# Patient Record
Sex: MALE | Race: WHITE | NOT HISPANIC OR LATINO | Employment: FULL TIME | URBAN - METROPOLITAN AREA
[De-identification: names, ages, dates, MRNs, and addresses within clinical notes are randomized per-mention and may not be internally consistent; named-entity substitution may affect disease eponyms.]

---

## 2017-01-16 ENCOUNTER — OFFICE VISIT (OUTPATIENT)
Dept: PSYCHOLOGY | Facility: CLINIC | Age: 37
End: 2017-01-16
Payer: COMMERCIAL

## 2017-01-16 DIAGNOSIS — F33.41 MAJOR DEPRESSIVE DISORDER, RECURRENT EPISODE, IN PARTIAL REMISSION (H): ICD-10-CM

## 2017-01-16 DIAGNOSIS — F90.0 ATTENTION-DEFICIT HYPERACTIVITY DISORDER, PREDOMINANTLY INATTENTIVE TYPE: Primary | ICD-10-CM

## 2017-01-16 PROCEDURE — 90834 PSYTX W PT 45 MINUTES: CPT | Performed by: PSYCHOLOGIST

## 2017-01-16 NOTE — PROGRESS NOTES
Progress Note    Client Name: William Bolton  Date: 1/16/17         Service Type: Individual      Session Start Time: 7:00  Session End Time: 7:45      Session Length: 45     Session #: 9     Attendees: Client attended alone       DATA      Progress Since Last Session (Related to Symptoms / Goals / Homework):   Symptoms: Stable    Homework: completed      Episode of Care Goals: Satisfactory progress - ACTION (Actively working towards change); Intervened by reinforcing change plan / affirming steps taken. Reports that he has been more assertive about his needs with his wife. Reports that he is making overall lifestyle changes to improve mood and manage problems with inattention.     Current / Ongoing Stressors and Concerns:   Reported that inattention and mood seem to be well managed right now. Reported that they should find out next week if his foster child's mother will have her parental rights terminated. Reported that they plan to adopt him.       Treatment Objective(s) Addressed in This Session:   reviewed successes and struggles; review lifestyle habits and ways to improve focus and mood  psychoeducation     Intervention:   Reviewed progress, successes and struggles since last session. Coached him on assertiveness skills and mood management skills to maintain gains. Reinforced healthy behavioral choices. Normalized the ups and downs of being a .  Reflected on the pressure of the uncertainties of whether they will be able to adopt their foster son.         ASSESSMENT: Current Emotional / Mental Status (status of significant symptoms):   Risk status (Self / Other harm or suicidal ideation)   Client denies current fears or concerns for personal safety.   Client denies current or recent suicidal ideation or behaviors.   Client denies current or recent homicidal ideation or behaviors.   Client denies current or recent self injurious behavior or  ideation.   Client denies other safety concerns.   A safety and risk management plan has not been developed at this time, however client was given the after-hours number should there be a change in any of these risk factors.     Appearance:   Appropriate    Eye Contact:   Good    Psychomotor Behavior: Normal    Attitude:   Cooperative    Orientation:   All   Speech    Rate / Production: Normal     Volume:  Normal    Mood:    Normal with periods of situational sadness and anxiety   Affect:    Appropriate    Thought Content:  Clear    Thought Form:  Coherent  Logical    Insight:    Good      Medication Review:   No changes to current psychiatric medication(s)     Medication Compliance:   Yes     Changes in Health Issues:   None reported     Chemical Use Review:   Substance Use: Chemical use reviewed, no active concerns identified      Tobacco Use: No current tobacco use.       Collateral Reports Completed:   Not Applicable    PLAN: (Client Tasks / Therapist Tasks / Other)  Future appointment is scheduled. Continue to use skills discussed in session to increase life balance, improve mood, and improve focus and follow through.  Continue habits of regular physical activity. Continue to reach out to spouse and other supportive people when mood is low. Call with questions or concerns.         Marlin Spivey PSYD LP                                                                                                          Treatment Plan    Client's Name: William Bolton  YOB: 1980    Date: 1/16/2017    DSM-V Diagnoses: 296.35 Major Depressive Disorder, Recurrent Episode, In partial remission _  Attention Deficit/Hyperactivity Disorder: 314.00 (F90.0): Predominantly innattentive presentation  Psychosocial / Contextual Factors: balancing work and home life  WHODAS:     Referral / Collaboration:  Referral to another professional/service is not indicated at this time..    Anticipated number of session or this episode  of care: reassess after 12 sessions      MeasurableTreatment Goal(s) related to diagnosis / functional impairment(s)  Goal 1: Client will learn and use coping skills to effectively manage inattention and depressed mood.    I will know I've met my goal when I'm feeling better.      Objective #A (Client Action)    Client will increase physical activity to at least 2x weekly to improve mood and overall sense of well-being.  Status: Continued - Date(s): 1/16/2017    Intervention(s)  Therapist will assign homework, problem-solve barriers to follow through.    Objective #B  Client will learn assertive communication skills to improve relationships.  Status: Continued - Date(s): 1/16/2017    Intervention(s)  Therapist will teach assertive communication skills.    Objective #C  Client will learn and use strategies to plan his time and organize his activities more effectively.  Status: Continued - Date(s): 1/16/2017    Intervention(s)  Therapist will teach time management and organizational techniques.      Client has reviewed and agreed to the above plan.      Marlin Spivey PSYD LP  January 16, 2017

## 2017-01-25 ENCOUNTER — MYC MEDICAL ADVICE (OUTPATIENT)
Dept: FAMILY MEDICINE | Facility: CLINIC | Age: 37
End: 2017-01-25

## 2017-01-25 DIAGNOSIS — F98.8 ADD (ATTENTION DEFICIT DISORDER) WITHOUT HYPERACTIVITY: Primary | ICD-10-CM

## 2017-01-25 RX ORDER — DEXTROAMPHETAMINE SACCHARATE, AMPHETAMINE ASPARTATE MONOHYDRATE, DEXTROAMPHETAMINE SULFATE AND AMPHETAMINE SULFATE 5; 5; 5; 5 MG/1; MG/1; MG/1; MG/1
20 CAPSULE, EXTENDED RELEASE ORAL DAILY
Qty: 30 CAPSULE | Refills: 0 | Status: CANCELLED | OUTPATIENT
Start: 2017-01-25

## 2017-01-25 NOTE — TELEPHONE ENCOUNTER
Dr. Villa please see mychart note below--3 issues. Please advise.   Adderall pended.     A prescription for  Adderall      Last Written Prescription Date: 9/23/16  Last Fill Quantity: 30,  # refills: 0 ?  Last Office Visit with [facility], [facility] or [facility] prescribing provider: 4/20/16                                         Next 5 appointments (look out 90 days)     Apr 17, 2017  7:00 AM   Return Visit with Marlin Spivey PSYD   [facility]    [address] [place] [id number]   [phone]                  Routing refill request to provider for review/approval because:  Drug not on the G refill protocol   Chary Peralta R.N.

## 2017-02-01 RX ORDER — DEXTROAMPHETAMINE SACCHARATE, AMPHETAMINE ASPARTATE MONOHYDRATE, DEXTROAMPHETAMINE SULFATE AND AMPHETAMINE SULFATE 5; 5; 5; 5 MG/1; MG/1; MG/1; MG/1
20 CAPSULE, EXTENDED RELEASE ORAL DAILY
Qty: 30 CAPSULE | Refills: 0 | Status: SHIPPED | OUTPATIENT
Start: 2017-03-04 | End: 2017-04-03

## 2017-02-01 RX ORDER — DEXTROAMPHETAMINE SACCHARATE, AMPHETAMINE ASPARTATE MONOHYDRATE, DEXTROAMPHETAMINE SULFATE AND AMPHETAMINE SULFATE 5; 5; 5; 5 MG/1; MG/1; MG/1; MG/1
20 CAPSULE, EXTENDED RELEASE ORAL DAILY
Qty: 30 CAPSULE | Refills: 0 | Status: SHIPPED | OUTPATIENT
Start: 2017-02-01 | End: 2017-03-03

## 2017-02-01 RX ORDER — DEXTROAMPHETAMINE SACCHARATE, AMPHETAMINE ASPARTATE MONOHYDRATE, DEXTROAMPHETAMINE SULFATE AND AMPHETAMINE SULFATE 5; 5; 5; 5 MG/1; MG/1; MG/1; MG/1
20 CAPSULE, EXTENDED RELEASE ORAL DAILY
Qty: 30 CAPSULE | Refills: 0 | Status: SHIPPED | OUTPATIENT
Start: 2017-04-04 | End: 2017-05-04

## 2017-02-15 ENCOUNTER — OFFICE VISIT (OUTPATIENT)
Dept: FAMILY MEDICINE | Facility: CLINIC | Age: 37
End: 2017-02-15
Payer: COMMERCIAL

## 2017-02-15 VITALS
DIASTOLIC BLOOD PRESSURE: 78 MMHG | BODY MASS INDEX: 36.16 KG/M2 | TEMPERATURE: 97.8 F | HEIGHT: 72 IN | HEART RATE: 100 BPM | SYSTOLIC BLOOD PRESSURE: 130 MMHG | WEIGHT: 267 LBS

## 2017-02-15 DIAGNOSIS — F33.0 MAJOR DEPRESSIVE DISORDER, RECURRENT EPISODE, MILD (H): ICD-10-CM

## 2017-02-15 DIAGNOSIS — Z30.09 ENCOUNTER FOR VASECTOMY COUNSELING: Primary | ICD-10-CM

## 2017-02-15 PROCEDURE — 99214 OFFICE O/P EST MOD 30 MIN: CPT | Performed by: FAMILY MEDICINE

## 2017-02-15 RX ORDER — CITALOPRAM HYDROBROMIDE 10 MG/1
10 TABLET ORAL DAILY
Qty: 10 TABLET | Refills: 0 | Status: SHIPPED | OUTPATIENT
Start: 2017-02-15 | End: 2017-06-05 | Stop reason: ALTCHOICE

## 2017-02-15 RX ORDER — BUPROPION HYDROCHLORIDE 150 MG/1
150 TABLET ORAL EVERY MORNING
Qty: 30 TABLET | Refills: 1 | Status: SHIPPED | OUTPATIENT
Start: 2017-02-15 | End: 2017-04-11

## 2017-02-15 RX ORDER — DIAZEPAM 10 MG
10 TABLET ORAL EVERY 6 HOURS PRN
Qty: 1 TABLET | Refills: 0 | Status: SHIPPED | OUTPATIENT
Start: 2017-02-15 | End: 2017-06-05

## 2017-02-15 NOTE — PATIENT INSTRUCTIONS
Understanding Vasectomy  Vasectomy is a simple, safe procedure that makes a man sterile (unable to father a child). It is the most effective birth control method for men.  Your Reproductive System  For pregnancy to occur, a man s sperm (male reproductive cells) must join with a woman s egg. To understand how a vasectomy works, you need to know how sperm are produced, stored, and released by the body:    The urethra is the tube in the center of the penis. It transports both urine and semen. When you have an orgasm, semen is ejaculated out of the urethra.    The seminal vesicles and the prostate gland secrete fluids called semen. This sticky, white fluid helps nourish sperm and carry them along.    The epididymis is a coiled tube that holds the sperm while they mature.    The scrotum is a pouch of skin that contains the testes.    The testes are glands that produce sperm and male hormones.    The vas deferens are tubes that carry the sperm from the epididymis to the penis.    Sperm (shown magnified) carry genetic material.    How a Vasectomy Works  During the procedure, the 2 vas deferens are cut and sealed off. This prevents sperm from traveling from the testes to the penis. It is the only change in your reproductive system. The testes still produce sperm. But since the sperm have nowhere to go, they die and are absorbed by your body. Only a very small amount of semen is made up of sperm. So after a vasectomy, your semen won t look or feel any different.  Keep In Mind  After a vasectomy, some active sperm still remain in the reproductive system. It will take about 3 months and numerous ejaculations before the semen is completely free of sperm. Until then, you ll need to use another form of birth control.     2675-1828 [employer]. [address] [place] [address], [place] [address]. All rights reserved. This information is not intended as a substitute for professional medical care. Always follow your healthcare  professional's instructions.        Having a Vasectomy: Before, During, and After the Procedure  Vasectomy is an outpatient (same day) procedure. It can be done in a doctor s office, clinic, or hospital. Your doctor will talk with you about preparing for surgery. He or she will also discuss the possible risks and complications with you. After the procedure, follow your doctor s advice for recovery.  Preparing for surgery     The cut ends of the vas may be tied, closed with a clip, or sealed by heat (cauterized).   Your doctor will talk with you about getting ready for surgery. You may be asked to do the following:    Sign a consent form. This must be done at least a few days before surgery. It gives your doctor permission to do the procedure. It also states that a vasectomy is not guaranteed to make you sterile.    Don t take aspirin, ibuprofen, or naproxen for 2 weeks before surgery. These medications can cause bleeding after the procedure. Also, tell your doctor if you take any medications, supplements, or herbal remedies.    Tell your doctor if you ve had any prior scrotal surgery.    Arrange for an adult family member or friend to give you a ride home after surgery.    Shower and clean your scrotum the day of surgery. Your doctor may also ask you to shave your scrotum.    Bring an athletic supporter (jockstrap) or pair of snug cotton briefs to the doctor s office or hospital.    Eat no more than a light snack before surgery.  During surgery  The entire procedure usually lasts less than 30 minutes.    You ll be asked to undress and lie on a table.    You may be given medication to help you relax. To prevent pain during surgery, you ll be given an injection of local anesthetic in your scrotum or lower groin.    Once the area is numb, one or two small incisions are made in the scrotum. This may be done with a scalpel or with a pointed clamp (no-scalpel method).    The vas deferens are lifted through the incision  and cut. The ends of the vas are then sealed off using one of several methods.    If needed, the incision is closed with stitches.    You can rest for a while until you re ready to go home.  Recovering at home  For about a week, your scrotum may look bruised and slightly swollen. You may also have a small amount of bloody discharge from the incision. This is normal.  To help make your recovery more comfortable, follow the tips below.    Stay off your feet as much as possible for the first 2 days. Try to lie flat on a bed or sofa.    Wear an athletic supporter or snug cotton briefs for support.    Reduce swelling by placing an ice pack or bag of frozen peas in a thin towel. Then place the towel on your scrotum.    Take medications with acetaminophen (such as Tylenol) to relieve any discomfort. Don t use aspirin, ibuprofen, or naproxen.    Wait 48 hours before bathing.    Avoid heavy lifting or exercise for 7 days.    Ask your doctor how long to wait before having sex again. Remember: You must use another form of birth control until you re completely sterile.  When to seek medical care  Call your doctor if you notice any of the following after surgery:    Increasing pain or swelling in your scrotum    A large black-and-blue area, or a growing lump    Fever or chills    Increasing redness or drainage of the incision    Trouble urinating   Sex after vasectomy  Vasectomy doesn t change your sexual function. So when you start having sex again, it should feel the same as before. A vasectomy also shouldn t affect your relationship with your partner. It s important to remember, though, that you won t become sterile right away. It will take time before you can have sex without the need for birth control.    Until you re sterile: After a vasectomy, some active sperm still remain in your semen. It will take time and many ejaculations before the sperm are completely gone. During this period, you must use another birth control  method to prevent pregnancy. To make sure no sperm are left in your semen, you ll need to have one or more semen exams. You usually collect a semen sample at home and bring it to a lab. The sample is then checked under a microscope. You re sterile only when these samples show no evidence of sperm. Ask your doctor whether additional follow-up is needed.    After you re sterile: After your doctor tells you you re sterile, you no longer need to use any form of birth control. You re free to have sex without the fear of unwanted pregnancy. However, a vasectomy does not protect you from sexually transmitted diseases (STDs). If you have more than one sex partner, be sure to practice safer sex by using condoms.    8436-5634 [employer]. [address] [place] [address], [place] [address]. All rights reserved. This information is not intended as a substitute for professional medical care. Always follow your healthcare professional's instructions.        Vasectomy: Risks and Complications  A vasectomy is an outpatient procedure. This means you ll go home the same day. It s done in a doctor s office, clinic, or hospital. Before your procedure, you ll be asked to read and sign a consent form. This form states you re aware of the possible risks and complications. It also says that you understand that the procedure, though most often successful, can t promise to make you sterile. Be sure you have all your questions answered before you sign this form. Below is a list of risks and possible complications of the procedure.  Risks and Possible Complications of Vasectomy  Vasectomy is safe. But it does have risks. They include the following:    Bleeding or infection    Sperm granuloma. This is a small, harmless lump. It may form where the vas deferens is sealed off.    Sperm buildup (congestion). This may cause soreness in the testes. Anti-inflammatory medications can provide relief.    Epididymitis. This is inflammation that may  cause scrotal aching. It often goes away without treatment. Anti-inflammatory medications can provide relief.    Reconnection of the vas deferens. This can occur in rare cases. It makes you fertile again. This may result in an unplanned pregnancy.    Sperm antibodies. Develpoing antibodies is a common response of your body to the absorbed sperm. The antibodies can make you sterile. This is true even if you later try to reverse your vasectomy.    Long-term testicular discomfort. This may occur after surgery. But it s very rare.     3265-4000 [employer]. [address] [place] [address]. All rights reserved. This information is not intended as a substitute for professional medical care. Always follow your healthcare professional's instructions.

## 2017-02-15 NOTE — MR AVS SNAPSHOT
After Visit Summary   2/15/2017    William Bolton    MRN: [id number]           Patient Information     Date Of Birth          1980        Visit Information        Provider Department      2/15/2017 4:20 PM Juan Antonio Villa Jr., MD [facility]        Today's Diagnoses     Encounter for vasectomy counseling    -  1      Care Instructions      Understanding Vasectomy  Vasectomy is a simple, safe procedure that makes a man sterile (unable to father a child). It is the most effective birth control method for men.  Your Reproductive System  For pregnancy to occur, a man s sperm (male reproductive cells) must join with a woman s egg. To understand how a vasectomy works, you need to know how sperm are produced, stored, and released by the body:    The urethra is the tube in the center of the penis. It transports both urine and semen. When you have an orgasm, semen is ejaculated out of the urethra.    The seminal vesicles and the prostate gland secrete fluids called semen. This sticky, white fluid helps nourish sperm and carry them along.    The epididymis is a coiled tube that holds the sperm while they mature.    The scrotum is a pouch of skin that contains the testes.    The testes are glands that produce sperm and male hormones.    The vas deferens are tubes that carry the sperm from the epididymis to the penis.    Sperm (shown magnified) carry genetic material.    How a Vasectomy Works  During the procedure, the 2 vas deferens are cut and sealed off. This prevents sperm from traveling from the testes to the penis. It is the only change in your reproductive system. The testes still produce sperm. But since the sperm have nowhere to go, they die and are absorbed by your body. Only a very small amount of semen is made up of sperm. So after a vasectomy, your semen won t look or feel any different.  Keep In Mind  After a vasectomy, some active sperm still remain in the reproductive system. It  will take about 3 months and numerous ejaculations before the semen is completely free of sperm. Until then, you ll need to use another form of birth control.     6191-5620 [employer]. [address] [place] [address], [place] [address]. All rights reserved. This information is not intended as a substitute for professional medical care. Always follow your healthcare professional's instructions.        Having a Vasectomy: Before, During, and After the Procedure  Vasectomy is an outpatient (same day) procedure. It can be done in a doctor s office, clinic, or hospital. Your doctor will talk with you about preparing for surgery. He or she will also discuss the possible risks and complications with you. After the procedure, follow your doctor s advice for recovery.  Preparing for surgery     The cut ends of the vas may be tied, closed with a clip, or sealed by heat (cauterized).   Your doctor will talk with you about getting ready for surgery. You may be asked to do the following:    Sign a consent form. This must be done at least a few days before surgery. It gives your doctor permission to do the procedure. It also states that a vasectomy is not guaranteed to make you sterile.    Don t take aspirin, ibuprofen, or naproxen for 2 weeks before surgery. These medications can cause bleeding after the procedure. Also, tell your doctor if you take any medications, supplements, or herbal remedies.    Tell your doctor if you ve had any prior scrotal surgery.    Arrange for an adult family member or friend to give you a ride home after surgery.    Shower and clean your scrotum the day of surgery. Your doctor may also ask you to shave your scrotum.    Bring an athletic supporter (jockstalecia) or pair of snug cotton briefs to the doctor s office or hospital.    Eat no more than a light snack before surgery.  During surgery  The entire procedure usually lasts less than 30 minutes.    You ll be asked to undress and lie on a  table.    You may be given medication to help you relax. To prevent pain during surgery, you ll be given an injection of local anesthetic in your scrotum or lower groin.    Once the area is numb, one or two small incisions are made in the scrotum. This may be done with a scalpel or with a pointed clamp (no-scalpel method).    The vas deferens are lifted through the incision and cut. The ends of the vas are then sealed off using one of several methods.    If needed, the incision is closed with stitches.    You can rest for a while until you re ready to go home.  Recovering at home  For about a week, your scrotum may look bruised and slightly swollen. You may also have a small amount of bloody discharge from the incision. This is normal.  To help make your recovery more comfortable, follow the tips below.    Stay off your feet as much as possible for the first 2 days. Try to lie flat on a bed or sofa.    Wear an athletic supporter or snug cotton briefs for support.    Reduce swelling by placing an ice pack or bag of frozen peas in a thin towel. Then place the towel on your scrotum.    Take medications with acetaminophen (such as Tylenol) to relieve any discomfort. Don t use aspirin, ibuprofen, or naproxen.    Wait 48 hours before bathing.    Avoid heavy lifting or exercise for 7 days.    Ask your doctor how long to wait before having sex again. Remember: You must use another form of birth control until you re completely sterile.  When to seek medical care  Call your doctor if you notice any of the following after surgery:    Increasing pain or swelling in your scrotum    A large black-and-blue area, or a growing lump    Fever or chills    Increasing redness or drainage of the incision    Trouble urinating   Sex after vasectomy  Vasectomy doesn t change your sexual function. So when you start having sex again, it should feel the same as before. A vasectomy also shouldn t affect your relationship with your partner. It s  important to remember, though, that you won t become sterile right away. It will take time before you can have sex without the need for birth control.    Until you re sterile: After a vasectomy, some active sperm still remain in your semen. It will take time and many ejaculations before the sperm are completely gone. During this period, you must use another birth control method to prevent pregnancy. To make sure no sperm are left in your semen, you ll need to have one or more semen exams. You usually collect a semen sample at home and bring it to a lab. The sample is then checked under a microscope. You re sterile only when these samples show no evidence of sperm. Ask your doctor whether additional follow-up is needed.    After you re sterile: After your doctor tells you you re sterile, you no longer need to use any form of birth control. You re free to have sex without the fear of unwanted pregnancy. However, a vasectomy does not protect you from sexually transmitted diseases (STDs). If you have more than one sex partner, be sure to practice safer sex by using condoms.    6246-2315 [employer]. [address] [place] [address]. All rights reserved. This information is not intended as a substitute for professional medical care. Always follow your healthcare professional's instructions.        Vasectomy: Risks and Complications  A vasectomy is an outpatient procedure. This means you ll go home the same day. It s done in a doctor s office, clinic, or hospital. Before your procedure, you ll be asked to read and sign a consent form. This form states you re aware of the possible risks and complications. It also says that you understand that the procedure, though most often successful, can t promise to make you sterile. Be sure you have all your questions answered before you sign this form. Below is a list of risks and possible complications of the procedure.  Risks and Possible Complications of  Vasectomy  Vasectomy is safe. But it does have risks. They include the following:    Bleeding or infection    Sperm granuloma. This is a small, harmless lump. It may form where the vas deferens is sealed off.    Sperm buildup (congestion). This may cause soreness in the testes. Anti-inflammatory medications can provide relief.    Epididymitis. This is inflammation that may cause scrotal aching. It often goes away without treatment. Anti-inflammatory medications can provide relief.    Reconnection of the vas deferens. This can occur in rare cases. It makes you fertile again. This may result in an unplanned pregnancy.    Sperm antibodies. Develpoing antibodies is a common response of your body to the absorbed sperm. The antibodies can make you sterile. This is true even if you later try to reverse your vasectomy.    Long-term testicular discomfort. This may occur after surgery. But it s very rare.     3220-6630 [employer]. [address] [place] [address]. All rights reserved. This information is not intended as a substitute for professional medical care. Always follow your healthcare professional's instructions.              Follow-ups after your visit        Your next 10 appointments already scheduled     Apr 17, 2017  7:00 AM CDT   Return Visit with Marlin Spivey PsyD   [facility] [place] ([facility] [place])    [address] [place] [id number] [phone]              Who to contact     If you have questions or need follow up information about today's clinic visit or your schedule please contact [facility] [place] directly at [phone].  Normal or non-critical lab and imaging results will be communicated to you by MyChart, letter or phone within 4 business days after the clinic has received the results. If you do not hear from us within 7 days, please contact the clinic through MyChart or phone. If you have a critical or abnormal lab  result, we will notify you by phone as soon as possible.  Submit refill requests through [facility] or call your pharmacy and they will forward the refill request to us. Please allow 3 business days for your refill to be completed.          Additional Information About Your Visit        [employer] Information     [facility] [employer] gives you secure access to your electronic health record. If you see a primary care provider, you can also send messages to your care team and make appointments. If you have questions, please call your primary care clinic.  If you do not have a primary care provider, please call [phone] and they will assist you.        Care EveryWhere ID     This is your Care EveryWhere ID. This could be used by other organizations to access your [facility] medical records  [id number]        Your Vitals Were     Pulse Temperature Height BMI (Body Mass Index)          100 97.8  F (36.6  C) (Oral) 6' (1.829 m) 36.21 kg/m2         Blood Pressure from Last 3 Encounters:   02/15/17 130/78   03/08/16 122/82   12/07/15 118/76    Weight from Last 3 Encounters:   02/15/17 267 lb (121.1 kg)   03/08/16 235 lb (106.6 kg)   12/07/15 228 lb (103.4 kg)              Today, you had the following     No orders found for display         Today's Medication Changes          These changes are accurate as of: 2/15/17  4:57 PM.  If you have any questions, ask your nurse or doctor.               Start taking these medicines.        Dose/Directions    diazepam 10 MG tablet   Commonly known as:  VALIUM   Used for:  Encounter for vasectomy counseling   Started by:  Juan Antonio Villa Jr., MD        Dose:  10 mg   Take 1 tablet (10 mg) by mouth every 6 hours as needed for anxiety or sleep Take 30-60 minutes before procedure.  Do not operate a vehicle after taking this medication.   Quantity:  1 tablet   Refills:  0            Where to get your medicines      Some of these will need a paper prescription and others can be bought over the counter.   Ask your nurse if you have questions.     Bring a paper prescription for each of these medications     diazepam 10 MG tablet                Primary Care Provider Office Phone # Fax #    Juan Antonio Villa Jr., [phone]       [facility] [address]  [place] [address]        Thank you!     Thank you for choosing [facility]  for your care. Our goal is always to provide you with excellent care. Hearing back from our patients is one way we can continue to improve our services. Please take a few minutes to complete the written survey that you may receive in the mail after your visit with us. Thank you!             Your Updated Medication List - Protect others around you: Learn how to safely use, store and throw away your medicines at www.disposemymeds.org.          This list is accurate as of: 2/15/17  4:57 PM.  Always use your most recent med list.                   Brand Name Dispense Instructions for use    * amphetamine-dextroamphetamine 20 MG per 24 hr capsule    ADDERALL XR    30 capsule    Take 1 capsule (20 mg) by mouth daily       * amphetamine-dextroamphetamine 20 MG per 24 hr capsule   Start taking on:  3/4/2017    ADDERALL XR    30 capsule    Take 1 capsule (20 mg) by mouth daily       * amphetamine-dextroamphetamine 20 MG per 24 hr capsule   Start taking on:  4/4/2017    ADDERALL XR    30 capsule    Take 1 capsule (20 mg) by mouth daily       citalopram 40 MG tablet    celeXA    90 tablet    Take 1 tablet (40 mg) by mouth daily       diazepam 10 MG tablet    VALIUM    1 tablet    Take 1 tablet (10 mg) by mouth every 6 hours as needed for anxiety or sleep Take 30-60 minutes before procedure.  Do not operate a vehicle after taking this medication.       * Notice:  This list has 3 medication(s) that are the same as other medications prescribed for you. Read the directions carefully, and ask your doctor or other care provider to review them with you.

## 2017-02-15 NOTE — NURSING NOTE
Chief Complaint   Patient presents with     Consult       Initial /78 (BP Location: Right arm, Patient Position: Chair, Cuff Size: Adult Large)  Pulse 100  Temp 97.8  F (36.6  C) (Oral)  Ht 6' (1.829 m)  Wt 267 lb (121.1 kg)  BMI 36.21 kg/m2 Estimated body mass index is 36.21 kg/(m^2) as calculated from the following:    Height as of this encounter: 6' (1.829 m).    Weight as of this encounter: 267 lb (121.1 kg).  Medication Reconciliation: complete

## 2017-02-15 NOTE — PROGRESS NOTES
SUBJECTIVE:                                                    William Bolton is a 37 year old male who presents to clinic today for the following health issues:      Vasectomy Consult     Depression Followup    Status since last visit: Stable, but had episode of erectile dysfunction on citalopram and wonders if he should consider changing medications.  Endorses that libido is down a little.    See PHQ-9 for current symptoms.  Other associated symptoms: None    Complicating factors:   Significant life event:  No   Current substance abuse:  None  Anxiety or Panic symptoms:  No    PHQ-9  English PHQ-9   Any Language            Problem list and histories reviewed & adjusted, as indicated.  Additional history: none    Indication: Vasectomy Consultation    S:  This patient has presented for discussion of vasectomy.  He and his wife have agreed they are done having children and desire permanent sterilization for him. The procedure was explained in detail using diagrams published by [employer].  The permanency and effectiveness of this operation were explained in detail.  Complications were also explained in detail, including vasectomy failure rate, bleeding, infection, scarring, chronic pain, and epididymitis.      The patient was told to shave his scrotal area the day before the procedure to prep for surgery.  He was also told he'd need to remain inactive for 48-72 hours afterwards, no lifting more than 20 lbs or sexual intercourse for two weeks.  The patient was also told that he should have a post-operative sperm sample checked and should refrain from unprotected sexual intercourse until the sperm sample shows no sperm.      Approximately 20 minutes were used in the discussion of the vasectomy and questions were answered.              Problem list, Medication list, Allergies, and Medical/Social/Surgical histories reviewed in EPIC and updated as appropriate.    ROS:  Constitutional, HEENT, cardiovascular,  pulmonary, gi and gu systems are negative, except as otherwise noted.    OBJECTIVE:                                                    /78 (BP Location: Right arm, Patient Position: Chair, Cuff Size: Adult Large)  Pulse 100  Temp 97.8  F (36.6  C) (Oral)  Ht 6' (1.829 m)  Wt 267 lb (121.1 kg)  BMI 36.21 kg/m2  Body mass index is 36.21 kg/(m^2).  GENERAL: healthy, alert and no distress  PSYCH: mentation appears normal, affect normal/bright  The vas deferens were found bilaterally.  Left side was somewhat difficult to isolate.    Diagnostic Test Results:  none      ASSESSMENT/PLAN:                                                            1. Encounter for vasectomy counseling  Schedule vasectomy.  Patient was offered pre-operative Valium which he accepted.  - diazepam (VALIUM) 10 MG tablet; Take 1 tablet (10 mg) by mouth every 6 hours as needed for anxiety or sleep Take 30-60 minutes before procedure.  Do not operate a vehicle after taking this medication.  Dispense: 1 tablet; Refill: 0    2. Major depressive disorder, recurrent episode, mild (H)  Will decrease citalopram dose to 20 mg daily for 10 days then 10 mg daily for 10 days then stop.  Start Wellbutrin during the citalopram taper.  Recheck in four weeks when he presents for his vasectomy.  - citalopram (CELEXA) 10 MG tablet; Take 1 tablet (10 mg) by mouth daily  Dispense: 10 tablet; Refill: 0  - buPROPion (WELLBUTRIN XL) 150 MG 24 hr tablet; Take 1 tablet (150 mg) by mouth every morning  Dispense: 30 tablet; Refill: 1    See Patient Instructions    Jr Juan Antonio Villa MD  [facility]

## 2017-03-23 ENCOUNTER — TELEPHONE (OUTPATIENT)
Dept: FAMILY MEDICINE | Facility: CLINIC | Age: 37
End: 2017-03-23

## 2017-03-23 NOTE — TELEPHONE ENCOUNTER
Patient calling to report that he was recently exposed to pneumonia. His youngest child was just diagnosed with this and he is wondering if this could impact his upcoming vasectomy procedure on 3/29/17. He also has another child with a cold. Patient reports that as of right now, he has a mild cough and some sinus congestion. Nothing suggestive of pneumonia at this time. Denies fevers, fatigue, achiness, wheezing, SOB, or any other acute symptoms at this time. Advised patient to continue to monitor symptoms. If new or worsening symptoms occur prior to appointment call clinic back to speak with nurse again. Otherwise, should be fine to proceed with vasectomy on 3/29/17.    Patient verbalized understanding and agrees with plan.    Will call back if further questions or concerns.    Colette Butler, RN, BSN

## 2017-03-25 ENCOUNTER — OFFICE VISIT (OUTPATIENT)
Dept: FAMILY MEDICINE | Facility: CLINIC | Age: 37
End: 2017-03-25
Payer: COMMERCIAL

## 2017-03-25 VITALS
HEART RATE: 105 BPM | SYSTOLIC BLOOD PRESSURE: 120 MMHG | OXYGEN SATURATION: 98 % | TEMPERATURE: 99 F | WEIGHT: 264 LBS | HEIGHT: 72 IN | BODY MASS INDEX: 35.76 KG/M2 | DIASTOLIC BLOOD PRESSURE: 82 MMHG

## 2017-03-25 DIAGNOSIS — J06.9 VIRAL URI WITH COUGH: Primary | ICD-10-CM

## 2017-03-25 PROCEDURE — 99213 OFFICE O/P EST LOW 20 MIN: CPT | Performed by: FAMILY MEDICINE

## 2017-03-25 NOTE — MR AVS SNAPSHOT
"              After Visit Summary   3/25/2017    William Bolton    MRN: [id number]           Patient Information     Date Of Birth          1980        Visit Information        Provider Department      3/25/2017 9:00 AM Juan Antonio Villa Jr., MD [facility]        Today's Diagnoses     Viral URI with cough    -  1      Care Instructions      Viral Syndrome (Adult)  A viral illness may cause a number of symptoms. The symptoms depend on the part of the body that the virus affects. If it settles in the nose, throat, and lungs, it may cause cough, sore throat, congestion, and sometimes headache. If it settles in the stomach and intestinal tract, it may cause vomiting and diarrhea. Sometimes it causes vague symptoms like \"aching all over,\" feeling tired, loss of appetite, or fever.  A viral illness usually lasts 1 to 2 weeks, but sometimes it lasts longer. In some cases, a more serious infection can look like a viral syndrome in the first few days of the illness. You may need another exam and additional tests to know the difference. Watch for the warning signs listed below.  Home care  Follow these guidelines for taking care of yourself at home:    If symptoms are severe, rest at home for the first 2 to 3 days.    Stay away from cigarette smoke - both your smoke and the smoke from others.    You may use over-the-counter medication acetaminophen or ibuprofen for fever, muscle aching, and headache, unless another medicine was prescribed for this. If you have chronic liver or kidney disease or ever had a stomach ulcer or GI bleeding, talk with your doctor before using these medicines . No one who is younger than 18 and ill with a fever should take aspirin. It may cause severe disease or death liver damage .    Your appetite may be poor, so a light diet is fine. Avoid dehydration by drinking 8 to 12 8-ounce glasses of fluids each day. This may include water; orange juice; lemonade; apple, grape, and " cranberry juice; clear fruit drinks; electrolyte replacement and sports drinks; and decaffeinated teas and coffee. If you have been diagnosed with a kidney disease, ask your doctor how much and what types of fluids you should drink to prevent dehydration. If you have kidney disease, drinking too much fluid can cause it build up in the your body and be dangerous to your health.    Over-the-counter remedies won't shorten the length of the illness but may be helpful for cough, sore throat; and nasal and sinus congestion. Don't use decongestants if you have high blood pressure.  Follow-up care  Follow up with your health care provider if you do not improve over the next week.  When to seek medical advice  Call your healthcare provider right away if any of these occur:    Cough with lots of colored sputum (mucus) or blood in your sputum    Chest pain, shortness of breath, wheezing, or difficulty breathing    Severe headache; face, neck, or ear pain    Severe, constant pain in the lower right side of your belly (abdominal)    Continued vomiting (can t keep liquids down)    Frequent diarrhea (more than 5 times a day); blood (red or black color) or mucus in diarrhea    Feeling weak, dizzy, or like you are going to faint    Extreme thirst    Fever of 100.4 F (38 C) or higher, or as directed by your healthcare provider  Call 911  Get emergency medical care if any of the following occur:    Convulsion    Feeling weak, dizzy, or like you are going to faint    Chest pain, shortness of breath, wheezing, or difficulty breathing    3848-7544 [employer]. [address] [place] [address], [place] [address]. All rights reserved. This information is not intended as a substitute for professional medical care. Always follow your healthcare professional's instructions.              Follow-ups after your visit        Your next 10 appointments already scheduled     Mar 29, 2017  1:40 PM CDT   Office Visit with Juan Antonio Villa Jr.,  MD, SV PROC RM 1   [facility] ([facility])    [address] [place] [address] [phone]           Bring a current list of meds and any records pertaining to this visit.  For Physicals, please bring immunization records and any forms needing to be filled out.  Please arrive 10 minutes early to complete paperwork.            Apr 17, 2017  7:00 AM CDT   Return Visit with Marlin Spivey PsyD   [facility] [place] ([facility] [place])    [address]  [place] [address] [phone]              Who to contact     If you have questions or need follow up information about today's clinic visit or your schedule please contact [facility] directly at [phone].  Normal or non-critical lab and imaging results will be communicated to you by MyChart, letter or phone within 4 business days after the clinic has received the results. If you do not hear from us within 7 days, please contact the clinic through [employer] or phone. If you have a critical or abnormal lab result, we will notify you by phone as soon as possible.  Submit refill requests through [employer] or call your pharmacy and they will forward the refill request to us. Please allow 3 business days for your refill to be completed.          Additional Information About Your Visit        MyChart Information     [employer] gives you secure access to your electronic health record. If you see a primary care provider, you can also send messages to your care team and make appointments. If you have questions, please call your primary care clinic.  If you do not have a primary care provider, please call [phone] and they will assist you.        Care EveryWhere ID     This is your Care EveryWhere ID. This could be used by other organizations to access your [place] medical records  [id number]        Your Vitals Were     Pulse Temperature Height Pulse Oximetry BMI (Body Mass Index)        105 99  F (37.2  C) (Oral) 6' (1.829 m) 98% 35.8 kg/m2        Blood Pressure from Last 3 Encounters:   03/25/17 120/82   02/15/17 130/78   03/08/16 122/82    Weight from Last 3 Encounters:   03/25/17 264 lb (119.7 kg)   02/15/17 267 lb (121.1 kg)   03/08/16 235 lb (106.6 kg)              Today, you had the following     No orders found for display       Primary Care Provider Office Phone # Fax #    Juan Antonio Villa Jr., [phone]       [facility] [address] [address]        Thank you!     Thank you for choosing [facility]  for your care. Our goal is always to provide you with excellent care. Hearing back from our patients is one way we can continue to improve our services. Please take a few minutes to complete the written survey that you may receive in the mail after your visit with us. Thank you!             Your Updated Medication List - Protect others around you: Learn how to safely use, store and throw away your medicines at www.disposemymeds.org.          This list is accurate as of: 3/25/17  9:24 AM.  Always use your most recent med list.                   Brand Name Dispense Instructions for use    * amphetamine-dextroamphetamine 20 MG per 24 hr capsule    ADDERALL XR    30 capsule    Take 1 capsule (20 mg) by mouth daily       * amphetamine-dextroamphetamine 20 MG per 24 hr capsule   Start taking on:  4/4/2017    ADDERALL XR    30 capsule    Take 1 capsule (20 mg) by mouth daily       buPROPion 150 MG 24 hr tablet    WELLBUTRIN XL    30 tablet    Take 1 tablet (150 mg) by mouth every morning       citalopram 10 MG tablet    celeXA    10 tablet    Take 1 tablet (10 mg) by mouth daily       diazepam 10 MG tablet    VALIUM    1 tablet    Take 1 tablet (10 mg) by mouth every 6 hours as needed for anxiety or sleep Take 30-60 minutes before procedure.  Do not operate a vehicle after taking this medication.       * Notice:  This list has 2  medication(s) that are the same as other medications prescribed for you. Read the directions carefully, and ask your doctor or other care provider to review them with you.

## 2017-03-25 NOTE — NURSING NOTE
Chief Complaint   Patient presents with     Cough       Initial /82 (BP Location: Right arm, Cuff Size: Adult Large)  Pulse 105  Temp 99  F (37.2  C) (Oral)  Ht 6' (1.829 m)  Wt 264 lb (119.7 kg)  SpO2 98%  BMI 35.8 kg/m2 Estimated body mass index is 35.8 kg/(m^2) as calculated from the following:    Height as of this encounter: 6' (1.829 m).    Weight as of this encounter: 264 lb (119.7 kg).  Medication Reconciliation: complete

## 2017-03-25 NOTE — PATIENT INSTRUCTIONS
"  Viral Syndrome (Adult)  A viral illness may cause a number of symptoms. The symptoms depend on the part of the body that the virus affects. If it settles in the nose, throat, and lungs, it may cause cough, sore throat, congestion, and sometimes headache. If it settles in the stomach and intestinal tract, it may cause vomiting and diarrhea. Sometimes it causes vague symptoms like \"aching all over,\" feeling tired, loss of appetite, or fever.  A viral illness usually lasts 1 to 2 weeks, but sometimes it lasts longer. In some cases, a more serious infection can look like a viral syndrome in the first few days of the illness. You may need another exam and additional tests to know the difference. Watch for the warning signs listed below.  Home care  Follow these guidelines for taking care of yourself at home:    If symptoms are severe, rest at home for the first 2 to 3 days.    Stay away from cigarette smoke - both your smoke and the smoke from others.    You may use over-the-counter medication acetaminophen or ibuprofen for fever, muscle aching, and headache, unless another medicine was prescribed for this. If you have chronic liver or kidney disease or ever had a stomach ulcer or GI bleeding, talk with your doctor before using these medicines . No one who is younger than 18 and ill with a fever should take aspirin. It may cause severe disease or death liver damage .    Your appetite may be poor, so a light diet is fine. Avoid dehydration by drinking 8 to 12 8-ounce glasses of fluids each day. This may include water; orange juice; lemonade; apple, grape, and cranberry juice; clear fruit drinks; electrolyte replacement and sports drinks; and decaffeinated teas and coffee. If you have been diagnosed with a kidney disease, ask your doctor how much and what types of fluids you should drink to prevent dehydration. If you have kidney disease, drinking too much fluid can cause it build up in the your body and be dangerous to " your health.    Over-the-counter remedies won't shorten the length of the illness but may be helpful for cough, sore throat; and nasal and sinus congestion. Don't use decongestants if you have high blood pressure.  Follow-up care  Follow up with your health care provider if you do not improve over the next week.  When to seek medical advice  Call your healthcare provider right away if any of these occur:    Cough with lots of colored sputum (mucus) or blood in your sputum    Chest pain, shortness of breath, wheezing, or difficulty breathing    Severe headache; face, neck, or ear pain    Severe, constant pain in the lower right side of your belly (abdominal)    Continued vomiting (can t keep liquids down)    Frequent diarrhea (more than 5 times a day); blood (red or black color) or mucus in diarrhea    Feeling weak, dizzy, or like you are going to faint    Extreme thirst    Fever of 100.4 F (38 C) or higher, or as directed by your healthcare provider  Call 911  Get emergency medical care if any of the following occur:    Convulsion    Feeling weak, dizzy, or like you are going to faint    Chest pain, shortness of breath, wheezing, or difficulty breathing    6499-9655 [employer]. [address] [place] [address], [place] [address]. All rights reserved. This information is not intended as a substitute for professional medical care. Always follow your healthcare professional's instructions.

## 2017-03-29 ENCOUNTER — OFFICE VISIT (OUTPATIENT)
Dept: FAMILY MEDICINE | Facility: CLINIC | Age: 37
End: 2017-03-29
Payer: COMMERCIAL

## 2017-03-29 VITALS
TEMPERATURE: 96.9 F | OXYGEN SATURATION: 99 % | BODY MASS INDEX: 35.76 KG/M2 | WEIGHT: 264 LBS | HEIGHT: 72 IN | SYSTOLIC BLOOD PRESSURE: 128 MMHG | HEART RATE: 104 BPM | DIASTOLIC BLOOD PRESSURE: 86 MMHG

## 2017-03-29 DIAGNOSIS — Z30.2 ENCOUNTER FOR VASECTOMY: Primary | ICD-10-CM

## 2017-03-29 PROCEDURE — 55250 REMOVAL OF SPERM DUCT(S): CPT | Performed by: FAMILY MEDICINE

## 2017-03-29 PROCEDURE — 88302 TISSUE EXAM BY PATHOLOGIST: CPT | Performed by: FAMILY MEDICINE

## 2017-03-29 NOTE — NURSING NOTE
Chief Complaint   Patient presents with     Vasectomy       Initial /86  Pulse 104  Temp 96.9  F (36.1  C) (Tympanic)  Ht 6' (1.829 m)  Wt 264 lb (119.7 kg)  SpO2 99%  BMI 35.8 kg/m2 Estimated body mass index is 35.8 kg/(m^2) as calculated from the following:    Height as of this encounter: 6' (1.829 m).    Weight as of this encounter: 264 lb (119.7 kg).  Medication Reconciliation: complete

## 2017-03-29 NOTE — MR AVS SNAPSHOT
After Visit Summary   3/29/2017    William Bolton    MRN: [id number]           Patient Information     Date Of Birth          1980        Visit Information        Provider Department      3/29/2017 1:40 PM Juan Antonio Villa Jr., MD; SV PROC RM 1 [facility]        Today's Diagnoses     Encounter for vasectomy    -  1       Follow-ups after your visit        Follow-up notes from your care team     Return in about 90 days (around 6/27/2017) for semen analysis, Lab Work.      Your next 10 appointments already scheduled     Apr 17, 2017  7:00 AM CDT   Return Visit with Marlin Spivey PsyD   [facility] [place] ([facility] [place])    [address]  [place] [address] [phone]              Who to contact     If you have questions or need follow up information about today's clinic visit or your schedule please contact [facility] [place] directly at [phone].  Normal or non-critical lab and imaging results will be communicated to you by [employer], letter or phone within 4 business days after the clinic has received the results. If you do not hear from us within 7 days, please contact the clinic through [employer] or phone. If you have a critical or abnormal lab result, we will notify you by phone as soon as possible.  Submit refill requests through [employer] or call your pharmacy and they will forward the refill request to us. Please allow 3 business days for your refill to be completed.          Additional Information About Your Visit        [employer] Information     [employer] gives you secure access to your electronic health record. If you see a primary care provider, you can also send messages to your care team and make appointments. If you have questions, please call your primary care clinic.  If you do not have a primary care provider, please call [phone] and they will assist you.        Care EveryWhere ID     This is your Care EveryWhere  ID. This could be used by other organizations to access your [place] medical records  [id number]        Your Vitals Were     Pulse Temperature Height Pulse Oximetry BMI (Body Mass Index)       104 96.9  F (36.1  C) (Tympanic) 6' (1.829 m) 99% 35.8 kg/m2        Blood Pressure from Last 3 Encounters:   03/25/17 120/82   02/15/17 130/78   03/08/16 122/82    Weight from Last 3 Encounters:   03/29/17 264 lb (119.7 kg)   03/25/17 264 lb (119.7 kg)   02/15/17 267 lb (121.1 kg)              Today, you had the following     No orders found for display       Primary Care Provider Office Phone # Fax #    Juan Antonio Villa Jr., [phone]       [facility] [address]  [place] [address]        Thank you!     Thank you for choosing [facility]  for your care. Our goal is always to provide you with excellent care. Hearing back from our patients is one way we can continue to improve our services. Please take a few minutes to complete the written survey that you may receive in the mail after your visit with us. Thank you!             Your Updated Medication List - Protect others around you: Learn how to safely use, store and throw away your medicines at www.disposemymeds.org.          This list is accurate as of: 3/29/17  2:55 PM.  Always use your most recent med list.                   Brand Name Dispense Instructions for use    * amphetamine-dextroamphetamine 20 MG per 24 hr capsule    ADDERALL XR    30 capsule    Take 1 capsule (20 mg) by mouth daily       * amphetamine-dextroamphetamine 20 MG per 24 hr capsule   Start taking on:  4/4/2017    ADDERALL XR    30 capsule    Take 1 capsule (20 mg) by mouth daily       buPROPion 150 MG 24 hr tablet    WELLBUTRIN XL    30 tablet    Take 1 tablet (150 mg) by mouth every morning       citalopram 10 MG tablet    celeXA    10 tablet    Take 1 tablet (10 mg) by mouth daily       diazepam 10 MG tablet    VALIUM    1 tablet    Take 1 tablet  (10 mg) by mouth every 6 hours as needed for anxiety or sleep Take 30-60 minutes before procedure.  Do not operate a vehicle after taking this medication.       * Notice:  This list has 2 medication(s) that are the same as other medications prescribed for you. Read the directions carefully, and ask your doctor or other care provider to review them with you.

## 2017-03-29 NOTE — PROGRESS NOTES
The patient comes in today for a vasectomy.  The patient and his significant other have previously been in and we discussed the other options for birth control, the risks and benefits of the procedure.  Details of those risks and benefits have been noted in the consent form which has been signed.  This includes the potential for bleeding, potential for infection.  Data presented suggesting an increased risk of prostate carcinoma and potential for sperm granuloma formation.    The patient was placed in a supine position on the procedure table.  Scrotal shaving was done by the patient at home.  Sterilely prepped and draped in the usual fashion.  The right vas was first identified and brought up to the midline raphae where a small wheal of Lidocaine without epinephrine was placed in the skin overlying the vas and further anesthesia was injected in to the vas sheath.  The vas was then secured through the scrotal skin with a towel clamp.  A #15 scalpel was then used to incise the skin and then I dissected out the vas free of adventitial tissue.   When a segment of vas was clearly freed up, two titanium surgical clips were placed on the proximal and one on the distal end of a .5-1 cm. segment of the vas.  The vas material between the clips was then cut and removed.  Each ligated end was then cauterized.  When the sterile field was completely dry, the vas was returned to the scrotal sac.      Attention was then turned to the opposite side and proceeded in similar fashion to obtain specimen.  The vas was brought up to the same opening, injected with Lidocaine along the vas sheath and grasped with the clamp, and proceeded in the above fashion.  A 4-0 Dexon suture was used for the scrotal skin incision and a single interrupted suture was used to re-approximate the skin edges.    ASSESSMENT:  1)  Male sterilization via vasectomy.     PLAN:  Patient tolerated the procedure quite well.  He will use  Tylenol 1000 mg TID with  food.  Ice to the scrotum 20 minutes at a time every two to three hours while awake for the next two to three days.  No heavy lifting or intercourse for 14 days.  The patient will bring in a sample of semen for analysis after 3 months.    Juan Antonio Villa MD

## 2017-03-31 LAB — COPATH REPORT: NORMAL

## 2017-04-09 ENCOUNTER — MYC MEDICAL ADVICE (OUTPATIENT)
Dept: FAMILY MEDICINE | Facility: CLINIC | Age: 37
End: 2017-04-09

## 2017-04-09 DIAGNOSIS — F33.0 MAJOR DEPRESSIVE DISORDER, RECURRENT EPISODE, MILD (H): ICD-10-CM

## 2017-04-11 RX ORDER — BUPROPION HYDROCHLORIDE 300 MG/1
300 TABLET ORAL EVERY MORNING
Qty: 30 TABLET | Refills: 1 | Status: SHIPPED | OUTPATIENT
Start: 2017-04-11 | End: 2017-06-05

## 2017-05-12 ENCOUNTER — OFFICE VISIT (OUTPATIENT)
Dept: PSYCHOLOGY | Facility: CLINIC | Age: 37
End: 2017-05-12
Payer: COMMERCIAL

## 2017-05-12 DIAGNOSIS — F90.0 ATTENTION-DEFICIT HYPERACTIVITY DISORDER, PREDOMINANTLY INATTENTIVE TYPE: ICD-10-CM

## 2017-05-12 DIAGNOSIS — F33.41 RECURRENT MAJOR DEPRESSION IN PARTIAL REMISSION (H): Primary | ICD-10-CM

## 2017-05-12 PROCEDURE — 90834 PSYTX W PT 45 MINUTES: CPT | Performed by: PSYCHOLOGIST

## 2017-05-12 NOTE — PROGRESS NOTES
Progress Note    Client Name: William Bolton  Date: 5/12/2017         Service Type: Individual      Session Start Time: 11:00  Session End Time: 11:45      Session Length: 45     Session #: 10     Attendees: Client attended alone       DATA      Progress Since Last Session (Related to Symptoms / Goals / Homework):   Symptoms: Stable    Homework: completed      Episode of Care Goals: Satisfactory progress - ACTION (Actively working towards change); Intervened by reinforcing change plan / affirming steps taken. Reports that he has been more assertive about his needs with his wife. Reports that he is making overall lifestyle changes to improve mood and manage problems with inattention.     Current / Ongoing Stressors and Concerns:   Reported that inattention and mood seem to be well managed right now. He reported that he recently switched anti-depressants and has noticed an increase in irritability, but is waiting another month to see if this resolves before scheduling a follow up with Dr. Villa. He discussed a few situations that created strain with his wife, but reported that they have been managing conflicts appropriately between the two of them, but he hopes that his wife can talk to her parents about the imbalance that client sees in the way his in-laws treat his kids compared to their other grandchildren.       Treatment Objective(s) Addressed in This Session:   reviewed successes and struggles; review lifestyle habits and ways to improve focus and mood  psychoeducation     Intervention:   Reviewed progress, successes and struggles since last session. Coached him on assertiveness skills to use with family (rather than being overly direct) and mood management skills to maintain gains. Reinforced healthy behavioral choices.          ASSESSMENT: Current Emotional / Mental Status (status of significant symptoms):   Risk status (Self / Other harm or suicidal  "ideation)   Client denies current fears or concerns for personal safety.   Client denies current or recent suicidal ideation or behaviors.   Client denies current or recent homicidal ideation or behaviors.   Client denies current or recent self injurious behavior or ideation.   Client denies other safety concerns.   A safety and risk management plan has not been developed at this time, however client was given the after-hours number should there be a change in any of these risk factors.     Appearance:   Appropriate    Eye Contact:   Good    Psychomotor Behavior: Normal    Attitude:   Cooperative    Orientation:   All   Speech    Rate / Production: Normal     Volume:  Normal    Mood:    Normal with periods of situational sadness and irritability   Affect:    Appropriate    Thought Content:  Clear    Thought Form:  Coherent  Logical    Insight:    Good      Medication Review:   Changes to psychiatric medications, see updated Medication List in EPIC.      Medication Compliance:   Yes     Changes in Health Issues:   None reported     Chemical Use Review:   Substance Use: Chemical use reviewed, no active concerns identified      Tobacco Use: No current tobacco use.       Collateral Reports Completed:   Not Applicable    PLAN: (Client Tasks / Therapist Tasks / Other)  Future appointment is scheduled. Continue to use skills discussed in session to increase life balance, improve mood, and improve focus and follow through.  Get back into a habit of regular physical activity. Continue to reach out to spouse and other supportive people when mood is low. Use assertiveness skills (e.g. Paying attention to timing, tone, \"heat of the moment\") with family to manage disagreement and conflict. Call with questions or concerns.         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    Anticipated number of session or this episode of care: reassess after 12 sessions      MeasurableTreatment Goal(s) related to diagnosis / functional impairment(s)  Goal 1: Client will learn and use coping skills to effectively manage inattention and depressed mood.    I will know I've met my goal when I'm feeling better.      Objective #A (Client Action)    Client will increase physical activity to at least 2x weekly to improve mood and overall sense of well-being.  Status: Continued - Date(s): 5/12/2017    Intervention(s)  Therapist will assign homework, problem-solve barriers to follow through.    Objective #B  Client will learn assertive communication skills to improve relationships.  Status: Continued - Date(s): 5/12/2017    Intervention(s)  Therapist will teach assertive communication skills.    Objective #C  Client will learn and use strategies to plan his time and organize his activities more effectively.  Status: Continued - Date(s): 5/12/2017    Intervention(s)  Therapist will teach time management and organizational techniques.      Client has reviewed and agreed to the above plan.      Marlin Spivey PsyD LP  5/12/2017

## 2017-05-12 NOTE — MR AVS SNAPSHOT
MRN:[id number]                      After Visit Summary   5/12/2017    William Bolton    MRN: [id number]           Visit Information        Provider Department      5/12/2017 11:00 AM Marlin Spivey PsyD [facility]      Your next 10 appointments already scheduled     Aug 14, 2017  8:00 AM CDT   Return Visit with Marlin Spivey PsyD   [facility] [place] ([facility])    [address] [place] [address] [phone]              MyChart Information     [employer] gives you secure access to your electronic health record. If you see a primary care provider, you can also send messages to your care team and make appointments. If you have questions, please call your primary care clinic.  If you do not have a primary care provider, please call [phone] and they will assist you.        Care EveryWhere ID     This is your Care EveryWhere ID. This could be used by other organizations to access your [place] medical records  [id number]

## 2017-05-16 DIAGNOSIS — F98.8 ADD (ATTENTION DEFICIT DISORDER) WITHOUT HYPERACTIVITY: ICD-10-CM

## 2017-05-16 RX ORDER — DEXTROAMPHETAMINE SACCHARATE, AMPHETAMINE ASPARTATE MONOHYDRATE, DEXTROAMPHETAMINE SULFATE AND AMPHETAMINE SULFATE 5; 5; 5; 5 MG/1; MG/1; MG/1; MG/1
20 CAPSULE, EXTENDED RELEASE ORAL DAILY
Qty: 30 CAPSULE | Refills: 0 | Status: CANCELLED | OUTPATIENT
Start: 2017-05-16

## 2017-05-16 NOTE — TELEPHONE ENCOUNTER
amphetamine-dextroamphetamine (ADDERALL XR) 20 MG per 24 hr capsule      Last Written Prescription Date:  4/4/2017  Last Fill Quantity: 30 capsule,   # refills: 0  Last Office Visit with [facility] prescribing provider: 3/29/2017  Future Office visit:    Next 5 appointments (look out 90 days)     Aug 14, 2017  8:00 AM CDT   Return Visit with Marlin Spivey PsyD   [facility] [place] ([facility] [place])    [address] [place] [address]   [phone]                   Routing refill request to provider for review/approval because:  Drug not on the [facility] refill protocol or controlled substance

## 2017-05-16 NOTE — TELEPHONE ENCOUNTER
Controlled Substance Refill Request for Adderall XR 20mg  Problem List Complete:  No     PROVIDER TO CONSIDER COMPLETION OF PROBLEM LIST AND OVERVIEW/CONTROLLED SUBSTANCE AGREEMENT    Last Written Prescription Date:  4-4-17  Last Fill Quantity: 30,   # refills: 0    Last Office Visit with [facility] primary care provider: 3-29-17    Future Office visit:   Next 5 appointments (look out 90 days)     Aug 14, 2017  8:00 AM CDT   Return Visit with Marlin Spivey PsyD   [facility]    [address] [place] [address]   [phone]                  Controlled substance agreement on file: No.     Processing:  Patient will  in clinic   checked in past 6 months?  No, route to RN     Thank you,  Bernie Salvador, [place]  [facility]

## 2017-05-17 RX ORDER — DEXTROAMPHETAMINE SACCHARATE, AMPHETAMINE ASPARTATE MONOHYDRATE, DEXTROAMPHETAMINE SULFATE AND AMPHETAMINE SULFATE 5; 5; 5; 5 MG/1; MG/1; MG/1; MG/1
20 CAPSULE, EXTENDED RELEASE ORAL DAILY
Qty: 30 CAPSULE | Refills: 0 | Status: SHIPPED | OUTPATIENT
Start: 2017-05-17 | End: 2017-06-16

## 2017-05-17 RX ORDER — DEXTROAMPHETAMINE SACCHARATE, AMPHETAMINE ASPARTATE MONOHYDRATE, DEXTROAMPHETAMINE SULFATE AND AMPHETAMINE SULFATE 5; 5; 5; 5 MG/1; MG/1; MG/1; MG/1
20 CAPSULE, EXTENDED RELEASE ORAL DAILY
Qty: 30 CAPSULE | Refills: 0 | Status: SHIPPED | OUTPATIENT
Start: 2017-06-17 | End: 2017-07-17

## 2017-05-17 RX ORDER — DEXTROAMPHETAMINE SACCHARATE, AMPHETAMINE ASPARTATE MONOHYDRATE, DEXTROAMPHETAMINE SULFATE AND AMPHETAMINE SULFATE 5; 5; 5; 5 MG/1; MG/1; MG/1; MG/1
20 CAPSULE, EXTENDED RELEASE ORAL DAILY
Qty: 30 CAPSULE | Refills: 0 | Status: SHIPPED | OUTPATIENT
Start: 2017-07-18 | End: 2017-08-17

## 2017-05-17 NOTE — TELEPHONE ENCOUNTER
Perfect timing, Luke.  I'm over in [place] this morning and have dropped three one month Rx's for your Adderall XR 20 mg at the pharmacy here.  So you're good to go for the next three months. I should probably see you in clinic in 3 months as that will be 5-6 months after I last saw you.  I need to ensure your blood pressure, pulse, weight, etc all remain OK on your stimulant.  Please contact the clinic if you have further questions or concerns.     Chart reviewed.  Rx sent to pt's preferred pharmacy.    [facility] - [place], [place] - [address] [place] [address]    Juan Antonio Villa MD

## 2017-06-05 ENCOUNTER — OFFICE VISIT (OUTPATIENT)
Dept: FAMILY MEDICINE | Facility: CLINIC | Age: 37
End: 2017-06-05
Payer: COMMERCIAL

## 2017-06-05 VITALS
TEMPERATURE: 98.5 F | DIASTOLIC BLOOD PRESSURE: 82 MMHG | OXYGEN SATURATION: 98 % | HEART RATE: 99 BPM | HEIGHT: 72 IN | WEIGHT: 264 LBS | BODY MASS INDEX: 35.76 KG/M2 | SYSTOLIC BLOOD PRESSURE: 132 MMHG

## 2017-06-05 DIAGNOSIS — F33.0 MAJOR DEPRESSIVE DISORDER, RECURRENT EPISODE, MILD (H): ICD-10-CM

## 2017-06-05 DIAGNOSIS — E66.09 NON MORBID OBESITY DUE TO EXCESS CALORIES: Primary | ICD-10-CM

## 2017-06-05 PROCEDURE — 99213 OFFICE O/P EST LOW 20 MIN: CPT | Performed by: FAMILY MEDICINE

## 2017-06-05 RX ORDER — BUPROPION HYDROCHLORIDE 150 MG/1
150 TABLET ORAL EVERY MORNING
Qty: 90 TABLET | Refills: 1 | Status: SHIPPED | OUTPATIENT
Start: 2017-06-05 | End: 2017-07-10

## 2017-06-05 RX ORDER — CITALOPRAM HYDROBROMIDE 20 MG/1
TABLET ORAL
Qty: 30 TABLET | Refills: 0 | Status: SHIPPED | OUTPATIENT
Start: 2017-06-05 | End: 2017-07-10

## 2017-06-05 ASSESSMENT — ANXIETY QUESTIONNAIRES
1. FEELING NERVOUS, ANXIOUS, OR ON EDGE: NOT AT ALL
3. WORRYING TOO MUCH ABOUT DIFFERENT THINGS: NOT AT ALL
IF YOU CHECKED OFF ANY PROBLEMS ON THIS QUESTIONNAIRE, HOW DIFFICULT HAVE THESE PROBLEMS MADE IT FOR YOU TO DO YOUR WORK, TAKE CARE OF THINGS AT HOME, OR GET ALONG WITH OTHER PEOPLE: SOMEWHAT DIFFICULT
7. FEELING AFRAID AS IF SOMETHING AWFUL MIGHT HAPPEN: NOT AT ALL
6. BECOMING EASILY ANNOYED OR IRRITABLE: NEARLY EVERY DAY
5. BEING SO RESTLESS THAT IT IS HARD TO SIT STILL: NOT AT ALL
2. NOT BEING ABLE TO STOP OR CONTROL WORRYING: NOT AT ALL
GAD7 TOTAL SCORE: 3

## 2017-06-05 ASSESSMENT — PATIENT HEALTH QUESTIONNAIRE - PHQ9: 5. POOR APPETITE OR OVEREATING: NOT AT ALL

## 2017-06-05 NOTE — MR AVS SNAPSHOT
After Visit Summary   6/5/2017    William Bolton    MRN: [id number]           Patient Information     Date Of Birth          1980        Visit Information        Provider Department      6/5/2017 2:00 PM Juan Antonio Villa Jr., MD [facility]        Today's Diagnoses     Major depressive disorder, recurrent episode, mild (H)           Follow-ups after your visit        Follow-up notes from your care team     Return in about 4 weeks (around 7/3/2017) for E-visit to recheck depression meds.      Your next 10 appointments already scheduled     Aug 14, 2017  8:00 AM CDT   Return Visit with Marlin Spivey PsyD   [facility] [place] ([facility] [place])    [address]  [place] [address] [phone]              Who to contact     If you have questions or need follow up information about today's clinic visit or your schedule please contact [facility] directly at [phone].  Normal or non-critical lab and imaging results will be communicated to you by [employer], letter or phone within 4 business days after the clinic has received the results. If you do not hear from us within 7 days, please contact the clinic through [employer] or phone. If you have a critical or abnormal lab result, we will notify you by phone as soon as possible.  Submit refill requests through [employer] or call your pharmacy and they will forward the refill request to us. Please allow 3 business days for your refill to be completed.          Additional Information About Your Visit        MyChart Information     [employer] gives you secure access to your electronic health record. If you see a primary care provider, you can also send messages to your care team and make appointments. If you have questions, please call your primary care clinic.  If you do not have a primary care provider, please call [phone] and they will assist you.        Care EveryWhere ID     This is  your Care EveryWhere ID. This could be used by other organizations to access your [place] medical records  [id number]        Your Vitals Were     Pulse Temperature Height Pulse Oximetry BMI (Body Mass Index)       99 98.5  F (36.9  C) (Oral) 6' (1.829 m) 98% 35.8 kg/m2        Blood Pressure from Last 3 Encounters:   06/05/17 132/82   03/29/17 128/86   03/25/17 120/82    Weight from Last 3 Encounters:   06/05/17 264 lb (119.7 kg)   03/29/17 264 lb (119.7 kg)   03/25/17 264 lb (119.7 kg)              Today, you had the following     No orders found for display         Today's Medication Changes          These changes are accurate as of: 6/5/17  2:41 PM.  If you have any questions, ask your nurse or doctor.               These medicines have changed or have updated prescriptions.        Dose/Directions    buPROPion 150 MG 24 hr tablet   Commonly known as:  WELLBUTRIN XL   This may have changed:    - medication strength  - how much to take   Used for:  Major depressive disorder, recurrent episode, mild (H)   Changed by:  Juan Antonio Villa Jr., MD        Dose:  150 mg   Take 1 tablet (150 mg) by mouth every morning   Quantity:  90 tablet   Refills:  1       citalopram 20 MG tablet   Commonly known as:  celeXA   This may have changed:    - medication strength  - how much to take  - how to take this  - when to take this  - additional instructions   Used for:  Major depressive disorder, recurrent episode, mild (H)   Changed by:  Juan Antonio Villa Jr., MD        Take 1/2 tablet (10 mg) for 1-2 weeks, then increase to 1 tablet orally daily   Quantity:  30 tablet   Refills:  0            Where to get your medicines      These medications were sent to [facility] [id number]  [place] [id number] [facility] [address], [facility] [address]    Hours:  24-hours Phone:  [phone]     buPROPion 150 MG 24 hr tablet    citalopram 20 MG tablet                Primary Care  Provider Office Phone # Fax #    Juan Antonio Villa Jr., [phone]       [facility] [address] [facility] [address]        Thank you!     Thank you for choosing [facility]  for your care. Our goal is always to provide you with excellent care. Hearing back from our patients is one way we can continue to improve our services. Please take a few minutes to complete the written survey that you may receive in the mail after your visit with us. Thank you!             Your Updated Medication List - Protect others around you: Learn how to safely use, store and throw away your medicines at www.disposemymeds.org.          This list is accurate as of: 6/5/17  2:41 PM.  Always use your most recent med list.                   Brand Name Dispense Instructions for use    * amphetamine-dextroamphetamine 20 MG per 24 hr capsule    ADDERALL XR    30 capsule    Take 1 capsule (20 mg) by mouth daily       * amphetamine-dextroamphetamine 20 MG per 24 hr capsule   Start taking on:  6/17/2017    ADDERALL XR    30 capsule    Take 1 capsule (20 mg) by mouth daily       * amphetamine-dextroamphetamine 20 MG per 24 hr capsule   Start taking on:  7/18/2017    ADDERALL XR    30 capsule    Take 1 capsule (20 mg) by mouth daily       buPROPion 150 MG 24 hr tablet    WELLBUTRIN XL    90 tablet    Take 1 tablet (150 mg) by mouth every morning       citalopram 20 MG tablet    celeXA    30 tablet    Take 1/2 tablet (10 mg) for 1-2 weeks, then increase to 1 tablet orally daily       * Notice:  This list has 3 medication(s) that are the same as other medications prescribed for you. Read the directions carefully, and ask your doctor or other care provider to review them with you.

## 2017-06-05 NOTE — NURSING NOTE
Chief Complaint   Patient presents with     Depression       Initial /82  Pulse 99  Temp 98.5  F (36.9  C) (Oral)  Ht 6' (1.829 m)  Wt 264 lb (119.7 kg)  SpO2 98%  BMI 35.8 kg/m2 Estimated body mass index is 35.8 kg/(m^2) as calculated from the following:    Height as of this encounter: 6' (1.829 m).    Weight as of this encounter: 264 lb (119.7 kg).  Medication Reconciliation: complete

## 2017-06-05 NOTE — PROGRESS NOTES
SUBJECTIVE:                                                    William Bolton is a 37 year old male who presents to clinic today for the following health issues:      Depression Followup    Status since last visit: felt better on Celexa, feels more on edge has more mood swings     See PHQ-9 for current symptoms.  Other associated symptoms: None    Complicating factors:   Significant life event:  No   Current substance abuse:  None  Anxiety or Panic symptoms:  No    PHQ-9  English PHQ-9   Any Language        Overall reports that his depression simply is not as well-controlled on Wellbutrin as it was while he was taking citalopram. He is also on stimulant medication for his ADD and his reports feeling short at times with his wife and other people. The main reason for the switch from citalopram to Wellbutrin was sexual dysfunction.          Problem list and histories reviewed & adjusted, as indicated.  Additional history: as documented        Reviewed and updated as needed this visit by clinical staff  Allergies  Meds  Med Hx       Reviewed and updated as needed this visit by Provider         ROS:  Constitutional, HEENT, cardiovascular, pulmonary, gi and gu systems are negative, except as otherwise noted.    OBJECTIVE:                                                    /82  Pulse 99  Temp 98.5  F (36.9  C) (Oral)  Ht 6' (1.829 m)  Wt 264 lb (119.7 kg)  SpO2 98%  BMI 35.8 kg/m2  Body mass index is 35.8 kg/(m^2).  GENERAL: healthy, alert and no distress  PSYCH: mentation appears normal, affect normal/bright    Diagnostic Test Results:  No results found for this or any previous visit (from the past 24 hour(s)).     ASSESSMENT/PLAN:                                                        BMI:   Estimated body mass index is 35.8 kg/(m^2) as calculated from the following:    Height as of this encounter: 6' (1.829 m).    Weight as of this encounter: 264 lb (119.7 kg).   Weight management plan: Discussed healthy  diet and exercise guidelines and patient will follow up in 6 months in clinic to re-evaluate. I also encouraged him to actually take his elliptical  out of the box it was shipped in and begin using it.      1. Major depressive disorder, recurrent episode, mild (H)  We will decrease his Wellbutrin from 300 mg to 150 mg daily. We will also add back his citalopram as outlined below. I think this should do a nice job of improving his depression to her it was previously and at the same time limits sexual side effects. We will follow-up in 4 weeks via E visit.  - buPROPion (WELLBUTRIN XL) 150 MG 24 hr tablet; Take 1 tablet (150 mg) by mouth every morning  Dispense: 90 tablet; Refill: 1  - citalopram (CELEXA) 20 MG tablet; Take 1/2 tablet (10 mg) for 1-2 weeks, then increase to 1 tablet orally daily  Dispense: 30 tablet; Refill: 0    2. Non morbid obesity due to excess calories  See above.      See Patient Instructions    Juan Antonio Villa Jr, MD  [facility]

## 2017-06-06 ASSESSMENT — ANXIETY QUESTIONNAIRES: GAD7 TOTAL SCORE: 3

## 2017-06-06 ASSESSMENT — PATIENT HEALTH QUESTIONNAIRE - PHQ9: SUM OF ALL RESPONSES TO PHQ QUESTIONS 1-9: 1

## 2017-07-05 ENCOUNTER — E-VISIT (OUTPATIENT)
Dept: FAMILY MEDICINE | Facility: CLINIC | Age: 37
End: 2017-07-05
Payer: COMMERCIAL

## 2017-07-05 DIAGNOSIS — F33.0 MAJOR DEPRESSIVE DISORDER, RECURRENT EPISODE, MILD (H): ICD-10-CM

## 2017-07-05 PROCEDURE — 99444 ZZC PHYSICIAN ONLINE EVALUATION & MANAGEMENT SERVICE: CPT | Performed by: FAMILY MEDICINE

## 2017-07-10 ENCOUNTER — MYC MEDICAL ADVICE (OUTPATIENT)
Dept: FAMILY MEDICINE | Facility: CLINIC | Age: 37
End: 2017-07-10

## 2017-07-10 RX ORDER — BUPROPION HYDROCHLORIDE 150 MG/1
150 TABLET ORAL EVERY MORNING
Qty: 90 TABLET | Refills: 1 | Status: SHIPPED | OUTPATIENT
Start: 2017-07-10 | End: 2018-01-15

## 2017-07-10 RX ORDER — CITALOPRAM HYDROBROMIDE 10 MG/1
30 TABLET ORAL DAILY
Qty: 810 TABLET | Refills: 1 | Status: SHIPPED | OUTPATIENT
Start: 2017-07-10 | End: 2018-02-23

## 2017-07-10 NOTE — TELEPHONE ENCOUNTER
Please see my chart message below     Please advise on e visit     LOV 6/5/2017    PHQ-9 SCORE 10/5/2016 11/18/2016 6/5/2017   Total Score - - -   Total Score MyChart 7 (Mild depression) 2 (Minimal depression) -   Total Score - - 1     SANDOVAL-7 SCORE 3/8/2016 10/5/2016 6/5/2017   Total Score - - -   Total Score - 1 (minimal anxiety) -   Total Score 2 - 3       Thank you     Treva Gillette RN, BSN  [facility]

## 2017-07-31 ENCOUNTER — OFFICE VISIT (OUTPATIENT)
Dept: FAMILY MEDICINE | Facility: CLINIC | Age: 37
End: 2017-07-31
Payer: COMMERCIAL

## 2017-07-31 ENCOUNTER — RADIANT APPOINTMENT (OUTPATIENT)
Dept: GENERAL RADIOLOGY | Facility: CLINIC | Age: 37
End: 2017-07-31
Attending: PHYSICIAN ASSISTANT
Payer: COMMERCIAL

## 2017-07-31 VITALS
WEIGHT: 259 LBS | DIASTOLIC BLOOD PRESSURE: 88 MMHG | OXYGEN SATURATION: 98 % | TEMPERATURE: 98.3 F | BODY MASS INDEX: 35.08 KG/M2 | HEIGHT: 72 IN | HEART RATE: 98 BPM | SYSTOLIC BLOOD PRESSURE: 154 MMHG

## 2017-07-31 DIAGNOSIS — S99.929A INJURY OF TOE: ICD-10-CM

## 2017-07-31 DIAGNOSIS — R03.0 ELEVATED BLOOD PRESSURE READING WITHOUT DIAGNOSIS OF HYPERTENSION: ICD-10-CM

## 2017-07-31 DIAGNOSIS — S99.921A INJURY OF TOE, RIGHT, INITIAL ENCOUNTER: ICD-10-CM

## 2017-07-31 DIAGNOSIS — S90.31XA CONTUSION OF RIGHT FOOT, INITIAL ENCOUNTER: Primary | ICD-10-CM

## 2017-07-31 PROBLEM — R93.7 ABNORMAL BONE XRAY: Status: ACTIVE | Noted: 2017-07-31

## 2017-07-31 PROCEDURE — 73660 X-RAY EXAM OF TOE(S): CPT | Mod: LT

## 2017-07-31 PROCEDURE — 99214 OFFICE O/P EST MOD 30 MIN: CPT | Performed by: PHYSICIAN ASSISTANT

## 2017-07-31 RX ORDER — OMEGA-3 FATTY ACIDS/FISH OIL 300-1000MG
800 CAPSULE ORAL EVERY 8 HOURS PRN
Qty: 120 CAPSULE | COMMUNITY
Start: 2017-07-31 | End: 2018-12-27

## 2017-07-31 NOTE — PROGRESS NOTES
SUBJECTIVE:                                                    William Bolton is a 37 year old male who presents to clinic today for the following health issues:      Joint Pain    Onset: injured yesterday    Description:   Location: right foot - big toe  Character: swollen, bruised    Intensity: moderate    Progression of Symptoms: worsening    Accompanying Signs & Symptoms:  Other symptoms: none    History:   Previous similar pain: yes, broke same toe 2 years ago       Precipitating factors:   Trauma or overuse: YES - slipped walking down the stairs, believes toe was bent backward underneath foot bearing pressure from weight of body    Aggravating factors:  Worsened by: bearing weight    Therapies Tried and outcome: OTC ibuprofen with moderate relief - last took last night as pain woke him from sleep    Of mention, patient's BP was high today. He reports that he took his citalopram, bupropion, and amphetamine-dextroamphetamine this morning as usual. Denies feelings of rapid heart rate, headache, or noticing hypertensive episodes in the past as result of medications. Believes high BP today is due to toe pain.   BP Readings from Last 6 Encounters:   07/31/17 154/88   06/05/17 132/82   03/29/17 128/86   03/25/17 120/82   02/15/17 130/78   03/08/16 122/82       Problem list and histories reviewed & adjusted, as indicated.  Additional history: as documented    Patient Active Problem List   Diagnosis     Hyperlipidemia with target LDL less than 160     ADD (attention deficit disorder) without hyperactivity     Major depressive disorder, recurrent episode, mild (H)     Non morbid obesity due to excess calories     Abnormal bone xray - sclerosis of phalynx of right great toe 7/31/17 - suggest repeat imaging at annual visit.     Past Surgical History:   Procedure Laterality Date     SURGICAL HISTORY OF -          Social History   Substance Use Topics     Smoking status: Never Smoker     Smokeless tobacco: Never Used      Alcohol use No     Family History   Problem Relation Age of Onset     Coronary Artery Disease Paternal Grandfather      Hypertension Father      Hyperlipidemia Mother          Current Outpatient Prescriptions   Medication Sig Dispense Refill     ibuprofen 200 MG capsule Take 800 mg by mouth every 8 hours as needed for pain 120 capsule      citalopram (CELEXA) 10 MG tablet Take 3 tablets (30 mg) by mouth daily 810 tablet 1     buPROPion (WELLBUTRIN XL) 150 MG 24 hr tablet Take 1 tablet (150 mg) by mouth every morning 90 tablet 1     amphetamine-dextroamphetamine (ADDERALL XR) 20 MG per 24 hr capsule Take 1 capsule (20 mg) by mouth daily 30 capsule 0     No Known Allergies      Reviewed and updated as needed this visit by clinical staff  Tobacco  Allergies  Meds  Problems  Med Hx  Surg Hx  Fam Hx  Soc Hx        Reviewed and updated as needed this visit by Provider  Tobacco  Allergies  Meds  Problems  Med Hx  Surg Hx  Fam Hx  Soc Hx          ROS:  Constitutional, HEENT, cardiovascular, pulmonary, GI, , musculoskeletal, neuro, skin, endocrine and psych systems are negative, except as otherwise noted.    This document serves as a record of the services and decisions personally performed and made by Saranya Jurado PA-C. It was created on her behalf by Lisseth Armstrong, a trained medical scribe. The creation of this document is based the provider's statements to the medical scribe.  Lisseth Armstrong, July 31, 2017 2:57 PM    OBJECTIVE:   /88  Pulse 98  Temp 98.3  F (36.8  C) (Oral)  Ht 6' (1.829 m)  Wt 259 lb (117.5 kg)  SpO2 98%  BMI 35.13 kg/m2  Body mass index is 35.13 kg/(m^2).     GENERAL: healthy, alert and no distress  MS: tenderness to palpation of right great toe PIP  NEURO: Normal strength and tone, mentation intact and speech normal  PSYCH: mentation appears normal, affect normal/bright    Diagnostic Test Results:  Xray -   Recent Results (from the past 24 hour(s))   XR Toe Left G/E 2 Views     Narrative    XR TOE LT G/E 2 VW   7/31/2017 2:45 PM     HISTORY: injury to left toe, Unspecified injury of unspecified foot,  initial encounter    COMPARISON: None.      Impression    IMPRESSION: No evidence for acute fracture or dislocation. Nonspecific  sclerosis involving the cortex of the dorsal aspects of the first  distal phalanx.    CARLOS ALBERTO REY MD       ASSESSMENT/PLAN:     William was seen today for toe pain.    Diagnoses and all orders for this visit:    Contusion of right foot, initial encounter, Injury of toe (note that xray was ordered for left foot, but was done of the right)  Advised patient to ice and elevate foot. Discussed that taping toe was not advised for this type of injury. Recommended taking 800 mg ibuprofen TID for at least the next 5 days to manage pain.   -     XR Toe Left G/E 2 Views; Future  -     ibuprofen 200 MG capsule; Take 800 mg by mouth every 8 hours as needed for pain    Elevated blood pressure reading without diagnosis of hypertension  Rechecked BP during OV for a second time with no change. Advised patient that once pain from toe contusion is better controlled to RTC for pharmacy BP check. IF BP remains high advised follow up with PCP.      The information in this document, created by the medical scribe for me, accurately reflects the services I personally performed and the decisions made by me. I have reviewed and approved this document for accuracy prior to leaving the patient care area .  Saranya Jurado PA-C July 31, 2017 2:57 PM    Saranya Jurado PA-C  [facility]

## 2017-07-31 NOTE — PROGRESS NOTES
SWR-   FYI, it may be beneficial to repeat/monitor this sclerotic area of the phalyx    Luke-  Here are your recent results.  The radiologist read your Xray as normal/no acute fracture.  Regarding the sclerotic bone , I would advise repeating your foot Xray with your next annual physical so that your PCP can determine if there have been any changes to this area.  If you have any questions please do not hesitate to contact our office via phone ([phone]) or MyChart.    Saranya Jurado MS, PA-C  [facility]

## 2017-07-31 NOTE — MR AVS SNAPSHOT
After Visit Summary   7/31/2017    William Bolton    MRN: [id number]           Patient Information     Date Of Birth          1980        Visit Information        Provider Department      7/31/2017 2:20 PM Saranya Jurado PA-C [facility]        Today's Diagnoses     Injury of toe    -  1    Elevated blood pressure reading without diagnosis of hypertension        Contusion of right foot, initial encounter          Care Instructions      Foot Contusion  You have a contusion. This is also called a bruise. There is swelling and some bleeding under the skin, but no broken bones. This injury generally takes a few days to a few weeks to heal.  During that time, the bruise will typically change in color from reddish, to purple-blue, to greenish-yellow, then to yellow-brown.  Home care    Elevate the foot to reduce pain and swelling. As much as possible, sit or lie down with the foot raised about the level of your heart. This is especially important during the first 48 hours.    Ice the foot to help reduce pain and swelling. Wrap a cold source (ice pack or ice cubes in a plastic bag) in a thin towel. Apply to the bruised area for 20 minutes every 1 to 2 hours the first day. Continue this 3 to 4 times a day until the pain and swelling goes away.    Unless another medication was prescribed, you can take acetaminophen, ibuprofen, or naproxen to control pain. (If you have chronic liver or kidney disease or ever had a stomach ulcer or GI bleeding, talk with your doctor before using these medicines.)  Follow up  Follow up with your health care provider or our staff as advised. Call if you are not improving within 1 to 2 weeks.  When to seek medical advice   Call your health care provider right away if you have any of the following:    Increased pain or swelling    Foot or leg becomes cold, blue, numb or tingly    Signs of infection: Warmth, drainage, or increased redness or pain around the  bruise    Inability to move the injured foot     Frequent bruising for unknown reasons  Date Last Reviewed: 4/29/2015 2000-2017 [employer] [address] [place] [address], [place] [address]. All rights reserved. This information is not intended as a substitute for professional medical care. Always follow your healthcare professional's instructions.                Follow-ups after your visit        Your next 10 appointments already scheduled     Aug 14, 2017  8:00 AM CDT   Return Visit with Marlin Spivey PsyD   [facility] [place] ([facility] [place])    [address] [place] [address]  [place] [address] [phone]              Who to contact     If you have questions or need follow up information about today's clinic visit or your schedule please contact [facility] directly at [phone].  Normal or non-critical lab and imaging results will be communicated to you by MyChart, letter or phone within 4 business days after the clinic has received the results. If you do not hear from us within 7 days, please contact the clinic through [employer] or phone. If you have a critical or abnormal lab result, we will notify you by phone as soon as possible.  Submit refill requests through [employer] or call your pharmacy and they will forward the refill request to us. Please allow 3 business days for your refill to be completed.          Additional Information About Your Visit        MyChart Information     [employer] gives you secure access to your electronic health record. If you see a primary care provider, you can also send messages to your care team and make appointments. If you have questions, please call your primary care clinic.  If you do not have a primary care provider, please call [phone] and they will assist you.        Care EveryWhere ID     This is your Care EveryWhere ID. This could be used by other organizations to access your [facility]  records  [id number]        Your Vitals Were     Pulse Temperature Height Pulse Oximetry BMI (Body Mass Index)       98 98.3  F (36.8  C) (Oral) 6' (1.829 m) 98% 35.13 kg/m2        Blood Pressure from Last 3 Encounters:   07/31/17 154/88   06/05/17 132/82   03/29/17 128/86    Weight from Last 3 Encounters:   07/31/17 259 lb (117.5 kg)   06/05/17 264 lb (119.7 kg)   03/29/17 264 lb (119.7 kg)                 Today's Medication Changes          These changes are accurate as of: 7/31/17  3:04 PM.  If you have any questions, ask your nurse or doctor.               Start taking these medicines.        Dose/Directions    ibuprofen 200 MG capsule   Used for:  Contusion of right foot, initial encounter   Started by:  Saranya Jurado PA-C        Dose:  800 mg   Take 800 mg by mouth every 8 hours as needed for pain   Quantity:  120 capsule   Refills:  0            Where to get your medicines      Some of these will need a paper prescription and others can be bought over the counter.  Ask your nurse if you have questions.     You don't need a prescription for these medications     ibuprofen 200 MG capsule                Primary Care Provider Office Phone # Fax #    Juan Antonio Villa Jr., [phone]       [address] [address]        Equal Access to Services     JESÚS OCONNOR AH: Hadii angela ku hadasho Soomaali, waaxda luqadaha, qaybta kaalmada tapanegyada, sindy acharya. So [facility] [phone].    ATENCIÓN: Si habla español, tiene a epstein disposición servicios gratuitos de asistencia lingüística. Suma al [phone].    We comply with applicable federal civil rights laws and [place] laws. We do not discriminate on the basis of race, color, national origin, age, disability sex, sexual orientation or gender identity.            Thank you!     Thank you for choosing [facility]  for your care. Our goal is always to provide you with  excellent care. Hearing back from our patients is one way we can continue to improve our services. Please take a few minutes to complete the written survey that you may receive in the mail after your visit with us. Thank you!             Your Updated Medication List - Protect others around you: Learn how to safely use, store and throw away your medicines at www.disposemymeds.org.          This list is accurate as of: 7/31/17  3:04 PM.  Always use your most recent med list.                   Brand Name Dispense Instructions for use Diagnosis    amphetamine-dextroamphetamine 20 MG per 24 hr capsule    ADDERALL XR    30 capsule    Take 1 capsule (20 mg) by mouth daily    ADD (attention deficit disorder) without hyperactivity       buPROPion 150 MG 24 hr tablet    WELLBUTRIN XL    90 tablet    Take 1 tablet (150 mg) by mouth every morning    Major depressive disorder, recurrent episode, mild (H)       citalopram 10 MG tablet    celeXA    810 tablet    Take 3 tablets (30 mg) by mouth daily    Major depressive disorder, recurrent episode, mild (H)       ibuprofen 200 MG capsule     120 capsule    Take 800 mg by mouth every 8 hours as needed for pain    Contusion of right foot, initial encounter

## 2017-07-31 NOTE — NURSING NOTE
Chief Complaint   Patient presents with     Toe Pain       Initial /88  Pulse 98  Temp 98.3  F (36.8  C) (Oral)  Ht 6' (1.829 m)  Wt 259 lb (117.5 kg)  SpO2 98%  BMI 35.13 kg/m2 Estimated body mass index is 35.13 kg/(m^2) as calculated from the following:    Height as of this encounter: 6' (1.829 m).    Weight as of this encounter: 259 lb (117.5 kg).  Medication Reconciliation: complete

## 2017-07-31 NOTE — PATIENT INSTRUCTIONS
Foot Contusion  You have a contusion. This is also called a bruise. There is swelling and some bleeding under the skin, but no broken bones. This injury generally takes a few days to a few weeks to heal.  During that time, the bruise will typically change in color from reddish, to purple-blue, to greenish-yellow, then to yellow-brown.  Home care    Elevate the foot to reduce pain and swelling. As much as possible, sit or lie down with the foot raised about the level of your heart. This is especially important during the first 48 hours.    Ice the foot to help reduce pain and swelling. Wrap a cold source (ice pack or ice cubes in a plastic bag) in a thin towel. Apply to the bruised area for 20 minutes every 1 to 2 hours the first day. Continue this 3 to 4 times a day until the pain and swelling goes away.    Unless another medication was prescribed, you can take acetaminophen, ibuprofen, or naproxen to control pain. (If you have chronic liver or kidney disease or ever had a stomach ulcer or GI bleeding, talk with your doctor before using these medicines.)  Follow up  Follow up with your health care provider or our staff as advised. Call if you are not improving within 1 to 2 weeks.  When to seek medical advice   Call your health care provider right away if you have any of the following:    Increased pain or swelling    Foot or leg becomes cold, blue, numb or tingly    Signs of infection: Warmth, drainage, or increased redness or pain around the bruise    Inability to move the injured foot     Frequent bruising for unknown reasons  Date Last Reviewed: 4/29/2015 2000-2017 [employer]. [address] [place] [address], [place] [address]. All rights reserved. This information is not intended as a substitute for professional medical care. Always follow your healthcare professional's instructions.

## 2017-08-07 DIAGNOSIS — Z30.2 ENCOUNTER FOR VASECTOMY: ICD-10-CM

## 2017-08-07 LAB — SEMEN ANALYSIS P VAS PNL: NORMAL

## 2017-08-07 PROCEDURE — 89321 SEMEN ANAL SPERM DETECTION: CPT | Performed by: FAMILY MEDICINE

## 2017-08-07 NOTE — PROGRESS NOTES
Mr. Bolton,    -All of your labs are normal.  No sperm are seen - you're good to go!    If you have further questions about the interpretation of your labs, labtestsonline.org is a good website to check out for further information.    Please contact the clinic if you have additional questions.  Thank you.    Sincerely,    Juan Antonio Villa MD

## 2017-08-14 ENCOUNTER — OFFICE VISIT (OUTPATIENT)
Dept: PSYCHOLOGY | Facility: CLINIC | Age: 37
End: 2017-08-14
Payer: COMMERCIAL

## 2017-08-14 DIAGNOSIS — F90.0 ADHD, PREDOMINANTLY INATTENTIVE TYPE: ICD-10-CM

## 2017-08-14 DIAGNOSIS — F33.41 MAJOR DEPRESSIVE DISORDER, RECURRENT EPISODE, IN PARTIAL REMISSION (H): Primary | ICD-10-CM

## 2017-08-14 PROCEDURE — 90834 PSYTX W PT 45 MINUTES: CPT | Performed by: PSYCHOLOGIST

## 2017-08-14 NOTE — PROGRESS NOTES
"                                             Progress Note    Client Name: William Bolton  Date: 8/14/2017         Service Type: Individual      Session Start Time: 8:00  Session End Time: 8:45      Session Length: 45     Session #: 11     Attendees: Client attended alone       DATA      Progress Since Last Session (Related to Symptoms / Goals / Homework):   Symptoms: Stable    Homework: completed      Episode of Care Goals: Satisfactory progress - ACTION (Actively working towards change); Intervened by reinforcing change plan / affirming steps taken. Reports that he has been more assertive about his needs with his wife. Reported that there have been some positive changes at work that have helped him be more efficient and feel more confident about his performance.      Current / Ongoing Stressors and Concerns:   Reported he is in the process of adjusting his anti-depressant medications. Reported that mood has been \"pretty good\" but noted there remains some room for improvement. Reported that symptoms of inattention seem to be well-managed. Reported that he has been trying to be more assertive with his spouse and her family, and even though the conversations are uncomfortable they have been productive. Reported that he and his wife do not always have the same perspective but they are able to sort things out before a major conflict or rift evolves in their relationship.       Treatment Objective(s) Addressed in This Session:   reviewed successes and struggles; review lifestyle habits and ways to improve focus and mood  psychoeducation     Intervention:   Reviewed progress, successes and struggles since last session. Coached him on assertiveness skills to use with family, and pointed out a need for patience when others do not immediately respond in a way he had hoped. Reinforced healthy behavioral choices.  Reflected on use of assertiveness skills and the benefits he has gained from not keeping his needs silent from " "his family.        ASSESSMENT: Current Emotional / Mental Status (status of significant symptoms):   Risk status (Self / Other harm or suicidal ideation)   Client denies current fears or concerns for personal safety.   Client denies current or recent suicidal ideation or behaviors.   Client denies current or recent homicidal ideation or behaviors.   Client denies current or recent self injurious behavior or ideation.   Client denies other safety concerns.   A safety and risk management plan has not been developed at this time, however client was given the after-hours number should there be a change in any of these risk factors.     Appearance:   Appropriate    Eye Contact:   Good    Psychomotor Behavior: Normal    Attitude:   Cooperative    Orientation:   All   Speech    Rate / Production: Normal     Volume:  Normal    Mood:    Normal with periods of situational sadness and irritability   Affect:    Appropriate    Thought Content:  Clear    Thought Form:  Coherent  Logical    Insight:    Good      Medication Review:   Changes to psychiatric medications, see updated Medication List in EPIC.      Medication Compliance:   Yes     Changes in Health Issues:   None reported     Chemical Use Review:   Substance Use: Chemical use reviewed, no active concerns identified      Tobacco Use: No current tobacco use.       Collateral Reports Completed:   Not Applicable    PLAN: (Client Tasks / Therapist Tasks / Other)  Future appointment is scheduled. Continue to use skills discussed in session to increase life balance, improve mood, and improve focus and follow through.  Get back into a habit of regular physical activity. Use assertiveness skills (e.g. Paying attention to timing, tone, \"heat of the moment\") with family to manage disagreement and conflict. Call with questions or concerns.         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    Anticipated number of session or this episode of care: reassess after 12 sessions      MeasurableTreatment Goal(s) related to diagnosis / functional impairment(s)  Goal 1: Client will learn and use coping skills to effectively manage inattention and depressed mood.    I will know I've met my goal when I'm feeling better.      Objective #A (Client Action)    Client will increase physical activity to at least 2x weekly to improve mood and overall sense of well-being.  Status: Continued - Date(s): 8/14/2017    Intervention(s)  Therapist will assign homework, problem-solve barriers to follow through.    Objective #B  Client will learn assertive communication skills to improve relationships.  Status: Continued - Date(s): 8/14/2017    Intervention(s)  Therapist will teach assertive communication skills.    Objective #C  Client will learn and use strategies to plan his time and organize his activities more effectively.  Status: Continued - Date(s): 8/14/2017    Intervention(s)  Therapist will teach time management and organizational techniques.      Client has reviewed and agreed to the above plan.      Marlin Spivey PsyD LP  8/14/2017

## 2017-08-14 NOTE — MR AVS SNAPSHOT
MRN:[id number]                      After Visit Summary   8/14/2017    William Bolton    MRN: [id number]           Visit Information        Provider Department      8/14/2017 8:00 AM Marlin Spivey PsyD [facility] [place] [facility]      Your next 10 appointments already scheduled     Nov 13, 2017  7:00 AM CST   Return Visit with Marlin Spivey PsyD   [facility] [place] ([facility] [place])    [address] [place] [address] [phone]              MyChart Information     [employer] gives you secure access to your electronic health record. If you see a primary care provider, you can also send messages to your care team and make appointments. If you have questions, please call your primary care clinic.  If you do not have a primary care provider, please call [phone] and they will assist you.        Care EveryWhere ID     This is your Care EveryWhere ID. This could be used by other organizations to access your [place] medical records  [id number]        Equal Access to Services     SPENCER OCONNOR : Hadii angela esteves hadasho Soomaali, waaxda luqadaha, qaybta kaalmada adeegyaolga lidia, sindy mendoza . So [facility] [phone].    ATENCIÓN: Si habla español, tiene a epstein disposición servicios gratuitos de asistencia lingüística. Llame al [phone].    We comply with applicable federal civil rights laws and [place] laws. We do not discriminate on the basis of race, color, national origin, age, disability sex, sexual orientation or gender identity.

## 2017-08-24 DIAGNOSIS — F98.8 ADD (ATTENTION DEFICIT DISORDER) WITHOUT HYPERACTIVITY: ICD-10-CM

## 2017-08-24 NOTE — TELEPHONE ENCOUNTER
Controlled Substance Refill Request for Adderall----patient would like 3 months of prescriptions  Problem List Complete:  Yes    Last Written Prescription Date: 5/17/17 (start date of 7/17/17)  Last Fill Quantity: 30,   # refills: 0    Last Office Visit with [facility] primary care provider: 07.31.17    Clinic visit frequency required: unknown     Future Office visit:   Next 5 appointments (look out 90 days)     Nov 13, 2017  7:00 AM CST   Return Visit with Marlin Spivey PsyD   [facility] ([facility])    [address] [place] [id number] [phone]                  Controlled substance agreement on file: No.     Processing:  Mail to [facility] pharmacy     checked in past 6 months?  No, route to RN     -Luzmaria Garland, Certified Pharmacy Technician, CPhT, [facility], Ph. [phone]

## 2017-08-28 RX ORDER — DEXTROAMPHETAMINE SACCHARATE, AMPHETAMINE ASPARTATE MONOHYDRATE, DEXTROAMPHETAMINE SULFATE AND AMPHETAMINE SULFATE 5; 5; 5; 5 MG/1; MG/1; MG/1; MG/1
20 CAPSULE, EXTENDED RELEASE ORAL DAILY
Qty: 30 CAPSULE | Refills: 0 | Status: CANCELLED | OUTPATIENT
Start: 2017-08-28

## 2017-08-28 NOTE — TELEPHONE ENCOUNTER
Patient calling today for an update on this refill requested on 8/24.  Patient aware of the 72 hours for refills, but was just looking for an update.      Please call patient back and advise.    Chary Good  Patient Representative

## 2017-08-30 RX ORDER — DEXTROAMPHETAMINE SACCHARATE, AMPHETAMINE ASPARTATE MONOHYDRATE, DEXTROAMPHETAMINE SULFATE AND AMPHETAMINE SULFATE 5; 5; 5; 5 MG/1; MG/1; MG/1; MG/1
20 CAPSULE, EXTENDED RELEASE ORAL DAILY
Qty: 30 CAPSULE | Refills: 0 | Status: SHIPPED | OUTPATIENT
Start: 2017-09-30 | End: 2017-10-30

## 2017-08-30 RX ORDER — DEXTROAMPHETAMINE SACCHARATE, AMPHETAMINE ASPARTATE MONOHYDRATE, DEXTROAMPHETAMINE SULFATE AND AMPHETAMINE SULFATE 5; 5; 5; 5 MG/1; MG/1; MG/1; MG/1
20 CAPSULE, EXTENDED RELEASE ORAL DAILY
Qty: 30 CAPSULE | Refills: 0 | Status: SHIPPED | OUTPATIENT
Start: 2017-10-31 | End: 2017-11-30

## 2017-08-30 RX ORDER — DEXTROAMPHETAMINE SACCHARATE, AMPHETAMINE ASPARTATE MONOHYDRATE, DEXTROAMPHETAMINE SULFATE AND AMPHETAMINE SULFATE 5; 5; 5; 5 MG/1; MG/1; MG/1; MG/1
20 CAPSULE, EXTENDED RELEASE ORAL DAILY
Qty: 30 CAPSULE | Refills: 0 | Status: SHIPPED | OUTPATIENT
Start: 2017-08-30 | End: 2017-09-29

## 2017-08-30 NOTE — TELEPHONE ENCOUNTER
Three one month Rx's for Adderall XR 20 mg printed and walked to [facility] pharmacy by me.    Juan Antonio Villa MD

## 2017-09-19 ENCOUNTER — MYC MEDICAL ADVICE (OUTPATIENT)
Dept: FAMILY MEDICINE | Facility: CLINIC | Age: 37
End: 2017-09-19

## 2017-09-19 DIAGNOSIS — R06.83 SNORING: Primary | ICD-10-CM

## 2017-09-19 NOTE — TELEPHONE ENCOUNTER
Please see [employer] message. Please advise if you would like to see patient prior to placing referral. Thank you.  Colette Butler RN, BSN  [facility]

## 2017-10-31 ENCOUNTER — OFFICE VISIT (OUTPATIENT)
Dept: SLEEP MEDICINE | Facility: CLINIC | Age: 37
End: 2017-10-31
Attending: FAMILY MEDICINE
Payer: COMMERCIAL

## 2017-10-31 VITALS
OXYGEN SATURATION: 97 % | HEIGHT: 72 IN | HEART RATE: 89 BPM | SYSTOLIC BLOOD PRESSURE: 134 MMHG | BODY MASS INDEX: 33.86 KG/M2 | WEIGHT: 250 LBS | DIASTOLIC BLOOD PRESSURE: 86 MMHG | RESPIRATION RATE: 21 BRPM

## 2017-10-31 DIAGNOSIS — R06.81 APNEA: ICD-10-CM

## 2017-10-31 DIAGNOSIS — R06.83 SNORING: ICD-10-CM

## 2017-10-31 DIAGNOSIS — E66.09 CLASS 1 OBESITY DUE TO EXCESS CALORIES WITHOUT SERIOUS COMORBIDITY WITH BODY MASS INDEX (BMI) OF 33.0 TO 33.9 IN ADULT: Primary | ICD-10-CM

## 2017-10-31 DIAGNOSIS — G47.19 EXCESSIVE DAYTIME SLEEPINESS: ICD-10-CM

## 2017-10-31 DIAGNOSIS — E66.811 CLASS 1 OBESITY DUE TO EXCESS CALORIES WITHOUT SERIOUS COMORBIDITY WITH BODY MASS INDEX (BMI) OF 33.0 TO 33.9 IN ADULT: Primary | ICD-10-CM

## 2017-10-31 PROCEDURE — 99244 OFF/OP CNSLTJ NEW/EST MOD 40: CPT | Performed by: FAMILY MEDICINE

## 2017-10-31 RX ORDER — ZOLPIDEM TARTRATE 5 MG/1
TABLET ORAL
Qty: 1 TABLET | Refills: 0 | Status: SHIPPED | OUTPATIENT
Start: 2017-10-31 | End: 2017-12-06

## 2017-10-31 NOTE — MR AVS SNAPSHOT
After Visit Summary   10/31/2017    William Bolton    MRN: [id number]           Patient Information     Date Of Birth          1980        Visit Information        Provider Department      10/31/2017 2:30 PM Fly Rodirguez MD [facility]        Today's Diagnoses     Class 1 obesity due to excess calories without serious comorbidity with body mass index (BMI) of 33.0 to 33.9 in adult    -  1    Snoring        Apnea        Excessive daytime sleepiness          Care Instructions              Your BMI is Body mass index is 33.91 kg/(m^2).  Weight management is a personal decision.  If you are interested in exploring weight loss strategies, the following discussion covers the approaches that may be successful. Body mass index (BMI) is one way to tell whether you are at a healthy weight, overweight, or obese. It measures your weight in relation to your height.  A BMI of 18.5 to 24.9 is in the healthy range. A person with a BMI of 25 to 29.9 is considered overweight, and someone with a BMI of 30 or greater is considered obese. More than two-thirds of American adults are considered overweight or obese.  Being overweight or obese increases the risk for further weight gain. Excess weight may lead to heart disease and diabetes.  Creating and following plans for healthy eating and physical activity may help you improve your health.  Weight control is part of healthy lifestyle and includes exercise, emotional health, and healthy eating habits. Careful eating habits lifelong are the mainstay of weight control. Though there are significant health benefits from weight loss, long-term weight loss with diet alone may be very difficult to achieve- studies show long-term success with dietary management in less than 10% of people. Attaining a healthy weight may be especially difficult to achieve in those with severe obesity. In some cases, medications, devices and surgical management might be  considered.  What can you do?  If you are overweight or obese and are interested in methods for weight loss, you should discuss this with your provider.     Consider reducing daily calorie intake by 500 calories.     Keep a food journal.     Avoiding skipping meals, consider cutting portions instead.    Diet combined with exercise helps maintain muscle while optimizing fat loss. Strength training is particularly important for building and maintaining muscle mass. Exercise helps reduce stress, increase energy, and improves fitness. Increasing exercise without diet control, however, may not burn enough calories to loose weight.       Start walking three days a week 10-20 minutes at a time    Work towards walking thirty minutes five days a week     Eventually, increase the speed of your walking for 1-2 minutes at time    In addition, we recommend that you review healthy lifestyles and methods for weight loss available through the National Institutes of Health patient information sites:  http://win.niddk.nih.gov/publications/index.htm    And look into health and wellness programs that may be available through your health insurance provider, employer, local community center, or [employer]mer club.    Weight management plan: Patient was referred to their PCP to discuss a diet and exercise plan.     Your blood pressure was checked while you were in clinic today.  Please read the guidelines below about what these numbers mean and what you should do about them.  Your systolic blood pressure is the top number.  This is the pressure when the heart is pumping.  Your diastolic blood pressure is the bottom number.  This is the pressure in between beats.  If your systolic blood pressure is less than 120 and your diastolic blood pressure is less than 80, then your blood pressure is normal. There is nothing more that you need to do about it  If your systolic blood pressure is 120-139 or your diastolic blood pressure is 80-89, your blood  "pressure may be higher than it should be.  You should have your blood pressure re-checked within a year by a primary care provider.  If your systolic blood pressure is 140 or greater or your diastolic blood pressure is 90 or greater, you may have high blood pressure.  High blood pressure is treatable, but if left untreated over time it can put you at risk for heart attack, stroke, or kidney failure.  You should have your blood pressure re-checked by a primary care provider within the next four weeks.      MY TREATMENT INFORMATION FOR SLEEP APNEA -  William Bolton    DOCTOR: Fly Rodriguez MD, MPH  SLEEP CENTER : [facility]         If I haven't had a sleep study yet, what can I expect?  A personal story from [employer]  https://www.PreciouStatus.com/watch?v=AxPLmlRpnCs      Am I having a home sleep study?  Here is a video in case you get home and want to make sure you have done it correctly  https://www.PreciouStatus.com/watch?v=ZRV2B5jTku9&feature=youtu.be      Frequently asked questions:  1. What is Obstructive Sleep Apnea (NATHAN)? NATHAN is the most common type of sleep apnea. Apnea literally means, \"without breath.\" It is characterized by repetitive pauses in breathing, despite continued effort to breathe, and is usually associated with a reduction in blood oxygen saturation. Apneas can last 10 to over 60 seconds. It is caused by narrowing or collapse of the upper airway as muscles relax during sleep. Severity of sleep apnea is determined by frequency of breathing events and their effect on your sleep and oxygen levels determined during sleep testing.     2. What are the consequences of NATHAN? Symptoms include: daytime sleepiness- possibly increasing the risk of falling asleep while driving, unrefreshing/restless sleep, snoring, insomnia, waking frequently to urinate, waking with heartburn or reflux, reduced concentration and memory, and morning headaches. Other health consequences may include development of high " blood pressure and other cardiovascular disease in persons who are susceptible. Untreated NATHAN  can contribute to heart disease, stroke and diabetes.     3. What are the treatment options? In most situations, sleep apnea is a lifelong disease that must be managed with daily therapy. Medications are not effective for sleep apnea and surgery is generally not performed until other therapies have been tried. Therapy is usually tailored to the individual patient based on many factors including your wishes as well as severity of sleep apnea and severity of obesity. Continuous Positive Airway (CPAP) is the most reliable treatment. An oral device to hold your jaw forward is usually the next most reliable option. Other options include postioning devices (to keep you off your back), weight loss, and surgery including a tongue pacing device. There is more detail about some of these options below.            1. CPAP-  WHAT DOES IT DO AND HOW CAN I LEARN TO WEAR IT?                               BEFORE I START, CAN I WATCH A MOVIE TO GET A PLAN ON HOW TO USE CPAP?  https://www.Limbo.com/watch?b=d5D94qb334P      Continuous positive airway pressure, or CPAP, is the most effective treatment for obstructive sleep apnea. It works by blowing room air, through a mask, to hold your throat open. A decision to use CPAP is a major step forward in the pursuit of a healthier life. The successful use of CPAP will help you breathe easier, sleep better and live healthier. You can choose CPAP equipment from any durable medical equipment provider that meets your needs.  Using CPAP can be a positive experience if you keep these albert points in mind:  1. Commitment  CPAP is not a quick fix for your problem. It involves a long-term commitment to improve your sleep and your health.    2. Communication  Stay in close communication with both your sleep doctor and your CPAP supplier. Ask lots of questions and seek help when you need  "it.    3. Consistency  Use CPAP all night, every night and for every nap. You will receive the maximum health benefits from CPAP when you use it every time that you sleep. This will also make it easier for your body to adjust to the treatment.    4. Correction  The first machine and mask that you try may not be the best ones for you. Work with your sleep doctor and your CPAP supplier to make corrections to your equipment selection. Ask about trying a different type of machine or mask if you have ongoing problems. Make sure that your mask is a good fit and learn to use your equipment properly.    5. Challenge  Tell a family member or close friend to ask you each morning if you used your CPAP the previous night. Have someone to challenge you to give it your best effort.    6. Connection   Your adjustment to CPAP will be easier if you are able to connect with others who use the same treatment. Ask your sleep doctor if there is a support group in your area for people who have sleep apnea, or look for one on the Internet.  7. Comfort   Increase your level of comfort by using a saline spray, decongestant or heated humidifier if CPAP irritates your nose, mouth or throat. Use your unit's \"ramp\" setting to slowly get used to the air pressure level. There may be soft pads you can buy that will fit over your mask straps. Look on www.CPAP.com for accessories that can help make CPAP use more comfortable.  8. Cleaning   Clean your mask, tubing and headgear on a regular basis. Put this time in your schedule so that you don't forget to do it. Check and replace the filters for your CPAP unit and humidifier.    9. Completion   Although you are never finished with CPAP therapy, you should reward yourself by celebrating the completion of your first month of treatment. Expect this first month to be your hardest period of adjustment. It will involve some trial and error as you find the machine, mask and pressure settings that are right " for you.    10. Continuation  After your first month of treatment, continue to make a daily commitment to use your CPAP all night, every night and for every nap.    CPAP-Tips to starting with success:  Begin using your CPAP for short periods of time during the day while you watch TV or read.    Use CPAP every night and for every nap. Using it less often reduces the health benefits and makes it harder for your body to get used to it.    Make small adjustments to your mask, tubing, straps and headgear until you get the right fit. Tightening the mask may actually worsen the leak.  If it leaves significant marks on your face or irritates the bridge of your nose, it may not be the best mask for you.  Speak with the person who supplied the mask and consider trying other masks. Insurances will allow you to try different masks during the first month of starting CPAP.  Insurance also covers a new mask, hose and filter about every 6 months.    Use a saline nasal spray to ease mild nasal congestion. Neti-Pot or saline nasal rinses may also help. Nasal gel sprays can help reduce nasal dryness.  Biotene mouthwash can be helpful to protect your teeth if you experience frequent dry mouth.  Dry mouth may be a sign of air escaping out of your mouth or out of the mask in the case of a full face mask.  Speak with your provider if you expect that is the case.     Take a nasal decongestant to relieve more severe nasal or sinus congestion.  Do not use Afrin (oxymetazoline) nasal spray more than 3 days in a row.  Speak with your sleep doctor if your nasal congestion is chronic.    Use a heated humidifier that fits your CPAP model to enhance your breathing comfort. Adjust the heat setting up if you get a dry nose or throat, down if you get condensation in the hose or mask.  Position the CPAP lower than you so that any condensation in the hose drains back into the machine rather than towards the mask.    Try a system that uses nasal pillows  if traditional masks give you problems.    Clean your mask, tubing and headgear once a week. Make sure the equipment dries fully.    Regularly check and replace the filters for your CPAP unit and humidifier.    Work closely with your sleep provider and your CPAP supplier to make sure that you have the machine, mask and air pressure setting that works best for you. It is better to stop using it and call your provider to solve problems than to lay awake all night frustrated with the device.      BESIDES CPAP, WHAT OTHER THERAPIES ARE THERE?        Positioning Device  Positioning devices are generally used when sleep apnea is mild and only occurs on your back.This example shows a pillow that straps around the waist. It may be appropriate for those whose sleep study shows milder sleep apnea that occurs primarily when lying flat on one's back. Preliminary studies have shown benefit but effectiveness at home may need to be verified by a home sleep test. These devices are generally not covered by medical insurance.                        Oral Appliance  What is oral appliance therapy?  An oral appliance is a small acrylic device that fits over the upper and lower teeth or tongue (similar to an orthodontic retainer or a mouth guard). This device slightly advances the lower jaw or tongue, which moves the base of the tongue forward, opens the airway, improves breathing and can effectively treat snoring and obstructive sleep apnea sleep apnea. The appliance is fabricated and customized by a qualified dentist with experience in treating snoring and sleep apnea. Oral appliances are usually well tolerated and have relatively high compliance by patients1, 2, 3.  When is an oral appliance indicated?  Oral appliance therapy is recommended as a first-line treatment for patients with primary snoring, mild sleep apnea, and for patients with moderate sleep apnea who prefer appliance therapy to use of CPAP4, 5. Severity of sleep apnea is  determined by sleep testing and is based on the number of respiratory events per hour of sleep.   How successful is oral appliance therapy?  The success rate of oral appliance therapy in patients with mild sleep apnea is 75-80% while in patients with moderate sleep apnea it is 50-70%. The chance of success in patients with severe sleep apnea is 40-50%. The research also shows that oral appliances have a beneficial effect on the cardiovascular health of NATHAN patients at the same magnitude as CPAP therapy7.  Oral appliances should be a second-line treatment in cases of severe sleep apnea, but if not completely successful then a combination therapy utilizing CPAP plus oral appliance therapy may be effective. Oral appliances tend to be effective in a broad range of patients although studies show that the patients who have the highest success are females, younger patients, those with milder disease, and less severe obesity. 3, 6.   The chances of success are lower in patients who have more severe NATHAN, are older, and those who are morbidly obese.     Example of an oral appliance   Finding a dentist that practices dental sleep medicine  Specific training is available through the American Academy of Dental Sleep Medicine for dentists interested in working in the field of sleep. To find a dentist who is educated in the field of sleep and the use of oral appliances, near you, visit the Web site of the American Academy of Dental Sleep Medicine; also see   http://www.accpstorage.org/newOrganization/patients/oralAppliances.pdf  To search for a dentist certified in these practices:  Http://aadsm.org/FindADentist.aspx?1  1. Brooks, et al. Objectively measured vs self-reported compliance during oral appliance therapy for sleep-disordered breathing. Chest 2013; 144(5): 1353-5495.  2. Francine et al. Objective measurement of compliance during oral appliance therapy for sleep-disordered breathing. Thorax 2013; 68(1): 91-96.  3.  Alee et al. Mandibular advancement devices in 620 men and women with NATHAN and snoring: tolerability and predictors of treatment success. Chest 2004; 125: 2477-0045.  4. Lilliam et al. Oral appliances for snoring and NATHAN: a review. Sleep 2006; 29: 244-262.  5. Kristine et al. Oral appliance treatment for NATHAN: an update. J Clin Sleep Med 2014; 10(2): 215-227.  6. Mary et al. Predictors of OSAH treatment outcome. J Dent Res 2007; 86: 3690-6611.      Weight Loss:    Weight management is a personal decision.  If you are interested in exploring weight loss strategies, the following discussion covers the impact on weight loss on sleep apnea and the approaches that may be successful.    Weight loss decreases severity of sleep apnea in most people with obesity. For those with mild obesity who have developed snoring with weight gain, even 15-30 pound weight loss can improve and occasionally eliminate sleep apnea.  Structured and life-long dietary and health habits are necessary to lose weight and keep healthier weight levels.     Though there may be significant health benefits from weight loss, long-term weight loss is very difficult to achieve- studies show success with dietary management in less than 10% of people. In addition, substantial weight loss may require years of dietary control and may be difficult if patients have severe obesity. In these cases, surgical management may be considered.  Finally, older individuals who have tolerated obesity without health complications may be less likely to benefit from weight loss strategies.    Your BMI is Body mass index is 33.91 kg/(m^2).  Body mass index (BMI) is one way to tell whether you are at a healthy weight, overweight, or obese. It measures your weight in relation to your height.  A BMI of 18.5 to 24.9 is in the healthy range. A person with a BMI of 25 to 29.9 is considered overweight, and someone with a BMI of 30 or greater is considered obese. More than  two-thirds of American adults are considered overweight or obese.  Being overweight or obese increases the risk for further weight gain. Excess weight may lead to heart disease and diabetes.  Creating and following plans for healthy eating and physical activity may help you improve your health.  Weight control is part of healthy lifestyle and includes exercise, emotional health, and healthy eating habits. Careful eating habits lifelong are the mainstay of weight control. Though there are significant health benefits from weight loss, long-term weight loss with diet alone may be very difficult to achieve- studies show long-term success with dietary management in less than 10% of people. Attaining a healthy weight may be especially difficult to achieve in those with severe obesity. In some cases, medications, devices and surgical management might be considered.  What can you do?  If you are overweight or obese and are interested in methods for weight loss, you should discuss this with your provider.     Consider reducing daily calorie intake by 500 calories.     Keep a food journal.     Avoiding skipping meals, consider cutting portions instead.    Diet combined with exercise helps maintain muscle while optimizing fat loss. Strength training is particularly important for building and maintaining muscle mass. Exercise helps reduce stress, increase energy, and improves fitness. Increasing exercise without diet control, however, may not burn enough calories to loose weight.       Start walking three days a week 10-20 minutes at a time    Work towards walking thirty minutes five days a week     Eventually, increase the speed of your walking for 1-2 minutes at time    In addition, we recommend that you review healthy lifestyles and methods for weight loss available through the National Institutes of Health patient information sites:  http://win.niddk.nih.gov/publications/index.htm    And look into health and wellness  programs that may be available through your health insurance provider, employer, local community center, or [employer] club.    Weight management plan: Discussed healthy diet and exercise guidelines and patient will follow up in 3 months in clinic to re-evaluate.  Surgery:    Upper Airway Surgery for NATHAN  Surgery for NATHAN is a second-line treatment option in the management of sleep apnea.  Surgery should be considered for patients who are having a difficult time tolerating CPAP.    Surgery for NATHAN is directed at areas that are responsible for narrowing or complete obstruction of the airway during sleep.  There are a wide range of procedures available to enlarge and/or stabilize the airway to prevent blockage of breathing in the three major areas where it can occur: the palate, tongue, and nasal regions.  Successful surgical treatment depends on the accurate identification of the factors responsible for obstructive sleep apnea in each person.  A personalized approach is required because there is no single treatment that works well for everyone.  Because of anatomic variation, consultation with an examination by a sleep surgeon is a critical first step in determining what surgical options are best for each patient.  In some cases, examination during sedation may be recommended in order to guide the selection of procedures.  Patients will be counseled about risks and benefits as well as the typical recovery course after surgery. Surgery is typically not a cure for a person s NATHAN.  However, surgery will often significantly improve one s NATHAN severity (termed  success rate ).  Even in the absence of a cure, surgery will decrease the cardiovascular risk associated with OSA7; improve overall quality of life8 (sleepiness, functionality, sleep quality, etc).          Palate Procedures:  Patients with NATHAN often have narrowing of their airway in the region of their tonsils and uvula.  The goals of palate procedures are to widen the  airway in this region as well as to help the tissues resist collapse.  Modern palate procedure techniques focus on tissue conservation and soft tissue rearrangement, rather than tissue removal.  Often the uvula is preserved in this procedure. Residual sleep apnea is common in patient after pharyngoplasty with an average reduction in sleep apnea events of 33%2.      Tongue Procedures:  While patients are awake, the muscles that surround the throat are active and keep this region open for breathing. These muscles relax during sleep, allowing the tongue and other structures to collapse and block breathing.  There are several different tongue procedures available.  Selection of a tongue base procedure depends on characteristics seen on physical exam.  Generally, procedures are aimed at removing bulky tissues in this area or preventing the back of the tongue from falling back during sleep.  Success rates for tongue surgery range from 50-62%3.    Hypoglossal Nerve Stimulation:  Hypoglossal nerve stimulation has recently received approval from the [place] Food and Drug Administration for the treatment of obstructive sleep apnea.  This is based on research showing that the system was safe and effective in treating sleep apnea6.  Results showed that the median AHI score decreased 68%, from 29.3 to 9.0. This therapy uses an implant system that senses breathing patterns and delivers mild stimulation to airway muscles, which keeps the airway open during sleep.  The system consists of three fully implanted components: a small generator (similar in size to a pacemaker), a breathing sensor, and a stimulation lead.  Using a small handheld remote, a patient turns the therapy on before bed and off upon awakening.    Candidates for this device must be greater than 22 years of age, have moderate to severe NATHAN (AHI between 20-65), BMI less than 32, have tried CPAP/oral appliance without success, and have appropriate upper airway  anatomy (determined by a sleep endoscopy performed by Dr. Spangler).    Hypoglossal Nerve Stimulation Pathway:    The sleep surgeon s office will work with the patient through the insurance prior-authorization process (including communications and appeals).    Nasal Procedures:  Nasal obstruction can interfere with nasal breathing during the day and night.  Studies have shown that relief of nasal obstruction can improve the ability of some patients to tolerate positive airway pressure therapy for obstructive sleep apnea1.  Treatment options include medications such as nasal saline, topical corticosteroid and antihistamine sprays, and oral medications such as antihistamines or decongestants. Non-surgical treatments can include external nasal dilators for selected patients. If these are not successful by themselves, surgery can improve the nasal airway either alone or in combination with these other options.    Combination Procedures:  Combination of surgical procedures and other treatments may be recommended, particularly if patients have more than one area of narrowing or persistent positional disease.  The success rate of combination surgery ranges from 66-80%2,3.      1. [place] POPE. The Role of the Nose in Snoring and Obstructive Sleep Apnoea: An Update.  Eur Arch Otorhinolaryngol. 2011; 268: 1365-73.  2.  Miroslava SM; Sol JA; Shannen JR; Pallanch JF; Neel MB; Mirtha SG; Taye WALTER. Surgical modifications of the upper airway for obstructive sleep apnea in adults: a systematic review and meta-analysis. SLEEP 2010;33(10):5289-3129. Too KELLY. Hypopharyngeal surgery in obstructive sleep apnea: an evidence-based medicine review.  Arch Otolaryngol Head Neck Surg. 2006 Feb;132(2):206-13.  3. Giovany YH1, Kayce Y, Denis HALIMA. The efficacy of anatomically based multilevel surgery for obstructive sleep apnea. Otolaryngol Head Neck Surg. 2003 Oct;129(4):327-35.  4. Too KELLY, Goldberg A. Hypopharyngeal Surgery in Obstructive  Sleep Apnea: An Evidence-Based Medicine Review. Arch Otolaryngol Head Neck Surg. 2006 Feb;132(2):206-13.  5. Miguel PJ et al. Upper-Airway Stimulation for Obstructive Sleep Apnea.  N Engl J Med. 2014 Jan 9;370(2):139-49.  6. Camryn Y et al. Increased Incidence of Cardiovascular Disease in Middle-aged Men with Obstructive Sleep Apnea. Am J Respir Crit Care Med; 2002 166: 159-165  7. Caroline EM et al. Studying Life Effects and Effectiveness of Palatopharyngoplasty (SLEEP) study: Subjective Outcomes of Isolated Uvulopalatopharyngoplasty. Otolaryngol Head Neck Surg. 2011; 144: 623-631.  Please, schedule sleep study and follow up visit with the nurse (she will coming into the room and meet with you). Use sleeping aid only if needed during the night of the study.    Thank you!    Fly Rodriguez MD, MPH  Clinical Sleep and Occupational / Environmental Medicine              Follow-ups after your visit        Your next 10 appointments already scheduled     Nov 13, 2017  7:00 AM CST   Return Visit with Marlin Spivey PsyD   [facility] ([facility])    [address] [place] [id number] [phone]              Future tests that were ordered for you today     Open Future Orders        Priority Expected Expires Ordered    HST-Home Sleep Apnea Test Routine  5/2/2018 10/31/2017            Who to contact     If you have questions or need follow up information about today's clinic visit or your schedule please contact [facility] [place] [phone].  Normal or non-critical lab and imaging results will be communicated to you by MyChart, letter or phone within 4 business days after the clinic has received the results. If you do not hear from us within 7 days, please contact the clinic through MyChart or phone. If you have a critical or abnormal lab result, we will notify you by phone as soon as possible.  Submit refill requests through [facility] or  call your pharmacy and they will forward the refill request to us. Please allow 3 business days for your refill to be completed.          Additional Information About Your Visit        [employer] Information     [employer] gives you secure access to your electronic health record. If you see a primary care provider, you can also send messages to your care team and make appointments. If you have questions, please call your primary care clinic.  If you do not have a primary care provider, please call [phone] and they will assist you.        Care EveryWhere ID     This is your Care EveryWhere ID. This could be used by other organizations to access your [facility] medical records  [id number]        Your Vitals Were     Pulse Respirations Height Pulse Oximetry BMI (Body Mass Index)       89 21 1.829 m (6') 97% 33.91 kg/m2        Blood Pressure from Last 3 Encounters:   10/31/17 (!) 140/97   07/31/17 154/88   06/05/17 132/82    Weight from Last 3 Encounters:   10/31/17 113.4 kg (250 lb)   07/31/17 117.5 kg (259 lb)   06/05/17 119.7 kg (264 lb)              We Performed the Following     SLEEP EVALUATION & MANAGEMENT REFERRAL - ADULT          Today's Medication Changes          These changes are accurate as of: 10/31/17  3:17 PM.  If you have any questions, ask your nurse or doctor.               Start taking these medicines.        Dose/Directions    zolpidem 5 MG tablet   Commonly known as:  AMBIEN   Used for:  Snoring, Excessive daytime sleepiness, Apnea, Class 1 obesity due to excess calories without serious comorbidity with body mass index (BMI) of 33.0 to 33.9 in adult        Take tablet by mouth 15 minutes prior to sleep, for Sleep Study   Quantity:  1 tablet   Refills:  0            Where to get your medicines      Some of these will need a paper prescription and others can be bought over the counter.  Ask your nurse if you have questions.     Bring a paper prescription for each of these medications     zolpidem 5 MG  tablet                Primary Care Provider Office Phone # Fax #    Juan Antonio Villa Jr., [phone] [address]  [place] [address]        Equal Access to Services     JESÚS OCONNOR : Hadii aad ku hademiliao Sotamarali, waaxda luqadaha, qaybta kaalmada ademagueda, sindy romerosean marck. So [facility] [phone].    ATENCIÓN: Si habla español, tiene a epstein disposición servicios gratuitos de asistencia lingüística. Llame al [phone].    We comply with applicable federal civil rights laws and [place] laws. We do not discriminate on the basis of race, color, national origin, age, disability, sex, sexual orientation, or gender identity.            Thank you!     Thank you for choosing [facility]  for your care. Our goal is always to provide you with excellent care. Hearing back from our patients is one way we can continue to improve our services. Please take a few minutes to complete the written survey that you may receive in the mail after your visit with us. Thank you!             Your Updated Medication List - Protect others around you: Learn how to safely use, store and throw away your medicines at www.disposemymeds.org.          This list is accurate as of: 10/31/17  3:17 PM.  Always use your most recent med list.                   Brand Name Dispense Instructions for use Diagnosis    amphetamine-dextroamphetamine 20 MG per 24 hr capsule    ADDERALL XR    30 capsule    Take 1 capsule (20 mg) by mouth daily    ADD (attention deficit disorder) without hyperactivity       buPROPion 150 MG 24 hr tablet    WELLBUTRIN XL    90 tablet    Take 1 tablet (150 mg) by mouth every morning    Major depressive disorder, recurrent episode, mild (H)       citalopram 10 MG tablet    celeXA    810 tablet    Take 3 tablets (30 mg) by mouth daily    Major depressive disorder, recurrent episode, mild (H)       ibuprofen 200 MG capsule     120 capsule    Take 800 mg by  mouth every 8 hours as needed for pain        zolpidem 5 MG tablet    AMBIEN    1 tablet    Take tablet by mouth 15 minutes prior to sleep, for Sleep Study    Snoring, Excessive daytime sleepiness, Apnea, Class 1 obesity due to excess calories without serious comorbidity with body mass index (BMI) of 33.0 to 33.9 in adult

## 2017-10-31 NOTE — NURSING NOTE
Chief Complaint   Patient presents with     Consult     Snores per wife,stops breathing, gasps for air,  Can get to sleep but not stay asleep,  No SS or cpap       Initial BP (!) 140/97  Pulse 89  Resp 21  Ht 1.829 m (6')  Wt 113.4 kg (250 lb)  SpO2 97%  BMI 33.91 kg/m2 Estimated body mass index is 33.91 kg/(m^2) as calculated from the following:    Height as of this encounter: 1.829 m (6').    Weight as of this encounter: 113.4 kg (250 lb).  Medication Reconciliation: complete       Neck 45cm  17.75in  Ess 2      Ester Jerry LPN/CMA

## 2017-10-31 NOTE — PROGRESS NOTES
"[facility]  Outpatient Sleep Medicine Consultation  October 31, 2017    Name: William Bolton MRN# [id number]   Age: 37 year old YOB: 1980     Date of Consultation: October 31, 2017  Consultation is requested by: Juan Antonio Villa Jr., MD  [address] [place] [address]  Primary care provider: Juan Antonio Villa Jr.    Patient is accompanied by: Patient presents alone today       Reason for Sleep Consult:     William Bolton is a 37 year old male patient that presents here for an initial evaluation due to \"snoring.\"         Assessment and Plan:     Summary Sleep Diagnoses:    (1) Obesity  (2) Snoring  (3) Witnessed Apneas  (4) Mild EDS    Summary Recommendations / Discussion:    This patient has sxs, hx, and physical findings that suggest the diagnosis of a sleep disordered breathing. In addition, he has a high pre-test probability of NATHAN. Given that he does not have any contraindications, a portable HST sleep study will be performed. Furthermore, based on the patient's history and physical examination, there is low suspicion for hypoventilation and, therefore, no TCM monitoring and/or ABG would be necessary at this point. Today, the nature and pathophysiology of NATHAN were discussed. The different treatment options for NATHAN were also reviewed and explained today. The patient was given zolpidem 5 mg to use only during the night of the study and only if needed (medication side effects and possible complications discussed). Lifestyle recommendations including healthy dietary and exercising habits were discussed (weight loss also advised and discussed). Pt will follow up with me after having completed a portable HST sleep study.    This patient's evaluation showed elevated BP today. Education and counseling regarding elevated BP were given. The effects of elevated BP were discussed. The patient was advised to monitor his BP and keep a BP diary / log. The patient will follow up with his PCP " for further management.    Coding:  (E66.09,  Z68.33) Class 1 obesity due to excess calories without serious comorbidity with body mass index (BMI) of 33.0 to 33.9 in adult  (primary encounter diagnosis)  (R06.83) Snoring  (R06.81) Apnea  (G47.19) Excessive daytime sleepiness    Counseling included a comprehensive review of diagnostic and therapeutic strategies as well as risks of inadequate therapy.    Educational materials provided in instructions. The patient was instructed to avoid driving or operating any heavy machinery when experiencing drowsiness.    All questions and concerns were addressed today. Pt agrees and understands the assessment and plan.         History of Present Illness:   William Bolton is a 37 year old  RHD male pt with PMH of hyperlipidemia, attention deficit disorder currently on amphetamine-dextroamphetamine (Adderall), major depressive disorder, and obesity presents for an initial evaluation today due to snoring. The patient reports loud snoring (he can be heard from a different room) with some gasping / choking for air and witnessed apneas during the night. Pt reports non-restorative sleep with sleep inertia. Pt reports only mild EDS with inadvertent naps (EDS is not impacting social or work life). No drowsiness when driving with no near accidents. Pt endorses xerostomia with also some morning sore throats. No morning cephalgia, GERD sxs, nocturia, morning myalgias, CP, SOB, positional dyspnea, peripheral edema, or any other sxs or concerns.    PREVIOUS IN- LAB or HOME SLEEP STUDIES: None Reported      SLEEP-WAKE SCHEDULE:     William Bolton      -Describes himself as a night person; prefers to go to sleep at 11:30 PM and wakes up at 8:00 AM.      -Pt takes no naps during the week; takes no inadvertent naps.      -ON WEEKDAYS, goes to sleep at 10:30 PM during the week; awakens 5:00 AM with an alarm; falls asleep in 15 minutes; denies difficulty falling asleep.      -ON WEEKENDS,  goes to sleep at 11:00 PM and wakes up at 7:00 AM with an alarm; falls asleep in 15 minutes.        -Awakens 2-3 times a night for less 10 minutes before falling back to sleep; awakens to go to the bathroom.      BEDTIME ACTIVITIES AND SHIFT WORK:    William Bolton     -Bedtime Activities and Other Sleeping Information: Pt lives with wife and 3 children. Pt sleeps with wife on a jerald size bed. No pets on the bed. Pt sleeps on his size. Pt does not use any electronics in bed.      -Occupation: . Pt works day shifts.    -Working Hours: 7 AM to 430 PM     SCALES        -SLEEP APNEA: Stopbang score: 6 / 8   -TRELL 6 / 28   -PHQ9 of 0        -SLEEPINESS: [place] sleepiness scale (ESS):  2 / 24    Drowsy driving / near accidents: Denies any near accidents    Consequences: Non-restorative sleep with mild EDS    SLEEP COMPLAINTS:  Cardio-respiratory     -Snoring: Significant snoring reported    -Dyspnea: Pt admits having witnessed apneas   -Morning headaches or confusion: Denies any morning cephalgia   -Coexisting Lung disease: Denies diagnosed or known lung disease at this time     -Coexisting Heart disease: Denies diagnosed or known cardiovascular disease at this time     -Does patient have a bed partner: Patient sleeps with spouse   -Has bed partner been sleeping separately because of snoring:  No            RLS Screen:    -When you try to relax in the evening or sleep at night, do you ever have unpleasant, restless feelings in your legs that can be relieved by walking or movement? None Reported     -Periodic limb movement: None Reported    Narcolepsy:     - Denies sudden urges of sleep attacks   - Denies cataplexy   - Denies sleep paralysis    - Denies hallucinations    - Denies feeling refreshed after a nap.    Sleep Behaviors:   - Denies leg symptoms/movements   - Denies motor restlessness   - Denies night terrors   - Denies bruxism   - Denies automatic behaviors    Other Subjective Complaints:   - Denies anxiety  or rumination    - Denies pain and discomfort at night   - Denies waking up with heart pounding or racing   - Denies GERD or aspiration         Parasomnia:    -NREM - Denies recurrent persistent confusional arousal, night eating, sleep walking or sleep terrors.      -REM - Denies dream enactment or injuries.     -Driving Accident or Near Accidents: None Reported         Medications:     Current Outpatient Prescriptions   Medication Sig     amphetamine-dextroamphetamine (ADDERALL XR) 20 MG per 24 hr capsule Take 1 capsule (20 mg) by mouth daily     ibuprofen 200 MG capsule Take 800 mg by mouth every 8 hours as needed for pain     citalopram (CELEXA) 10 MG tablet Take 3 tablets (30 mg) by mouth daily     buPROPion (WELLBUTRIN XL) 150 MG 24 hr tablet Take 1 tablet (150 mg) by mouth every morning     No current facility-administered medications for this visit.       No Known Allergies         Past Medical History:     Denies needing any 02 supplement at night.    Past Medical History:   Diagnosis Date     Abnormal bone xray - sclerosis of phalynx of right great toe 7/31/17 - suggest repeat imaging at annual visit. 7/31/2017           Past Surgical History:    Denies previous upper airway surgery.     Past Surgical History:   Procedure Laterality Date     SURGICAL HISTORY OF -             Social History:     Social History   Substance Use Topics     Smoking status: Never Smoker     Smokeless tobacco: Never Used     Alcohol use No     Chemical History:     Tobacco: Never smoked     Uses 4 cups/day of coffee. Last caffeine intake is usually before 10 AM.    Supplements for wakefulness: Patient does not use any supplements to stay awake    EtOH: 3 to 4 drinks a week  Recreational Drugs: Patient denies using any recreational drugs     Psych Hx:   PHQ2: Negative   -Little Interest or pleasure in doing things? 0 - not at all   -Feeling down, depressed, or hopeless? 0 - not at all    Current dangers to self or others: No. Pt  denies any SI / HI, hallucinations, or delusions         Family History:     Family History   Problem Relation Age of Onset     Coronary Artery Disease Paternal Grandfather      Hypertension Father      Hyperlipidemia Mother       Sleep Family Hx:       RLS - None reported   NATHAN - Mother  Insomnia - None reported  Parasomnia - None reported         Review of Systems:     A complete 10 point review of systems was negative other than HPI or as commented below:     CONSTITUTIONAL: NEGATIVE for weight gain/loss, fever, chills, sweats or night sweats, drug allergies.  EYES: NEGATIVE for changes in vision, blind spots, double vision.  ENT: NEGATIVE for ear pain, sore throat, sinus pain, post-nasal drip, runny nose, bloody nose  CARDIAC: NEGATIVE for fast heartbeats or fluttering in chest, chest pain or pressure, breathlessness when lying flat, swollen legs or swollen feet.  NEUROLOGIC: NEGATIVE headaches, weakness or numbness in the arms or legs.  DERMATOLOGIC: NEGATIVE for rashes, new moles or change in mole(s)  PULMONARY: NEGATIVE SOB at rest, SOB with activity, dry cough, productive cough, coughing up blood, wheezing or whistling when breathing.    GASTROINTESTINAL: NEGATIVE for nausea or vomitting, loose or watery stools, fat or grease in stools, constipation, abdominal pain, bowel movements black in color or blood noted.  GENITOURINARY: NEGATIVE for pain during urination, blood in urine, urinating more frequently than usual, irregular menstrual periods.  MUSCULOSKELETAL: NEGATIVE for muscle pain, bone or joint pain, swollen joints.  ENDOCRINE: NEGATIVE for increased thirst or urination, diabetes.  LYMPHATIC: NEGATIVE for swollen lymph nodes, lumps or bumps in the breasts or nipple discharge.         Physical Examination:   /86  Pulse 89  Resp 21  Ht 1.829 m (6')  Wt 113.4 kg (250 lb)  SpO2 97%  BMI 33.91 kg/m2     VS: Reviewed and normal.  General: Alert, oriented, not in distress. Dressed casually; Good  eye contact; Comfortably sitting in a chair; in no apparent distress  HEENT: Normocephalic and atraumatic; NL TM x 2; pupils are isocoric and equally responsive to the light. PERRLA. EOMI. Normal fundoscopic examination; Nasal turbinates are normal with a mild deviated septal alignment;  Mallampati score: Grade II; Tonsillar hypertrophy: 1  hidden by pillars; Pharynx with no erythema or exudates. Crenation of the tongue.  NECK: Neck supple; symmetrical; no lymphadenopathy; no thyromegaly, bruit, JVD noted. Neck circumference of 17.75 inches (45 cm).  Lungs: Both hemithoraces are symmetrical, normal to palpation, no dullness to percussion, auscultation of lungs revealed normal breath sounds with no expirium prolongation, wheezing, rhonci and crackles.  CVS: Normal S1 and S2 heart sounds with no extra heart sounds. No murmur, rubs, or clicks. Normal peripheral pulses throughout with no obvious peripheral edema.  Abdomen: Bowel sounds present. Abdomen is soft, non-tender, and non-distended. No organomegaly, ascitis, or obvious masses noted. Negative CVA tenderness.  Extremities/musculoskeletal: No deformity, cyanosis, or clubbing noted.  Neurology: Awake, alert, and oriented x 3. No obvious gross motor / sensorial deficits with normal strength in all extremities at 5/5 and normal sensation throughout. Cranial nerves are grossly intact with normal II to XII CN functions. Negative Romberg's test with normal gait. DTR are symmetric and normal at 2+/4.  Integumentary: No obvious skin rash.  Psychiatry: Mood and affect are appropriate. Euthymic with affect congruent with full range and intensity. No SI/HI with adequate insight and judgement.          Data: All pertinent previous laboratory data reviewed     No results found for: PH, PHARTERIAL, PO2, MG5SJAHLLEI, SAT, PCO2, HCO3, BASEEXCESS, FRANDY, BEB  No results found for: TSH  No results found for: GLC  No results found for: HGB  No results found for: BUN,  CR    Echocardiology: None Available    Chest x-ray: None Available    PFT: None Available    Laboratory Studies: None Available    Fly Rodriguez MD, MPH  Clinical Sleep Medicine    Total time spent with patient: 60 min. Over >50% of the time was spent for face to face counseling, education, and evaluation.

## 2017-10-31 NOTE — PATIENT INSTRUCTIONS
Your BMI is Body mass index is 33.91 kg/(m^2).  Weight management is a personal decision.  If you are interested in exploring weight loss strategies, the following discussion covers the approaches that may be successful. Body mass index (BMI) is one way to tell whether you are at a healthy weight, overweight, or obese. It measures your weight in relation to your height.  A BMI of 18.5 to 24.9 is in the healthy range. A person with a BMI of 25 to 29.9 is considered overweight, and someone with a BMI of 30 or greater is considered obese. More than two-thirds of American adults are considered overweight or obese.  Being overweight or obese increases the risk for further weight gain. Excess weight may lead to heart disease and diabetes.  Creating and following plans for healthy eating and physical activity may help you improve your health.  Weight control is part of healthy lifestyle and includes exercise, emotional health, and healthy eating habits. Careful eating habits lifelong are the mainstay of weight control. Though there are significant health benefits from weight loss, long-term weight loss with diet alone may be very difficult to achieve- studies show long-term success with dietary management in less than 10% of people. Attaining a healthy weight may be especially difficult to achieve in those with severe obesity. In some cases, medications, devices and surgical management might be considered.  What can you do?  If you are overweight or obese and are interested in methods for weight loss, you should discuss this with your provider.     Consider reducing daily calorie intake by 500 calories.     Keep a food journal.     Avoiding skipping meals, consider cutting portions instead.    Diet combined with exercise helps maintain muscle while optimizing fat loss. Strength training is particularly important for building and maintaining muscle mass. Exercise helps reduce stress, increase energy, and improves  fitness. Increasing exercise without diet control, however, may not burn enough calories to loose weight.       Start walking three days a week 10-20 minutes at a time    Work towards walking thirty minutes five days a week     Eventually, increase the speed of your walking for 1-2 minutes at time    In addition, we recommend that you review healthy lifestyles and methods for weight loss available through the National Institutes of Health patient information sites:  http://win.niddk.nih.gov/publications/index.htm    And look into health and wellness programs that may be available through your health insurance provider, employer, local community center, or [employer] club.    Weight management plan: Patient was referred to their PCP to discuss a diet and exercise plan.     Your blood pressure was checked while you were in clinic today.  Please read the guidelines below about what these numbers mean and what you should do about them.  Your systolic blood pressure is the top number.  This is the pressure when the heart is pumping.  Your diastolic blood pressure is the bottom number.  This is the pressure in between beats.  If your systolic blood pressure is less than 120 and your diastolic blood pressure is less than 80, then your blood pressure is normal. There is nothing more that you need to do about it  If your systolic blood pressure is 120-139 or your diastolic blood pressure is 80-89, your blood pressure may be higher than it should be.  You should have your blood pressure re-checked within a year by a primary care provider.  If your systolic blood pressure is 140 or greater or your diastolic blood pressure is 90 or greater, you may have high blood pressure.  High blood pressure is treatable, but if left untreated over time it can put you at risk for heart attack, stroke, or kidney failure.  You should have your blood pressure re-checked by a primary care provider within the next four weeks.      MY TREATMENT  "INFORMATION FOR SLEEP APNEA -  William Bolton    DOCTOR: Fly Rodriguez MD, MPH  SLEEP CENTER : [facility]         If I haven't had a sleep study yet, what can I expect?  A personal story from [place]  https://www.[employer].com/watch?v=AxPLmlRpnCs      Am I having a home sleep study?  Here is a video in case you get home and want to make sure you have done it correctly  https://www.[employer].com/watch?v=BRE7U5pSpe8&feature=youtu.be      Frequently asked questions:  1. What is Obstructive Sleep Apnea (NATHAN)? NATHAN is the most common type of sleep apnea. Apnea literally means, \"without breath.\" It is characterized by repetitive pauses in breathing, despite continued effort to breathe, and is usually associated with a reduction in blood oxygen saturation. Apneas can last 10 to over 60 seconds. It is caused by narrowing or collapse of the upper airway as muscles relax during sleep. Severity of sleep apnea is determined by frequency of breathing events and their effect on your sleep and oxygen levels determined during sleep testing.     2. What are the consequences of NATHAN? Symptoms include: daytime sleepiness- possibly increasing the risk of falling asleep while driving, unrefreshing/restless sleep, snoring, insomnia, waking frequently to urinate, waking with heartburn or reflux, reduced concentration and memory, and morning headaches. Other health consequences may include development of high blood pressure and other cardiovascular disease in persons who are susceptible. Untreated NATHAN  can contribute to heart disease, stroke and diabetes.     3. What are the treatment options? In most situations, sleep apnea is a lifelong disease that must be managed with daily therapy. Medications are not effective for sleep apnea and surgery is generally not performed until other therapies have been tried. Therapy is usually tailored to the individual patient based on many factors including your wishes as well as severity " of sleep apnea and severity of obesity. Continuous Positive Airway (CPAP) is the most reliable treatment. An oral device to hold your jaw forward is usually the next most reliable option. Other options include postioning devices (to keep you off your back), weight loss, and surgery including a tongue pacing device. There is more detail about some of these options below.            1. CPAP-  WHAT DOES IT DO AND HOW CAN I LEARN TO WEAR IT?                               BEFORE I START, CAN I WATCH A MOVIE TO GET A PLAN ON HOW TO USE CPAP?  https://www.[employer].com/watch?g=r7M15ph799S      Continuous positive airway pressure, or CPAP, is the most effective treatment for obstructive sleep apnea. It works by blowing room air, through a mask, to hold your throat open. A decision to use CPAP is a major step forward in the pursuit of a healthier life. The successful use of CPAP will help you breathe easier, sleep better and live healthier. You can choose CPAP equipment from any durable medical equipment provider that meets your needs.  Using CPAP can be a positive experience if you keep these albert points in mind:  1. Commitment  CPAP is not a quick fix for your problem. It involves a long-term commitment to improve your sleep and your health.    2. Communication  Stay in close communication with both your sleep doctor and your CPAP supplier. Ask lots of questions and seek help when you need it.    3. Consistency  Use CPAP all night, every night and for every nap. You will receive the maximum health benefits from CPAP when you use it every time that you sleep. This will also make it easier for your body to adjust to the treatment.    4. Correction  The first machine and mask that you try may not be the best ones for you. Work with your sleep doctor and your CPAP supplier to make corrections to your equipment selection. Ask about trying a different type of machine or mask if you have ongoing problems. Make sure that your mask is  "a good fit and learn to use your equipment properly.    5. Challenge  Tell a family member or close friend to ask you each morning if you used your CPAP the previous night. Have someone to challenge you to give it your best effort.    6. Connection   Your adjustment to CPAP will be easier if you are able to connect with others who use the same treatment. Ask your sleep doctor if there is a support group in your area for people who have sleep apnea, or look for one on the Internet.  7. Comfort   Increase your level of comfort by using a saline spray, decongestant or heated humidifier if CPAP irritates your nose, mouth or throat. Use your unit's \"ramp\" setting to slowly get used to the air pressure level. There may be soft pads you can buy that will fit over your mask straps. Look on www.CPAP.com for accessories that can help make CPAP use more comfortable.  8. Cleaning   Clean your mask, tubing and headgear on a regular basis. Put this time in your schedule so that you don't forget to do it. Check and replace the filters for your CPAP unit and humidifier.    9. Completion   Although you are never finished with CPAP therapy, you should reward yourself by celebrating the completion of your first month of treatment. Expect this first month to be your hardest period of adjustment. It will involve some trial and error as you find the machine, mask and pressure settings that are right for you.    10. Continuation  After your first month of treatment, continue to make a daily commitment to use your CPAP all night, every night and for every nap.    CPAP-Tips to starting with success:  Begin using your CPAP for short periods of time during the day while you watch TV or read.    Use CPAP every night and for every nap. Using it less often reduces the health benefits and makes it harder for your body to get used to it.    Make small adjustments to your mask, tubing, straps and headgear until you get the right fit. Tightening the " mask may actually worsen the leak.  If it leaves significant marks on your face or irritates the bridge of your nose, it may not be the best mask for you.  Speak with the person who supplied the mask and consider trying other masks. Insurances will allow you to try different masks during the first month of starting CPAP.  Insurance also covers a new mask, hose and filter about every 6 months.    Use a saline nasal spray to ease mild nasal congestion. Neti-Pot or saline nasal rinses may also help. Nasal gel sprays can help reduce nasal dryness.  Biotene mouthwash can be helpful to protect your teeth if you experience frequent dry mouth.  Dry mouth may be a sign of air escaping out of your mouth or out of the mask in the case of a full face mask.  Speak with your provider if you expect that is the case.     Take a nasal decongestant to relieve more severe nasal or sinus congestion.  Do not use Afrin (oxymetazoline) nasal spray more than 3 days in a row.  Speak with your sleep doctor if your nasal congestion is chronic.    Use a heated humidifier that fits your CPAP model to enhance your breathing comfort. Adjust the heat setting up if you get a dry nose or throat, down if you get condensation in the hose or mask.  Position the CPAP lower than you so that any condensation in the hose drains back into the machine rather than towards the mask.    Try a system that uses nasal pillows if traditional masks give you problems.    Clean your mask, tubing and headgear once a week. Make sure the equipment dries fully.    Regularly check and replace the filters for your CPAP unit and humidifier.    Work closely with your sleep provider and your CPAP supplier to make sure that you have the machine, mask and air pressure setting that works best for you. It is better to stop using it and call your provider to solve problems than to lay awake all night frustrated with the device.      BESIDES CPAP, WHAT OTHER THERAPIES ARE  THERE?        Positioning Device  Positioning devices are generally used when sleep apnea is mild and only occurs on your back.This example shows a pillow that straps around the waist. It may be appropriate for those whose sleep study shows milder sleep apnea that occurs primarily when lying flat on one's back. Preliminary studies have shown benefit but effectiveness at home may need to be verified by a home sleep test. These devices are generally not covered by medical insurance.                        Oral Appliance  What is oral appliance therapy?  An oral appliance is a small acrylic device that fits over the upper and lower teeth or tongue (similar to an orthodontic retainer or a mouth guard). This device slightly advances the lower jaw or tongue, which moves the base of the tongue forward, opens the airway, improves breathing and can effectively treat snoring and obstructive sleep apnea sleep apnea. The appliance is fabricated and customized by a qualified dentist with experience in treating snoring and sleep apnea. Oral appliances are usually well tolerated and have relatively high compliance by patients1, 2, 3.  When is an oral appliance indicated?  Oral appliance therapy is recommended as a first-line treatment for patients with primary snoring, mild sleep apnea, and for patients with moderate sleep apnea who prefer appliance therapy to use of CPAP4, 5. Severity of sleep apnea is determined by sleep testing and is based on the number of respiratory events per hour of sleep.   How successful is oral appliance therapy?  The success rate of oral appliance therapy in patients with mild sleep apnea is 75-80% while in patients with moderate sleep apnea it is 50-70%. The chance of success in patients with severe sleep apnea is 40-50%. The research also shows that oral appliances have a beneficial effect on the cardiovascular health of NATHAN patients at the same magnitude as CPAP therapy7.  Oral appliances should be  a second-line treatment in cases of severe sleep apnea, but if not completely successful then a combination therapy utilizing CPAP plus oral appliance therapy may be effective. Oral appliances tend to be effective in a broad range of patients although studies show that the patients who have the highest success are females, younger patients, those with milder disease, and less severe obesity. 3, 6.   The chances of success are lower in patients who have more severe NATHAN, are older, and those who are morbidly obese.     Example of an oral appliance   Finding a dentist that practices dental sleep medicine  Specific training is available through the American Academy of Dental Sleep Medicine for dentists interested in working in the field of sleep. To find a dentist who is educated in the field of sleep and the use of oral appliances, near you, visit the Web site of the American Academy of Dental Sleep Medicine; also see   http://www.accpstorage.org/newOrganization/patients/oralAppliances.pdf  To search for a dentist certified in these practices:  Http://aadsm.org/FindADentist.aspx?1  1. Brooks et al. Objectively measured vs self-reported compliance during oral appliance therapy for sleep-disordered breathing. Chest 2013; 144(5): 9135-4052.  2. Francine, et al. Objective measurement of compliance during oral appliance therapy for sleep-disordered breathing. Thorax 2013; 68(1): 91-96.  3. Alee et al. Mandibular advancement devices in 620 men and women with NATHAN and snoring: tolerability and predictors of treatment success. Chest 2004; 125: 8214-5629.  4. Lilliam, et al. Oral appliances for snoring and NATHAN: a review. Sleep 2006; 29: 244-262.  5. Kristine et al. Oral appliance treatment for NATHAN: an update. J Clin Sleep Med 2014; 10(2): 215-227.  6. Mary et al. Predictors of OSAH treatment outcome. J Dent Res 2007; 86: 4093-0633.      Weight Loss:    Weight management is a personal decision.  If you are  interested in exploring weight loss strategies, the following discussion covers the impact on weight loss on sleep apnea and the approaches that may be successful.    Weight loss decreases severity of sleep apnea in most people with obesity. For those with mild obesity who have developed snoring with weight gain, even 15-30 pound weight loss can improve and occasionally eliminate sleep apnea.  Structured and life-long dietary and health habits are necessary to lose weight and keep healthier weight levels.     Though there may be significant health benefits from weight loss, long-term weight loss is very difficult to achieve- studies show success with dietary management in less than 10% of people. In addition, substantial weight loss may require years of dietary control and may be difficult if patients have severe obesity. In these cases, surgical management may be considered.  Finally, older individuals who have tolerated obesity without health complications may be less likely to benefit from weight loss strategies.    Your BMI is Body mass index is 33.91 kg/(m^2).  Body mass index (BMI) is one way to tell whether you are at a healthy weight, overweight, or obese. It measures your weight in relation to your height.  A BMI of 18.5 to 24.9 is in the healthy range. A person with a BMI of 25 to 29.9 is considered overweight, and someone with a BMI of 30 or greater is considered obese. More than two-thirds of American adults are considered overweight or obese.  Being overweight or obese increases the risk for further weight gain. Excess weight may lead to heart disease and diabetes.  Creating and following plans for healthy eating and physical activity may help you improve your health.  Weight control is part of healthy lifestyle and includes exercise, emotional health, and healthy eating habits. Careful eating habits lifelong are the mainstay of weight control. Though there are significant health benefits from weight  loss, long-term weight loss with diet alone may be very difficult to achieve- studies show long-term success with dietary management in less than 10% of people. Attaining a healthy weight may be especially difficult to achieve in those with severe obesity. In some cases, medications, devices and surgical management might be considered.  What can you do?  If you are overweight or obese and are interested in methods for weight loss, you should discuss this with your provider.     Consider reducing daily calorie intake by 500 calories.     Keep a food journal.     Avoiding skipping meals, consider cutting portions instead.    Diet combined with exercise helps maintain muscle while optimizing fat loss. Strength training is particularly important for building and maintaining muscle mass. Exercise helps reduce stress, increase energy, and improves fitness. Increasing exercise without diet control, however, may not burn enough calories to loose weight.       Start walking three days a week 10-20 minutes at a time    Work towards walking thirty minutes five days a week     Eventually, increase the speed of your walking for 1-2 minutes at time    In addition, we recommend that you review healthy lifestyles and methods for weight loss available through the National Institutes of Health patient information sites:  http://win.niddk.nih.gov/publications/index.htm    And look into health and wellness programs that may be available through your health insurance provider, employer, local community center, or [employer] club.    Weight management plan: Discussed healthy diet and exercise guidelines and patient will follow up in 3 months in clinic to re-evaluate.  Surgery:    Upper Airway Surgery for NATHAN  Surgery for NATHAN is a second-line treatment option in the management of sleep apnea.  Surgery should be considered for patients who are having a difficult time tolerating CPAP.    Surgery for NATHAN is directed at areas that are responsible  for narrowing or complete obstruction of the airway during sleep.  There are a wide range of procedures available to enlarge and/or stabilize the airway to prevent blockage of breathing in the three major areas where it can occur: the palate, tongue, and nasal regions.  Successful surgical treatment depends on the accurate identification of the factors responsible for obstructive sleep apnea in each person.  A personalized approach is required because there is no single treatment that works well for everyone.  Because of anatomic variation, consultation with an examination by a sleep surgeon is a critical first step in determining what surgical options are best for each patient.  In some cases, examination during sedation may be recommended in order to guide the selection of procedures.  Patients will be counseled about risks and benefits as well as the typical recovery course after surgery. Surgery is typically not a cure for a person s NATHAN.  However, surgery will often significantly improve one s NATHAN severity (termed  success rate ).  Even in the absence of a cure, surgery will decrease the cardiovascular risk associated with OSA7; improve overall quality of life8 (sleepiness, functionality, sleep quality, etc).          Palate Procedures:  Patients with NATHAN often have narrowing of their airway in the region of their tonsils and uvula.  The goals of palate procedures are to widen the airway in this region as well as to help the tissues resist collapse.  Modern palate procedure techniques focus on tissue conservation and soft tissue rearrangement, rather than tissue removal.  Often the uvula is preserved in this procedure. Residual sleep apnea is common in patient after pharyngoplasty with an average reduction in sleep apnea events of 33%2.      Tongue Procedures:  While patients are awake, the muscles that surround the throat are active and keep this region open for breathing. These muscles relax during sleep,  allowing the tongue and other structures to collapse and block breathing.  There are several different tongue procedures available.  Selection of a tongue base procedure depends on characteristics seen on physical exam.  Generally, procedures are aimed at removing bulky tissues in this area or preventing the back of the tongue from falling back during sleep.  Success rates for tongue surgery range from 50-62%3.    Hypoglossal Nerve Stimulation:  Hypoglossal nerve stimulation has recently received approval from the [place] Food and Drug Administration for the treatment of obstructive sleep apnea.  This is based on research showing that the system was safe and effective in treating sleep apnea6.  Results showed that the median AHI score decreased 68%, from 29.3 to 9.0. This therapy uses an implant system that senses breathing patterns and delivers mild stimulation to airway muscles, which keeps the airway open during sleep.  The system consists of three fully implanted components: a small generator (similar in size to a pacemaker), a breathing sensor, and a stimulation lead.  Using a small handheld remote, a patient turns the therapy on before bed and off upon awakening.    Candidates for this device must be greater than 22 years of age, have moderate to severe NATHAN (AHI between 20-65), BMI less than 32, have tried CPAP/oral appliance without success, and have appropriate upper airway anatomy (determined by a sleep endoscopy performed by Dr. Spangler).    Hypoglossal Nerve Stimulation Pathway:    The sleep surgeon s office will work with the patient through the insurance prior-authorization process (including communications and appeals).    Nasal Procedures:  Nasal obstruction can interfere with nasal breathing during the day and night.  Studies have shown that relief of nasal obstruction can improve the ability of some patients to tolerate positive airway pressure therapy for obstructive sleep apnea1.  Treatment  options include medications such as nasal saline, topical corticosteroid and antihistamine sprays, and oral medications such as antihistamines or decongestants. Non-surgical treatments can include external nasal dilators for selected patients. If these are not successful by themselves, surgery can improve the nasal airway either alone or in combination with these other options.    Combination Procedures:  Combination of surgical procedures and other treatments may be recommended, particularly if patients have more than one area of narrowing or persistent positional disease.  The success rate of combination surgery ranges from 66-80%2,3.      1. [place] POPE. The Role of the Nose in Snoring and Obstructive Sleep Apnoea: An Update.  Eur Arch Otorhinolaryngol. 2011; 268: 1365-73.  2.  Miroslava SM; Slo JA; Shannen JR; Pallanch JF; Neel MB; Mirtha SG; Taye WALTER. Surgical modifications of the upper airway for obstructive sleep apnea in adults: a systematic review and meta-analysis. SLEEP 2010;33(10):1825-2283. Too KELLY. Hypopharyngeal surgery in obstructive sleep apnea: an evidence-based medicine review.  Arch Otolaryngol Head Neck Surg. 2006 Feb;132(2):206-13.  3. Giovany YH1, Kayce Y, Denis HALIMA. The efficacy of anatomically based multilevel surgery for obstructive sleep apnea. Otolaryngol Head Neck Surg. 2003 Oct;129(4):327-35.  4. Too KELLY, Goldberg A. Hypopharyngeal Surgery in Obstructive Sleep Apnea: An Evidence-Based Medicine Review. Arch Otolaryngol Head Neck Surg. 2006 Feb;132(2):206-13.  5. Miguel MENDEZ et al. Upper-Airway Stimulation for Obstructive Sleep Apnea.  N Engl J Med. 2014 Jan 9;370(2):139-49.  6. Camryn Y et al. Increased Incidence of Cardiovascular Disease in Middle-aged Men with Obstructive Sleep Apnea. Am J Respir Crit Care Med; 2002 166: 159-165  7. Caroline GROVER et al. Studying Life Effects and Effectiveness of Palatopharyngoplasty (SLEEP) study: Subjective Outcomes of Isolated  Uvulopalatopharyngoplasty. Otolaryngol Head Neck Surg. 2011; 144: 623-631.  Please, schedule sleep study and follow up visit with the nurse (she will coming into the room and meet with you). Use sleeping aid only if needed during the night of the study.    Thank you!    Fly Rodriguez MD, MPH  Clinical Sleep and Occupational / Environmental Medicine

## 2017-11-13 ENCOUNTER — OFFICE VISIT (OUTPATIENT)
Dept: PSYCHOLOGY | Facility: CLINIC | Age: 37
End: 2017-11-13
Payer: COMMERCIAL

## 2017-11-13 DIAGNOSIS — F90.0 ADHD, PREDOMINANTLY INATTENTIVE TYPE: ICD-10-CM

## 2017-11-13 DIAGNOSIS — F33.42 MAJOR DEPRESSIVE DISORDER, RECURRENT EPISODE, IN FULL REMISSION (H): Primary | ICD-10-CM

## 2017-11-13 PROCEDURE — 90834 PSYTX W PT 45 MINUTES: CPT | Performed by: PSYCHOLOGIST

## 2017-11-13 NOTE — PROGRESS NOTES
Progress Note    Client Name: William Bolton  Date: 11/13/2017         Service Type: Individual      Session Start Time: 8:00  Session End Time: 8:45      Session Length: 45     Session #: 11     Attendees: Client attended alone       DATA      Progress Since Last Session (Related to Symptoms / Goals / Homework):   Symptoms: Stable    Homework: completed      Episode of Care Goals: Satisfactory progress - ACTION (Actively working towards change); Intervened by reinforcing change plan / affirming steps taken. Reports that he has been more assertive about his needs with his wife and family.      Current / Ongoing Stressors and Concerns:   Reported that his medication adjustments seems to be helping. Reported that symptoms of inattention continue to be well-managed. Reported that the father of his foster son's appeal for parental rights has been turned down and they are planning to adopt him. Reported some concerns about anxiety in their oldest daughter after she produced some drawings that included some self-critical statements, and because she has been making many self-critical statements at home.        Treatment Objective(s) Addressed in This Session:   reviewed successes and struggles; review lifestyle habits and ways to improve focus and mood  psychoeducation     Intervention:   Reviewed progress, successes and struggles since last session. Coached him on ways to set effective limits with his foster son's birth mother. Reinforced healthy behavioral choices.  Reflected on use of assertiveness skills and the benefits he has gained from being more open with others. Encouraged client to consider having his daughter start counseling to address anxiety. Coached client on ways to establish more reasonable expectations for their daughter and how to increase family time during the evenings.         ASSESSMENT: Current Emotional / Mental Status (status of significant  "symptoms):   Risk status (Self / Other harm or suicidal ideation)   Client denies current fears or concerns for personal safety.   Client denies current or recent suicidal ideation or behaviors.   Client denies current or recent homicidal ideation or behaviors.   Client denies current or recent self injurious behavior or ideation.   Client denies other safety concerns.   A safety and risk management plan has not been developed at this time, however client was given the after-hours number should there be a change in any of these risk factors.     Appearance:   Appropriate    Eye Contact:   Good    Psychomotor Behavior: Normal    Attitude:   Cooperative    Orientation:   All   Speech    Rate / Production: Normal     Volume:  Normal    Mood:    Normal with periods of situational sadness and irritability   Affect:    Appropriate    Thought Content:  Clear    Thought Form:  Coherent  Logical    Insight:    Good      Medication Review:   No changes to current psychiatric medication(s)     Medication Compliance:   Yes     Changes in Health Issues:   None reported     Chemical Use Review:   Substance Use: Chemical use reviewed, no active concerns identified      Tobacco Use: No current tobacco use.       Collateral Reports Completed:   Not Applicable    PLAN: (Client Tasks / Therapist Tasks / Other)  Future appointment is scheduled. Continue to use skills discussed in session to increase life balance, improve mood, and improve focus and follow through.  Get back into a habit of regular physical activity and monthly \"date night\" with spouse. Use assertiveness skills (e.g. Paying attention to timing, tone, \"heat of the moment\") with family to manage disagreement and conflict. Call with questions or concerns.         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    Anticipated number of session or this episode of care: reassess after 12 sessions      MeasurableTreatment Goal(s) related to diagnosis / functional impairment(s)  Goal 1: Client will learn and use coping skills to effectively manage inattention and depressed mood.    I will know I've met my goal when I'm feeling better.      Objective #A (Client Action)    Client will increase physical activity to at least 2x weekly to improve mood and overall sense of well-being.  Status: Continued - Date(s): 8/14/2017    Intervention(s)  Therapist will assign homework, problem-solve barriers to follow through.    Objective #B  Client will learn assertive communication skills to improve relationships.  Status: Continued - Date(s): 8/14/2017    Intervention(s)  Therapist will teach assertive communication skills.    Objective #C  Client will learn and use strategies to plan his time and organize his activities more effectively.  Status: Continued - Date(s): 8/14/2017    Intervention(s)  Therapist will teach time management and organizational techniques.      Client has reviewed and agreed to the above plan.      Marlin Spivey PsyD LP  8/14/2017

## 2017-11-13 NOTE — MR AVS SNAPSHOT
MRN:[id number]                      After Visit Summary   11/13/2017    William Bolton    MRN: [id number]           Visit Information        Provider Department      11/13/2017 7:00 AM Marlin Spivey PsyD [facility]      Your next 10 appointments already scheduled     Dec 11, 2017  3:00 PM CST   HST  with  SLEEP LAB   [facility] ([facility]    [address]  [place] [address]   [phone]            Dec 12, 2017  8:00 AM CST   HST Drop Off with  SLEEP DME   [facility] ([facility]    [address]  [place] [address]   [phone]            Dec 19, 2017  2:00 PM CST   Return Sleep Patient with Fly Rodriguez MD   [facility] ([facility])    [address]  [place] [address]   [phone]            Feb 05, 2018  7:00 AM CST   Return Visit with Marlin Spivey PsyD   [facility] ([facility])    [address] [place] [address] [phone]              MyChart Information     [facility] [employer] gives you secure access to your electronic health record. If you see a primary care provider, you can also send messages to your care team and make appointments. If you have questions, please call your primary care clinic.  If you do not have a primary care provider, please call [phone] and they will assist you.        Care EveryWhere ID     This is your Care EveryWhere ID. This could be used by other organizations to access your [place] medical records  [id number]        Equal Access to Services     JESÚS OCONNOR : Hadii angela lermao Somia, waaxda giuliana, qaybta kaalmada victoria, sindy acharya. So [facility] [phone].    ATENCIÓN: Si vincent david,  tiene a epstein disposición servicios gratuitos de asistencia lingüística. Llame al [phone].    We comply with applicable federal civil rights laws and [place] laws. We do not discriminate on the basis of race, color, national origin, age, disability, sex, sexual orientation, or gender identity.

## 2017-12-05 ENCOUNTER — MYC MEDICAL ADVICE (OUTPATIENT)
Dept: FAMILY MEDICINE | Facility: CLINIC | Age: 37
End: 2017-12-05

## 2017-12-05 DIAGNOSIS — F98.8 ADD (ATTENTION DEFICIT DISORDER) WITHOUT HYPERACTIVITY: Primary | ICD-10-CM

## 2017-12-06 RX ORDER — DEXTROAMPHETAMINE SACCHARATE, AMPHETAMINE ASPARTATE MONOHYDRATE, DEXTROAMPHETAMINE SULFATE AND AMPHETAMINE SULFATE 5; 5; 5; 5 MG/1; MG/1; MG/1; MG/1
20 CAPSULE, EXTENDED RELEASE ORAL DAILY
Qty: 30 CAPSULE | Refills: 0 | Status: SHIPPED | OUTPATIENT
Start: 2018-01-06 | End: 2018-02-05

## 2017-12-06 RX ORDER — DEXTROAMPHETAMINE SACCHARATE, AMPHETAMINE ASPARTATE MONOHYDRATE, DEXTROAMPHETAMINE SULFATE AND AMPHETAMINE SULFATE 5; 5; 5; 5 MG/1; MG/1; MG/1; MG/1
20 CAPSULE, EXTENDED RELEASE ORAL DAILY
Qty: 30 CAPSULE | Refills: 0 | Status: SHIPPED | OUTPATIENT
Start: 2018-02-06 | End: 2018-03-08

## 2017-12-06 RX ORDER — DEXTROAMPHETAMINE SACCHARATE, AMPHETAMINE ASPARTATE MONOHYDRATE, DEXTROAMPHETAMINE SULFATE AND AMPHETAMINE SULFATE 5; 5; 5; 5 MG/1; MG/1; MG/1; MG/1
20 CAPSULE, EXTENDED RELEASE ORAL DAILY
Qty: 30 CAPSULE | Refills: 0 | Status: SHIPPED | OUTPATIENT
Start: 2017-12-06 | End: 2018-01-05

## 2017-12-11 ENCOUNTER — OFFICE VISIT (OUTPATIENT)
Dept: SLEEP MEDICINE | Facility: CLINIC | Age: 37
End: 2017-12-11
Payer: COMMERCIAL

## 2017-12-11 DIAGNOSIS — R06.83 SNORING: ICD-10-CM

## 2017-12-11 DIAGNOSIS — E66.09 CLASS 1 OBESITY DUE TO EXCESS CALORIES WITHOUT SERIOUS COMORBIDITY WITH BODY MASS INDEX (BMI) OF 33.0 TO 33.9 IN ADULT: ICD-10-CM

## 2017-12-11 DIAGNOSIS — E66.09 CLASS 1 OBESITY DUE TO EXCESS CALORIES WITH SERIOUS COMORBIDITY AND BODY MASS INDEX (BMI) OF 33.0 TO 33.9 IN ADULT: ICD-10-CM

## 2017-12-11 DIAGNOSIS — R06.81 APNEA: Primary | ICD-10-CM

## 2017-12-11 DIAGNOSIS — E66.811 CLASS 1 OBESITY DUE TO EXCESS CALORIES WITH SERIOUS COMORBIDITY AND BODY MASS INDEX (BMI) OF 33.0 TO 33.9 IN ADULT: ICD-10-CM

## 2017-12-11 DIAGNOSIS — E66.811 CLASS 1 OBESITY DUE TO EXCESS CALORIES WITHOUT SERIOUS COMORBIDITY WITH BODY MASS INDEX (BMI) OF 33.0 TO 33.9 IN ADULT: ICD-10-CM

## 2017-12-11 DIAGNOSIS — G47.19 EXCESSIVE DAYTIME SLEEPINESS: ICD-10-CM

## 2017-12-11 PROCEDURE — G0399 HOME SLEEP TEST/TYPE 3 PORTA: HCPCS | Performed by: FAMILY MEDICINE

## 2017-12-11 NOTE — MR AVS SNAPSHOT
After Visit Summary   12/11/2017    William Bolton    MRN: [id number]           Patient Information     Date Of Birth          1980        Visit Information        Provider Department      12/11/2017 3:00 PM [facility] SLEEP LAB [facility]        Today's Diagnoses     Apnea    -  1    Excessive daytime sleepiness        Class 1 obesity due to excess calories with serious comorbidity and body mass index (BMI) of 33.0 to 33.9 in adult        Snoring           Follow-ups after your visit        Your next 10 appointments already scheduled     Dec 12, 2017  8:00 AM CST   HST Drop Off with [facility] SLEEP DME   [facility] ([facility])    [address]  [place] [address]   [phone]            Dec 19, 2017  8:30 AM CST   Return Sleep Patient with Fly Rodriguez MD   [facility] ([facility])    [address]  [place] [address]   [phone]            Feb 05, 2018  7:00 AM CST   Return Visit with Marlin Spivey PsyD   [facility] ([facility])    [address] [address] [phone]              Who to contact     If you have questions or need follow up information about today's clinic visit or your schedule please contact [facility] directly at [phone].  Normal or non-critical lab and imaging results will be communicated to you by MyChart, letter or phone within 4 business days after the clinic has received the results. If you do not hear from us within 7 days, please contact the clinic through MyChart or phone. If you have a critical or abnormal lab result, we will notify you by phone as soon as possible.  Submit refill requests through [employer] or call your pharmacy and they will forward the refill request to us. Please allow 3 business days for your  refill to be completed.          Additional Information About Your Visit        MyChart Information     [employer] gives you secure access to your electronic health record. If you see a primary care provider, you can also send messages to your care team and make appointments. If you have questions, please call your primary care clinic.  If you do not have a primary care provider, please call [phone] and they will assist you.        Care EveryWhere ID     This is your Care EveryWhere ID. This could be used by other organizations to access your [place] medical records  [id number]         Blood Pressure from Last 3 Encounters:   10/31/17 134/86   07/31/17 154/88   06/05/17 132/82    Weight from Last 3 Encounters:   10/31/17 113.4 kg (250 lb)   07/31/17 117.5 kg (259 lb)   06/05/17 119.7 kg (264 lb)              Today, you had the following     No orders found for display       Primary Care Provider Office Phone # Fax #    Juan Antonio Villa Jr., [phone] [address]  [place] [address]        Equal Access to Services     [facility]: Hadii aad ku hadasho Soomaali, waaxda luqadaha, qaybta kaalmada adecurt, sindy mendoza . So [facility] [phone].    ATENCIÓN: Si habla español, tiene a epstein disposición servicios gratuitos de asistencia lingüística. Suma al [phone].    We comply with applicable federal civil rights laws and [place] laws. We do not discriminate on the basis of race, color, national origin, age, disability, sex, sexual orientation, or gender identity.            Thank you!     Thank you for choosing [facility]  for your care. Our goal is always to provide you with excellent care. Hearing back from our patients is one way we can continue to improve our services. Please take a few minutes to complete the written survey that you may receive in the mail after your visit with us. Thank you!             Your Updated  Medication List - Protect others around you: Learn how to safely use, store and throw away your medicines at www.disposemymeds.org.          This list is accurate as of: 12/11/17  5:44 PM.  Always use your most recent med list.                   Brand Name Dispense Instructions for use Diagnosis    * amphetamine-dextroamphetamine 20 MG per 24 hr capsule    ADDERALL XR    30 capsule    Take 1 capsule (20 mg) by mouth daily    ADD (attention deficit disorder) without hyperactivity       * amphetamine-dextroamphetamine 20 MG per 24 hr capsule   Start taking on:  1/6/2018    ADDERALL XR    30 capsule    Take 1 capsule (20 mg) by mouth daily    ADD (attention deficit disorder) without hyperactivity       * amphetamine-dextroamphetamine 20 MG per 24 hr capsule   Start taking on:  2/6/2018    ADDERALL XR    30 capsule    Take 1 capsule (20 mg) by mouth daily    ADD (attention deficit disorder) without hyperactivity       buPROPion 150 MG 24 hr tablet    WELLBUTRIN XL    90 tablet    Take 1 tablet (150 mg) by mouth every morning    Major depressive disorder, recurrent episode, mild (H)       citalopram 10 MG tablet    celeXA    810 tablet    Take 3 tablets (30 mg) by mouth daily    Major depressive disorder, recurrent episode, mild (H)       ibuprofen 200 MG capsule     120 capsule    Take 800 mg by mouth every 8 hours as needed for pain        * Notice:  This list has 3 medication(s) that are the same as other medications prescribed for you. Read the directions carefully, and ask your doctor or other care provider to review them with you.

## 2017-12-11 NOTE — PROGRESS NOTES
Patient picked up Hst 12/11/17.  Demonstrated back for knowledge of use.  Will return Hst 12/12/17.

## 2017-12-12 ENCOUNTER — DOCUMENTATION ONLY (OUTPATIENT)
Dept: SLEEP MEDICINE | Facility: CLINIC | Age: 37
End: 2017-12-12

## 2017-12-13 NOTE — PROCEDURES
HOME SLEEP STUDY INTERPRETATION    Patient: William Bolton  MRN: [id number]  YOB: 1980  Study Date: 12/11/2017  Ordering Provider: Fly Rodriguez MD, MPH     Indications for Home Study: William Bolton is a 37 year-old male with a past medical history of hyperlipidemia, attention deficit disorder currently on amphetamine-dextroamphetamine (Adderall), major depressive disorder, and obesity who presents with symptoms suggestive of obstructive sleep apnea.    Patient's Characteristics:   Weight: 250.0 kg   Height: 72.0 cm   BMI: 33.9 kg/m2   Age: 37 years old   Kinsley Score: 2    Data: A full night home sleep study was performed recording the standard physiologic parameters including body position, movement, sound, nasal pressure, thermal oral airflow, chest and abdominal movements with respiratory inductance plethysmography, and oxygen saturation by pulse oximetry. Pulse rate was estimated by oximetry recording. This study was considered adequate based on > 4 hours of quality oximetry and respiratory recording.     Recording Information:  Analysis Time: 599.9 minutes  Time in Bed: 385.7 minutes   Estimated sleep efficiency: 94.6%.   Time spent in supine position: 34.4% of total bed time (136.7 minutes)  Time spent in non-supine positions: 63.2% of total bed time (251.2 minutes)    Oximetry Analysis:   Sleep Associated Hypoxemia: Sustained hypoxemia was present. Baseline oxygen saturation was 92.6%. Time with saturation less than or equal to 88% was 14.2 minutes. The lowest oxygen saturation was 79.0%.     Respiratory Analysis  Snoring: Snoring was present.  Respiratory events: The home study revealed a presence of 0.5 obstructive apneas and 0.0 mixed events, and 0.2 central apneas. There were 43 hypopneas resulting in a combined apnea / hypopnea index (AHI) of 7.3 events per hour. The AHI supine was 11.9 events per hour, whereas the AHI non-supine was 4.8 events per hour. The overall respiratory  disturbance index (RDI) was 7.3 events per hour. The supine RDI was 11.9 events per hour and the non-supine RDI was 4.8 events per hour.    Pattern: Excluding events noted above, respiratory rate and pattern was normal.    Position: Percent of time spent: supine- 34.4%; Prone- 0.0%; On left side- 7.1%; On right side- 56.1%.    Cardiac Analysis:  Maximum Pulse Rate: 111.0 beats per minute (bpm)  Minimum Pulse Rate: 55.0 beats per minute (bpm)  Average Pulse Rate: 75.6 beats per minute (bpm)  Time Spent Above 100 bpm: 0.4 minutes  Time Spent Below 40 bpm: 0.0 minutes    Heart Rate: By pulse oximetry, normal rate was noted (normal pulse rate during sleep between 40 bpm and 90 bpm).     Assessment:    (1) Mild obstructive sleep apnea.   (2) Sleep associated hypoxemia was present.    Recommendations:   1. Based on the presence of mild obstructive sleep apnea and excessive daytime sleepiness, treatment could be empirically initiated with an auto-titrating PAP therapy with a range of 5 cmH2O - 15 cmH2O. Recommend clinical follow up with sleep management team.  2. Patient may be a candidate for dental appliance through referral to Sleep Dentistry for the treatment of mild obstructive sleep apnea and/or socially disruptive snoring.  3. If devices are not acceptable or effective, patient may benefit from evaluation of possible surgical options.  If he is interested, would recommend referral to specialized ENT-Sleep provider.  4. Advise regarding the risks of drowsy driving.  5. Suggest optimizing sleep schedule and avoiding sleep deprivation.  6. Weight management (if BMI > 30).  7. Avoid sedating medications, including narcotics and alcohol, as these may exacerbate sleep apnea and/or underlying respiratory disorders.    Diagnosis Code(s):    (1) Obstructive Sleep Apnea G47.33   (2) Hypoxemia G47.36    It is important to note that portable HST sleep studies may tend to underestimate the true severity of the condition. Also,  it is important to highlight that an in-lab PSG titration sleep study or even another in-lab PSG sleep study may be necessary to adequately understand or treat the condition.    Fly Rodriguez MD, MPH, 12/13/2017  Diplomate, American Board of Family Medicine, Sleep Medicine

## 2017-12-19 ENCOUNTER — OFFICE VISIT (OUTPATIENT)
Dept: SLEEP MEDICINE | Facility: CLINIC | Age: 37
End: 2017-12-19
Payer: COMMERCIAL

## 2017-12-19 VITALS
OXYGEN SATURATION: 97 % | HEART RATE: 94 BPM | WEIGHT: 250 LBS | HEIGHT: 72 IN | DIASTOLIC BLOOD PRESSURE: 102 MMHG | RESPIRATION RATE: 10 BRPM | BODY MASS INDEX: 33.86 KG/M2 | SYSTOLIC BLOOD PRESSURE: 148 MMHG

## 2017-12-19 DIAGNOSIS — R06.81 APNEA: ICD-10-CM

## 2017-12-19 DIAGNOSIS — G47.33 OSA (OBSTRUCTIVE SLEEP APNEA): ICD-10-CM

## 2017-12-19 DIAGNOSIS — E66.811 CLASS 1 OBESITY DUE TO EXCESS CALORIES WITHOUT SERIOUS COMORBIDITY WITH BODY MASS INDEX (BMI) OF 33.0 TO 33.9 IN ADULT: Primary | ICD-10-CM

## 2017-12-19 DIAGNOSIS — E66.09 CLASS 1 OBESITY DUE TO EXCESS CALORIES WITHOUT SERIOUS COMORBIDITY WITH BODY MASS INDEX (BMI) OF 33.0 TO 33.9 IN ADULT: Primary | ICD-10-CM

## 2017-12-19 DIAGNOSIS — G47.19 EXCESSIVE DAYTIME SLEEPINESS: ICD-10-CM

## 2017-12-19 DIAGNOSIS — R06.83 SNORING: ICD-10-CM

## 2017-12-19 DIAGNOSIS — R03.0 ELEVATED BP WITHOUT DIAGNOSIS OF HYPERTENSION: ICD-10-CM

## 2017-12-19 DIAGNOSIS — G47.34 NOCTURNAL HYPOXEMIA: ICD-10-CM

## 2017-12-19 PROCEDURE — 99215 OFFICE O/P EST HI 40 MIN: CPT | Performed by: FAMILY MEDICINE

## 2017-12-19 NOTE — MR AVS SNAPSHOT
After Visit Summary   12/19/2017    William Bolton    MRN: [id number]           Patient Information     Date Of Birth          1980        Visit Information        Provider Department      12/19/2017 8:30 AM Fly Rodriguez MD [facility]        Today's Diagnoses     Class 1 obesity due to excess calories without serious comorbidity with body mass index (BMI) of 33.0 to 33.9 in adult    -  1    Snoring        Apnea        Excessive daytime sleepiness        NATHAN (obstructive sleep apnea)        Nocturnal hypoxemia          Care Instructions    Your blood pressure was checked while you were in clinic today.  Please read the guidelines below about what these numbers mean and what you should do about them.  Your systolic blood pressure is the top number.  This is the pressure when the heart is pumping.  Your diastolic blood pressure is the bottom number.  This is the pressure in between beats.  If your systolic blood pressure is less than 120 and your diastolic blood pressure is less than 80, then your blood pressure is normal. There is nothing more that you need to do about it  If your systolic blood pressure is 120-139 or your diastolic blood pressure is 80-89, your blood pressure may be higher than it should be.  You should have your blood pressure re-checked within a year by a primary care provider.  If your systolic blood pressure is 140 or greater or your diastolic blood pressure is 90 or greater, you may have high blood pressure.  High blood pressure is treatable, but if left untreated over time it can put you at risk for heart attack, stroke, or kidney failure.  You should have your blood pressure re-checked by a primary care provider within the next four weeks.  Your BMI is Body mass index is 33.9 kg/(m^2).  Weight management is a personal decision.  If you are interested in exploring weight loss strategies, the following discussion covers the approaches that may be  successful. Body mass index (BMI) is one way to tell whether you are at a healthy weight, overweight, or obese. It measures your weight in relation to your height.  A BMI of 18.5 to 24.9 is in the healthy range. A person with a BMI of 25 to 29.9 is considered overweight, and someone with a BMI of 30 or greater is considered obese. More than two-thirds of American adults are considered overweight or obese.  Being overweight or obese increases the risk for further weight gain. Excess weight may lead to heart disease and diabetes.  Creating and following plans for healthy eating and physical activity may help you improve your health.  Weight control is part of healthy lifestyle and includes exercise, emotional health, and healthy eating habits. Careful eating habits lifelong are the mainstay of weight control. Though there are significant health benefits from weight loss, long-term weight loss with diet alone may be very difficult to achieve- studies show long-term success with dietary management in less than 10% of people. Attaining a healthy weight may be especially difficult to achieve in those with severe obesity. In some cases, medications, devices and surgical management might be considered.  What can you do?  If you are overweight or obese and are interested in methods for weight loss, you should discuss this with your provider.     Consider reducing daily calorie intake by 500 calories.     Keep a food journal.     Avoiding skipping meals, consider cutting portions instead.    Diet combined with exercise helps maintain muscle while optimizing fat loss. Strength training is particularly important for building and maintaining muscle mass. Exercise helps reduce stress, increase energy, and improves fitness. Increasing exercise without diet control, however, may not burn enough calories to loose weight.       Start walking three days a week 10-20 minutes at a time    Work towards walking thirty minutes five days a  week     Eventually, increase the speed of your walking for 1-2 minutes at time    In addition, we recommend that you review healthy lifestyles and methods for weight loss available through the National Institutes of Health patient information sites:  http://win.niddk.nih.gov/publications/index.htm    And look into health and wellness programs that may be available through your health insurance provider, employer, local community center, or [employer] club.    Weight management plan: Patient was referred to their PCP to discuss a diet and exercise plan.  1. CPAP-  WHAT DOES IT DO AND HOW CAN I LEARN TO WEAR IT?                               BEFORE I START, CAN I WATCH A MOVIE TO GET A PLAN ON HOW TO USE CPAP?  https://www.[employer].com/watch?j=w4X44iy206K      Continuous positive airway pressure, or CPAP, is the most effective treatment for obstructive sleep apnea. It works by blowing room air, through a mask, to hold your throat open. A decision to use CPAP is a major step forward in the pursuit of a healthier life. The successful use of CPAP will help you breathe easier, sleep better and live healthier. You can choose CPAP equipment from any durable medical equipment provider that meets your needs.  Using CPAP can be a positive experience if you keep these albert points in mind:  1. Commitment  CPAP is not a quick fix for your problem. It involves a long-term commitment to improve your sleep and your health.    2. Communication  Stay in close communication with both your sleep doctor and your CPAP supplier. Ask lots of questions and seek help when you need it.    3. Consistency  Use CPAP all night, every night and for every nap. You will receive the maximum health benefits from CPAP when you use it every time that you sleep. This will also make it easier for your body to adjust to the treatment.    4. Correction  The first machine and mask that you try may not be the best ones for you. Work with your sleep doctor and your  "CPAP supplier to make corrections to your equipment selection. Ask about trying a different type of machine or mask if you have ongoing problems. Make sure that your mask is a good fit and learn to use your equipment properly.    5. Challenge  Tell a family member or close friend to ask you each morning if you used your CPAP the previous night. Have someone to challenge you to give it your best effort.    6. Connection   Your adjustment to CPAP will be easier if you are able to connect with others who use the same treatment. Ask your sleep doctor if there is a support group in your area for people who have sleep apnea, or look for one on the Internet.  7. Comfort   Increase your level of comfort by using a saline spray, decongestant or heated humidifier if CPAP irritates your nose, mouth or throat. Use your unit's \"ramp\" setting to slowly get used to the air pressure level. There may be soft pads you can buy that will fit over your mask straps. Look on www.CPAP.com for accessories that can help make CPAP use more comfortable.  8. Cleaning   Clean your mask, tubing and headgear on a regular basis. Put this time in your schedule so that you don't forget to do it. Check and replace the filters for your CPAP unit and humidifier.    9. Completion   Although you are never finished with CPAP therapy, you should reward yourself by celebrating the completion of your first month of treatment. Expect this first month to be your hardest period of adjustment. It will involve some trial and error as you find the machine, mask and pressure settings that are right for you.    10. Continuation  After your first month of treatment, continue to make a daily commitment to use your CPAP all night, every night and for every nap.    CPAP-Tips to starting with success:  Begin using your CPAP for short periods of time during the day while you watch TV or read.    Use CPAP every night and for every nap. Using it less often reduces the health " benefits and makes it harder for your body to get used to it.    Make small adjustments to your mask, tubing, straps and headgear until you get the right fit. Tightening the mask may actually worsen the leak.  If it leaves significant marks on your face or irritates the bridge of your nose, it may not be the best mask for you.  Speak with the person who supplied the mask and consider trying other masks. Insurances will allow you to try different masks during the first month of starting CPAP.  Insurance also covers a new mask, hose and filter about every 6 months.    Use a saline nasal spray to ease mild nasal congestion. Neti-Pot or saline nasal rinses may also help. Nasal gel sprays can help reduce nasal dryness.  Biotene mouthwash can be helpful to protect your teeth if you experience frequent dry mouth.  Dry mouth may be a sign of air escaping out of your mouth or out of the mask in the case of a full face mask.  Speak with your provider if you expect that is the case.     Take a nasal decongestant to relieve more severe nasal or sinus congestion.  Do not use Afrin (oxymetazoline) nasal spray more than 3 days in a row.  Speak with your sleep doctor if your nasal congestion is chronic.    Use a heated humidifier that fits your CPAP model to enhance your breathing comfort. Adjust the heat setting up if you get a dry nose or throat, down if you get condensation in the hose or mask.  Position the CPAP lower than you so that any condensation in the hose drains back into the machine rather than towards the mask.    Try a system that uses nasal pillows if traditional masks give you problems.    Clean your mask, tubing and headgear once a week. Make sure the equipment dries fully.    Regularly check and replace the filters for your CPAP unit and humidifier.    Work closely with your sleep provider and your CPAP supplier to make sure that you have the machine, mask and air pressure setting that works best for you. It is  better to stop using it and call your provider to solve problems than to lay awake all night frustrated with the device.    Daytime naps are not advised, but use the PAP device if taking naps. Many insurances require that we prove you are using the PAP device at least 4 hours on at least 70% of nights over a 30 day period. We have 90 days to meet those criteria.    You can get new supplies (mask, hose and filter) for your device every 3-6 months (covered by insurance). You do not need to get supplies that often, but they are available if you would like them. You may exchange the mask once within the first month if you feel the initial mask does not fit well. Please, contact your medical equipment provider for equipment issues.    Please let me know if you have any snoring, daytime sleepiness, or poor sleep quality. We will want to make sure your PAP device is adequately treating your condition.    There is a website called CPAP.com that has accessories that may make CPAP use easier. Please visit it at your convenience.    Please, schedule follow up visit with the nurse (she will coming into the room and meet with you).     Thank you!    Fly Rodriguez MD, MPH  Clinical Sleep and Occupational / Environmental Medicine                Follow-ups after your visit        Your next 10 appointments already scheduled     Feb 05, 2018  7:00 AM CST   Return Visit with Marlin Spivey PsyD   [facility] ([facility])    [address] [place] [id number] [phone]              Who to contact     If you have questions or need follow up information about today's clinic visit or your schedule please contact [facility] directly at [phone].  Normal or non-critical lab and imaging results will be communicated to you by MyChart, letter or phone within 4 business days after the clinic has received the results. If you do not hear from us within 7  "days, please contact the clinic through [employer] or phone. If you have a critical or abnormal lab result, we will notify you by phone as soon as possible.  Submit refill requests through [employer] or call your pharmacy and they will forward the refill request to us. Please allow 3 business days for your refill to be completed.          Additional Information About Your Visit        [employer] Information     [employer] gives you secure access to your electronic health record. If you see a primary care provider, you can also send messages to your care team and make appointments. If you have questions, please call your primary care clinic.  If you do not have a primary care provider, please call [phone] and they will assist you.        Care EveryWhere ID     This is your Care EveryWhere ID. This could be used by other organizations to access your [facility] medical records  [id number]        Your Vitals Were     Pulse Respirations Height Pulse Oximetry BMI (Body Mass Index)       94 10 1.829 m (6' 0.01\") 97% 33.9 kg/m2        Blood Pressure from Last 3 Encounters:   12/19/17 (!) 148/102   10/31/17 134/86   07/31/17 154/88    Weight from Last 3 Encounters:   12/19/17 113.4 kg (250 lb)   10/31/17 113.4 kg (250 lb)   07/31/17 117.5 kg (259 lb)              We Performed the Following     Comprehensive DME        Primary Care Provider Office Phone # Fax #    Juan Antonio Villa Jr., [phone] [address]  [place] [address]        Equal Access to Services     [facility]: Hadii aad ku hadasho Soomaali, waaxda luqadaha, qaybta kaalmada adeegyada, waxay saturnino acharya. So [facility] [phone].    ATENCIÓN: Si habla español, tiene a epstein disposición servicios gratuitos de asistencia lingüística. Llame al [phone].    We comply with applicable federal civil rights laws and [place] laws. We do not discriminate on the basis of race, color, national origin, age, disability, sex, " sexual orientation, or gender identity.            Thank you!     Thank you for choosing [facility]  for your care. Our goal is always to provide you with excellent care. Hearing back from our patients is one way we can continue to improve our services. Please take a few minutes to complete the written survey that you may receive in the mail after your visit with us. Thank you!             Your Updated Medication List - Protect others around you: Learn how to safely use, store and throw away your medicines at www.disposemymeds.org.          This list is accurate as of: 12/19/17  8:57 AM.  Always use your most recent med list.                   Brand Name Dispense Instructions for use Diagnosis    * amphetamine-dextroamphetamine 20 MG per 24 hr capsule    ADDERALL XR    30 capsule    Take 1 capsule (20 mg) by mouth daily    ADD (attention deficit disorder) without hyperactivity       * amphetamine-dextroamphetamine 20 MG per 24 hr capsule   Start taking on:  1/6/2018    ADDERALL XR    30 capsule    Take 1 capsule (20 mg) by mouth daily    ADD (attention deficit disorder) without hyperactivity       * amphetamine-dextroamphetamine 20 MG per 24 hr capsule   Start taking on:  2/6/2018    ADDERALL XR    30 capsule    Take 1 capsule (20 mg) by mouth daily    ADD (attention deficit disorder) without hyperactivity       buPROPion 150 MG 24 hr tablet    WELLBUTRIN XL    90 tablet    Take 1 tablet (150 mg) by mouth every morning    Major depressive disorder, recurrent episode, mild (H)       citalopram 10 MG tablet    celeXA    810 tablet    Take 3 tablets (30 mg) by mouth daily    Major depressive disorder, recurrent episode, mild (H)       ibuprofen 200 MG capsule     120 capsule    Take 800 mg by mouth every 8 hours as needed for pain        * Notice:  This list has 3 medication(s) that are the same as other medications prescribed for you. Read the directions carefully, and ask your doctor or other  care provider to review them with you.

## 2017-12-19 NOTE — PATIENT INSTRUCTIONS
Your blood pressure was checked while you were in clinic today.  Please read the guidelines below about what these numbers mean and what you should do about them.  Your systolic blood pressure is the top number.  This is the pressure when the heart is pumping.  Your diastolic blood pressure is the bottom number.  This is the pressure in between beats.  If your systolic blood pressure is less than 120 and your diastolic blood pressure is less than 80, then your blood pressure is normal. There is nothing more that you need to do about it  If your systolic blood pressure is 120-139 or your diastolic blood pressure is 80-89, your blood pressure may be higher than it should be.  You should have your blood pressure re-checked within a year by a primary care provider.  If your systolic blood pressure is 140 or greater or your diastolic blood pressure is 90 or greater, you may have high blood pressure.  High blood pressure is treatable, but if left untreated over time it can put you at risk for heart attack, stroke, or kidney failure.  You should have your blood pressure re-checked by a primary care provider within the next four weeks.  Your BMI is Body mass index is 33.9 kg/(m^2).  Weight management is a personal decision.  If you are interested in exploring weight loss strategies, the following discussion covers the approaches that may be successful. Body mass index (BMI) is one way to tell whether you are at a healthy weight, overweight, or obese. It measures your weight in relation to your height.  A BMI of 18.5 to 24.9 is in the healthy range. A person with a BMI of 25 to 29.9 is considered overweight, and someone with a BMI of 30 or greater is considered obese. More than two-thirds of American adults are considered overweight or obese.  Being overweight or obese increases the risk for further weight gain. Excess weight may lead to heart disease and diabetes.  Creating and following plans for healthy eating and  physical activity may help you improve your health.  Weight control is part of healthy lifestyle and includes exercise, emotional health, and healthy eating habits. Careful eating habits lifelong are the mainstay of weight control. Though there are significant health benefits from weight loss, long-term weight loss with diet alone may be very difficult to achieve- studies show long-term success with dietary management in less than 10% of people. Attaining a healthy weight may be especially difficult to achieve in those with severe obesity. In some cases, medications, devices and surgical management might be considered.  What can you do?  If you are overweight or obese and are interested in methods for weight loss, you should discuss this with your provider.     Consider reducing daily calorie intake by 500 calories.     Keep a food journal.     Avoiding skipping meals, consider cutting portions instead.    Diet combined with exercise helps maintain muscle while optimizing fat loss. Strength training is particularly important for building and maintaining muscle mass. Exercise helps reduce stress, increase energy, and improves fitness. Increasing exercise without diet control, however, may not burn enough calories to loose weight.       Start walking three days a week 10-20 minutes at a time    Work towards walking thirty minutes five days a week     Eventually, increase the speed of your walking for 1-2 minutes at time    In addition, we recommend that you review healthy lifestyles and methods for weight loss available through the National Institutes of Health patient information sites:  http://win.niddk.nih.gov/publications/index.htm    And look into health and wellness programs that may be available through your health insurance provider, employer, local community center, or [employer].    Weight management plan: Patient was referred to their PCP to discuss a diet and exercise plan.  1. CPAP-  WHAT DOES IT DO AND  HOW CAN I LEARN TO WEAR IT?                               BEFORE I START, CAN I WATCH A MOVIE TO GET A PLAN ON HOW TO USE CPAP?  https://www.youtube.com/watch?p=q7X00ww527T      Continuous positive airway pressure, or CPAP, is the most effective treatment for obstructive sleep apnea. It works by blowing room air, through a mask, to hold your throat open. A decision to use CPAP is a major step forward in the pursuit of a healthier life. The successful use of CPAP will help you breathe easier, sleep better and live healthier. You can choose CPAP equipment from any durable medical equipment provider that meets your needs.  Using CPAP can be a positive experience if you keep these albert points in mind:  1. Commitment  CPAP is not a quick fix for your problem. It involves a long-term commitment to improve your sleep and your health.    2. Communication  Stay in close communication with both your sleep doctor and your CPAP supplier. Ask lots of questions and seek help when you need it.    3. Consistency  Use CPAP all night, every night and for every nap. You will receive the maximum health benefits from CPAP when you use it every time that you sleep. This will also make it easier for your body to adjust to the treatment.    4. Correction  The first machine and mask that you try may not be the best ones for you. Work with your sleep doctor and your CPAP supplier to make corrections to your equipment selection. Ask about trying a different type of machine or mask if you have ongoing problems. Make sure that your mask is a good fit and learn to use your equipment properly.    5. Challenge  Tell a family member or close friend to ask you each morning if you used your CPAP the previous night. Have someone to challenge you to give it your best effort.    6. Connection   Your adjustment to CPAP will be easier if you are able to connect with others who use the same treatment. Ask your sleep doctor if there is a support group in  "your area for people who have sleep apnea, or look for one on the Internet.  7. Comfort   Increase your level of comfort by using a saline spray, decongestant or heated humidifier if CPAP irritates your nose, mouth or throat. Use your unit's \"ramp\" setting to slowly get used to the air pressure level. There may be soft pads you can buy that will fit over your mask straps. Look on www.CPAP.com for accessories that can help make CPAP use more comfortable.  8. Cleaning   Clean your mask, tubing and headgear on a regular basis. Put this time in your schedule so that you don't forget to do it. Check and replace the filters for your CPAP unit and humidifier.    9. Completion   Although you are never finished with CPAP therapy, you should reward yourself by celebrating the completion of your first month of treatment. Expect this first month to be your hardest period of adjustment. It will involve some trial and error as you find the machine, mask and pressure settings that are right for you.    10. Continuation  After your first month of treatment, continue to make a daily commitment to use your CPAP all night, every night and for every nap.    CPAP-Tips to starting with success:  Begin using your CPAP for short periods of time during the day while you watch TV or read.    Use CPAP every night and for every nap. Using it less often reduces the health benefits and makes it harder for your body to get used to it.    Make small adjustments to your mask, tubing, straps and headgear until you get the right fit. Tightening the mask may actually worsen the leak.  If it leaves significant marks on your face or irritates the bridge of your nose, it may not be the best mask for you.  Speak with the person who supplied the mask and consider trying other masks. Insurances will allow you to try different masks during the first month of starting CPAP.  Insurance also covers a new mask, hose and filter about every 6 months.    Use a " saline nasal spray to ease mild nasal congestion. Neti-Pot or saline nasal rinses may also help. Nasal gel sprays can help reduce nasal dryness.  Biotene mouthwash can be helpful to protect your teeth if you experience frequent dry mouth.  Dry mouth may be a sign of air escaping out of your mouth or out of the mask in the case of a full face mask.  Speak with your provider if you expect that is the case.     Take a nasal decongestant to relieve more severe nasal or sinus congestion.  Do not use Afrin (oxymetazoline) nasal spray more than 3 days in a row.  Speak with your sleep doctor if your nasal congestion is chronic.    Use a heated humidifier that fits your CPAP model to enhance your breathing comfort. Adjust the heat setting up if you get a dry nose or throat, down if you get condensation in the hose or mask.  Position the CPAP lower than you so that any condensation in the hose drains back into the machine rather than towards the mask.    Try a system that uses nasal pillows if traditional masks give you problems.    Clean your mask, tubing and headgear once a week. Make sure the equipment dries fully.    Regularly check and replace the filters for your CPAP unit and humidifier.    Work closely with your sleep provider and your CPAP supplier to make sure that you have the machine, mask and air pressure setting that works best for you. It is better to stop using it and call your provider to solve problems than to lay awake all night frustrated with the device.    Daytime naps are not advised, but use the PAP device if taking naps. Many insurances require that we prove you are using the PAP device at least 4 hours on at least 70% of nights over a 30 day period. We have 90 days to meet those criteria.    You can get new supplies (mask, hose and filter) for your device every 3-6 months (covered by insurance). You do not need to get supplies that often, but they are available if you would like them. You may  exchange the mask once within the first month if you feel the initial mask does not fit well. Please, contact your medical equipment provider for equipment issues.    Please let me know if you have any snoring, daytime sleepiness, or poor sleep quality. We will want to make sure your PAP device is adequately treating your condition.    There is a website called CPAP.com that has accessories that may make CPAP use easier. Please visit it at your convenience.    Please, schedule follow up visit with the nurse (she will coming into the room and meet with you).     Thank you!    Fly Rodriguez MD, MPH  Clinical Sleep and Occupational / Environmental Medicine

## 2017-12-19 NOTE — PROGRESS NOTES
[facility]  Outpatient Sleep Medicine Follow Up Visit  December 19, 2017    Name: William Bolton MRN# [id number]   Age: 37 year old YOB: 1980     Date of Follow Up Visit: December 19, 2017  Consultation is requested by: Juan Antonio Villa Jr., MD  [address] [place] [address]  Primary care provider: Juan Antonio Villa Jr.    Patient is accompanied by: Patient presents alone today.       Reason for Sleep Consult:     William Bolton is a 37 year old male patient that presents here for a follow up evaluation after completing a portable HST sleep study.         Assessment and Plan:     Summary Sleep Diagnoses:    (1) Mild NATHAN (AHI of 7.3 events per hour)  (2) Nocturnal Hypoxemia  (3) Obesity  (4) Snoring  (5) Witnessed Apneas  (6) EDS    Summary Recommendations / Discussion:    This patient had sxs, hx, and physical findings that suggested the diagnosis of a sleep disordered breathing. In addition, he had a high pre-test probability of NATHAN. Given that he did not have any contraindications, a portable HST sleep study was performed. Furthermore, based on the patient's history and physical examination, there was low suspicion for hypoventilation and, therefore, no TCM monitoring and/or ABG was necessary. Today, the study results were carefully discussed and explained. The mild nature of the condition and its rather benign association with other health risks were also reviewed. The nature and pathophysiology of NATHAN were discussed once again. The different treatment options for NATHAN were also reviewed and explained today again. After much discussion of the different treatment options, the patient opted to start PAP therapy. As such, the patient will be started on APAP with minimum pressure of 5 cmH2O and maximum pressure of 15 cmH2O (ramp and EPR should be used). PAP compliance was discussed, explained, and encouraged. Lifestyle recommendations including healthy dietary and exercising habits  were discussed (weight loss also advised and discussed). Pt will follow up with me after having completed 6 to 8 weeks of PAP therapy.    This patient's evaluation showed elevated BP today. Education and counseling regarding elevated BP were given. The effects of elevated BP were discussed. The patient was advised to monitor his BP and keep a BP diary / log. The patient will follow up with his PCP for further management.    Coding:  (E66.09,  Z68.33) Class 1 obesity due to excess calories without serious comorbidity with body mass index (BMI) of 33.0 to 33.9 in adult  (primary encounter diagnosis)  (R06.83) Snoring  (R06.81) Apnea  (G47.19) Excessive daytime sleepiness  (G47.33) NATHAN (obstructive sleep apnea)  (G47.34) Nocturnal hypoxemia    Counseling included a comprehensive review of diagnostic and therapeutic strategies as well as risks of inadequate therapy.    Educational materials provided in instructions. The patient was instructed to avoid driving or operating any heavy machinery when experiencing drowsiness.    All questions and concerns were addressed today. Pt agrees and understands the assessment and plan.         History of Present Illness:   William Bolton is a 37 year old  RHD male pt with PMH of hyperlipidemia, attention deficit disorder currently on amphetamine-dextroamphetamine (Adderall), major depressive disorder, and obesity presents for a follow up evaluation today after completing a portable HST sleep study due to snoring. During the initial visit, the patient reported loud snoring (he can be heard from a different room) with some gasping / choking for air and witnessed apneas during the night. Pt reported non-restorative sleep with sleep inertia. Pt also endorsed only mild EDS with inadvertent naps (EDS is not impacting social or work life). No drowsiness when driving with no near accidents. Pt endorsed xerostomia with also some morning sore throats. No morning cephalgia, GERD sxs,  nocturia, morning myalgias, CP, SOB, positional dyspnea, peripheral edema, or any other sxs or concerns.    The patient completed a portable HST sleep study on 12/11/2017 that demonstrated mild NATHAN sleep associated hypoxemia (AHI was 7.3 events per hour and the patient spent 14.2 minutes under the SpO2 of 88%).    Pt denies any CP, SOB, diplopia, N/V, cephalgia, or any other sxs or concerns.    PREVIOUS IN- LAB or HOME SLEEP STUDIES: None Reported      SLEEP-WAKE SCHEDULE:     William Bolton      -Describes himself as a night person; prefers to go to sleep at 11:30 PM and wakes up at 8:00 AM.      -Pt takes no naps during the week; takes no inadvertent naps.      -ON WEEKDAYS, goes to sleep at 10:30 PM during the week; awakens 5:00 AM with an alarm; falls asleep in 15 minutes; denies difficulty falling asleep.      -ON WEEKENDS, goes to sleep at 11:00 PM and wakes up at 7:00 AM with an alarm; falls asleep in 15 minutes.        -Awakens 2-3 times a night for less 10 minutes before falling back to sleep; awakens to go to the bathroom.      BEDTIME ACTIVITIES AND SHIFT WORK:    William Bolton     -Bedtime Activities and Other Sleeping Information: Pt lives with wife and 3 children. Pt sleeps with wife on a jerald size bed. No pets on the bed. Pt sleeps on his size. Pt does not use any electronics in bed.      -Occupation: . Pt works day shifts.    -Working Hours: 7 AM to 430 PM     SCALES        -SLEEP APNEA: Stopbang score: 6 / 8   -TRELL 6 / 28   -PHQ9 of 0        -SLEEPINESS: [place] sleepiness scale (ESS):  2 / 24 (initial score)    Drowsy driving / near accidents: Denies any near accidents    Consequences: Non-restorative sleep with mild EDS    SLEEP COMPLAINTS:  Cardio-respiratory     -Snoring: Significant snoring reported    -Dyspnea: Pt admits having witnessed apneas   -Morning headaches or confusion: Denies any morning cephalgia   -Coexisting Lung disease: Denies diagnosed or known lung disease at this  time     -Coexisting Heart disease: Denies diagnosed or known cardiovascular disease at this time     -Does patient have a bed partner: Patient sleeps with spouse   -Has bed partner been sleeping separately because of snoring:  No            RLS Screen:    -When you try to relax in the evening or sleep at night, do you ever have unpleasant, restless feelings in your legs that can be relieved by walking or movement? None Reported     -Periodic limb movement: None Reported    Narcolepsy:     - Denies sudden urges of sleep attacks   - Denies cataplexy   - Denies sleep paralysis    - Denies hallucinations    - Denies feeling refreshed after a nap.    Sleep Behaviors:   - Denies leg symptoms/movements   - Denies motor restlessness   - Denies night terrors   - Denies bruxism   - Denies automatic behaviors    Other Subjective Complaints:   - Denies anxiety or rumination    - Denies pain and discomfort at night   - Denies waking up with heart pounding or racing   - Denies GERD or aspiration         Parasomnia:    -NREM - Denies recurrent persistent confusional arousal, night eating, sleep walking or sleep terrors.      -REM - Denies dream enactment or injuries.     -Driving Accident or Near Accidents: None Reported         Medications:     Current Outpatient Prescriptions   Medication Sig     [START ON 2/6/2018] amphetamine-dextroamphetamine (ADDERALL XR) 20 MG per 24 hr capsule Take 1 capsule (20 mg) by mouth daily     citalopram (CELEXA) 10 MG tablet Take 3 tablets (30 mg) by mouth daily     buPROPion (WELLBUTRIN XL) 150 MG 24 hr tablet Take 1 tablet (150 mg) by mouth every morning     amphetamine-dextroamphetamine (ADDERALL XR) 20 MG per 24 hr capsule Take 1 capsule (20 mg) by mouth daily     [START ON 1/6/2018] amphetamine-dextroamphetamine (ADDERALL XR) 20 MG per 24 hr capsule Take 1 capsule (20 mg) by mouth daily     ibuprofen 200 MG capsule Take 800 mg by mouth every 8 hours as needed for pain (Patient not taking:  Reported on 12/19/2017)     No current facility-administered medications for this visit.       No Known Allergies         Past Medical History:     Denies needing any 02 supplement at night.    Past Medical History:   Diagnosis Date     Abnormal bone xray - sclerosis of phalynx of right great toe 7/31/17 - suggest repeat imaging at annual visit. 7/31/2017           Past Surgical History:    Denies previous upper airway surgery.     Past Surgical History:   Procedure Laterality Date     SURGICAL HISTORY OF -             Social History:     Social History   Substance Use Topics     Smoking status: Never Smoker     Smokeless tobacco: Never Used     Alcohol use No     Chemical History:     Tobacco: Never smoked     Uses 4 cups/day of coffee. Last caffeine intake is usually before 10 AM.    Supplements for wakefulness: Patient does not use any supplements to stay awake    EtOH: 3 to 4 drinks a week  Recreational Drugs: Patient denies using any recreational drugs     Psych Hx:   PHQ2: Negative   -Little Interest or pleasure in doing things? 0 - not at all   -Feeling down, depressed, or hopeless? 0 - not at all    Current dangers to self or others: No. Pt denies any SI / HI, hallucinations, or delusions         Family History:     Family History   Problem Relation Age of Onset     Coronary Artery Disease Paternal Grandfather      Hypertension Father      Hyperlipidemia Mother       Sleep Family Hx:        RLS - None reported   NATHAN - Mother  Insomnia - None reported  Parasomnia - None reported         Review of Systems:     A complete 10 point review of systems was negative other than HPI or as commented below:     CONSTITUTIONAL: NEGATIVE for weight gain/loss, fever, chills, sweats or night sweats, drug allergies.  EYES: NEGATIVE for changes in vision, blind spots, double vision.  ENT: NEGATIVE for ear pain, sore throat, sinus pain, post-nasal drip, runny nose, bloody nose  CARDIAC: NEGATIVE for fast heartbeats or  "fluttering in chest, chest pain or pressure, breathlessness when lying flat, swollen legs or swollen feet.  NEUROLOGIC: NEGATIVE headaches, weakness or numbness in the arms or legs.  DERMATOLOGIC: NEGATIVE for rashes, new moles or change in mole(s)  PULMONARY: NEGATIVE SOB at rest, SOB with activity, dry cough, productive cough, coughing up blood, wheezing or whistling when breathing.    GASTROINTESTINAL: NEGATIVE for nausea or vomitting, loose or watery stools, fat or grease in stools, constipation, abdominal pain, bowel movements black in color or blood noted.  GENITOURINARY: NEGATIVE for pain during urination, blood in urine, urinating more frequently than usual, irregular menstrual periods.  MUSCULOSKELETAL: NEGATIVE for muscle pain, bone or joint pain, swollen joints.  ENDOCRINE: NEGATIVE for increased thirst or urination, diabetes.  LYMPHATIC: NEGATIVE for swollen lymph nodes, lumps or bumps in the breasts or nipple discharge.         Physical Examination:   BP (!) 148/102  Pulse 94  Resp 10  Ht 1.829 m (6' 0.01\")  Wt 113.4 kg (250 lb)  SpO2 97%  BMI 33.9 kg/m2     VS: Reviewed and elevated BP noted.  General: Alert, oriented, not in distress. Dressed casually; Good eye contact; Comfortably sitting in a chair; in no apparent distress  HEENT: Normocephalic and atraumatic; NL TM x 2; pupils are isocoric and equally responsive to the light. PERRLA. EOMI. Normal fundoscopic examination; Nasal turbinates are normal with a mild deviated septal alignment;  Mallampati score: Grade II; Tonsillar hypertrophy: 1  hidden by pillars; Pharynx with no erythema or exudates. Crenation of the tongue.  NECK: Neck supple; symmetrical; no lymphadenopathy; no thyromegaly, bruit, JVD noted. Neck circumference of 17.75 inches (45 cm).  Lungs: Both hemithoraces are symmetrical, normal to palpation, no dullness to percussion, auscultation of lungs revealed normal breath sounds with no expirium prolongation, wheezing, rhonci and " crackles.  CVS: Normal S1 and S2 heart sounds with no extra heart sounds. No murmur, rubs, or clicks. Normal peripheral pulses throughout with no obvious peripheral edema.  Psychiatry: Mood and affect are appropriate. Euthymic with affect congruent with full range and intensity. No SI/HI with adequate insight and judgement.          Data: All pertinent previous laboratory data reviewed     No results found for: PH, PHARTERIAL, PO2, HI2NHBQADBT, SAT, PCO2, HCO3, BASEEXCESS, FRANDY, BEB  No results found for: TSH  No results found for: GLC  No results found for: HGB  No results found for: BUN, CR    Echocardiology: None Available    Chest x-ray: None Available    PFT: None Available    Laboratory Studies: None Available    Fly Rodriguez MD, MPH  Clinical Sleep Medicine    Total time spent with patient: 40 min. Over >50% of the time was spent for face to face counseling, education, and evaluation.

## 2017-12-26 ENCOUNTER — DOCUMENTATION ONLY (OUTPATIENT)
Dept: SLEEP MEDICINE | Facility: CLINIC | Age: 37
End: 2017-12-26
Payer: COMMERCIAL

## 2017-12-26 NOTE — PROGRESS NOTES
Patient was offered choice of vendor and chose [facility].  Patient William Bolton was set up at [place] on December 26, 2017. Patient received a Resmed AirSense 10 Auto. Pressures were set at 5-15 cm H2O.   Patient s ramp is 5 cm H2O for Auto and FLEX/EPR is EPR of 2.  Patient received a Resmed Airfit P10  Pillow mask Size Medium, heated tubing and heated humidifier.  Patient is enrolled in the STM Program and does not need to meet compliance. Patient has a follow up on 02/09/2018 with Dr. Rodriguez.    CARA HANNAH

## 2017-12-29 ENCOUNTER — DOCUMENTATION ONLY (OUTPATIENT)
Dept: SLEEP MEDICINE | Facility: CLINIC | Age: 37
End: 2017-12-29

## 2017-12-29 NOTE — PROGRESS NOTES
3 DAY STM VISIT    Patient contacted for 3 day STM visit  Message left for patient to return call     Device type: Auto-CPAP  PAP settings: CPAP min 5 cm  H20     CPAP max 15 cm  H20   Assessment: Nightly usage over four hours.  Action plan: Pt to have f/u 14 day STM visit.  Diagnostic AHI:  7.3

## 2018-01-02 ENCOUNTER — DOCUMENTATION ONLY (OUTPATIENT)
Dept: SLEEP MEDICINE | Facility: CLINIC | Age: 38
End: 2018-01-02
Payer: COMMERCIAL

## 2018-01-02 DIAGNOSIS — G47.33 OSA (OBSTRUCTIVE SLEEP APNEA): Primary | ICD-10-CM

## 2018-01-02 NOTE — PROGRESS NOTES
Patient came to [facility] mask fitting appointment on January 2, 2018. Patient requested to switch masks because oral breathing.  Patient tried on the followings masks: Airfit F20 medium. Patient selected  Resmed Airfit [id number] Face mask Medium

## 2018-01-10 ENCOUNTER — DOCUMENTATION ONLY (OUTPATIENT)
Dept: SLEEP MEDICINE | Facility: CLINIC | Age: 38
End: 2018-01-10
Payer: COMMERCIAL

## 2018-01-10 NOTE — PROGRESS NOTES
14 DAY STM VISIT    Message left for patient to return call     Assessment: Pt not meeting objective benchmarks for compliance.  Action plan: Waiting for patient to return call and pt to have 30 day STM visit.    Device type: Auto-CPAP  PAP settings: CPAP min 5 cm  H20     CPAP max 15 cm  H20    95th% pressure 10.9 cm  H20   Objective measures: 14 day rolling measures      Compliance  57 %      Leak  17.35 lpm  last  upload      AHI 2.59   last  upload      Average number of minutes 308    Diagnostic AHI: 7.3    Objective measure goal  Compliance   Goal >70%  Leak   Goal < 24 lpm  AHI  Goal < 5  Usage  Goal >240

## 2018-01-15 DIAGNOSIS — F33.0 MAJOR DEPRESSIVE DISORDER, RECURRENT EPISODE, MILD (H): ICD-10-CM

## 2018-01-15 NOTE — TELEPHONE ENCOUNTER
"Requested Prescriptions   Pending Prescriptions Disp Refills     buPROPion (WELLBUTRIN XL) 150 MG 24 hr tablet  Last Written Prescription Date:  7/10/2017  Last Fill Quantity: 90 tablet,  # refills: 1   Last Office Visit with [facility], P or [facility] prescribing provider:  6/5/2017   Future Office Visit:    Next 5 appointments (look out 90 days)     Feb 05, 2018  7:00 AM CST   Return Visit with Marlin Spivey PsyD   [facility] ([facility])    [address] [id number] [phone]                  90 tablet 1     Sig: Take 1 tablet (150 mg) by mouth every morning    SSRIs Protocol Failed    1/15/2018 11:37 AM       Failed - PHQ-9 score less than 5 in past 6 months    The PHQ-9 criteria is meant to fail. It requires a PHQ-9 score review  PHQ-9 SCORE 10/5/2016 11/18/2016 6/5/2017   Total Score - - -   Total Score MyChart 7 (Mild depression) 2 (Minimal depression) -   Total Score - - 1     SANDOVAL-7 SCORE 3/8/2016 10/5/2016 6/5/2017   Total Score - - -   Total Score - 1 (minimal anxiety) -   Total Score 2 - 3          Passed - Medication is Bupropion    If the medication is Bupropion (Wellbutrin), and the patient is taking for smoking cessation; OK to refill.         Passed - Patient is age 18 or older       Passed - Recent (6 mo) or future visit with authorizing provider's specialty    Patient had office visit in the last 6 months or has a visit in the next 30 days with authorizing provider.  See \"Patient Info\" tab in inbasket, or \"Choose Columns\" in Meds & Orders section of the refill encounter.             "

## 2018-01-16 RX ORDER — BUPROPION HYDROCHLORIDE 150 MG/1
150 TABLET ORAL EVERY MORNING
Qty: 30 TABLET | Refills: 0 | Status: SHIPPED | OUTPATIENT
Start: 2018-01-16 | End: 2018-02-12

## 2018-01-16 NOTE — TELEPHONE ENCOUNTER
Medication is being filled for 1 time refill only due to:  Patient needs to be seen because due for depression follow up.   Colette Butler RN, BSN  [facility]

## 2018-01-26 ENCOUNTER — DOCUMENTATION ONLY (OUTPATIENT)
Dept: SLEEP MEDICINE | Facility: CLINIC | Age: 38
End: 2018-01-26

## 2018-02-09 ENCOUNTER — OFFICE VISIT (OUTPATIENT)
Dept: SLEEP MEDICINE | Facility: CLINIC | Age: 38
End: 2018-02-09
Payer: COMMERCIAL

## 2018-02-09 VITALS
HEART RATE: 89 BPM | OXYGEN SATURATION: 99 % | WEIGHT: 250 LBS | HEIGHT: 72 IN | RESPIRATION RATE: 20 BRPM | SYSTOLIC BLOOD PRESSURE: 141 MMHG | DIASTOLIC BLOOD PRESSURE: 89 MMHG | BODY MASS INDEX: 33.86 KG/M2

## 2018-02-09 DIAGNOSIS — G47.33 OSA (OBSTRUCTIVE SLEEP APNEA): Primary | ICD-10-CM

## 2018-02-09 DIAGNOSIS — G47.19 EXCESSIVE DAYTIME SLEEPINESS: ICD-10-CM

## 2018-02-09 DIAGNOSIS — G47.34 NOCTURNAL HYPOXEMIA: ICD-10-CM

## 2018-02-09 PROCEDURE — 99214 OFFICE O/P EST MOD 30 MIN: CPT | Performed by: FAMILY MEDICINE

## 2018-02-09 NOTE — PATIENT INSTRUCTIONS
Your BMI is Body mass index is 33.91 kg/(m^2).  Weight management is a personal decision.  If you are interested in exploring weight loss strategies, the following discussion covers the approaches that may be successful. Body mass index (BMI) is one way to tell whether you are at a healthy weight, overweight, or obese. It measures your weight in relation to your height.  A BMI of 18.5 to 24.9 is in the healthy range. A person with a BMI of 25 to 29.9 is considered overweight, and someone with a BMI of 30 or greater is considered obese. More than two-thirds of American adults are considered overweight or obese.  Being overweight or obese increases the risk for further weight gain. Excess weight may lead to heart disease and diabetes.  Creating and following plans for healthy eating and physical activity may help you improve your health.  Weight control is part of healthy lifestyle and includes exercise, emotional health, and healthy eating habits. Careful eating habits lifelong are the mainstay of weight control. Though there are significant health benefits from weight loss, long-term weight loss with diet alone may be very difficult to achieve- studies show long-term success with dietary management in less than 10% of people. Attaining a healthy weight may be especially difficult to achieve in those with severe obesity. In some cases, medications, devices and surgical management might be considered.  What can you do?  If you are overweight or obese and are interested in methods for weight loss, you should discuss this with your provider.     Consider reducing daily calorie intake by 500 calories.     Keep a food journal.     Avoiding skipping meals, consider cutting portions instead.    Diet combined with exercise helps maintain muscle while optimizing fat loss. Strength training is particularly important for building and maintaining muscle mass. Exercise helps reduce stress, increase energy, and improves  fitness. Increasing exercise without diet control, however, may not burn enough calories to loose weight.       Start walking three days a week 10-20 minutes at a time    Work towards walking thirty minutes five days a week     Eventually, increase the speed of your walking for 1-2 minutes at time    In addition, we recommend that you review healthy lifestyles and methods for weight loss available through the National Institutes of Health patient information sites:  http://win.niddk.nih.gov/publications/index.htm    And look into health and wellness programs that may be available through your health insurance provider, employer, local community center, or [employer] club.    Weight management plan: Patient was referred to their PCP to discuss a diet and exercise plan.     Your blood pressure was checked while you were in clinic today.  Please read the guidelines below about what these numbers mean and what you should do about them.  Your systolic blood pressure is the top number.  This is the pressure when the heart is pumping.  Your diastolic blood pressure is the bottom number.  This is the pressure in between beats.  If your systolic blood pressure is less than 120 and your diastolic blood pressure is less than 80, then your blood pressure is normal. There is nothing more that you need to do about it  If your systolic blood pressure is 120-139 or your diastolic blood pressure is 80-89, your blood pressure may be higher than it should be.  You should have your blood pressure re-checked within a year by a primary care provider.  If your systolic blood pressure is 140 or greater or your diastolic blood pressure is 90 or greater, you may have high blood pressure.  High blood pressure is treatable, but if left untreated over time it can put you at risk for heart attack, stroke, or kidney failure.  You should have your blood pressure re-checked by a primary care provider within the next four weeks.    1. CPAP-  WHAT DOES IT  DO AND HOW CAN I LEARN TO WEAR IT?                               BEFORE I START, CAN I WATCH A MOVIE TO GET A PLAN ON HOW TO USE CPAP?  https://www.youtube.com/watch?o=y3F25qb635G      Continuous positive airway pressure, or CPAP, is the most effective treatment for obstructive sleep apnea. It works by blowing room air, through a mask, to hold your throat open. A decision to use CPAP is a major step forward in the pursuit of a healthier life. The successful use of CPAP will help you breathe easier, sleep better and live healthier. You can choose CPAP equipment from any durable medical equipment provider that meets your needs.  Using CPAP can be a positive experience if you keep these albert points in mind:  1. Commitment  CPAP is not a quick fix for your problem. It involves a long-term commitment to improve your sleep and your health.    2. Communication  Stay in close communication with both your sleep doctor and your CPAP supplier. Ask lots of questions and seek help when you need it.    3. Consistency  Use CPAP all night, every night and for every nap. You will receive the maximum health benefits from CPAP when you use it every time that you sleep. This will also make it easier for your body to adjust to the treatment.    4. Correction  The first machine and mask that you try may not be the best ones for you. Work with your sleep doctor and your CPAP supplier to make corrections to your equipment selection. Ask about trying a different type of machine or mask if you have ongoing problems. Make sure that your mask is a good fit and learn to use your equipment properly.    5. Challenge  Tell a family member or close friend to ask you each morning if you used your CPAP the previous night. Have someone to challenge you to give it your best effort.    6. Connection   Your adjustment to CPAP will be easier if you are able to connect with others who use the same treatment. Ask your sleep doctor if there is a support group  "in your area for people who have sleep apnea, or look for one on the Internet.  7. Comfort   Increase your level of comfort by using a saline spray, decongestant or heated humidifier if CPAP irritates your nose, mouth or throat. Use your unit's \"ramp\" setting to slowly get used to the air pressure level. There may be soft pads you can buy that will fit over your mask straps. Look on www.CPAP.com for accessories that can help make CPAP use more comfortable.  8. Cleaning   Clean your mask, tubing and headgear on a regular basis. Put this time in your schedule so that you don't forget to do it. Check and replace the filters for your CPAP unit and humidifier.    9. Completion   Although you are never finished with CPAP therapy, you should reward yourself by celebrating the completion of your first month of treatment. Expect this first month to be your hardest period of adjustment. It will involve some trial and error as you find the machine, mask and pressure settings that are right for you.    10. Continuation  After your first month of treatment, continue to make a daily commitment to use your CPAP all night, every night and for every nap.    CPAP-Tips to starting with success:  Begin using your CPAP for short periods of time during the day while you watch TV or read.    Use CPAP every night and for every nap. Using it less often reduces the health benefits and makes it harder for your body to get used to it.    Make small adjustments to your mask, tubing, straps and headgear until you get the right fit. Tightening the mask may actually worsen the leak.  If it leaves significant marks on your face or irritates the bridge of your nose, it may not be the best mask for you.  Speak with the person who supplied the mask and consider trying other masks. Insurances will allow you to try different masks during the first month of starting CPAP.  Insurance also covers a new mask, hose and filter about every 6 months.    Use a " saline nasal spray to ease mild nasal congestion. Neti-Pot or saline nasal rinses may also help. Nasal gel sprays can help reduce nasal dryness.  Biotene mouthwash can be helpful to protect your teeth if you experience frequent dry mouth.  Dry mouth may be a sign of air escaping out of your mouth or out of the mask in the case of a full face mask.  Speak with your provider if you expect that is the case.     Take a nasal decongestant to relieve more severe nasal or sinus congestion.  Do not use Afrin (oxymetazoline) nasal spray more than 3 days in a row.  Speak with your sleep doctor if your nasal congestion is chronic.    Use a heated humidifier that fits your CPAP model to enhance your breathing comfort. Adjust the heat setting up if you get a dry nose or throat, down if you get condensation in the hose or mask.  Position the CPAP lower than you so that any condensation in the hose drains back into the machine rather than towards the mask.    Try a system that uses nasal pillows if traditional masks give you problems.    Clean your mask, tubing and headgear once a week. Make sure the equipment dries fully.    Regularly check and replace the filters for your CPAP unit and humidifier.    Work closely with your sleep provider and your CPAP supplier to make sure that you have the machine, mask and air pressure setting that works best for you. It is better to stop using it and call your provider to solve problems than to lay awake all night frustrated with the device.    Daytime naps are not advised, but use the PAP device if taking naps. Many insurances require that we prove you are using the PAP device at least 4 hours on at least 70% of nights over a 30 day period. We have 90 days to meet those criteria.    You can get new supplies (mask, hose and filter) for your device every 3-6 months (covered by insurance). You do not need to get supplies that often, but they are available if you would like them. You may  exchange the mask once within the first month if you feel the initial mask does not fit well. Please, contact your medical equipment provider for equipment issues.    Please let me know if you have any snoring, daytime sleepiness, or poor sleep quality. We will want to make sure your PAP device is adequately treating your condition.    There is a website called CPAP.com that has accessories that may make CPAP use easier. Please visit it at your convenience.    Continue the good work. Follow up with me within one year.    Thank you!    Fly Rodriguez MD, MPH  Clinical Sleep and Occupational / Environmental Medicine

## 2018-02-09 NOTE — PROGRESS NOTES
[facility]  Outpatient Sleep Medicine Follow Up Visit  February 9, 2018    Name: William Bolton MRN# [id number]   Age: 37 year old YOB: 1980     Date of Follow Up Visit: February 9, 2018  Consultation is requested by: Juan Antonio Villa Jr., MD  [address] [place] [address]  Primary care provider: Juan Antonio Villa Jr.    Patient is accompanied by: Patient presents alone today.       Reason for Sleep Consult:     William Bolton is a 37 year old male patient that presents here for an NATHAN follow up.         Assessment and Plan:     Summary Sleep Diagnoses:    (1) Mild NATHAN (AHI of 7.3 events per hour)  (2) Nocturnal Hypoxemia  (3) EDS    Summary Recommendations / Discussion:    During the initial visit, this patient had sxs, hx, and physical findings that suggested the diagnosis of a sleep disordered breathing. In addition, he had a high pre-test probability of NATHAN. Given that he did not have any contraindications, a portable HST sleep study was performed. Furthermore, based on the patient's history and physical examination, there was low suspicion for hypoventilation and, therefore, no TCM monitoring and/or ABG was necessary. The HST sleep study demonstrated mild NATHAN with an AHI of 7.3 events per hour. During the last visit, after much discussion of the different treatment options, the patient opted to start PAP therapy. As such, the patient was started on APAP with minimum pressure of 5 cmH2O and maximum pressure of 15 cmH2O (ramp and EPR should be used). The patient has been compliant with PAP therapy and this have been subjective and objectively effective at treating the condition with a residual AHI of 2.0 events per hour. Current therapy will be continued at this time with APAP at 5 to 15 cmH2O. PAP compliance was discussed, explained, and encouraged. Lifestyle recommendations including healthy dietary and exercising habits were discussed (weight loss also advised and  discussed). Pt will follow up with me within one year.    This patient's evaluation showed elevated BP today. Education and counseling regarding elevated BP were given. The effects of elevated BP were discussed. The patient was advised to monitor his BP and keep a BP diary / log. The patient will follow up with his PCP for further management.    Coding:  (G47.33) NATHAN (obstructive sleep apnea)  (primary encounter diagnosis)  (G47.34) Nocturnal hypoxemia  (G47.19) Excessive daytime sleepiness    Counseling included a comprehensive review of diagnostic and therapeutic strategies as well as risks of inadequate therapy.    Educational materials provided in instructions. The patient was instructed to avoid driving or operating any heavy machinery when experiencing drowsiness.    All questions and concerns were addressed today. Pt agrees and understands the assessment and plan.         History of Present Illness:   William Bolton is a 37 year old  RHD male pt with PMH of hyperlipidemia, attention deficit disorder currently on amphetamine-dextroamphetamine (Adderall), major depressive disorder, and obesity presents for an NATHAN follow up evaluation today. During the initial visit, the patient reported loud snoring (he can be heard from a different room) with some gasping / choking for air and witnessed apneas during the night. Pt reported non-restorative sleep with sleep inertia. Pt also endorsed only mild EDS with inadvertent naps (EDS is not impacting social or work life). No drowsiness when driving with no near accidents. Pt endorsed xerostomia with also some morning sore throats. No morning cephalgia, GERD sxs, nocturia, morning myalgias, CP, SOB, positional dyspnea, peripheral edema, or any other sxs or concerns.    This patient completed a portable HST sleep study on 12/11/2017 that demonstrated mild NATHAN sleep associated hypoxemia (AHI was 7.3 events per hour and the patient spent 14.2 minutes under the SpO2 of  88%).    During the last visit, the patient was placed on a APAP at 5 - 15 cmH2O. The PAP download today shows a compliant patient who is using the device 77% of the time with 70% over 4 hours. The device is effective at treating the condition with a residual AHI of 2.0 events per hour. The patient 95th percentile pressure is 11.8 cmH2O. No significant leaks noted with a 95th percentile at 13.8 L/min.    CPAP Compliance:   Dates: February 9, 2018       70 % >4hour use    Average Use: 6 hours 36 minutes   Leak: 11.8 L/min    Residual AHI: 2.0 events per hour    The patient explains that he likes the APAP and he reports improved sleep quality as well as improved EDS. The patient explains that he missed a week of PAP therapy because he was out of town and forgot a connecting piece of the device. Otherwise, the patient explains that he uses the PAP every night. Pt denies any aerophagia, bloating, CP, SOB, diplopia, N/V, cephalgia, or any other sxs or concerns. The patient is using a full face interface with no integumentary concerns or leaks.    PREVIOUS IN- LAB or HOME SLEEP STUDIES: None Reported      SLEEP-WAKE SCHEDULE:     [employer]      -Describes himself as a night person; prefers to go to sleep at 11:30 PM and wakes up at 8:00 AM.      -Pt takes no naps during the week; takes no inadvertent naps.      -ON WEEKDAYS, goes to sleep at 10:30 PM during the week; awakens 5:00 AM with an alarm; falls asleep in 15 minutes; denies difficulty falling asleep.      -ON WEEKENDS, goes to sleep at 11:00 PM and wakes up at 7:00 AM with an alarm; falls asleep in 15 minutes.        -Awakens 2-3 times a night for less 10 minutes before falling back to sleep; awakens to go to the bathroom.      BEDTIME ACTIVITIES AND SHIFT WORK:    William Bolton     -Bedtime Activities and Other Sleeping Information: Pt lives with wife and 3 children. Pt sleeps with wife on a jerald size bed. No pets on the bed. Pt sleeps on his size. Pt does  not use any electronics in bed.      -Occupation: . Pt works day shifts.    -Working Hours: 7 AM to 430 PM     SCALES        -SLEEP APNEA: Stopbang score: 6 / 8   -TRELL 6 / 28   -PHQ9 of 0        -SLEEPINESS: [place] sleepiness scale (ESS):  2 / 24 (initial score)    Drowsy driving / near accidents: Denies any near accidents    Consequences: Non-restorative sleep with mild EDS    SLEEP COMPLAINTS:  Cardio-respiratory     -Snoring: Significant snoring reported    -Dyspnea: Pt admits having witnessed apneas   -Morning headaches or confusion: Denies any morning cephalgia   -Coexisting Lung disease: Denies diagnosed or known lung disease at this time     -Coexisting Heart disease: Denies diagnosed or known cardiovascular disease at this time     -Does patient have a bed partner: Patient sleeps with spouse   -Has bed partner been sleeping separately because of snoring:  No            RLS Screen:    -When you try to relax in the evening or sleep at night, do you ever have unpleasant, restless feelings in your legs that can be relieved by walking or movement? None Reported     -Periodic limb movement: None Reported    Narcolepsy:     - Denies sudden urges of sleep attacks   - Denies cataplexy   - Denies sleep paralysis    - Denies hallucinations    - Denies feeling refreshed after a nap.    Sleep Behaviors:   - Denies leg symptoms/movements   - Denies motor restlessness   - Denies night terrors   - Denies bruxism   - Denies automatic behaviors    Other Subjective Complaints:   - Denies anxiety or rumination    - Denies pain and discomfort at night   - Denies waking up with heart pounding or racing   - Denies GERD or aspiration         Parasomnia:    -NREM - Denies recurrent persistent confusional arousal, night eating, sleep walking or sleep terrors.      -REM - Denies dream enactment or injuries.     -Driving Accident or Near Accidents: None Reported         Medications:     Current Outpatient Prescriptions    Medication Sig     buPROPion (WELLBUTRIN XL) 150 MG 24 hr tablet Take 1 tablet (150 mg) by mouth every morning     amphetamine-dextroamphetamine (ADDERALL XR) 20 MG per 24 hr capsule Take 1 capsule (20 mg) by mouth daily     ibuprofen 200 MG capsule Take 800 mg by mouth every 8 hours as needed for pain     citalopram (CELEXA) 10 MG tablet Take 3 tablets (30 mg) by mouth daily     No current facility-administered medications for this visit.       No Known Allergies         Past Medical History:     Denies needing any 02 supplement at night.    Past Medical History:   Diagnosis Date     Abnormal bone xray - sclerosis of phalynx of right great toe 7/31/17 - suggest repeat imaging at annual visit. 7/31/2017           Past Surgical History:    Denies previous upper airway surgery.     Past Surgical History:   Procedure Laterality Date     SURGICAL HISTORY OF -             Social History:     Social History   Substance Use Topics     Smoking status: Never Smoker     Smokeless tobacco: Never Used     Alcohol use No     Chemical History:     Tobacco: Never smoked     Uses 4 cups/day of coffee. Last caffeine intake is usually before 10 AM.    Supplements for wakefulness: Patient does not use any supplements to stay awake    EtOH: 3 to 4 drinks a week  Recreational Drugs: Patient denies using any recreational drugs     Psych Hx:   PHQ2: Negative   -Little Interest or pleasure in doing things? 0 - not at all   -Feeling down, depressed, or hopeless? 0 - not at all    Current dangers to self or others: No. Pt denies any SI / HI, hallucinations, or delusions         Family History:     Family History   Problem Relation Age of Onset     Coronary Artery Disease Paternal Grandfather      Hypertension Father      Hyperlipidemia Mother       Sleep Family Hx:        RLS - None reported   NATHAN - Mother  Insomnia - None reported  Parasomnia - None reported         Review of Systems:     A complete 10 point review of systems was negative  other than HPI or as commented below:     CONSTITUTIONAL: NEGATIVE for weight gain/loss, fever, chills, sweats or night sweats, drug allergies.  EYES: NEGATIVE for changes in vision, blind spots, double vision.  ENT: NEGATIVE for ear pain, sore throat, sinus pain, post-nasal drip, runny nose, bloody nose  CARDIAC: NEGATIVE for fast heartbeats or fluttering in chest, chest pain or pressure, breathlessness when lying flat, swollen legs or swollen feet.  NEUROLOGIC: NEGATIVE headaches, weakness or numbness in the arms or legs.  DERMATOLOGIC: NEGATIVE for rashes, new moles or change in mole(s)  PULMONARY: NEGATIVE SOB at rest, SOB with activity, dry cough, productive cough, coughing up blood, wheezing or whistling when breathing.    GASTROINTESTINAL: NEGATIVE for nausea or vomitting, loose or watery stools, fat or grease in stools, constipation, abdominal pain, bowel movements black in color or blood noted.  GENITOURINARY: NEGATIVE for pain during urination, blood in urine, urinating more frequently than usual, irregular menstrual periods.  MUSCULOSKELETAL: NEGATIVE for muscle pain, bone or joint pain, swollen joints.  ENDOCRINE: NEGATIVE for increased thirst or urination, diabetes.  LYMPHATIC: NEGATIVE for swollen lymph nodes, lumps or bumps in the breasts or nipple discharge.         Physical Examination:   /89  Pulse 89  Resp 20  Ht 1.829 m (6')  Wt 113.4 kg (250 lb)  SpO2 99%  BMI 33.91 kg/m2     VS: Reviewed and elevated BP noted.  General: Alert, oriented, not in distress. Dressed casually; Good eye contact; Comfortably sitting in a chair; in no apparent distress  HEENT: Normocephalic and atraumatic; NL TM x 2; pupils are isocoric and equally responsive to the light. PERRLA. EOMI. Normal fundoscopic examination; Nasal turbinates are normal with a mild deviated septal alignment;  Mallampati score: Grade II; Tonsillar hypertrophy: 1  hidden by pillars; Pharynx with no erythema or exudates. Crenation of  the tongue.  NECK: Neck supple; symmetrical; no lymphadenopathy; no thyromegaly, bruit, JVD noted. Neck circumference of 17.75 inches (45 cm).  Lungs: Both hemithoraces are symmetrical, normal to palpation, no dullness to percussion, auscultation of lungs revealed normal breath sounds with no expirium prolongation, wheezing, rhonci and crackles.  CVS: Normal S1 and S2 heart sounds with no extra heart sounds. No murmur, rubs, or clicks. Normal peripheral pulses throughout with no obvious peripheral edema.  Psychiatry: Mood and affect are appropriate. Euthymic with affect congruent with full range and intensity. No SI/HI with adequate insight and judgement.          Data: All pertinent previous laboratory data reviewed     No results found for: PH, PHARTERIAL, PO2, [id number], SAT, PCO2, HCO3, BASEEXCESS, FRANDY, BEB  No results found for: TSH  No results found for: GLC  No results found for: HGB  No results found for: BUN, CR    Echocardiology: None Available    Chest x-ray: None Available    PFT: None Available    Laboratory Studies: None Available    Fly Rodriguez MD, MPH  Clinical Sleep Medicine    Total time spent with patient: 30 min. Over >50% of the time was spent for face to face counseling, education, and evaluation.

## 2018-02-09 NOTE — NURSING NOTE
Chief Complaint   Patient presents with     RECHECK     f/u cpap       Initial /89  Pulse 89  Resp 20  Ht 1.829 m (6')  Wt 113.4 kg (250 lb)  SpO2 99%  BMI 33.91 kg/m2 Estimated body mass index is 33.91 kg/(m^2) as calculated from the following:    Height as of this encounter: 1.829 m (6').    Weight as of this encounter: 113.4 kg (250 lb).  Medication Reconciliation: complete         Ester Jerry LPN/CMA

## 2018-02-09 NOTE — MR AVS SNAPSHOT
After Visit Summary   2/9/2018    William Bolton    MRN: [id number]           Patient Information     Date Of Birth          1980        Visit Information        Provider Department      2/9/2018 2:00 PM Fly Rodriguez MD [facility]        Care Instructions            Your BMI is Body mass index is 33.91 kg/(m^2).  Weight management is a personal decision.  If you are interested in exploring weight loss strategies, the following discussion covers the approaches that may be successful. Body mass index (BMI) is one way to tell whether you are at a healthy weight, overweight, or obese. It measures your weight in relation to your height.  A BMI of 18.5 to 24.9 is in the healthy range. A person with a BMI of 25 to 29.9 is considered overweight, and someone with a BMI of 30 or greater is considered obese. More than two-thirds of American adults are considered overweight or obese.  Being overweight or obese increases the risk for further weight gain. Excess weight may lead to heart disease and diabetes.  Creating and following plans for healthy eating and physical activity may help you improve your health.  Weight control is part of healthy lifestyle and includes exercise, emotional health, and healthy eating habits. Careful eating habits lifelong are the mainstay of weight control. Though there are significant health benefits from weight loss, long-term weight loss with diet alone may be very difficult to achieve- studies show long-term success with dietary management in less than 10% of people. Attaining a healthy weight may be especially difficult to achieve in those with severe obesity. In some cases, medications, devices and surgical management might be considered.  What can you do?  If you are overweight or obese and are interested in methods for weight loss, you should discuss this with your provider.     Consider reducing daily calorie intake by 500 calories.     Keep a  food journal.     Avoiding skipping meals, consider cutting portions instead.    Diet combined with exercise helps maintain muscle while optimizing fat loss. Strength training is particularly important for building and maintaining muscle mass. Exercise helps reduce stress, increase energy, and improves fitness. Increasing exercise without diet control, however, may not burn enough calories to loose weight.       Start walking three days a week 10-20 minutes at a time    Work towards walking thirty minutes five days a week     Eventually, increase the speed of your walking for 1-2 minutes at time    In addition, we recommend that you review healthy lifestyles and methods for weight loss available through the National Institutes of Health patient information sites:  http://win.niddk.nih.gov/publications/index.htm    And look into health and wellness programs that may be available through your health insurance provider, employer, local community center, or elmer club.    Weight management plan: Patient was referred to their PCP to discuss a diet and exercise plan.     Your blood pressure was checked while you were in clinic today.  Please read the guidelines below about what these numbers mean and what you should do about them.  Your systolic blood pressure is the top number.  This is the pressure when the heart is pumping.  Your diastolic blood pressure is the bottom number.  This is the pressure in between beats.  If your systolic blood pressure is less than 120 and your diastolic blood pressure is less than 80, then your blood pressure is normal. There is nothing more that you need to do about it  If your systolic blood pressure is 120-139 or your diastolic blood pressure is 80-89, your blood pressure may be higher than it should be.  You should have your blood pressure re-checked within a year by a primary care provider.  If your systolic blood pressure is 140 or greater or your diastolic blood pressure is 90 or  greater, you may have high blood pressure.  High blood pressure is treatable, but if left untreated over time it can put you at risk for heart attack, stroke, or kidney failure.  You should have your blood pressure re-checked by a primary care provider within the next four weeks.    1. CPAP-  WHAT DOES IT DO AND HOW CAN I LEARN TO WEAR IT?                               BEFORE I START, CAN I WATCH A MOVIE TO GET A PLAN ON HOW TO USE CPAP?  https://www.[employer].com/watch?v=o9L63ro346Y      Continuous positive airway pressure, or CPAP, is the most effective treatment for obstructive sleep apnea. It works by blowing room air, through a mask, to hold your throat open. A decision to use CPAP is a major step forward in the pursuit of a healthier life. The successful use of CPAP will help you breathe easier, sleep better and live healthier. You can choose CPAP equipment from any durable medical equipment provider that meets your needs.  Using CPAP can be a positive experience if you keep these albert points in mind:  1. Commitment  CPAP is not a quick fix for your problem. It involves a long-term commitment to improve your sleep and your health.    2. Communication  Stay in close communication with both your sleep doctor and your CPAP supplier. Ask lots of questions and seek help when you need it.    3. Consistency  Use CPAP all night, every night and for every nap. You will receive the maximum health benefits from CPAP when you use it every time that you sleep. This will also make it easier for your body to adjust to the treatment.    4. Correction  The first machine and mask that you try may not be the best ones for you. Work with your sleep doctor and your CPAP supplier to make corrections to your equipment selection. Ask about trying a different type of machine or mask if you have ongoing problems. Make sure that your mask is a good fit and learn to use your equipment properly.    5. Challenge  Tell a family member or close  "friend to ask you each morning if you used your CPAP the previous night. Have someone to challenge you to give it your best effort.    6. Connection   Your adjustment to CPAP will be easier if you are able to connect with others who use the same treatment. Ask your sleep doctor if there is a support group in your area for people who have sleep apnea, or look for one on the Internet.  7. Comfort   Increase your level of comfort by using a saline spray, decongestant or heated humidifier if CPAP irritates your nose, mouth or throat. Use your unit's \"ramp\" setting to slowly get used to the air pressure level. There may be soft pads you can buy that will fit over your mask straps. Look on www.CPAP.com for accessories that can help make CPAP use more comfortable.  8. Cleaning   Clean your mask, tubing and headgear on a regular basis. Put this time in your schedule so that you don't forget to do it. Check and replace the filters for your CPAP unit and humidifier.    9. Completion   Although you are never finished with CPAP therapy, you should reward yourself by celebrating the completion of your first month of treatment. Expect this first month to be your hardest period of adjustment. It will involve some trial and error as you find the machine, mask and pressure settings that are right for you.    10. Continuation  After your first month of treatment, continue to make a daily commitment to use your CPAP all night, every night and for every nap.    CPAP-Tips to starting with success:  Begin using your CPAP for short periods of time during the day while you watch TV or read.    Use CPAP every night and for every nap. Using it less often reduces the health benefits and makes it harder for your body to get used to it.    Make small adjustments to your mask, tubing, straps and headgear until you get the right fit. Tightening the mask may actually worsen the leak.  If it leaves significant marks on your face or irritates the " bridge of your nose, it may not be the best mask for you.  Speak with the person who supplied the mask and consider trying other masks. Insurances will allow you to try different masks during the first month of starting CPAP.  Insurance also covers a new mask, hose and filter about every 6 months.    Use a saline nasal spray to ease mild nasal congestion. Neti-Pot or saline nasal rinses may also help. Nasal gel sprays can help reduce nasal dryness.  Biotene mouthwash can be helpful to protect your teeth if you experience frequent dry mouth.  Dry mouth may be a sign of air escaping out of your mouth or out of the mask in the case of a full face mask.  Speak with your provider if you expect that is the case.     Take a nasal decongestant to relieve more severe nasal or sinus congestion.  Do not use Afrin (oxymetazoline) nasal spray more than 3 days in a row.  Speak with your sleep doctor if your nasal congestion is chronic.    Use a heated humidifier that fits your CPAP model to enhance your breathing comfort. Adjust the heat setting up if you get a dry nose or throat, down if you get condensation in the hose or mask.  Position the CPAP lower than you so that any condensation in the hose drains back into the machine rather than towards the mask.    Try a system that uses nasal pillows if traditional masks give you problems.    Clean your mask, tubing and headgear once a week. Make sure the equipment dries fully.    Regularly check and replace the filters for your CPAP unit and humidifier.    Work closely with your sleep provider and your CPAP supplier to make sure that you have the machine, mask and air pressure setting that works best for you. It is better to stop using it and call your provider to solve problems than to lay awake all night frustrated with the device.    Daytime naps are not advised, but use the PAP device if taking naps. Many insurances require that we prove you are using the PAP device at least 4  hours on at least 70% of nights over a 30 day period. We have 90 days to meet those criteria.    You can get new supplies (mask, hose and filter) for your device every 3-6 months (covered by insurance). You do not need to get supplies that often, but they are available if you would like them. You may exchange the mask once within the first month if you feel the initial mask does not fit well. Please, contact your medical equipment provider for equipment issues.    Please let me know if you have any snoring, daytime sleepiness, or poor sleep quality. We will want to make sure your PAP device is adequately treating your condition.    There is a website called CPAP.com that has accessories that may make CPAP use easier. Please visit it at your convenience.    Continue the good work. Follow up with me within one year.    Thank you!    Fly Rodriguez MD, MPH  Clinical Sleep and Occupational / Environmental Medicine              Follow-ups after your visit        Who to contact     If you have questions or need follow up information about today's clinic visit or your schedule please contact [facility] directly at [phone].  Normal or non-critical lab and imaging results will be communicated to you by Good[employer] Systemshart, letter or phone within 4 business days after the clinic has received the results. If you do not hear from us within 7 days, please contact the clinic through [facility] or phone. If you have a critical or abnormal lab result, we will notify you by phone as soon as possible.  Submit refill requests through [facility] or call your pharmacy and they will forward the refill request to us. Please allow 3 business days for your refill to be completed.          Additional Information About Your Visit        [facility] Information     [facility] gives you secure access to your electronic health record. If you see a primary care provider, you can also send messages to your care team and make appointments.  If you have questions, please call your primary care clinic.  If you do not have a primary care provider, please call [phone] and they will assist you.        Care EveryWhere ID     This is your Care EveryWhere ID. This could be used by other organizations to access your [facility] medical records  [id number]        Your Vitals Were     Pulse Respirations Height Pulse Oximetry BMI (Body Mass Index)       89 20 1.829 m (6') 99% 33.91 kg/m2        Blood Pressure from Last 3 Encounters:   02/09/18 141/89   12/19/17 (!) 148/102   10/31/17 134/86    Weight from Last 3 Encounters:   02/09/18 113.4 kg (250 lb)   12/19/17 113.4 kg (250 lb)   10/31/17 113.4 kg (250 lb)              Today, you had the following     No orders found for display       Primary Care Provider Office Phone # Fax #    Juan Antonio Villa Jr., [phone] [address] [facility] [place] [address]        Equal Access to Services     [facility]: Hadii aad ku hadasho Soomaali, waaxda luqadaha, qaybta kaalmada adeegyada, waxay idiin haytamikon london mendoza . So [facility] [phone].    ATENCIÓN: Si habla español, tiene a epstein disposición servicios gratuitos de asistencia lingüística. Llame al [phone].    We comply with applicable federal civil rights laws and [place] laws. We do not discriminate on the basis of race, color, national origin, age, disability, sex, sexual orientation, or gender identity.            Thank you!     Thank you for choosing [facility]  for your care. Our goal is always to provide you with excellent care. Hearing back from our patients is one way we can continue to improve our services. Please take a few minutes to complete the written survey that you may receive in the mail after your visit with us. Thank you!             Your Updated Medication List - Protect others around you: Learn how to safely use, store and throw away your medicines at www.disposemymeds.org.           This list is accurate as of 2/9/18  2:24 PM.  Always use your most recent med list.                   Brand Name Dispense Instructions for use Diagnosis    amphetamine-dextroamphetamine 20 MG per 24 hr capsule    ADDERALL XR    30 capsule    Take 1 capsule (20 mg) by mouth daily    ADD (attention deficit disorder) without hyperactivity       buPROPion 150 MG 24 hr tablet    WELLBUTRIN XL    30 tablet    Take 1 tablet (150 mg) by mouth every morning    Major depressive disorder, recurrent episode, mild (H)       citalopram 10 MG tablet    celeXA    810 tablet    Take 3 tablets (30 mg) by mouth daily    Major depressive disorder, recurrent episode, mild (H)       ibuprofen 200 MG capsule     120 capsule    Take 800 mg by mouth every 8 hours as needed for pain

## 2018-02-12 DIAGNOSIS — F33.0 MAJOR DEPRESSIVE DISORDER, RECURRENT EPISODE, MILD (H): ICD-10-CM

## 2018-02-12 NOTE — LETTER
[facility]  [address] [id number] [phone]  February 15, 2018    William Bolton  [address] [place] [address]    Dear William,    We were able to refill the medication you need for a one time luis refill, but we will need to see you in the office before we can provide any further refills.  Please call [phone] to schedule an appointment for a medication follow up with Dr. Villa or another provoider at your earliest convenience.    Thank you for choosing [facility]

## 2018-02-13 NOTE — TELEPHONE ENCOUNTER
"Requested Prescriptions   Pending Prescriptions Disp Refills     buPROPion (WELLBUTRIN XL) 150 MG 24 hr tablet  Last Written Prescription Date:  1/16/2018  Last Fill Quantity: 30 tablet,  # refills: 0   Last office visit: 6/5/2017 with prescribing provider:  Allen   Future Office Visit:     30 tablet 0     Sig: Take 1 tablet (150 mg) by mouth every morning    SSRIs Protocol Failed    2/12/2018  5:00 PM       Failed - PHQ-9 score less than 5 in past 6 months    The PHQ-9 criteria is meant to fail. It requires a PHQ-9 score review  PHQ-9 SCORE 10/5/2016 11/18/2016 6/5/2017   Total Score - - -   Total Score MyChart 7 (Mild depression) 2 (Minimal depression) -   Total Score - - 1     SANDOVAL-7 SCORE 3/8/2016 10/5/2016 6/5/2017   Total Score - - -   Total Score - 1 (minimal anxiety) -   Total Score 2 - 3          Failed - Recent (6 mo) or future visit with authorizing provider's specialty    Patient had office visit in the last 6 months or has a visit in the next 30 days with authorizing provider.  See \"Patient Info\" tab in inbasket, or \"Choose Columns\" in Meds & Orders section of the refill encounter.           Passed - Medication is Bupropion    If the medication is Bupropion (Wellbutrin), and the patient is taking for smoking cessation; OK to refill.         Passed - Patient is age 18 or older          "

## 2018-02-13 NOTE — TELEPHONE ENCOUNTER
Routing refill request to provider for review/approval because:  Lesly given x1 and patient did not follow up, please advise  Colette Butler RN, BSN  [facility]

## 2018-02-14 RX ORDER — BUPROPION HYDROCHLORIDE 150 MG/1
150 TABLET ORAL EVERY MORNING
Qty: 30 TABLET | Refills: 0 | Status: SHIPPED | OUTPATIENT
Start: 2018-02-14 | End: 2018-02-23

## 2018-02-14 NOTE — TELEPHONE ENCOUNTER
Needs to be seen.  Please have patient make appointment for depression and ADHD.     Juan Antonio Villa MD

## 2018-02-21 ENCOUNTER — MYC MEDICAL ADVICE (OUTPATIENT)
Dept: FAMILY MEDICINE | Facility: CLINIC | Age: 38
End: 2018-02-21

## 2018-02-22 NOTE — TELEPHONE ENCOUNTER
Please see [email or web address] message. Please advise. Thank you.  Colette Butler RN, BSN  [facility]

## 2018-02-23 ENCOUNTER — E-VISIT (OUTPATIENT)
Dept: FAMILY MEDICINE | Facility: CLINIC | Age: 38
End: 2018-02-23
Payer: COMMERCIAL

## 2018-02-23 DIAGNOSIS — F33.0 MAJOR DEPRESSIVE DISORDER, RECURRENT EPISODE, MILD (H): ICD-10-CM

## 2018-02-23 DIAGNOSIS — F41.9 ANXIETY: Primary | ICD-10-CM

## 2018-02-23 PROCEDURE — 99444 ZZC PHYSICIAN ONLINE EVALUATION & MANAGEMENT SERVICE: CPT | Performed by: FAMILY MEDICINE

## 2018-02-23 RX ORDER — BUPROPION HYDROCHLORIDE 150 MG/1
150 TABLET ORAL EVERY MORNING
Qty: 90 TABLET | Refills: 1 | Status: SHIPPED | OUTPATIENT
Start: 2018-02-23 | End: 2018-12-27

## 2018-02-23 RX ORDER — CITALOPRAM HYDROBROMIDE 10 MG/1
40 TABLET ORAL DAILY
Qty: 810 TABLET | Refills: 1
Start: 2018-02-23 | End: 2018-07-17

## 2018-02-23 RX ORDER — ALPRAZOLAM 0.5 MG
0.5 TABLET ORAL 3 TIMES DAILY PRN
Qty: 40 TABLET | Refills: 0 | Status: SHIPPED | OUTPATIENT
Start: 2018-02-23 | End: 2018-03-23

## 2018-02-23 ASSESSMENT — PATIENT HEALTH QUESTIONNAIRE - PHQ9
SUM OF ALL RESPONSES TO PHQ QUESTIONS 1-9: 5
SUM OF ALL RESPONSES TO PHQ QUESTIONS 1-9: 5
10. IF YOU CHECKED OFF ANY PROBLEMS, HOW DIFFICULT HAVE THESE PROBLEMS MADE IT FOR YOU TO DO YOUR WORK, TAKE CARE OF THINGS AT HOME, OR GET ALONG WITH OTHER PEOPLE: SOMEWHAT DIFFICULT

## 2018-02-24 ASSESSMENT — PATIENT HEALTH QUESTIONNAIRE - PHQ9: SUM OF ALL RESPONSES TO PHQ QUESTIONS 1-9: 5

## 2018-03-20 DIAGNOSIS — F41.9 ANXIETY: ICD-10-CM

## 2018-03-20 DIAGNOSIS — F98.8 ADD (ATTENTION DEFICIT DISORDER) WITHOUT HYPERACTIVITY: ICD-10-CM

## 2018-03-20 RX ORDER — DEXTROAMPHETAMINE SACCHARATE, AMPHETAMINE ASPARTATE MONOHYDRATE, DEXTROAMPHETAMINE SULFATE AND AMPHETAMINE SULFATE 5; 5; 5; 5 MG/1; MG/1; MG/1; MG/1
20 CAPSULE, EXTENDED RELEASE ORAL DAILY
Qty: 30 CAPSULE | Refills: 0 | Status: CANCELLED | OUTPATIENT
Start: 2018-03-20

## 2018-03-20 NOTE — TELEPHONE ENCOUNTER
Xanax      Last Written Prescription Date:  2/23/18  Last Fill Quantity: 40,   # refills: 0  Last Office Visit: 2/23/18 E-visit, last OV: 6/5/17  Future Office visit:       Routing refill request to provider for review/approval because:  Drug not on the [facility], [facility] or [facility] refill protocol or controlled substance  Colette Butler RN, BSN  [facility]

## 2018-03-23 RX ORDER — DEXTROAMPHETAMINE SACCHARATE, AMPHETAMINE ASPARTATE MONOHYDRATE, DEXTROAMPHETAMINE SULFATE AND AMPHETAMINE SULFATE 5; 5; 5; 5 MG/1; MG/1; MG/1; MG/1
20 CAPSULE, EXTENDED RELEASE ORAL DAILY
Qty: 30 CAPSULE | Refills: 0 | Status: SHIPPED | OUTPATIENT
Start: 2018-03-23 | End: 2018-04-22

## 2018-03-23 RX ORDER — DEXTROAMPHETAMINE SACCHARATE, AMPHETAMINE ASPARTATE MONOHYDRATE, DEXTROAMPHETAMINE SULFATE AND AMPHETAMINE SULFATE 5; 5; 5; 5 MG/1; MG/1; MG/1; MG/1
20 CAPSULE, EXTENDED RELEASE ORAL DAILY
Qty: 30 CAPSULE | Refills: 0 | Status: SHIPPED | OUTPATIENT
Start: 2018-04-23 | End: 2018-05-23

## 2018-03-23 RX ORDER — ALPRAZOLAM 0.5 MG
0.5 TABLET ORAL 3 TIMES DAILY PRN
Qty: 40 TABLET | Refills: 0 | Status: SHIPPED | OUTPATIENT
Start: 2018-03-23 | End: 2018-12-27

## 2018-03-23 RX ORDER — DEXTROAMPHETAMINE SACCHARATE, AMPHETAMINE ASPARTATE MONOHYDRATE, DEXTROAMPHETAMINE SULFATE AND AMPHETAMINE SULFATE 5; 5; 5; 5 MG/1; MG/1; MG/1; MG/1
20 CAPSULE, EXTENDED RELEASE ORAL DAILY
Qty: 30 CAPSULE | Refills: 0 | Status: SHIPPED | OUTPATIENT
Start: 2018-05-24 | End: 2018-06-23

## 2018-03-23 NOTE — TELEPHONE ENCOUNTER
Chart reviewed.  Rx sent to pt's preferred pharmacy.  Walked to pharmacy by me.    [facility] [address]    Juan Antonio Villa MD

## 2018-06-23 ENCOUNTER — MYC MEDICAL ADVICE (OUTPATIENT)
Dept: FAMILY MEDICINE | Facility: CLINIC | Age: 38
End: 2018-06-23

## 2018-06-23 DIAGNOSIS — F98.8 ADD (ATTENTION DEFICIT DISORDER) WITHOUT HYPERACTIVITY: Primary | ICD-10-CM

## 2018-06-25 RX ORDER — DEXTROAMPHETAMINE SACCHARATE, AMPHETAMINE ASPARTATE MONOHYDRATE, DEXTROAMPHETAMINE SULFATE AND AMPHETAMINE SULFATE 5; 5; 5; 5 MG/1; MG/1; MG/1; MG/1
20 CAPSULE, EXTENDED RELEASE ORAL DAILY
Qty: 30 CAPSULE | Refills: 0 | Status: CANCELLED | OUTPATIENT
Start: 2018-06-25

## 2018-06-25 NOTE — TELEPHONE ENCOUNTER
Instructions sent of how to request medications send via [employer] to William. When reviewing please note William's note of how he wants this done and send this request to Dr. Villa. Thank you, Chary Peralta R.N.

## 2018-06-25 NOTE — TELEPHONE ENCOUNTER
amphetamine-dextroamphetamine (ADDERALL XR) 20 MG per 24 hr capsule      Last Written Prescription Date:  5/24/2018  Last Fill Quantity: 30 capsule,   # refills: 0  Last Office Visit: 6/5/2017  Allen  --  2/23/2018 (E-Visit)  Future Office visit:       Routing refill request to provider for review/approval because:  Drug not on the [facility], [facility] or [facility] refill protocol or controlled substance      Controlled Substance Agreement Signed:  No  Provider:  Allen    Patient would like Rx taken to [facility] per note.

## 2018-06-26 ENCOUNTER — DOCUMENTATION ONLY (OUTPATIENT)
Dept: SLEEP MEDICINE | Facility: CLINIC | Age: 38
End: 2018-06-26

## 2018-06-26 NOTE — PROGRESS NOTES
6 month [facility]    STM Recheck Visit     Diagnostic AHI:   7.3      Data only recheck     Assessment: Pt meeting objective benchmarks.     Action plan:   pt to follow up per provider request (1-2 yrs)       Device type: Auto-CPAP  PAP settings: CPAP min 5 cm  H20     CPAP max 15 cm  H20    95th% pressure 12 cm  H20   Objective measures: 14 day rolling measures      Compliance  92 %      Leak  10.03 lpm  last  upload      AHI 1.49   last  upload      Average number of minutes 342      Objective measure goal  Compliance   Goal >70%  Leak   Goal < 24 lpm  AHI  Goal < 5  Usage  Goal >240

## 2018-06-27 RX ORDER — DEXTROAMPHETAMINE SACCHARATE, AMPHETAMINE ASPARTATE MONOHYDRATE, DEXTROAMPHETAMINE SULFATE AND AMPHETAMINE SULFATE 5; 5; 5; 5 MG/1; MG/1; MG/1; MG/1
20 CAPSULE, EXTENDED RELEASE ORAL DAILY
Qty: 30 CAPSULE | Refills: 0 | Status: SHIPPED | OUTPATIENT
Start: 2018-06-27 | End: 2018-07-27

## 2018-06-27 RX ORDER — DEXTROAMPHETAMINE SACCHARATE, AMPHETAMINE ASPARTATE MONOHYDRATE, DEXTROAMPHETAMINE SULFATE AND AMPHETAMINE SULFATE 5; 5; 5; 5 MG/1; MG/1; MG/1; MG/1
20 CAPSULE, EXTENDED RELEASE ORAL DAILY
Qty: 30 CAPSULE | Refills: 0 | Status: SHIPPED | OUTPATIENT
Start: 2018-07-28 | End: 2018-08-27

## 2018-06-27 RX ORDER — DEXTROAMPHETAMINE SACCHARATE, AMPHETAMINE ASPARTATE MONOHYDRATE, DEXTROAMPHETAMINE SULFATE AND AMPHETAMINE SULFATE 5; 5; 5; 5 MG/1; MG/1; MG/1; MG/1
20 CAPSULE, EXTENDED RELEASE ORAL DAILY
Qty: 30 CAPSULE | Refills: 0 | Status: SHIPPED | OUTPATIENT
Start: 2018-08-28 | End: 2018-09-27

## 2018-06-28 NOTE — TELEPHONE ENCOUNTER
The requested prescription(s) has/have been approved and has/have been printed and signed. [facility] to be filled there.    Juan Antonio Villa Jr

## 2018-07-17 ENCOUNTER — OFFICE VISIT (OUTPATIENT)
Dept: FAMILY MEDICINE | Facility: CLINIC | Age: 38
End: 2018-07-17
Payer: COMMERCIAL

## 2018-07-17 ENCOUNTER — TELEPHONE (OUTPATIENT)
Dept: FAMILY MEDICINE | Facility: CLINIC | Age: 38
End: 2018-07-17

## 2018-07-17 VITALS
BODY MASS INDEX: 34.95 KG/M2 | TEMPERATURE: 98.5 F | HEART RATE: 86 BPM | OXYGEN SATURATION: 97 % | WEIGHT: 258 LBS | DIASTOLIC BLOOD PRESSURE: 98 MMHG | HEIGHT: 72 IN | SYSTOLIC BLOOD PRESSURE: 152 MMHG

## 2018-07-17 DIAGNOSIS — Z00.01 ENCOUNTER FOR ROUTINE ADULT MEDICAL EXAM WITH ABNORMAL FINDINGS: Primary | ICD-10-CM

## 2018-07-17 DIAGNOSIS — R03.0 ELEVATED BLOOD PRESSURE READING WITHOUT DIAGNOSIS OF HYPERTENSION: ICD-10-CM

## 2018-07-17 DIAGNOSIS — Z13.220 SCREENING FOR HYPERLIPIDEMIA: ICD-10-CM

## 2018-07-17 DIAGNOSIS — Z13.1 SCREENING FOR DIABETES MELLITUS: ICD-10-CM

## 2018-07-17 DIAGNOSIS — F33.0 MAJOR DEPRESSIVE DISORDER, RECURRENT EPISODE, MILD (H): ICD-10-CM

## 2018-07-17 DIAGNOSIS — F41.9 ANXIETY: ICD-10-CM

## 2018-07-17 DIAGNOSIS — Z00.8 ENCOUNTER FOR BIOMETRIC SCREENING: ICD-10-CM

## 2018-07-17 LAB
ALBUMIN SERPL-MCNC: 3.9 G/DL (ref 3.4–5)
ALBUMIN UR-MCNC: 30 MG/DL
ALP SERPL-CCNC: 88 U/L (ref 40–150)
ALT SERPL W P-5'-P-CCNC: 129 U/L (ref 0–70)
ANION GAP SERPL CALCULATED.3IONS-SCNC: 10 MMOL/L (ref 3–14)
APPEARANCE UR: CLEAR
AST SERPL W P-5'-P-CCNC: 73 U/L (ref 0–45)
BACTERIA #/AREA URNS HPF: ABNORMAL /HPF
BILIRUB SERPL-MCNC: 0.5 MG/DL (ref 0.2–1.3)
BILIRUB UR QL STRIP: NEGATIVE
BUN SERPL-MCNC: 11 MG/DL (ref 7–30)
CALCIUM SERPL-MCNC: 8.7 MG/DL (ref 8.5–10.1)
CHLORIDE SERPL-SCNC: 104 MMOL/L (ref 94–109)
CHOLEST SERPL-MCNC: 351 MG/DL
CO2 SERPL-SCNC: 26 MMOL/L (ref 20–32)
COLOR UR AUTO: YELLOW
CREAT SERPL-MCNC: 0.98 MG/DL (ref 0.66–1.25)
ERYTHROCYTE [DISTWIDTH] IN BLOOD BY AUTOMATED COUNT: 16.3 % (ref 10–15)
GFR SERPL CREATININE-BSD FRML MDRD: 87 ML/MIN/1.7M2
GLUCOSE SERPL-MCNC: 97 MG/DL (ref 70–99)
GLUCOSE UR STRIP-MCNC: NEGATIVE MG/DL
HCT VFR BLD AUTO: 38 % (ref 40–53)
HDLC SERPL-MCNC: 62 MG/DL
HGB BLD-MCNC: 12.1 G/DL (ref 13.3–17.7)
HGB UR QL STRIP: NEGATIVE
KETONES UR STRIP-MCNC: NEGATIVE MG/DL
LDLC SERPL CALC-MCNC: 258 MG/DL
LEUKOCYTE ESTERASE UR QL STRIP: NEGATIVE
MCH RBC QN AUTO: 25.9 PG (ref 26.5–33)
MCHC RBC AUTO-ENTMCNC: 31.8 G/DL (ref 31.5–36.5)
MCV RBC AUTO: 81 FL (ref 78–100)
NITRATE UR QL: NEGATIVE
NONHDLC SERPL-MCNC: 289 MG/DL
PH UR STRIP: 6.5 PH (ref 5–7)
PLATELET # BLD AUTO: 293 10E9/L (ref 150–450)
POTASSIUM SERPL-SCNC: 3.9 MMOL/L (ref 3.4–5.3)
PROT SERPL-MCNC: 7.7 G/DL (ref 6.8–8.8)
RBC # BLD AUTO: 4.67 10E12/L (ref 4.4–5.9)
RBC #/AREA URNS AUTO: ABNORMAL /HPF
SODIUM SERPL-SCNC: 140 MMOL/L (ref 133–144)
SOURCE: ABNORMAL
SP GR UR STRIP: 1.02 (ref 1–1.03)
TRIGL SERPL-MCNC: 157 MG/DL
UROBILINOGEN UR STRIP-ACNC: 1 EU/DL (ref 0.2–1)
WBC # BLD AUTO: 7.7 10E9/L (ref 4–11)
WBC #/AREA URNS AUTO: ABNORMAL /HPF

## 2018-07-17 PROCEDURE — 99213 OFFICE O/P EST LOW 20 MIN: CPT | Mod: 25 | Performed by: FAMILY MEDICINE

## 2018-07-17 PROCEDURE — 85027 COMPLETE CBC AUTOMATED: CPT | Performed by: FAMILY MEDICINE

## 2018-07-17 PROCEDURE — 80053 COMPREHEN METABOLIC PANEL: CPT | Performed by: FAMILY MEDICINE

## 2018-07-17 PROCEDURE — 36415 COLL VENOUS BLD VENIPUNCTURE: CPT | Performed by: FAMILY MEDICINE

## 2018-07-17 PROCEDURE — 80061 LIPID PANEL: CPT | Performed by: FAMILY MEDICINE

## 2018-07-17 PROCEDURE — 81001 URINALYSIS AUTO W/SCOPE: CPT | Performed by: FAMILY MEDICINE

## 2018-07-17 PROCEDURE — 99395 PREV VISIT EST AGE 18-39: CPT | Performed by: FAMILY MEDICINE

## 2018-07-17 ASSESSMENT — ANXIETY QUESTIONNAIRES
6. BECOMING EASILY ANNOYED OR IRRITABLE: SEVERAL DAYS
IF YOU CHECKED OFF ANY PROBLEMS ON THIS QUESTIONNAIRE, HOW DIFFICULT HAVE THESE PROBLEMS MADE IT FOR YOU TO DO YOUR WORK, TAKE CARE OF THINGS AT HOME, OR GET ALONG WITH OTHER PEOPLE: NOT DIFFICULT AT ALL
5. BEING SO RESTLESS THAT IT IS HARD TO SIT STILL: NOT AT ALL
1. FEELING NERVOUS, ANXIOUS, OR ON EDGE: NOT AT ALL
3. WORRYING TOO MUCH ABOUT DIFFERENT THINGS: NOT AT ALL
2. NOT BEING ABLE TO STOP OR CONTROL WORRYING: NOT AT ALL
7. FEELING AFRAID AS IF SOMETHING AWFUL MIGHT HAPPEN: NOT AT ALL
GAD7 TOTAL SCORE: 1

## 2018-07-17 ASSESSMENT — PATIENT HEALTH QUESTIONNAIRE - PHQ9: 5. POOR APPETITE OR OVEREATING: NOT AT ALL

## 2018-07-17 NOTE — MR AVS SNAPSHOT
After Visit Summary   7/17/2018    William Bolton    MRN: [id number]           Patient Information     Date Of Birth          1980        Visit Information        Provider Department      7/17/2018 8:20 AM Smith Lemons DO [facility] [place]        Today's Diagnoses     Encounter for routine adult medical exam with abnormal findings    -  1    Encounter for biometric screening        Screening for diabetes mellitus        Screening for hyperlipidemia        Elevated blood pressure reading without diagnosis of hypertension          Care Instructions      Preventive Health Recommendations  Male Ages 26 - 39    Yearly exam:             See your health care provider every year in order to  o   Review health changes.   o   Discuss preventive care.    o   Review your medicines if your doctor has prescribed any.    You should be tested each year for STDs (sexually transmitted diseases), if you re at risk.     After age 35, talk to your provider about cholesterol testing. If you are at risk for heart disease, have your cholesterol tested at least every 5 years.     If you are at risk for diabetes, you should have a diabetes test (fasting glucose).  Shots: Get a flu shot each year. Get a tetanus shot every 10 years.     Nutrition:    Eat at least 5 servings of fruits and vegetables daily.     Eat whole-grain bread, whole-wheat pasta and brown rice instead of white grains and rice.     Get adequate Calcium and Vitamin D.     Lifestyle    Exercise for at least 150 minutes a week (30 minutes a day, 5 days a week). This will help you control your weight and prevent disease.     Limit alcohol to one drink per day.     No smoking.     Wear sunscreen to prevent skin cancer.     See your dentist every six months for an exam and cleaning.     Controlling High Blood Pressure  High blood pressure (hypertension) is often called the silent killer. This is because many people who have it don t know it. High  blood pressure can raise your risk of heart attack, stroke, and heart failure. Controlling your blood pressure can decrease your risk of these problems. Know your blood pressure and remember to check it regularly. Doing so can save your life.  Blood pressure measurements are given as 2 numbers. Systolic blood pressure is the upper number. This is the pressure when the heart contracts. Diastolic blood pressure is the lower number. This is the pressure when the heart relaxes between beats.  Blood pressure is categorized as normal, elevated, or stage 1 or stage 2 high blood pressure:    Normal blood pressure is systolic of less than 120 and diastolic of less than 80 (120/80)    Elevated blood pressure is systolic of 120 to 129 and diastolic less than 80    Stage 1 high blood pressure is systolic is 130 to 139 or diastolic between 80 to 89    Stage 2 high blood pressure is when systolic is 140 or higher or the diastolic is 90 or higher  Here are some things you can do to help control your blood pressure.    Choose heart-healthy foods    Select low-salt, low-fat foods. Limit sodium intake to 2,400 mg per day or the amount suggested by your healthcare provider.    Limit canned, dried, cured, packaged, and fast foods. These can contain a lot of salt.    Eat 8 to 10 servings of fruits and vegetables every day.    Choose lean meats, fish, or chicken.    Eat whole-grain pasta, brown rice, and beans.    Eat 2 to 3 servings of low-fat or fat-free dairy products.    Ask your doctor about the DASH eating plan. This plan helps reduce blood pressure.    When you go to a restaurant, ask that your meal be prepared with no added salt.  Maintain a healthy weight    Ask your healthcare provider how many calories to eat a day. Then stick to that number.    Ask your healthcare provider what weight range is healthiest for you. If you are overweight, a weight loss of only 3% to 5% of your body weight can help lower blood pressure.  Generally, a good weight loss goal is to lose 10% of your body weight in a year.    Limit snacks and sweets.    Get regular exercise.  Get up and get active    Choose activities you enjoy. Find ones you can do with friends or family. This includes bicycling, dancing, walking, and jogging.    Park farther away from building entrances.    Use stairs instead of the elevator.    When you can, walk or bike instead of driving.    Wildwood leaves, garden, or do household repairs.    Be active at a moderate to vigorous level of physical activity for at least 40 minutes for a minimum of 3 to 4 days a week.   Manage stress    Make time to relax and enjoy life. Find time to laugh.    Communicate your concerns with your loved ones and your healthcare provider.    Visit with family and friends, and keep up with hobbies.  Limit alcohol and quit smoking    Men should have no more than 2 drinks per day.    Women should have no more than 1 drink per day.    Talk with your healthcare provider about quitting smoking. Smoking significantly increases your risk for heart disease and stroke. Ask your healthcare provider about community smoking cessation programs and other options.  Medicines  If lifestyle changes aren t enough, your healthcare provider may prescribe high blood pressure medicine. Take all medicines as prescribed. If you have any questions about your medicines, ask your healthcare provider before stopping or changing them.   Date Last Reviewed: 4/27/2016 2000-2017 [employer]. [address] [place] [address], [place] [address]. All rights reserved. This information is not intended as a substitute for professional medical care. Always follow your healthcare professional's instructions.        What is High Blood Pressure?  High blood pressure (also called hypertension) is known as the  silent killer.  This is because most of the time it doesn t cause symptoms. In fact, many people don t know they have it until other  problems develop. In most cases, high blood pressure often requires lifelong treatment.  Understanding blood pressure  The circulatory system is made up of the heart and blood vessels that carry blood through the body. Your heart is the pump for this system. With each heartbeat (contraction), the heart sends blood out through large blood vessels called arteries. Blood pressure is a measure of how hard the moving blood pushes against the walls of the arteries.  High blood pressure can harm your health  In a healthy blood vessel, the blood moves smoothly through the vessel and puts normal pressure on the vessel walls.       High blood pressure occurs when blood pushes too hard against artery walls. This causes damage to the artery walls and then the formation of scar tissue as it heals. This makes the arteries stiff and weak. Plaque sticks to the scarred tissue narrowing and hardening the arteries. High blood pressure also causes your heart to work harder to get blood out to the body. High blood pressure raises your risk of heart attack, also known as acute myocardial infarction, or AMI, heart failure, and stroke. It can also lead to kidney disease, and blindness. In general, if you have high blood pressure, keeping your blood pressure below 130/80 mmHg may help prevent these problems. Your healthcare provider may prescribe medicine to help control blood pressure if lifestyle changes are not enough.  It's important to know your blood pressure numbers. Blood pressure measurements are given as 2 numbers. Systolic blood pressure is the upper number. This is the pressure when the heart contracts. Diastolic blood pressure is the lower number. This is the pressure when the heart relaxes between beats.  Blood pressure is categorized as normal, elevated, or stage 1 or stage 2 high blood pressure:    Normal blood pressure is systolic of less than 120 and diastolic of less than 80 (120/80)    Elevated blood pressure is systolic  "of 120 to 129 and diastolic less than 80    Stage 1 high blood pressure is systolic is 130 to 139 or diastolic between 80 to 89    Stage 2 high blood pressure is when systolic is 140 or higher or the diastolic is 90 or higher  High blood pressure is diagnosed when multiple, separate readings show blood pressure above 130/80 mmHg. Talk with your healthcare provider if you have questions or concerns about your blood pressure readings.  Measuring blood pressure  An example of a blood pressure measurement is 120/70 (120 over 70). The top number is the pressure of blood against the artery walls during a heartbeat (systolic). The bottom number is the pressure of blood against artery walls between heartbeats (diastolic). Talk with your healthcare provider to find out what your blood pressure goals should be.   Controlling blood pressure  If your blood pressure is too high, work with your doctor on a plan for lowering it. Below are steps you can take that will help lower your blood pressure.    Choose heart-healthy foods. Eating healthier meals helps you control your blood pressure. Ask your doctor about the DASH eating plan. This plan helps reduce blood pressure by limiting the amount of sodium (salt) you have in your diet. DASH also encourages eating plenty of fruits and vegetables, low-fat or non-fat dairy, whole-grains, and foods high in fiber, and low in fat. This also provides an enhanced amount of potassium which can also help lower blood pressure.    Reduce sodium. Reducing sodium in your diet reduces fluid retention. Fluid retention caused by too much salt increases blood volume and blood pressure. The American Heart Association s (AHA) \"ideal\" sodium intake recommendation is 1,500 milligrams per day.  However, since American's eat so much salt, the AHA says a positive change can occur by cutting back to even 2,400 milligrams of sodium a day.    Maintain a healthy weight. Being overweight makes you more likely to " have high blood pressure. Losing excess weight helps lower blood pressure.    Exercise regularly. Daily exercise helps your heart and blood vessels work better and stay healthier. It can help lower your blood pressure.    Stop smoking. Smoking increases blood pressure and damages blood vessels.    Limit alcohol. Drinking too much alcohol can raise blood pressure. Men should have no more than 2 drinks a day. Women should have no more than 1. (A drink is equal to 1 beer, or a small glass of wine, or a shot of liquor.)    Control stress. Stress makes your heart work harder and beat faster. Controlling stress helps you control your blood pressure.  Facts about high blood pressure    Feeling OK does not mean that blood pressure is under control. Likewise, feeling bad doesn t mean it s out of control. The only way to know for sure is to check your pressure regularly.    Medicine is only one part of controlling high blood pressure. You also need to manage your weight, get regular exercise, and adjust your eating habits.    High blood pressure is usually a lifelong problem. But it can be controlled with healthy lifestyle changes and medicine.    Hypertension is not the same as stress. Although stress may be a factor in high blood pressure, it s only one part of the story.    Blood pressure medicines need to be taken every day. Stopping suddenly may cause a dangerous increase in pressure.   Date Last Reviewed: 4/27/2016 2000-2017 [employer]. [address] [place] [address], [place] [address]. All rights reserved. This information is not intended as a substitute for professional medical care. Always follow your healthcare professional's instructions.        Your High Blood Pressure Risk Factors  Risk factors are things that make you more likely to have a disease or condition. Do you know your risk factors for high blood pressure? You can t do anything about some risk factors. But other risk factors are things that  can be changed. Know what high blood pressure risk factors you have. Then find out what changes you can make to help control your risk for high blood pressure. Start with the change that you think will be easiest for you.  Risk factors you can t control  Though you can t change any of the things listed below, check off the ones that apply to you. The more boxes you check, the greater your risk for high blood pressure.  Family history  ? One or both of your parents or grandparents has had high blood pressure or heart disease.  Gender and age  ? You re a man over age 55 or a postmenopausal woman.  Risk factors you can control  There are plenty of risk factors for high blood pressure that you can control. Learn what these risk factors are and then find out how to reduce your risk. Check the ones that apply to you.  ? What you eat  Do you eat a lot of salty, fatty, fried, or greasy foods? Do you go to restaurants or eat out frequently?  ? Alcohol consumption  Do you drink more than 1 drink a day if you are a female or more than 2 drinks a day if you male?   ? If you smoke or someone close to you smokes  Do you smoke cigarettes or cigars, chew tobacco, or dip snuff? Are you exposed to second-hand smoke on a regular basis?  ? How active you are   Are you inactive most of the time at work and at home? Do you go weeks without exercising?  ? Your weight   Has your doctor said that you are 15 or more pounds overweight?  ? Your stress level    Do you often feel anxious, nervous, and stressed? Do you feel that you don't have a supportive environment?  Date Last Reviewed: 4/27/2016 2000-2017 [employer]. [address] [place] [address], [place] [address]. All rights reserved. This information is not intended as a substitute for professional medical care. Always follow your healthcare professional's instructions.        Eating Heart-Healthy Food: Using the DASH Plan    Eating for your heart doesn t have to be hard or  boring. You just need to know how to make healthier choices. The DASH eating plan has been developed to help you do just that. DASH stands for Dietary Approaches to Stop Hypertension. It is a plan that has been proven to be healthier for your heart and to lower your risk for high blood pressure. It can also help lower your risk for cancer, heart disease, osteoporosis, and diabetes.  Choosing from each food group  Choose foods from each of the food groups below each day. Try to get the recommended number of servings for each food group. The serving numbers are based on a diet of 2,000 calories a day. Talk to your doctor if you re unsure about your calorie needs. Along with getting the correct servings, the DASH plan also recommends a sodium intake less than 2,300 mg per day.        Grains  Servings: 6 to 8 a day  A serving is:    1 slice bread    1 ounce dry cereal    Half a cup cooked rice, pasta or cereal  Best choices: Whole grains and any grains high in fiber. Vegetables  Servings: 4 to 5 a day  A serving is:    1 cup raw leafy vegetable    Half a cup cut-up raw or cooked vegetable    Half a cup vegetable juice  Best choices: Fresh or frozen vegetables prepared without added salt or fat.   Fruits  Servings: 4 to 5 a day  A serving is:    1 medium fruit    One-quarter cup dried fruit    Half a cup fresh, frozen, or canned fruit    Half a cup of 100% fruit juices  Best choices: A variety of fresh fruits of different colors. Whole fruits are a better choice than fruit juices. Low-fat or fat-free dairy  Servings: 2 to 3 a day  A serving is:    1 cup milk    1 cup yogurt    One and a half ounces cheese  Best choices: Skim or 1% milk, low-fat or fat-free yogurt or buttermilk, and low-fat cheeses.         Lean meats, poultry, fish  Servings: 6 or fewer a day  A serving is:    1 ounce cooked meats, poultry, or fish    1 egg  Best choices: Lean poultry and fish. Trim away visible fat. Broil, grill, roast, or boil instead  of frying. Remove skin from poultry before eating. Limit how much red meat you eat.  Nuts, seeds, beans  Servings: 4 to 5 a week  A serving is:    One-third cup nuts (one and a half ounces)    2 tablespoons nut butter or seeds    Half a cup cooked dry beans or legumes  Best choices: Dry roasted nuts with no salt added, lentils, kidney beans, garbanzo beans, and whole bernard beans.   Fats and oils  Servings: 2 to 3 a day  A serving is:    1 teaspoon vegetable oil    1 teaspoon soft margarine    1 tablespoon mayonnaise    2 tablespoons salad dressing  Best choices: Nut and vegetable oils (nontropical vegetable oils), such as olive and canola oil. Sweets  Servings: 5 a week or fewer  A serving is:    1 tablespoon sugar, maple syrup, or honey    1 tablespoon jam or jelly    1 half-ounce jelly beans (about 15)    1 cup lemonade  Best choices: Dried fruit can be a satisfying sweet. Choose low-fat sweets. And watch your serving sizes!      For more on the DASH eating plan, visit:  www.nhlbi.nih.gov/health/health-topics/topics/dash   Date Last Reviewed: 6/1/2016 2000-2017 [employer]. [address] [place] [address]. All rights reserved. This information is not intended as a substitute for professional medical care. Always follow your healthcare professional's instructions.        Using Herbs and Spices    Herbs and spices add flavor to cooking without adding fat or sodium. That's why they're great for healthy cooking. Try these tips to help create tasty, healthy meals.  How much to use?  If a recipe calls for: 1 tablespoon of fresh herbs.you can use: 1 teaspoon of dried herbs, Or: 1/4 teaspoon of powdered herbs.   Tips for getting the most of herbs and spices    Use kitchen royce or a sharp knife to cut leaves of fresh herbs. Cutting the leaves finely will release the most flavor.    When using whole spices, don't grind them until you need them. Crushing the berries and seeds with a mortar and  pestle or grating whole nutmeg or cinnamon sticks just before adding them to your recipe will guarantee the most flavor.    Dried herbs pack more flavor for the same quantity than fresh herbs, and powdered herbs are more potent than dried flakes. If you are using powdered herbs in a recipe that calls for fresh, you'll want to decrease the amount you add.    When adding fresh or dried spices and herbs to cold recipes, such as dips or salad dressings, allow the food to sit in the refrigerator for at least a couple of hours before serving so the flavors can blend.    Add fresh spices and herbs to hot dishes as close to serving time as possible for the most flavorful results. Dried herbs and spices should be added early in the cooking process to prevent a powdery taste.    The longer dried herbs and spices sit in your cupboard without being used, the more flavor they lose. Whole spices last longer than ground or powdered spices. Store dried herbs and spices in a cool, dry, dark place. Keep powdered herbs and spices for no longer than a year.  Date Last Reviewed: 6/1/2017 2000-2017 [employer] [address] [place] [address]. All rights reserved. This information is not intended as a substitute for professional medical care. Always follow your healthcare professional's instructions.        Tips for Using Less Salt    Most people with heart problems need to eat less salt (sodium). Reducing the amount of salt you eat may help control your blood pressure. The higher your blood pressure, the greater your risk for heart disease, stroke, blindness, and kidney problems.  At the store    Make low-salt choices by reading labels carefully. Look for the total amount of sodium per serving.    Use more fresh food. Buy more fruits and vegetables. Select lean meats, fish, and poultry.    Use fewer frozen, canned, and packaged foods which often contain a lot of sodium.    Use plain frozen vegetables without  sauces or toppings. These products are often low-sodium or no-sodium.    Choose reduced-sodium or no-salt-added versions of canned vegetables and soups.  In the kitchen    Don't add salt to food when you're cooking. Season with flavorings such as onion, garlic, pepper, salt-free herbal blends, and lemon or lime juice.    Use a cookbook containing low-salt recipes. It can give you ideas for tasty meals that are healthy for your heart.    Sprinkle salt-free herbal blends on vegetables and meat.    Drain and rinse canned foods, such as canned beans and vegetables, before cooking or eating.  Eating out    Tell the  you're on a low-salt diet. Ask questions about the menu.    Order fish, chicken, and meat broiled, baked, poached, or grilled without salt, butter, or breading.    Use lemon, pepper, and salt-free herb mixes to add flavor.    Choose plain steamed rice, boiled noodles, and baked or boiled potatoes. Top potatoes with chives and a little sour cream.     Beware! Salt goes by many other names. Limit foods with these words listed as ingredients: salt, sodium, soy sauce, baking soda, baking powder, MSG, monosodium, Na (the chemical symbol for sodium). Some antacids are also high in salt.   Date Last Reviewed: 6/1/2017 2000-2017 [employer]. [address] [place] [address]. All rights reserved. This information is not intended as a substitute for professional medical care. Always follow your healthcare professional's instructions.                Follow-ups after your visit        Follow-up notes from your care team     Return in about 2 months (around 9/17/2018) for follow up on elevated blood pressure.      Who to contact     If you have questions or need follow up information about today's clinic visit or your schedule please contact [facility] [place] directly at [phone].  Normal or non-critical lab and imaging results will be communicated to you by MyChart, letter or  phone within 4 business days after the clinic has received the results. If you do not hear from us within 7 days, please contact the clinic through [employer] or phone. If you have a critical or abnormal lab result, we will notify you by phone as soon as possible.  Submit refill requests through [employer] or call your pharmacy and they will forward the refill request to us. Please allow 3 business days for your refill to be completed.          Additional Information About Your Visit        [employer] Information     [employer] gives you secure access to your electronic health record. If you see a primary care provider, you can also send messages to your care team and make appointments. If you have questions, please call your primary care clinic.  If you do not have a primary care provider, please call [phone] and they will assist you.        Care EveryWhere ID     This is your Care EveryWhere ID. This could be used by other organizations to access your [place] medical records  [id number]        Your Vitals Were     Pulse Temperature Height Pulse Oximetry BMI (Body Mass Index)       86 98.5  F (36.9  C) (Oral) 6' (1.829 m) 97% 34.99 kg/m2        Blood Pressure from Last 3 Encounters:   07/17/18 152/90   02/09/18 141/89   12/19/17 (!) 148/102    Weight from Last 3 Encounters:   07/17/18 258 lb (117 kg)   02/09/18 250 lb (113.4 kg)   12/19/17 250 lb (113.4 kg)              We Performed the Following     *UA reflex to Microscopic and Culture ([place] and [facility] (except [place] and [place])     CBC with platelets     Comprehensive metabolic panel (BMP + Alb, Alk Phos, ALT, AST, Total. Bili, TP)     DEPRESSION ACTION PLAN (DAP)     Lipid panel reflex to direct LDL Fasting        Primary Care Provider Office Phone # Fax #    Juan Antonio Villa Jr., [phone] [address]  [place] [address]        Equal Access to Services     JESÚS OCONNOR : aly Posey,  avivastella bakercamrynolga lidia callahanvidya sindy choloriaz fowleraan ah. So [facility] [phone].    ATENCIÓN: Si vincent david, tiene a epstein disposición servicios gratuitos de asistencia lingüística. Suma al [phone].    We comply with applicable federal civil rights laws and [place] laws. We do not discriminate on the basis of race, color, national origin, age, disability, sex, sexual orientation, or gender identity.            Thank you!     Thank you for choosing [facility] [place]  for your care. Our goal is always to provide you with excellent care. Hearing back from our patients is one way we can continue to improve our services. Please take a few minutes to complete the written survey that you may receive in the mail after your visit with us. Thank you!             Your Updated Medication List - Protect others around you: Learn how to safely use, store and throw away your medicines at www.disposemymeds.org.          This list is accurate as of 7/17/18  8:54 AM.  Always use your most recent med list.                   Brand Name Dispense Instructions for use Diagnosis    ALPRAZolam 0.5 MG tablet    XANAX    40 tablet    Take 1 tablet (0.5 mg) by mouth 3 times daily as needed for anxiety    Anxiety       * amphetamine-dextroamphetamine 20 MG per 24 hr capsule    ADDERALL XR    30 capsule    Take 1 capsule (20 mg) by mouth daily    ADD (attention deficit disorder) without hyperactivity       * amphetamine-dextroamphetamine 20 MG per 24 hr capsule   Start taking on:  7/28/2018    ADDERALL XR    30 capsule    Take 1 capsule (20 mg) by mouth daily    ADD (attention deficit disorder) without hyperactivity       * amphetamine-dextroamphetamine 20 MG per 24 hr capsule   Start taking on:  8/28/2018    ADDERALL XR    30 capsule    Take 1 capsule (20 mg) by mouth daily    ADD (attention deficit disorder) without hyperactivity       buPROPion 150 MG 24 hr tablet    WELLBUTRIN XL    90 tablet    Take 1 tablet (150 mg)  by mouth every morning    Major depressive disorder, recurrent episode, mild (H)       citalopram 10 MG tablet    celeXA    810 tablet    Take 4 tablets (40 mg) by mouth daily    Major depressive disorder, recurrent episode, mild (H), Anxiety       ibuprofen 200 MG capsule     120 capsule    Take 800 mg by mouth every 8 hours as needed for pain        * Notice:  This list has 3 medication(s) that are the same as other medications prescribed for you. Read the directions carefully, and ask your doctor or other care provider to review them with you.

## 2018-07-17 NOTE — PROGRESS NOTES
SUBJECTIVE:   CC: William Bolton is an 38 year old male who presents for preventative health visit.     Healthy Habits:    Do you get at least three servings of calcium containing foods daily (dairy, green leafy vegetables, etc.)? yes    Amount of exercise or daily activities, outside of work: none    Problems taking medications regularly No    Medication side effects: No    Have you had an eye exam in the past two years? yes    Do you see a dentist twice per year? yes    Do you have sleep apnea, excessive snoring or daytime drowsiness?yes - has CPAP for sleep apnea     Blood pressure elevated: diet could be better, decrease salt, family history of HTN. He also admits to drinking 2-3 alcoholic beverages nightly -- typical drink would be small glass of scotch or whiskey (~ a shot per drink). He denies any headaches, lightheadedness, dizziness, chest pain, shortness of breath.      Today's PHQ-2 Score:   PHQ-2 ( 1999 [employer]) 9/8/2015 7/14/2015   Q1: Little interest or pleasure in doing things 1 0   Q2: Feeling down, depressed or hopeless 1 0   PHQ-2 Score 2 0       Abuse: Current or Past(Physical, Sexual or Emotional)- No  Do you feel safe in your environment - Yes    Social History   Substance Use Topics     Smoking status: Never Smoker     Smokeless tobacco: Never Used     Alcohol use Yes      Comment: 2-3 drinks (shots) in drinks per night.       If you drink alcohol do you typically have >3 drinks per day or >7 drinks per week? Yes                      Last PSA: No results found for: PSA    Reviewed orders with patient. Reviewed health maintenance and updated orders accordingly - Yes  BP Readings from Last 3 Encounters:   07/17/18 (!) 152/98   02/09/18 141/89   12/19/17 (!) 148/102    Wt Readings from Last 3 Encounters:   07/17/18 258 lb (117 kg)   02/09/18 250 lb (113.4 kg)   12/19/17 250 lb (113.4 kg)           Reviewed and updated as needed this visit by clinical staff  Tobacco  Allergies  Meds  Med Hx   Surg Hx  Fam Hx  Soc Hx        Reviewed and updated as needed this visit by Provider        Past Medical History:   Diagnosis Date     Abnormal bone xray - sclerosis of phalynx of right great toe 7/31/17 - suggest repeat imaging at annual visit. 7/31/2017      Past Surgical History:   Procedure Laterality Date     SURGICAL HISTORY OF -          ROS:  CONSTITUTIONAL: NEGATIVE for fever, chills, change in weight  INTEGUMENTARY/SKIN: NEGATIVE for worrisome rashes, moles or lesions  EYES: NEGATIVE for vision changes or irritation  ENT: NEGATIVE for ear, mouth and throat problems  RESP: NEGATIVE for significant cough or SOB  CV: NEGATIVE for chest pain, palpitations or peripheral edema  GI: NEGATIVE for nausea, abdominal pain, heartburn, or change in bowel habits   male: negative for dysuria, hematuria, decreased urinary stream, erectile dysfunction, urethral discharge  MUSCULOSKELETAL: NEGATIVE for significant arthralgias or myalgia  NEURO: NEGATIVE for weakness, dizziness or paresthesias  PSYCHIATRIC: NEGATIVE for changes in mood or affect    OBJECTIVE:   BP (!) 152/98 (BP Location: Right arm, Patient Position: Chair, Cuff Size: Adult Large)  Pulse 86  Temp 98.5  F (36.9  C) (Oral)  Ht 6' (1.829 m)  Wt 258 lb (117 kg)  SpO2 97%  BMI 34.99 kg/m2  EXAM:  GENERAL: healthy, alert and no distress  EYES: Eyes grossly normal to inspection, PERRL and conjunctivae and sclerae normal  HENT: ear canals and TM's normal, nose and mouth without ulcers or lesions  NECK: no adenopathy and no asymmetry, masses, or scars  RESP: lungs clear to auscultation - no rales, rhonchi or wheezes  CV: regular rate and rhythm, normal S1 S2, no S3 or S4, no murmur, click or rub, no peripheral edema and peripheral pulses strong  ABDOMEN: soft, nontender, no hepatosplenomegaly, no masses and bowel sounds normal  MS: no gross musculoskeletal defects noted, no edema  SKIN: no suspicious lesions or rashes  PSYCH: mentation appears normal,  affect normal/bright    Diagnostic Test Results:  none     ASSESSMENT/PLAN:   1. Encounter for routine adult medical exam with abnormal findings  - Urine Microscopic    2. Encounter for biometric screening  - CBC with platelets  - *UA reflex to Microscopic and Culture ([facility] [place] [facility] (except [place] and [place])    3. Screening for diabetes mellitus  - Comprehensive metabolic panel (BMP + Alb, Alk Phos, ALT, AST, Total. Bili, TP)    4. Screening for hyperlipidemia  - Lipid panel reflex to direct LDL Fasting    5. Elevated blood pressure reading without diagnosis of hypertension: historically blood pressure has been elevated. This is likely multifactorial as he is overweight, has higher dietary salt intake, family history of hypertension, and he does drink more than recommended amounts of alcohol (which I think he minimizes based on what his wife mentioned -- she states that she will find 1-2 bottles of empty liquor per week in the recycling) - did not discuss this discrepancy with him today, but alcohol use may need further discussion. He is also taking Adderall which can lead to increased blood pressure.     Recommend increasing exercise, cutting back on salt intake, weight loss, decrease alcohol intake and recheck blood pressure in 1-2 months for evaluation.      COUNSELING:  Reviewed preventive health counseling, as reflected in patient instructions       Regular exercise       Healthy diet/nutrition       Alcohol Use       HIV screeninx in teen years, 1x in adult years, and at intervals if high risk    BP Readings from Last 1 Encounters:   18 (!) 152/98     Estimated body mass index is 34.99 kg/(m^2) as calculated from the following:    Height as of this encounter: 6' (1.829 m).    Weight as of this encounter: 258 lb (117 kg).    BP Screening:   Last 3 BP Readings:    BP Readings from Last 3 Encounters:   18 (!) 152/98   18 141/89   17 (!) 148/102       The following  was recommended to the patient:  Recommend lifestyle modifications and Laboratory tests  Weight management plan: Discussed healthy diet and exercise guidelines and patient will follow up in 1 month in clinic to re-evaluate.     reports that he has never smoked. He has never used smokeless tobacco.      Counseling Resources:  ATP IV Guidelines  Pooled Cohorts Equation Calculator  FRAX Risk Assessment  ICSI Preventive Guidelines  Dietary Guidelines for Americans, 2010  USDA's MyPlate  ASA Prophylaxis  Lung CA Screening    Smith Lemons DO  [facility]

## 2018-07-17 NOTE — LETTER
[facility]  [address] [id number] [phone]  July 24, 2018    William Bolton  [address] [place] [place] [address]    Dear William,    I have tried to reach you in regards to the release of information you completed on 7/17/2018 but have not been able to connect with you so am sending this letter.  There are sections on that release that were dated the same day you signed it and they have invalidated the release of information.  We will need you to complete a new one if you have not already done so.  I have included a pre-completed release that you basically just need to sign, date, and time.  You can mail this back to me in the envelope I included as well and I will get that sent to our copy service to process the request.  If you have any questions you can reach me at [phone].    Thank you for choosing [facility]

## 2018-07-17 NOTE — TELEPHONE ENCOUNTER
"Requested Prescriptions   Pending Prescriptions Disp Refills     citalopram (CELEXA) 10 MG tablet  This may be a duplicate refill request.  Last Written Prescription Date:  2/23/2018  Last Fill Quantity: 810 Tablet,  # refills: 1   Last office visit: 7/17/2018 with prescribing provider:  Cristo   Future Office Visit:   Next 5 appointments (look out 90 days)     Sep 19, 2018  4:20 PM CDT   SHORT with Juan Antonio Villa Jr., MD   [facility]    [address]  [facility] [address]   [phone]                    810 tablet 1     Sig: Take 4 tablets (40 mg) by mouth daily    SSRIs Protocol Failed    7/17/2018 11:58 AM       Failed - PHQ-9 score less than 5 in past 6 months    Please review last PHQ-9 score.     PHQ-9 SCORE 6/5/2017 2/23/2018 7/17/2018   Total Score - - -   Total Score MyChart - 5 (Mild depression) -   Total Score 1 5 2     SANDOVAL-7 SCORE 10/5/2016 6/5/2017 7/17/2018   Total Score - - -   Total Score 1 (minimal anxiety) - -   Total Score - 3 1          Passed - Patient is age 18 or older       Passed - Recent (6 mo) or future (30 days) visit within the authorizing provider's specialty    Patient had office visit in the last 6 months or has a visit in the next 30 days with authorizing provider or within the authorizing provider's specialty.  See \"Patient Info\" tab in inbasket, or \"Choose Columns\" in Meds & Orders section of the refill encounter.              "

## 2018-07-17 NOTE — PATIENT INSTRUCTIONS
Preventive Health Recommendations  Male Ages 26 - 39    Yearly exam:             See your health care provider every year in order to  o   Review health changes.   o   Discuss preventive care.    o   Review your medicines if your doctor has prescribed any.    You should be tested each year for STDs (sexually transmitted diseases), if you re at risk.     After age 35, talk to your provider about cholesterol testing. If you are at risk for heart disease, have your cholesterol tested at least every 5 years.     If you are at risk for diabetes, you should have a diabetes test (fasting glucose).  Shots: Get a flu shot each year. Get a tetanus shot every 10 years.     Nutrition:    Eat at least 5 servings of fruits and vegetables daily.     Eat whole-grain bread, whole-wheat pasta and brown rice instead of white grains and rice.     Get adequate Calcium and Vitamin D.     Lifestyle    Exercise for at least 150 minutes a week (30 minutes a day, 5 days a week). This will help you control your weight and prevent disease.     Limit alcohol to one drink per day.     No smoking.     Wear sunscreen to prevent skin cancer.     See your dentist every six months for an exam and cleaning.     Controlling High Blood Pressure  High blood pressure (hypertension) is often called the silent killer. This is because many people who have it don t know it. High blood pressure can raise your risk of heart attack, stroke, and heart failure. Controlling your blood pressure can decrease your risk of these problems. Know your blood pressure and remember to check it regularly. Doing so can save your life.  Blood pressure measurements are given as 2 numbers. Systolic blood pressure is the upper number. This is the pressure when the heart contracts. Diastolic blood pressure is the lower number. This is the pressure when the heart relaxes between beats.  Blood pressure is categorized as normal, elevated, or stage 1 or stage 2 high blood  pressure:    Normal blood pressure is systolic of less than 120 and diastolic of less than 80 (120/80)    Elevated blood pressure is systolic of 120 to 129 and diastolic less than 80    Stage 1 high blood pressure is systolic is 130 to 139 or diastolic between 80 to 89    Stage 2 high blood pressure is when systolic is 140 or higher or the diastolic is 90 or higher  Here are some things you can do to help control your blood pressure.    Choose heart-healthy foods    Select low-salt, low-fat foods. Limit sodium intake to 2,400 mg per day or the amount suggested by your healthcare provider.    Limit canned, dried, cured, packaged, and fast foods. These can contain a lot of salt.    Eat 8 to 10 servings of fruits and vegetables every day.    Choose lean meats, fish, or chicken.    Eat whole-grain pasta, brown rice, and beans.    Eat 2 to 3 servings of low-fat or fat-free dairy products.    Ask your doctor about the DASH eating plan. This plan helps reduce blood pressure.    When you go to a restaurant, ask that your meal be prepared with no added salt.  Maintain a healthy weight    Ask your healthcare provider how many calories to eat a day. Then stick to that number.    Ask your healthcare provider what weight range is healthiest for you. If you are overweight, a weight loss of only 3% to 5% of your body weight can help lower blood pressure. Generally, a good weight loss goal is to lose 10% of your body weight in a year.    Limit snacks and sweets.    Get regular exercise.  Get up and get active    Choose activities you enjoy. Find ones you can do with friends or family. This includes bicycling, dancing, walking, and jogging.    Park farther away from building entrances.    Use stairs instead of the elevator.    When you can, walk or bike instead of driving.    [place]ham leaves, garden, or do household repairs.    Be active at a moderate to vigorous level of physical activity for at least 40 minutes for a minimum of 3 to 4  days a week.   Manage stress    Make time to relax and enjoy life. Find time to laugh.    Communicate your concerns with your loved ones and your healthcare provider.    Visit with family and friends, and keep up with hobbies.  Limit alcohol and quit smoking    Men should have no more than 2 drinks per day.    Women should have no more than 1 drink per day.    Talk with your healthcare provider about quitting smoking. Smoking significantly increases your risk for heart disease and stroke. Ask your healthcare provider about community smoking cessation programs and other options.  Medicines  If lifestyle changes aren t enough, your healthcare provider may prescribe high blood pressure medicine. Take all medicines as prescribed. If you have any questions about your medicines, ask your healthcare provider before stopping or changing them.   Date Last Reviewed: 4/27/2016 2000-2017 [employer]. [address] [place] [address], [place] [address]. All rights reserved. This information is not intended as a substitute for professional medical care. Always follow your healthcare professional's instructions.        What is High Blood Pressure?  High blood pressure (also called hypertension) is known as the  silent killer.  This is because most of the time it doesn t cause symptoms. In fact, many people don t know they have it until other problems develop. In most cases, high blood pressure often requires lifelong treatment.  Understanding blood pressure  The circulatory system is made up of the heart and blood vessels that carry blood through the body. Your heart is the pump for this system. With each heartbeat (contraction), the heart sends blood out through large blood vessels called arteries. Blood pressure is a measure of how hard the moving blood pushes against the walls of the arteries.  High blood pressure can harm your health  In a healthy blood vessel, the blood moves smoothly through the vessel and puts normal  pressure on the vessel walls.       High blood pressure occurs when blood pushes too hard against artery walls. This causes damage to the artery walls and then the formation of scar tissue as it heals. This makes the arteries stiff and weak. Plaque sticks to the scarred tissue narrowing and hardening the arteries. High blood pressure also causes your heart to work harder to get blood out to the body. High blood pressure raises your risk of heart attack, also known as acute myocardial infarction, or AMI, heart failure, and stroke. It can also lead to kidney disease, and blindness. In general, if you have high blood pressure, keeping your blood pressure below 130/80 mmHg may help prevent these problems. Your healthcare provider may prescribe medicine to help control blood pressure if lifestyle changes are not enough.  It's important to know your blood pressure numbers. Blood pressure measurements are given as 2 numbers. Systolic blood pressure is the upper number. This is the pressure when the heart contracts. Diastolic blood pressure is the lower number. This is the pressure when the heart relaxes between beats.  Blood pressure is categorized as normal, elevated, or stage 1 or stage 2 high blood pressure:    Normal blood pressure is systolic of less than 120 and diastolic of less than 80 (120/80)    Elevated blood pressure is systolic of 120 to 129 and diastolic less than 80    Stage 1 high blood pressure is systolic is 130 to 139 or diastolic between 80 to 89    Stage 2 high blood pressure is when systolic is 140 or higher or the diastolic is 90 or higher  High blood pressure is diagnosed when multiple, separate readings show blood pressure above 130/80 mmHg. Talk with your healthcare provider if you have questions or concerns about your blood pressure readings.  Measuring blood pressure  An example of a blood pressure measurement is 120/70 (120 over 70). The top number is the pressure of blood against the artery  "walls during a heartbeat (systolic). The bottom number is the pressure of blood against artery walls between heartbeats (diastolic). Talk with your healthcare provider to find out what your blood pressure goals should be.   Controlling blood pressure  If your blood pressure is too high, work with your doctor on a plan for lowering it. Below are steps you can take that will help lower your blood pressure.    Choose heart-healthy foods. Eating healthier meals helps you control your blood pressure. Ask your doctor about the DASH eating plan. This plan helps reduce blood pressure by limiting the amount of sodium (salt) you have in your diet. DASH also encourages eating plenty of fruits and vegetables, low-fat or non-fat dairy, whole-grains, and foods high in fiber, and low in fat. This also provides an enhanced amount of potassium which can also help lower blood pressure.    Reduce sodium. Reducing sodium in your diet reduces fluid retention. Fluid retention caused by too much salt increases blood volume and blood pressure. The American Heart Association s (AHA) \"ideal\" sodium intake recommendation is 1,500 milligrams per day.  However, since American's eat so much salt, the AHA says a positive change can occur by cutting back to even 2,400 milligrams of sodium a day.    Maintain a healthy weight. Being overweight makes you more likely to have high blood pressure. Losing excess weight helps lower blood pressure.    Exercise regularly. Daily exercise helps your heart and blood vessels work better and stay healthier. It can help lower your blood pressure.    Stop smoking. Smoking increases blood pressure and damages blood vessels.    Limit alcohol. Drinking too much alcohol can raise blood pressure. Men should have no more than 2 drinks a day. Women should have no more than 1. (A drink is equal to 1 beer, or a small glass of wine, or a shot of liquor.)    Control stress. Stress makes your heart work harder and beat " faster. Controlling stress helps you control your blood pressure.  Facts about high blood pressure    Feeling OK does not mean that blood pressure is under control. Likewise, feeling bad doesn t mean it s out of control. The only way to know for sure is to check your pressure regularly.    Medicine is only one part of controlling high blood pressure. You also need to manage your weight, get regular exercise, and adjust your eating habits.    High blood pressure is usually a lifelong problem. But it can be controlled with healthy lifestyle changes and medicine.    Hypertension is not the same as stress. Although stress may be a factor in high blood pressure, it s only one part of the story.    Blood pressure medicines need to be taken every day. Stopping suddenly may cause a dangerous increase in pressure.   Date Last Reviewed: 4/27/2016 2000-2017 [employer]. [address] [place] [address], [place] [address]. All rights reserved. This information is not intended as a substitute for professional medical care. Always follow your healthcare professional's instructions.        Your High Blood Pressure Risk Factors  Risk factors are things that make you more likely to have a disease or condition. Do you know your risk factors for high blood pressure? You can t do anything about some risk factors. But other risk factors are things that can be changed. Know what high blood pressure risk factors you have. Then find out what changes you can make to help control your risk for high blood pressure. Start with the change that you think will be easiest for you.  Risk factors you can t control  Though you can t change any of the things listed below, check off the ones that apply to you. The more boxes you check, the greater your risk for high blood pressure.  Family history  ? One or both of your parents or grandparents has had high blood pressure or heart disease.  Gender and age  ? You re a man over age 55 or a  postmenopausal woman.  Risk factors you can control  There are plenty of risk factors for high blood pressure that you can control. Learn what these risk factors are and then find out how to reduce your risk. Check the ones that apply to you.  ? What you eat  Do you eat a lot of salty, fatty, fried, or greasy foods? Do you go to restaurants or eat out frequently?  ? Alcohol consumption  Do you drink more than 1 drink a day if you are a female or more than 2 drinks a day if you male?   ? If you smoke or someone close to you smokes  Do you smoke cigarettes or cigars, chew tobacco, or dip snuff? Are you exposed to second-hand smoke on a regular basis?  ? How active you are   Are you inactive most of the time at work and at home? Do you go weeks without exercising?  ? Your weight   Has your doctor said that you are 15 or more pounds overweight?  ? Your stress level    Do you often feel anxious, nervous, and stressed? Do you feel that you don't have a supportive environment?  Date Last Reviewed: 4/27/2016 2000-2017 [employer]. [address] [place] [address]. All rights reserved. This information is not intended as a substitute for professional medical care. Always follow your healthcare professional's instructions.        Eating Heart-Healthy Food: Using the DASH Plan    Eating for your heart doesn t have to be hard or boring. You just need to know how to make healthier choices. The DASH eating plan has been developed to help you do just that. DASH stands for Dietary Approaches to Stop Hypertension. It is a plan that has been proven to be healthier for your heart and to lower your risk for high blood pressure. It can also help lower your risk for cancer, heart disease, osteoporosis, and diabetes.  Choosing from each food group  Choose foods from each of the food groups below each day. Try to get the recommended number of servings for each food group. The serving numbers are based on a diet of  2,000 calories a day. Talk to your doctor if you re unsure about your calorie needs. Along with getting the correct servings, the DASH plan also recommends a sodium intake less than 2,300 mg per day.        Grains  Servings: 6 to 8 a day  A serving is:    1 slice bread    1 ounce dry cereal    Half a cup cooked rice, pasta or cereal  Best choices: Whole grains and any grains high in fiber. Vegetables  Servings: 4 to 5 a day  A serving is:    1 cup raw leafy vegetable    Half a cup cut-up raw or cooked vegetable    Half a cup vegetable juice  Best choices: Fresh or frozen vegetables prepared without added salt or fat.   Fruits  Servings: 4 to 5 a day  A serving is:    1 medium fruit    One-quarter cup dried fruit    Half a cup fresh, frozen, or canned fruit    Half a cup of 100% fruit juices  Best choices: A variety of fresh fruits of different colors. Whole fruits are a better choice than fruit juices. Low-fat or fat-free dairy  Servings: 2 to 3 a day  A serving is:    1 cup milk    1 cup yogurt    One and a half ounces cheese  Best choices: Skim or 1% milk, low-fat or fat-free yogurt or buttermilk, and low-fat cheeses.         Lean meats, poultry, fish  Servings: 6 or fewer a day  A serving is:    1 ounce cooked meats, poultry, or fish    1 egg  Best choices: Lean poultry and fish. Trim away visible fat. Broil, grill, roast, or boil instead of frying. Remove skin from poultry before eating. Limit how much red meat you eat.  Nuts, seeds, beans  Servings: 4 to 5 a week  A serving is:    One-third cup nuts (one and a half ounces)    2 tablespoons nut butter or seeds    Half a cup cooked dry beans or legumes  Best choices: Dry roasted nuts with no salt added, lentils, kidney beans, garbanzo beans, and whole bernard beans.   Fats and oils  Servings: 2 to 3 a day  A serving is:    1 teaspoon vegetable oil    1 teaspoon soft margarine    1 tablespoon mayonnaise    2 tablespoons salad dressing  Best choices: Nut and  vegetable oils (nontropical vegetable oils), such as olive and canola oil. Sweets  Servings: 5 a week or fewer  A serving is:    1 tablespoon sugar, maple syrup, or honey    1 tablespoon jam or jelly    1 half-ounce jelly beans (about 15)    1 cup lemonade  Best choices: Dried fruit can be a satisfying sweet. Choose low-fat sweets. And watch your serving sizes!      For more on the DASH eating plan, visit:  www.nhlbi.nih.gov/health/health-topics/topics/dash   Date Last Reviewed: 6/1/2016 2000-2017 [employer]. [address] [place] [address], [place] [address]. All rights reserved. This information is not intended as a substitute for professional medical care. Always follow your healthcare professional's instructions.        Using Herbs and Spices    Herbs and spices add flavor to cooking without adding fat or sodium. That's why they're great for healthy cooking. Try these tips to help create tasty, healthy meals.  How much to use?  If a recipe calls for: 1 tablespoon of fresh herbs.you can use: 1 teaspoon of dried herbs, Or: 1/4 teaspoon of powdered herbs.   Tips for getting the most of herbs and spices    Use kitchen royce or a sharp knife to cut leaves of fresh herbs. Cutting the leaves finely will release the most flavor.    When using whole spices, don't grind them until you need them. Crushing the berries and seeds with a mortar and pestle or grating whole nutmeg or cinnamon sticks just before adding them to your recipe will guarantee the most flavor.    Dried herbs pack more flavor for the same quantity than fresh herbs, and powdered herbs are more potent than dried flakes. If you are using powdered herbs in a recipe that calls for fresh, you'll want to decrease the amount you add.    When adding fresh or dried spices and herbs to cold recipes, such as dips or salad dressings, allow the food to sit in the refrigerator for at least a couple of hours before serving so the flavors can blend.    Add  fresh spices and herbs to hot dishes as close to serving time as possible for the most flavorful results. Dried herbs and spices should be added early in the cooking process to prevent a powdery taste.    The longer dried herbs and spices sit in your cupboard without being used, the more flavor they lose. Whole spices last longer than ground or powdered spices. Store dried herbs and spices in a cool, dry, dark place. Keep powdered herbs and spices for no longer than a year.  Date Last Reviewed: 6/1/2017 2000-2017 [employer]. [address] [place] [address]. All rights reserved. This information is not intended as a substitute for professional medical care. Always follow your healthcare professional's instructions.        Tips for Using Less Salt    Most people with heart problems need to eat less salt (sodium). Reducing the amount of salt you eat may help control your blood pressure. The higher your blood pressure, the greater your risk for heart disease, stroke, blindness, and kidney problems.  At the store    Make low-salt choices by reading labels carefully. Look for the total amount of sodium per serving.    Use more fresh food. Buy more fruits and vegetables. Select lean meats, fish, and poultry.    Use fewer frozen, canned, and packaged foods which often contain a lot of sodium.    Use plain frozen vegetables without sauces or toppings. These products are often low-sodium or no-sodium.    Choose reduced-sodium or no-salt-added versions of canned vegetables and soups.  In the kitchen    Don't add salt to food when you're cooking. Season with flavorings such as onion, garlic, pepper, salt-free herbal blends, and lemon or lime juice.    Use a cookbook containing low-salt recipes. It can give you ideas for tasty meals that are healthy for your heart.    Sprinkle salt-free herbal blends on vegetables and meat.    Drain and rinse canned foods, such as canned beans and vegetables, before  cooking or eating.  Eating out    Tell the  you're on a low-salt diet. Ask questions about the menu.    Order fish, chicken, and meat broiled, baked, poached, or grilled without salt, butter, or breading.    Use lemon, pepper, and salt-free herb mixes to add flavor.    Choose plain steamed rice, boiled noodles, and baked or boiled potatoes. Top potatoes with chives and a little sour cream.     Beware! Salt goes by many other names. Limit foods with these words listed as ingredients: salt, sodium, soy sauce, baking soda, baking powder, MSG, monosodium, Na (the chemical symbol for sodium). Some antacids are also high in salt.   Date Last Reviewed: 6/1/2017 2000-2017 [employer]. [address] [place] [address]. All rights reserved. This information is not intended as a substitute for professional medical care. Always follow your healthcare professional's instructions.

## 2018-07-17 NOTE — LETTER
My Depression Action Plan  Name: William Bolton   Date of Birth 1980  Date: 7/17/2018    My doctor: Juan Antonio Villa Jr.   My clinic: [facility] [place]  [address]  [place] [address] [phone]          GREEN    ZONE   Good Control    What it looks like:     Things are going generally well. You have normal up s and down s. You may even feel depressed from time to time, but bad moods usually last less than a day.   What you need to do:  1. Continue to care for yourself (see self care plan)  2. Check your depression survival kit and update it as needed  3. Follow your physician s recommendations including any medication.  4. Do not stop taking medication unless you consult with your physician first.           YELLOW         ZONE Getting Worse    What it looks like:     Depression is starting to interfere with your life.     It may be hard to get out of bed; you may be starting to isolate yourself from others.    Symptoms of depression are starting to last most all day and this has happened for several days.     You may have suicidal thoughts but they are not constant.   What you need to do:     1. Call your care team, your response to treatment will improve if you keep your care team informed of your progress. Yellow periods are signs an adjustment may need to be made.     2. Continue your self-care, even if you have to fake it!    3. Talk to someone in your support network    4. Open up your depression survival kit           RED    ZONE Medical Alert - Get Help    What it looks like:     Depression is seriously interfering with your life.     You may experience these or other symptoms: You can t get out of bed most days, can t work or engage in other necessary activities, you have trouble taking care of basic hygiene, or basic responsibilities, thoughts of suicide or death that will not go away, self-injurious behavior.     What you need to do:  1. Call your care team and request  a same-day appointment. If they are not available (weekends or after hours) call your local crisis line, emergency room or 911.            Depression Self Care Plan / Survival Kit    Self-Care for Depression  Here s the deal. Your body and mind are really not as separate as most people think.  What you do and think affects how you feel and how you feel influences what you do and think. This means if you do things that people who feel good do, it will help you feel better.  Sometimes this is all it takes.  There is also a place for medication and therapy depending on how severe your depression is, so be sure to consult with your medical provider and/ or Behavioral Health Consultant if your symptoms are worsening or not improving.     In order to better manage my stress, I will:    Exercise  Get some form of exercise, every day. This will help reduce pain and release endorphins, the  feel good  chemicals in your brain. This is almost as good as taking antidepressants!  This is not the same as joining a gym and then never going! (they count on that by the way ) It can be as simple as just going for a walk or doing some gardening, anything that will get you moving.      Hygiene   Maintain good hygiene (Get out of bed in the morning, Make your bed, Brush your teeth, Take a shower, and Get dressed like you were going to work, even if you are unemployed).  If your clothes don't fit try to get ones that do.    Diet  I will strive to eat foods that are good for me, drink plenty of water, and avoid excessive sugar, caffeine, alcohol, and other mood-altering substances.  Some foods that are helpful in depression are: complex carbohydrates, B vitamins, flaxseed, fish or fish oil, fresh fruits and vegetables.    Psychotherapy  I agree to participate in Individual Therapy (if recommended).    Medication  If prescribed medications, I agree to take them.  Missing doses can result in serious side effects.  I understand that drinking  alcohol, or other illicit drug use, may cause potential side effects.  I will not stop my medication abruptly without first discussing it with my provider.    Staying Connected With Others  I will stay in touch with my friends, family members, and my primary care provider/team.    Use your imagination  Be creative.  We all have a creative side; it doesn t matter if it s oil painting, sand castles, or mud pies! This will also kick up the endorphins.    Witness Beauty  (AKA stop and smell the roses) Take a look outside, even in mid-winter. Notice colors, textures. Watch the squirrels and birds.     Service to others  Be of service to others.  There is always someone else in need.  By helping others we can  get out of ourselves  and remember the really important things.  This also provides opportunities for practicing all the other parts of the program.    Humor  Laugh and be silly!  Adjust your TV habits for less news and crime-drama and more comedy.    Control your stress  Try breathing deep, massage therapy, biofeedback, and meditation. Find time to relax each day.     My support system    Clinic Contact:  Phone number:    Contact 1:  Phone number:    Contact 2:  Phone number:    [facility]/:  Phone number:    Therapist:  Phone number:    Local crisis center:    Phone number:    Other community support:  Phone number:

## 2018-07-17 NOTE — TELEPHONE ENCOUNTER
Called pt and left message to call back.  He signed an ANABEL today in clinic, but noted an expiration date of today so this form is not valid.  He will need to stop in to any [facility] or go online to [email or web address] and sign a new ANABEL that can be sent on to HIM for processing.  Current ANABEL in TC pending basket at station 2.  Stefanie Heath

## 2018-07-18 DIAGNOSIS — D64.9 ANEMIA, UNSPECIFIED TYPE: Primary | ICD-10-CM

## 2018-07-18 DIAGNOSIS — R79.89 ELEVATED LFTS: ICD-10-CM

## 2018-07-18 ASSESSMENT — ANXIETY QUESTIONNAIRES: GAD7 TOTAL SCORE: 1

## 2018-07-18 ASSESSMENT — PATIENT HEALTH QUESTIONNAIRE - PHQ9: SUM OF ALL RESPONSES TO PHQ QUESTIONS 1-9: 2

## 2018-07-19 ENCOUNTER — TELEPHONE (OUTPATIENT)
Dept: FAMILY MEDICINE | Facility: CLINIC | Age: 38
End: 2018-07-19

## 2018-07-19 DIAGNOSIS — F33.0 MAJOR DEPRESSIVE DISORDER, RECURRENT EPISODE, MILD (H): ICD-10-CM

## 2018-07-19 DIAGNOSIS — F41.9 ANXIETY: ICD-10-CM

## 2018-07-19 RX ORDER — CITALOPRAM HYDROBROMIDE 10 MG/1
40 TABLET ORAL DAILY
Qty: 360 TABLET | Refills: 1 | Status: SHIPPED | OUTPATIENT
Start: 2018-07-19 | End: 2018-07-20

## 2018-07-19 NOTE — TELEPHONE ENCOUNTER
Prescription approved per [facility] Refill Protocol.  Colette Butler, RN, BSN  [facility]

## 2018-07-19 NOTE — TELEPHONE ENCOUNTER
We got the Rx for the four of the 10mg tablets but his insurance will not cover four tablets daily so we had to switch it to the 40mg tablets at once daily.    Thank you,  Bernie Salvador, [facility]

## 2018-07-20 RX ORDER — CITALOPRAM HYDROBROMIDE 40 MG/1
40 TABLET ORAL DAILY
Qty: 90 TABLET | Refills: 1
Start: 2018-07-20 | End: 2018-12-27

## 2018-09-12 NOTE — PROGRESS NOTES
SUBJECTIVE:                                                    William Bolton is a 37 year old male who presents to clinic today for the following health issues:      Acute Illness   Acute illness concerns: fatigue, body aches, cough  Onset: started Wednesday but got worse on thursday    Fever: no    Chills/Sweats: YES    Headache (location?): no    Sinus Pressure:no    Conjunctivitis:  no    Ear Pain: no    Rhinorrhea: no    Congestion: no    Sore Throat: no     Cough: YES    Wheeze: YES    Decreased Appetite: YES    Nausea: no    Vomiting: no    Diarrhea:  no    Dysuria/Freq.: no    Fatigue/Achiness: YES    Sick/Strep Exposure: YES, son has pneumonia and another son has a cold     Therapies Tried and outcome: OTC cough & cold preparations  Has a vasectomy procedure upcoming next week and wants to ensure he's still OK to have this done.          Problem list and histories reviewed & adjusted, as indicated.  Additional history: as documented        Reviewed and updated as needed this visit by clinical staff       Reviewed and updated as needed this visit by Provider         ROS:  Constitutional, HEENT, cardiovascular, pulmonary, gi and gu systems are negative, except as otherwise noted.    OBJECTIVE:                                                    /82 (BP Location: Right arm, Cuff Size: Adult Large)  Pulse 105  Temp 99  F (37.2  C) (Oral)  Ht 6' (1.829 m)  Wt 264 lb (119.7 kg)  SpO2 98%  BMI 35.8 kg/m2  Body mass index is 35.8 kg/(m^2).  GENERAL: healthy, alert and no distress  EYES: Eyes grossly normal to inspection, PERRL and conjunctivae and sclerae normal  HENT: ear canals and TM's normal, nose and mouth without ulcers or lesions  NECK: no adenopathy, no asymmetry, masses, or scars and thyroid normal to palpation  RESP: lungs clear to auscultation - no rales, rhonchi or wheezes  CV: regular rate and rhythm, normal S1 S2, no S3 or S4, no murmur, click or rub, no peripheral edema and peripheral  pulses strong  ABDOMEN: soft, nontender, no hepatosplenomegaly, no masses and bowel sounds normal  MS: no gross musculoskeletal defects noted, no edema    Diagnostic Test Results:  none      ASSESSMENT/PLAN:                                                          1. Viral URI with cough  likely viral.  Advised of OTC options for symptomatic treatment. Return to clinic in 10-14 days if not improving, sooner if worsens. OK to have vasectomy procedure next week as long as things do not progress.      See Patient Instructions    Jr Juan Antonio Villa MD  [facility]     Lives with partner and son  No tobacco, alcohol or illicit drug use

## 2018-12-27 ENCOUNTER — OFFICE VISIT (OUTPATIENT)
Dept: FAMILY MEDICINE | Facility: CLINIC | Age: 38
End: 2018-12-27
Payer: COMMERCIAL

## 2018-12-27 VITALS
DIASTOLIC BLOOD PRESSURE: 86 MMHG | TEMPERATURE: 97.6 F | WEIGHT: 263 LBS | BODY MASS INDEX: 35.67 KG/M2 | HEART RATE: 75 BPM | SYSTOLIC BLOOD PRESSURE: 156 MMHG

## 2018-12-27 DIAGNOSIS — R68.89 FLU-LIKE SYMPTOMS: ICD-10-CM

## 2018-12-27 DIAGNOSIS — R07.0 THROAT PAIN: Primary | ICD-10-CM

## 2018-12-27 LAB
FLUAV+FLUBV AG SPEC QL: NEGATIVE
FLUAV+FLUBV AG SPEC QL: NEGATIVE
SPECIMEN SOURCE: NORMAL

## 2018-12-27 PROCEDURE — 87804 INFLUENZA ASSAY W/OPTIC: CPT | Performed by: NURSE PRACTITIONER

## 2018-12-27 PROCEDURE — 99213 OFFICE O/P EST LOW 20 MIN: CPT | Performed by: NURSE PRACTITIONER

## 2018-12-27 NOTE — PROGRESS NOTES
HPI    SUBJECTIVE:   William Bolton is a 38 year old male who presents to clinic today for the following health issues:      Sunday uri infection    Feeling really lethargic, nasal congestion, sore throat, cough  Sister-in-law had Influenza A at [place]   No fevers, but never gets fevers even with influenza in the past    Past Medical History:   Diagnosis Date     Abnormal bone xray - sclerosis of phalynx of right great toe 7/31/17 - suggest repeat imaging at annual visit. 7/31/2017     Family History   Problem Relation Age of Onset     Coronary Artery Disease Paternal Grandfather      Hypertension Father      Hyperlipidemia Mother      Hypertension Sister      Past Surgical History:   Procedure Laterality Date     SURGICAL HISTORY OF -        Social History     Tobacco Use     Smoking status: Never Smoker     Smokeless tobacco: Never Used   Substance Use Topics     Alcohol use: Yes     Comment: 2-3 drinks (shots) in drinks per night.      No current outpatient medications on file.     No Known Allergies    Reviewed and updated as needed this visit by clinical staff and provider     ROS  Detailed as above       /86 (BP Location: Left arm, Patient Position: Chair, Cuff Size: Adult Large)   Pulse 75   Temp 97.6  F (36.4  C) (Oral)   Wt 119.3 kg (263 lb)   BMI 35.67 kg/m        Physical Exam   Constitutional: He is oriented to person, place, and time. He appears well-developed.   HENT:   Head: Normocephalic.   Right Ear: Tympanic membrane, external ear and ear canal normal.   Left Ear: Tympanic membrane, external ear and ear canal normal.   Mouth/Throat: Oropharynx is clear and moist. No oropharyngeal exudate.   Eyes: Conjunctivae are normal.   Neck: Normal range of motion.   Cardiovascular: Normal rate, regular rhythm and normal heart sounds.   No murmur heard.  Pulmonary/Chest: Effort normal and breath sounds normal. No respiratory distress.   Musculoskeletal: Normal range of motion.   Lymphadenopathy:      He has no cervical adenopathy.   Neurological: He is alert and oriented to person, place, and time.   Skin: Skin is warm and dry.   Psychiatric: He has a normal mood and affect. Judgment normal.       Assessment and Plan:       ICD-10-CM    1. Throat pain R07.0 CANCELED: Strep, Rapid Screen   2. Flu-like symptoms R68.89 Influenza A/B antigen     Neg flu swab. Suspect viral etiology. Discussed Symptomatic treatments   Also is supped to f/u on abnormal labs. He will see his PCP about this     ODALIS Powell, CNP  [facility]

## 2019-02-27 DIAGNOSIS — G47.33 OBSTRUCTIVE SLEEP APNEA (ADULT) (PEDIATRIC): Primary | ICD-10-CM

## 2019-05-16 ENCOUNTER — OFFICE VISIT (OUTPATIENT)
Dept: FAMILY MEDICINE | Facility: CLINIC | Age: 39
End: 2019-05-16
Payer: COMMERCIAL

## 2019-05-16 VITALS
DIASTOLIC BLOOD PRESSURE: 90 MMHG | OXYGEN SATURATION: 99 % | SYSTOLIC BLOOD PRESSURE: 156 MMHG | HEART RATE: 88 BPM | WEIGHT: 275 LBS | TEMPERATURE: 98 F | HEIGHT: 72 IN | BODY MASS INDEX: 37.25 KG/M2

## 2019-05-16 DIAGNOSIS — R13.19 ESOPHAGEAL DYSPHAGIA: ICD-10-CM

## 2019-05-16 DIAGNOSIS — D64.9 ANEMIA, UNSPECIFIED TYPE: ICD-10-CM

## 2019-05-16 DIAGNOSIS — I10 HYPERTENSION, UNSPECIFIED TYPE: Primary | ICD-10-CM

## 2019-05-16 DIAGNOSIS — E66.01 MORBID OBESITY (H): ICD-10-CM

## 2019-05-16 DIAGNOSIS — R79.89 ELEVATED LFTS: ICD-10-CM

## 2019-05-16 LAB
BASOPHILS # BLD AUTO: 0.1 10E9/L (ref 0–0.2)
BASOPHILS NFR BLD AUTO: 0.6 %
DIFFERENTIAL METHOD BLD: ABNORMAL
EOSINOPHIL # BLD AUTO: 0.4 10E9/L (ref 0–0.7)
EOSINOPHIL NFR BLD AUTO: 5.5 %
ERYTHROCYTE [DISTWIDTH] IN BLOOD BY AUTOMATED COUNT: 17.4 % (ref 10–15)
HCT VFR BLD AUTO: 30.1 % (ref 40–53)
HGB BLD-MCNC: 8.4 G/DL (ref 13.3–17.7)
LYMPHOCYTES # BLD AUTO: 2 10E9/L (ref 0.8–5.3)
LYMPHOCYTES NFR BLD AUTO: 26.4 %
MCH RBC QN AUTO: 19.3 PG (ref 26.5–33)
MCHC RBC AUTO-ENTMCNC: 27.9 G/DL (ref 31.5–36.5)
MCV RBC AUTO: 69 FL (ref 78–100)
MONOCYTES # BLD AUTO: 1.2 10E9/L (ref 0–1.3)
MONOCYTES NFR BLD AUTO: 15.5 %
NEUTROPHILS # BLD AUTO: 4 10E9/L (ref 1.6–8.3)
NEUTROPHILS NFR BLD AUTO: 52 %
PLATELET # BLD AUTO: 428 10E9/L (ref 150–450)
RBC # BLD AUTO: 4.35 10E12/L (ref 4.4–5.9)
WBC # BLD AUTO: 7.7 10E9/L (ref 4–11)

## 2019-05-16 PROCEDURE — 36415 COLL VENOUS BLD VENIPUNCTURE: CPT | Performed by: FAMILY MEDICINE

## 2019-05-16 PROCEDURE — 83540 ASSAY OF IRON: CPT | Performed by: INTERNAL MEDICINE

## 2019-05-16 PROCEDURE — 85025 COMPLETE CBC W/AUTO DIFF WBC: CPT | Performed by: FAMILY MEDICINE

## 2019-05-16 PROCEDURE — 99213 OFFICE O/P EST LOW 20 MIN: CPT | Performed by: INTERNAL MEDICINE

## 2019-05-16 PROCEDURE — 82728 ASSAY OF FERRITIN: CPT | Performed by: INTERNAL MEDICINE

## 2019-05-16 PROCEDURE — 80076 HEPATIC FUNCTION PANEL: CPT | Performed by: FAMILY MEDICINE

## 2019-05-16 PROCEDURE — 82977 ASSAY OF GGT: CPT | Performed by: FAMILY MEDICINE

## 2019-05-16 PROCEDURE — 83550 IRON BINDING TEST: CPT | Performed by: INTERNAL MEDICINE

## 2019-05-16 PROCEDURE — 93000 ELECTROCARDIOGRAM COMPLETE: CPT | Performed by: INTERNAL MEDICINE

## 2019-05-16 RX ORDER — SYRINGE WITH NEEDLE, 1 ML 25GX5/8"
SYRINGE, EMPTY DISPOSABLE MISCELLANEOUS
Refills: 2 | COMMUNITY
Start: 2019-02-27 | End: 2023-07-03

## 2019-05-16 RX ORDER — TESTOSTERONE CYPIONATE 200 MG/ML
INJECTION, SOLUTION INTRAMUSCULAR
Refills: 1 | COMMUNITY
Start: 2019-03-08 | End: 2022-11-01

## 2019-05-16 ASSESSMENT — MIFFLIN-ST. JEOR: SCORE: 2210.39

## 2019-05-16 NOTE — PATIENT INSTRUCTIONS
Patient Education     Step-by-Step  Checking Your Blood Pressure    Date Last Reviewed: 4/27/2016 2000-2018 [employer]. [address] [place] [address], [place] [address]. All rights reserved. This information is not intended as a substitute for professional medical care. Always follow your healthcare professional's instructions.           Patient Education     Low-Salt Choices  Eating salt (sodium) can make your body retain too much water. Excess water makes your heart work harder. Canned, packaged, and frozen foods are easy to prepare. But they are often high in sodium. Here are some ideas for low-salt foods you can easily make yourself.    For breakfast    Fruit or 100% fruit juice    Whole-wheat bread or an English muffin. Look for sodium content on Nutrition Facts labels.    Low-fat milk or yogurt    Unsalted eggs    Shredded wheat    Corn tortillas    Unsalted steamed rice    Regular (not instant) hot cereal, made without salt  Stay away from:    Sausage, toscano, and ham    Flour tortillas    Packaged muffins, pancakes, and biscuits    Instant hot cereals    Cottage cheese  For lunch and dinner    Fresh fish, chicken, turkey, or meat--baked, broiled, or roasted without salt    Dry beans, cooked without salt    Tofu, stir-fried without salt    Unsalted fresh fruit and vegetables, or frozen or canned fruit and vegetables with no added salt  Stay away from:    Lunch or deli meat that is cured or smoked    Cheese    Tomato juice and ketchup    Canned vegetables, soups, and fish not labeled as no-salt-added or reduced sodium    Packaged gravies and sauces    Olives, pickles, and relish    Bottled salad dressings  For snacks and desserts    Yogurt    Unsalted, air-popped popcorn    Unsalted nuts or seeds  Stay away from:    Pies and cakes    Packaged dessert mixes    Pizza    Canned and packaged puddings    Pretzels, chips, crackers, and nuts--unless the label says unsalted  Date Last Reviewed: 6/1/2017     5848-9187 [employer]. [address] [place] [address], [place] [address]. All rights reserved. This information is not intended as a substitute for professional medical care. Always follow your healthcare professional's instructions.

## 2019-05-16 NOTE — PROGRESS NOTES
"  SUBJECTIVE:   William Bolton is a 39 year old male who presents to clinic today for the following   health issues:      Concern - Blood pressure is running high / Would like labs done today  Onset: 07/17/2018 At last CPX blood pressure was elevated    Description:   152/98    Intensity: mild    Progression of Symptoms:  worsening    Accompanying Signs & Symptoms:  SOB    Previous history of similar problem:   Yes    Precipitating factors:   Worsened by: Diet, Salt, Lack of Exercise ,Alcohal use          Alleviating factors:  Improved by: none    Therapies Tried and outcome: none    Patient was to return for follow-up last year of high blood pressure, elevated liver function tests and anemia. However he lost his job and did not come back until today.           Patient Active Problem List   Diagnosis     Hyperlipidemia with target LDL less than 160     ADD (attention deficit disorder) without hyperactivity     Major depressive disorder, recurrent episode, mild (H)     Non morbid obesity due to excess calories     Abnormal bone xray - sclerosis of phalynx of right great toe 7/31/17 - suggest repeat imaging at annual visit.     NATHAN (obstructive sleep apnea)     Nocturnal hypoxemia     Anxiety     Past Surgical History:   Procedure Laterality Date     SURGICAL HISTORY OF -          Social History     Tobacco Use     Smoking status: Never Smoker     Smokeless tobacco: Never Used   Substance Use Topics     Alcohol use: Yes     Comment: 2-3 drinks (shots) in drinks per night.      Family History   Problem Relation Age of Onset     Coronary Artery Disease Paternal Grandfather      Hypertension Father      Hyperlipidemia Mother      Hypertension Sister          Current Outpatient Medications   Medication Sig Dispense Refill     B-D LUER-DAVID SYRINGE 23G X 1\" 3 ML MISC U WITH INJECTIONS  2     testosterone cypionate (DEPOTESTOSTERONE) 200 MG/ML injection INJECT 0.8ML INTO THE MUSCLE ONCE EVERY WEEK  1     No Known " Allergies  BP Readings from Last 3 Encounters:   05/16/19 156/90   12/27/18 156/86   07/17/18 (!) 152/98    Wt Readings from Last 3 Encounters:   05/16/19 124.7 kg (275 lb)   12/27/18 119.3 kg (263 lb)   07/17/18 117 kg (258 lb)                    ROS:  CONSTITUTIONAL: NEGATIVE for fever, chills, change in weight  RESP: NEGATIVE for significant cough.  CV: NEGATIVE for chest pain, palpitations or peripheral edema.  No chest pain but does report report shortness of breath after climbing 2 flights of stairs.    OBJECTIVE:     /90   Pulse 88   Temp 98  F (36.7  C) (Tympanic)   Ht 1.829 m (6')   Wt 124.7 kg (275 lb)   SpO2 99%   BMI 37.30 kg/m    Body mass index is 37.3 kg/m .     GENERAL: healthy, alert and no distress  RESP: lungs clear to auscultation - no rales, rhonchi or wheezes  CV: regular rates and rhythm, normal S1 S2, no S3 or S4, no murmur, click or rub and no peripheral edema    Diagnostic Test Results:  none     ASSESSMENT/PLAN:   1. Hypertension, unspecified type    He will start checking his blood pressure at home once or twice a day and then return in 2 weeks and we can decide on treatment.      - EKG 12-lead complete w/read - Clinics    2. Elevated LFTs    Need to recheck his labs    - GGT  - Hepatic panel (Albumin, ALT, AST, Bili, Alk Phos, TP)    3. Anemia, unspecified type    Need to recheck his labs    - CBC with platelets differential    4. Morbid obesity (H)    Hemoglobin came back at 8 g so I called the patient asked him come back in.  MCV is low so this is likely iron deficiency further history.  Indicates he has been having dysphagia for the last 2 years.        OBJECTIVE:                                                    /90   Pulse 88   Temp 98  F (36.7  C) (Tympanic)   Ht 1.829 m (6')   Wt 124.7 kg (275 lb)   SpO2 99%   BMI 37.30 kg/m    Body mass index is 37.3 kg/m .       ASSESSMENT/PLAN:                                                    1. Anemia,  Microcytic.    - CBC with platelets differential  - Iron and iron binding capacity  - Ferritin  - GASTROENTEROLOGY ADULT REF PROCEDURE ONLY Latoya Tuttle [phone]; No Provider Preference  - iron (FERROUS GLUCONATE) 256 (28 Fe) MG tablet; Take 1 tablet (100 mg) by mouth daily  Dispense: 90 tablet; Refill: 0    2. Esophageal dysphagia    - GASTROENTEROLOGY ADULT REF PROCEDURE ONLY Latoya Tuttle [phone]; No Provider Preference    3. Hypertension, unspecified type    - EKG 12-lead complete w/read - Clinics    4. Elevated LFTs    - GGT  - Hepatic panel (Albumin, ALT, AST, Bili, Alk Phos, TP)    5. Morbid obesity (H)                  Abilio Gonzalez MD  [facility] [place]    FUTURE APPOINTMENTS:       - Follow-up visit in 2 weeks      Patient Instructions     Patient Education     Step-by-Step  Checking Your Blood Pressure    Date Last Reviewed: 4/27/2016 2000-2018 [employer]. [address] [place] [address]. All rights reserved. This information is not intended as a substitute for professional medical care. Always follow your healthcare professional's instructions.           Patient Education     Low-Salt Choices  Eating salt (sodium) can make your body retain too much water. Excess water makes your heart work harder. Canned, packaged, and frozen foods are easy to prepare. But they are often high in sodium. Here are some ideas for low-salt foods you can easily make yourself.    For breakfast    Fruit or 100% fruit juice    Whole-wheat bread or an English muffin. Look for sodium content on Nutrition Facts labels.    Low-fat milk or yogurt    Unsalted eggs    Shredded wheat    Corn tortillas    Unsalted steamed rice    Regular (not instant) hot cereal, made without salt  Stay away from:    Sausage, toscano, and ham    Flour tortillas    Packaged muffins, pancakes, and biscuits    Instant hot cereals    Cottage cheese  For lunch and dinner    Fresh fish, chicken, turkey,  or meat--baked, broiled, or roasted without salt    Dry beans, cooked without salt    Tofu, stir-fried without salt    Unsalted fresh fruit and vegetables, or frozen or canned fruit and vegetables with no added salt  Stay away from:    Lunch or deli meat that is cured or smoked    Cheese    Tomato juice and ketchup    Canned vegetables, soups, and fish not labeled as no-salt-added or reduced sodium    Packaged gravies and sauces    Olives, pickles, and relish    Bottled salad dressings  For snacks and desserts    Yogurt    Unsalted, air-popped popcorn    Unsalted nuts or seeds  Stay away from:    Pies and cakes    Packaged dessert mixes    Pizza    Canned and packaged puddings    Pretzels, chips, crackers, and nuts--unless the label says unsalted  Date Last Reviewed: 6/1/2017 2000-2018 [employer]. [address] [place] [address], [place] [address]. All rights reserved. This information is not intended as a substitute for professional medical care. Always follow your healthcare professional's instructions.               Abilio Gonzalez MD  [facility]

## 2019-05-17 LAB
ALBUMIN SERPL-MCNC: 3.9 G/DL (ref 3.4–5)
ALP SERPL-CCNC: 67 U/L (ref 40–150)
ALT SERPL W P-5'-P-CCNC: 58 U/L (ref 0–70)
AST SERPL W P-5'-P-CCNC: 29 U/L (ref 0–45)
BILIRUB DIRECT SERPL-MCNC: <0.1 MG/DL (ref 0–0.2)
BILIRUB SERPL-MCNC: 0.4 MG/DL (ref 0.2–1.3)
GGT SERPL-CCNC: 36 U/L (ref 0–75)
PROT SERPL-MCNC: 7.5 G/DL (ref 6.8–8.8)

## 2019-05-19 LAB
FERRITIN SERPL-MCNC: 2 NG/ML (ref 26–388)
IRON SATN MFR SERPL: 5 % (ref 15–46)
IRON SERPL-MCNC: 29 UG/DL (ref 35–180)
TIBC SERPL-MCNC: 575 UG/DL (ref 240–430)

## 2019-05-21 DIAGNOSIS — D50.9 IRON DEFICIENCY ANEMIA, UNSPECIFIED IRON DEFICIENCY ANEMIA TYPE: Primary | ICD-10-CM

## 2019-05-23 ENCOUNTER — HOSPITAL ENCOUNTER (OUTPATIENT)
Facility: CLINIC | Age: 39
Discharge: HOME | End: 2019-05-23
Attending: INTERNAL MEDICINE | Admitting: INTERNAL MEDICINE
Payer: COMMERCIAL

## 2019-05-23 VITALS
RESPIRATION RATE: 14 BRPM | SYSTOLIC BLOOD PRESSURE: 123 MMHG | DIASTOLIC BLOOD PRESSURE: 73 MMHG | HEART RATE: 73 BPM | OXYGEN SATURATION: 96 %

## 2019-05-23 LAB — UPPER GI ENDOSCOPY: NORMAL

## 2019-05-23 PROCEDURE — 88305 TISSUE EXAM BY PATHOLOGIST: CPT | Performed by: INTERNAL MEDICINE

## 2019-05-23 PROCEDURE — 40000104 ZZH STATISTIC MODERATE SEDATION < 10 MIN: Performed by: INTERNAL MEDICINE

## 2019-05-23 PROCEDURE — 88305 TISSUE EXAM BY PATHOLOGIST: CPT | Mod: 26 | Performed by: INTERNAL MEDICINE

## 2019-05-23 PROCEDURE — 25000125 ZZHC RX 250: Performed by: INTERNAL MEDICINE

## 2019-05-23 PROCEDURE — 43239 EGD BIOPSY SINGLE/MULTIPLE: CPT | Performed by: INTERNAL MEDICINE

## 2019-05-23 PROCEDURE — 25000128 H RX IP 250 OP 636: Performed by: INTERNAL MEDICINE

## 2019-05-23 RX ORDER — ONDANSETRON 2 MG/ML
4 INJECTION INTRAMUSCULAR; INTRAVENOUS EVERY 6 HOURS PRN
Status: DISCONTINUED | OUTPATIENT
Start: 2019-05-23 | End: 2019-05-23 | Stop reason: HOSPADM

## 2019-05-23 RX ORDER — LIDOCAINE 40 MG/G
CREAM TOPICAL
Status: DISCONTINUED | OUTPATIENT
Start: 2019-05-23 | End: 2019-05-23 | Stop reason: HOSPADM

## 2019-05-23 RX ORDER — NALOXONE HYDROCHLORIDE 0.4 MG/ML
.1-.4 INJECTION, SOLUTION INTRAMUSCULAR; INTRAVENOUS; SUBCUTANEOUS
Status: DISCONTINUED | OUTPATIENT
Start: 2019-05-23 | End: 2019-05-23 | Stop reason: HOSPADM

## 2019-05-23 RX ORDER — ONDANSETRON 2 MG/ML
4 INJECTION INTRAMUSCULAR; INTRAVENOUS
Status: DISCONTINUED | OUTPATIENT
Start: 2019-05-23 | End: 2019-05-23 | Stop reason: HOSPADM

## 2019-05-23 RX ORDER — ONDANSETRON 4 MG/1
4 TABLET, ORALLY DISINTEGRATING ORAL EVERY 6 HOURS PRN
Status: DISCONTINUED | OUTPATIENT
Start: 2019-05-23 | End: 2019-05-23 | Stop reason: HOSPADM

## 2019-05-23 RX ORDER — FLUMAZENIL 0.1 MG/ML
0.2 INJECTION, SOLUTION INTRAVENOUS
Status: DISCONTINUED | OUTPATIENT
Start: 2019-05-23 | End: 2019-05-23 | Stop reason: HOSPADM

## 2019-05-23 RX ORDER — FENTANYL CITRATE 50 UG/ML
INJECTION, SOLUTION INTRAMUSCULAR; INTRAVENOUS PRN
Status: DISCONTINUED | OUTPATIENT
Start: 2019-05-23 | End: 2019-05-23 | Stop reason: HOSPADM

## 2019-05-23 NOTE — LETTER
May 17, 2019      William Bolton  [address] [facility] [address]         Thank you for choosing [facility]. You are scheduled for the following service:    Date:   Thursday May 23, 2019      Procedure: UPPER ENDOSCOPY-EGD  Doctor: Dr Navarro           Arrival Time:  1000    *check in at Emergency/Endoscopy desk*  Procedure Time: 1030       Location:   [facility]        Endoscopy Department, First Floor (Enter through ER Doors) *         [address] [place], [place] [address]      [phone] or [phone] ([facility]) to reschedule          PRE-PROCEDURE CHECKLIST    If you have diabetes, ask your regular doctor for diet and medication restrictions.  If you take any antiplatelet or anticoagulant medications (such as Coumadin, Lovenox, Plavix, etc.) and have not already discussed this, please call your primary physician for advice on holding this medication.  If you take Aspirin, you may continue to do so.  If you are or may be pregnant, please discuss the risks and benefits of this procedure with your doctor.  You must arrange for a ride for the day of your exam. If you fail to arrange transportation with a responsible adult, your procedure will need to be cancelled and rescheduled. Taxi, bus and medical transport are not acceptable unless you have a responsible adult that you know & trust with you. Please arrange for this  to be able to pick you up in our department, approximately one hour after your scheduled procedure, if they are not able to stay with you.      Canceling or rescheduling   If you must cancel or reschedule your appointment, please call [phone] as soon as possible.      Upper Endoscopy or Esophagogastroduodenoscopy (EGD) is a test performed to evaluate symptoms of persistent abdominal pain, nausea, vomiting and difficulty swallowing. It may also be used to treat various conditions of the upper gastrointestinal tract, such as  bleeding, narrowing or abnormal growths.     What happens during an upper endoscopy?  On the day of your procedure, plan to spend up to one and a half hour after your arrival at the endoscopy center. The exam itself takes about 5 to 10 minutes.    Before the exam:  - You will change into a gown.   - Your medical history and medication list will be reviewed with you, unless it has already been done over the phone.   - A nurse will insert an intravenous (IV) line into your hand or arm.  - The doctor will talk to you and give you a consent form to sign.    During the exam:  - Medicine will be given through the IV line to help you relax and feel comfortable.   - Your heart rate and oxygen levels will be monitored. If your blood pressure is low, you may be given fluids through the IV line.   - The doctor will insert a flexible, hollow tube, called an endoscope, into your mouth and will advance it slowly through the esophagus, stomach and duodenum (the first part of your small intestine).   - You may have a feeling of pressure or fullness.   - If you have difficulty swallowing, and the doctor finds a narrowing in your esophagus, it may be possible for the area to be expanded-dilated during the exam.   - If abnormal tissue is found, the doctor may remove it through the endoscope (biopsy it) for closer examination. The tissue removal is painless.    After the exam:  - Any tissue samples removed during the exam will be sent to a lab for evaluation. It may take 5 to 7 working days for you to be notified of the results  - The doctor will prepare a full report for the physician who referred you for the upper endoscopy.   - The doctor will talk with you about the initial results of your exam.   - You may feel bloated after the procedure. That is normal and should not last long.   - Your throat may feel sore for a short time.   - Following the exam, you may resume your normal diet. Avoid alcohol until the next day.   - You may  resume your regular activities the day after the procedure.   - Medication given during the exam will prohibit you from driving for the rest of the day.  - A nurse will provide you with complete discharge instructions before you leave the endoscopy center. Be sure to ask the nurse for specific instructions if you take blood thinners such as Aspirin , Coumadin , Lovenox , Plavix , etc.       PREPARATION    To ensure a successful exam, please follow all instructions carefully.      The night before your exam:    STOP eating solid foods at 11:45 pm.     Clear liquids are okay to drink (examples: Gatorade , apple juice, clear broth,coffee or tea without milk or cream, etc.).     DO NOT drink red liquids or alcoholic beverages.    The day of your exam:    STOP drinking clear liquids 4 hours before your exam.     You may take your usual medications with 4 oz. of water, but it needs to be at least 4 hours prior to your procedure.    When you leave for the procedure:    Bring a list of all of your current medications, including any allergy or over-the-counter medications, unless you have already reviewed that with an Endoscopy RN over the phone.     Bring a photo ID as well as up-to-date insurance information, such as your insurance card and any referral forms that might be required by your payer.         DIRECTIONS TO THE ENDOSCOPY DEPARTMENT    From the north ([facility])  Take [address] south, exit on [address] . Get into the left hand ayana, turn left (east), go one-half mile to [address] and turn left. Go north to the first stoplight, take a right on [email or web address] and follow it to the Emergency entrance.    From the south ([facility])  Take 35N to the [address]E split and exit on [address] . On [address] , turn left (west) to [address]. Turn right (north) on [address]. Go north to the first stoplight, take a right on [email or web address] and follow it to the Emergency  entrance.    From the east via 35E ([facility])  Take [address] south to [address]  exit. Turn right on [address]. Go west to [address]. Turn right (north) on [address]. Go to the first stoplight, take a right and follow on [address] to the Emergency entrance.    From the east via [address] ([facility])  Take [address] to [address]. Turn left (south) on [address] to [address]. Turn left (east) on [address] and follow it to the Emergency entrance.    From the west via [address] ([place], [place])  Take [address] east to [address]. Turn right (south) on [address] to [address]. Turn left (east) on [address] and follow it to the Emergency entrance.

## 2019-05-23 NOTE — H&P
Pre-Endoscopy History and Physical     William Bolton MRN# [id number]   YOB: 1980 Age: 39 year old     Date of Procedure: 5/23/2019  Primary care provider: Juan Antonio Villa Jr.  Type of Endoscopy: Gastroscopy with possible biopsy, possible dilation  Reason for Procedure: anemia  Type of Anesthesia Anticipated: Conscious Sedation    HPI:    William is a 39 year old male who will be undergoing the above procedure.      A history and physical has been performed. The patient's medications and allergies have been reviewed. The risks and benefits of the procedure and the sedation options and risks were discussed with the patient.  All questions were answered and informed consent was obtained.      He denies a personal or family history of anesthesia complications or bleeding disorders.     Patient Active Problem List   Diagnosis     Hyperlipidemia with target LDL less than 160     ADD (attention deficit disorder) without hyperactivity     Major depressive disorder, recurrent episode, mild (H)     Non morbid obesity due to excess calories     Abnormal bone xray - sclerosis of phalynx of right great toe 7/31/17 - suggest repeat imaging at annual visit.     NATHAN (obstructive sleep apnea)     Nocturnal hypoxemia     Anxiety     Obesity (BMI 35.0-39.9) with comorbidity (H)        Past Medical History:   Diagnosis Date     Abnormal bone xray - sclerosis of phalynx of right great toe 7/31/17 - suggest repeat imaging at annual visit. 7/31/2017     Sleep apnea         Past Surgical History:   Procedure Laterality Date     SURGICAL HISTORY OF -          Social History     Tobacco Use     Smoking status: Never Smoker     Smokeless tobacco: Never Used   Substance Use Topics     Alcohol use: Yes     Comment: 2-3 drinks (shots) in drinks per night.        Family History   Problem Relation Age of Onset     Coronary Artery Disease Paternal Grandfather      Hypertension Father      Hyperlipidemia Mother      Hypertension  "Sister        Prior to Admission medications    Medication Sig Start Date End Date Taking? Authorizing Provider   iron (FERROUS GLUCONATE) 256 (28 Fe) MG tablet Take 1 tablet (100 mg) by mouth daily 5/16/19 8/14/19 Yes Abilio Gonzalez MD   testosterone cypionate (DEPOTESTOSTERONE) 200 MG/ML injection INJECT 0.8ML INTO THE MUSCLE ONCE EVERY WEEK 3/8/19  Yes Reported, Patient   B-D LUER-DAVID SYRINGE 23G X 1\" 3 ML MISC U WITH INJECTIONS 2/27/19   Reported, Patient       No Known Allergies     REVIEW OF SYSTEMS:   5 point ROS negative except as noted above in HPI, including Gen., Resp., CV, GI &  system review.    PHYSICAL EXAM:   /87   Resp 12   SpO2 100%  Estimated body mass index is 37.3 kg/m  as calculated from the following:    Height as of 5/16/19: 1.829 m (6').    Weight as of 5/16/19: 124.7 kg (275 lb).   GENERAL APPEARANCE: alert, and oriented  MENTAL STATUS: alert  AIRWAY EXAM: Mallampatti Class I (visualization of the soft palate, fauces, uvula, anterior and posterior pillars)  RESP: lungs clear to auscultation - no rales, rhonchi or wheezes  CV: regular rates and rhythm  DIAGNOSTICS:    Not indicated    IMPRESSION   ASA Class 2 - Mild systemic disease    PLAN:   Plan for Gastroscopy with possible biopsy, possible dilation. We discussed the risks, benefits and alternatives and the patient wished to proceed.    The above has been forwarded to the consulting provider.      Signed Electronically by: Say Navarro  May 23, 2019          "

## 2019-05-24 LAB — COPATH REPORT: NORMAL

## 2019-05-30 ENCOUNTER — OFFICE VISIT (OUTPATIENT)
Dept: FAMILY MEDICINE | Facility: CLINIC | Age: 39
End: 2019-05-30
Payer: COMMERCIAL

## 2019-05-30 VITALS
TEMPERATURE: 97.9 F | BODY MASS INDEX: 36.84 KG/M2 | WEIGHT: 272 LBS | HEIGHT: 72 IN | SYSTOLIC BLOOD PRESSURE: 146 MMHG | HEART RATE: 88 BPM | DIASTOLIC BLOOD PRESSURE: 88 MMHG | OXYGEN SATURATION: 97 %

## 2019-05-30 DIAGNOSIS — D50.9 IRON DEFICIENCY ANEMIA, UNSPECIFIED IRON DEFICIENCY ANEMIA TYPE: ICD-10-CM

## 2019-05-30 DIAGNOSIS — I10 ESSENTIAL HYPERTENSION WITH GOAL BLOOD PRESSURE LESS THAN 130/80: Primary | ICD-10-CM

## 2019-05-30 PROCEDURE — 99214 OFFICE O/P EST MOD 30 MIN: CPT | Performed by: FAMILY MEDICINE

## 2019-05-30 RX ORDER — HYDROCHLOROTHIAZIDE 12.5 MG/1
12.5 TABLET ORAL DAILY
Qty: 30 TABLET | Refills: 0 | Status: SHIPPED | OUTPATIENT
Start: 2019-05-30 | End: 2019-06-12 | Stop reason: DRUGHIGH

## 2019-05-30 ASSESSMENT — MIFFLIN-ST. JEOR: SCORE: 2196.78

## 2019-05-30 NOTE — PROGRESS NOTES
Subjective     William Bolton is a 39 year old male who presents to clinic today for the following health issues:    HPI   Hypertension Follow-up      Do you check your blood pressure regularly outside of the clinic? Yes this last week or so    Are you following a low salt diet? No    Are your blood pressures ever more than 140 on the top number (systolic) OR more   than 90 on the bottom number (diastolic), for example 140/90? Yes has been fluctuating 135-140/ 85-90    Amount of exercise or physical activity: None    Problems taking medications regularly: No    Medication side effects: none    Diet: regular (no restrictions)    Denies any headaches, lightheadedness, dizziness, vision changes, chest pain, palpitations, shortness of breath, dyspnea, numbness/tingling.    Alcohol use: 2-3 days per week.       Anemia:  May 16, lab work incidentally noted hemoglobin of 8.4.  Iron studies also indicate iron deficiency anemia.  He underwent an EGD which did not show any evidence of upper GI bleeding.  He does have colonoscopy scheduled for June.  Has been advised to start iron supplementation.  He denies any headaches, lightheadedness, or dizziness.  He has not noticed any blood in his stool or urine.      Patient Active Problem List   Diagnosis     Hyperlipidemia with target LDL less than 160     ADD (attention deficit disorder) without hyperactivity     Major depressive disorder, recurrent episode, mild (H)     Non morbid obesity due to excess calories     Abnormal bone xray - sclerosis of phalynx of right great toe 7/31/17 - suggest repeat imaging at annual visit.     NATHAN (obstructive sleep apnea)     Nocturnal hypoxemia     Anxiety     Obesity (BMI 35.0-39.9) with comorbidity (H)     Past Surgical History:   Procedure Laterality Date     ESOPHAGOSCOPY, GASTROSCOPY, DUODENOSCOPY (EGD), COMBINED N/A 5/23/2019    Procedure: ESOPHAGOGASTRODUODENOSCOPY, WITH BIOPSY;  Surgeon: Say Navarro MD;  Location:  GI      SURGICAL HISTORY OF -          Social History     Tobacco Use     Smoking status: Never Smoker     Smokeless tobacco: Never Used   Substance Use Topics     Alcohol use: Yes     Comment: 2-3 drinks (shots) in drinks per night.      Family History   Problem Relation Age of Onset     Coronary Artery Disease Paternal Grandfather      Hypertension Father      Other - See Comments Father         primary sclerosing cholangitis     Hyperlipidemia Mother      Hypertension Sister              Reviewed and updated as needed this visit by Provider  Tobacco  Allergies  Meds  Problems  Med Hx  Surg Hx  Fam Hx         Review of Systems   ROS COMP: Constitutional, HEENT, cardiovascular, pulmonary, gi and gu systems are negative, except as otherwise noted.      Objective    /88 (BP Location: Right arm, Patient Position: Sitting, Cuff Size: Adult Regular)   Pulse 88   Temp 97.9  F (36.6  C) (Oral)   Ht 1.829 m (6')   Wt 123.4 kg (272 lb)   SpO2 97%   BMI 36.89 kg/m    Body mass index is 36.89 kg/m .  Physical Exam   GENERAL: healthy, alert and no distress  EYES: Eyes grossly normal to inspection, PERRL and conjunctivae and sclerae normal  HENT: ear canals and TM's normal, nose and mouth without ulcers or lesions  RESP: lungs clear to auscultation - no rales, rhonchi or wheezes  CV: regular rate and rhythm, normal S1 S2, no S3 or S4, no murmur, click or rub, no peripheral edema and peripheral pulses strong  ABDOMEN: soft, nontender, no hepatosplenomegaly, no masses and bowel sounds normal  MS: no gross musculoskeletal defects noted, no edema  NEURO: Normal strength and tone, mentation intact and speech normal  PSYCH: mentation appears normal, affect normal/bright    Diagnostic Test Results:  none         Assessment & Plan     1. Essential hypertension with goal blood pressure less than 130/80: Blood pressure remains elevated.  He is asymptomatic.  Will start hydrochlorothiazide 12.5 mg daily.  Follow-up in 2 weeks  to recheck blood pressure and consider medication titration.  - hydrochlorothiazide (HYDRODIURIL) 12.5 MG tablet; Take 1 tablet (12.5 mg) by mouth daily  Dispense: 30 tablet; Refill: 0  - RN HTN MGMT    2. Iron deficiency anemia, unspecified iron deficiency anemia type: Had negative EGD.  Start iron supplementation.  We will follow-up on colonoscopy results on June 13, 2019.  Recheck hemoglobin and iron counts in a couple months unless indicated sooner.     Return in about 2 weeks (around 6/13/2019) for BP Recheck with TREMAYNE.    Smith Lemons DO  [facility]

## 2019-05-30 NOTE — PATIENT INSTRUCTIONS
Patient Education     What is High Blood Pressure?  High blood pressure (also called hypertension) is known as the  silent killer.  This is because most of the time it doesn t cause symptoms. In fact, many people don t know they have it until other problems develop. In most cases, high blood pressure often requires lifelong treatment.  Understanding blood pressure  The circulatory system is made up of the heart and blood vessels that carry blood through the body. Your heart is the pump for this system. With each heartbeat (contraction), the heart sends blood out through large blood vessels called arteries. Blood pressure is a measure of how hard the moving blood pushes against the walls of the arteries.  High blood pressure can harm your health  In a healthy blood vessel, the blood moves smoothly through the vessel and puts normal pressure on the vessel walls.       High blood pressure occurs when blood pushes too hard against artery walls. This causes damage to the artery walls and then the formation of scar tissue as it heals. This makes the arteries stiff and weak. Plaque sticks to the scarred tissue narrowing and hardening the arteries. High blood pressure also causes your heart to work harder to get blood out to the body. High blood pressure raises your risk of heart attack, also known as acute myocardial infarction, or AMI, heart failure, and stroke. It can also lead to kidney disease, and blindness. In general, if you have high blood pressure, keeping your blood pressure below 130/80 mmHg may help prevent these problems. Your healthcare provider may prescribe medicine to help control blood pressure if lifestyle changes are not enough.  It's important to know your blood pressure numbers. Blood pressure measurements are given as 2 numbers. Systolic blood pressure is the upper number. This is the pressure when the heart contracts. Diastolic blood pressure is the lower number. This is the pressure when the  "heart relaxes between beats.  Blood pressure is categorized as normal, elevated, or stage 1 or stage 2 high blood pressure:    Normal blood pressure is systolic of less than 120 and diastolic of less than 80 (120/80)    Elevated blood pressure is systolic of 120 to 129 and diastolic less than 80    Stage 1 high blood pressure is systolic is 130 to 139 or diastolic between 80 to 89    Stage 2 high blood pressure is when systolic is 140 or higher or the diastolic is 90 or higher  High blood pressure is diagnosed when multiple, separate readings show blood pressure above 130/80 mmHg. Talk with your healthcare provider if you have questions or concerns about your blood pressure readings.  Measuring blood pressure  An example of a blood pressure measurement is 120/70 (120 over 70). The top number is the pressure of blood against the artery walls during a heartbeat (systolic). The bottom number is the pressure of blood against artery walls between heartbeats (diastolic). Talk with your healthcare provider to find out what your blood pressure goals should be.   Controlling blood pressure  If your blood pressure is too high, work with your doctor on a plan for lowering it. Below are steps you can take that will help lower your blood pressure.    Choose heart-healthy foods. Eating healthier meals helps you control your blood pressure. Ask your doctor about the DASH eating plan. This plan helps reduce blood pressure by limiting the amount of sodium (salt) you have in your diet. DASH also encourages eating plenty of fruits and vegetables, low-fat or non-fat dairy, whole-grains, and foods high in fiber, and low in fat. This also provides an enhanced amount of potassium which can also help lower blood pressure.    Reduce sodium. Reducing sodium in your diet reduces fluid retention. Fluid retention caused by too much salt increases blood volume and blood pressure. The American Heart Association s (AHA) \"ideal\" sodium intake " recommendation is 1,500 milligrams per day.  However, since American's eat so much salt, the AHA says a positive change can occur by cutting back to even 2,400 milligrams of sodium a day.    Maintain a healthy weight. Being overweight makes you more likely to have high blood pressure. Losing excess weight helps lower blood pressure.    Exercise regularly. Daily exercise helps your heart and blood vessels work better and stay healthier. It can help lower your blood pressure.    Stop smoking. Smoking increases blood pressure and damages blood vessels.    Limit alcohol. Drinking too much alcohol can raise blood pressure. Men should have no more than 2 drinks a day. Women should have no more than 1. (A drink is equal to 1 beer, or a small glass of wine, or a shot of liquor.)    Control stress. Stress makes your heart work harder and beat faster. Controlling stress helps you control your blood pressure.  Facts about high blood pressure    Feeling OK does not mean that blood pressure is under control. Likewise, feeling bad doesn t mean it s out of control. The only way to know for sure is to check your pressure regularly.    Medicine is only one part of controlling high blood pressure. You also need to manage your weight, get regular exercise, and adjust your eating habits.    High blood pressure is usually a lifelong problem. But it can be controlled with healthy lifestyle changes and medicine.    Hypertension is not the same as stress. Although stress may be a factor in high blood pressure, it s only one part of the story.    Blood pressure medicines need to be taken every day. Stopping suddenly may cause a dangerous increase in pressure.   Date Last Reviewed: 4/27/2016 2000-2018 [employer]. [address] [place] [address], [place] [address]. All rights reserved. This information is not intended as a substitute for professional medical care. Always follow your healthcare professional's instructions.            Patient Education     Controlling High Blood Pressure  High blood pressure (hypertension) is often called the silent killer. This is because many people who have it don t know it. High blood pressure can raise your risk of heart attack, stroke, and heart failure. Controlling your blood pressure can decrease your risk of these problems. Know your blood pressure and remember to check it regularly. Doing so can save your life.  Blood pressure measurements are given as 2 numbers. Systolic blood pressure is the upper number. This is the pressure when the heart contracts. Diastolic blood pressure is the lower number. This is the pressure when the heart relaxes between beats.  Blood pressure is categorized as normal, elevated, or stage 1 or stage 2 high blood pressure:    Normal blood pressure is systolic of less than 120 and diastolic of less than 80 (120/80)    Elevated blood pressure is systolic of 120 to 129 and diastolic less than 80    Stage 1 high blood pressure is systolic is 130 to 139 or diastolic between 80 to 89    Stage 2 high blood pressure is when systolic is 140 or higher or the diastolic is 90 or higher  Here are some things you can do to help control your blood pressure.    Choose heart-healthy foods    Select low-salt, low-fat foods. Limit sodium intake to 2,400 mg per day or the amount suggested by your healthcare provider.    Limit canned, dried, cured, packaged, and fast foods. These can contain a lot of salt.    Eat 8 to 10 servings of fruits and vegetables every day.    Choose lean meats, fish, or chicken.    Eat whole-grain pasta, brown rice, and beans.    Eat 2 to 3 servings of low-fat or fat-free dairy products.    Ask your doctor about the DASH eating plan. This plan helps reduce blood pressure.    When you go to a restaurant, ask that your meal be prepared with no added salt.  Maintain a healthy weight    Ask your healthcare provider how many calories to eat a day. Then stick to that  number.    Ask your healthcare provider what weight range is healthiest for you. If you are overweight, a weight loss of only 3% to 5% of your body weight can help lower blood pressure. Generally, a good weight loss goal is to lose 10% of your body weight in a year.    Limit snacks and sweets.    Get regular exercise.  Get up and get active    Choose activities you enjoy. Find ones you can do with friends or family. This includes bicycling, dancing, walking, and jogging.    Park farther away from building entrances.    Use stairs instead of the elevator.    When you can, walk or bike instead of driving.    [place] leaves, garden, or do household repairs.    Be active at a moderate to vigorous level of physical activity for at least 40 minutes for a minimum of 3 to 4 days a week.   Manage stress    Make time to relax and enjoy life. Find time to laugh.    Communicate your concerns with your loved ones and your healthcare provider.    Visit with family and friends, and keep up with hobbies.  Limit alcohol and quit smoking    Men should have no more than 2 drinks per day.    Women should have no more than 1 drink per day.    Talk with your healthcare provider about quitting smoking. Smoking significantly increases your risk for heart disease and stroke. Ask your healthcare provider about community smoking cessation programs and other options.  Medicines  If lifestyle changes aren t enough, your healthcare provider may prescribe high blood pressure medicine. Take all medicines as prescribed. If you have any questions about your medicines, ask your healthcare provider before stopping or changing them.   Date Last Reviewed: 4/27/2016 2000-2018 [employer]. [address] [facility], [place] [address]. All rights reserved. This information is not intended as a substitute for professional medical care. Always follow your healthcare professional's instructions.           Patient Education     Eating Heart-Healthy  Food: Using the DASH Plan    Eating for your heart doesn t have to be hard or boring. You just need to know how to make healthier choices. The DASH eating plan has been developed to help you do just that. DASH stands for Dietary Approaches to Stop Hypertension. It is a plan that has been proven to be healthier for your heart and to lower your risk for high blood pressure. It can also help lower your risk for cancer, heart disease, osteoporosis, and diabetes.  Choosing from each food group  Choose foods from each of the food groups below each day. Try to get the recommended number of servings for each food group. The serving numbers are based on a diet of 2,000 calories a day. Talk to your doctor if you re unsure about your calorie needs. Along with getting the correct servings, the DASH plan also recommends a sodium intake less than 2,300 mg per day.        Grains  Servings: 6 to 8 a day  A serving is:    1 slice bread    1 ounce dry cereal    Half a cup cooked rice, pasta or cereal  Best choices: Whole grains and any grains high in fiber. Vegetables  Servings: 4 to 5 a day  A serving is:    1 cup raw leafy vegetable    Half a cup cut-up raw or cooked vegetable    Half a cup vegetable juice  Best choices: Fresh or frozen vegetables prepared without added salt or fat.   Fruits  Servings: 4 to 5 a day  A serving is:    1 medium fruit    One-quarter cup dried fruit    Half a cup fresh, frozen, or canned fruit    Half a cup of 100% fruit juices  Best choices: A variety of fresh fruits of different colors. Whole fruits are a better choice than fruit juices. Low-fat or fat-free dairy  Servings: 2 to 3 a day  A serving is:    1 cup milk    1 cup yogurt    One and a half ounces cheese  Best choices: Skim or 1% milk, low-fat or fat-free yogurt or buttermilk, and low-fat cheeses.         Lean meats, poultry, fish  Servings: 6 or fewer a day  A serving is:    1 ounce cooked meats, poultry, or fish    1 egg  Best choices:  Lean poultry and fish. Trim away visible fat. Broil, grill, roast, or boil instead of frying. Remove skin from poultry before eating. Limit how much red meat you eat.  Nuts, seeds, beans  Servings: 4 to 5 a week  A serving is:    One-third cup nuts (one and a half ounces)    2 tablespoons nut butter or seeds    Half a cup cooked dry beans or legumes  Best choices: Dry roasted nuts with no salt added, lentils, kidney beans, garbanzo beans, and whole bernard beans.   Fats and oils  Servings: 2 to 3 a day  A serving is:    1 teaspoon vegetable oil    1 teaspoon soft margarine    1 tablespoon mayonnaise    2 tablespoons salad dressing  Best choices: Nut and vegetable oils (nontropical vegetable oils), such as olive and canola oil. Sweets  Servings: 5 a week or fewer  A serving is:    1 tablespoon sugar, maple syrup, or honey    1 tablespoon jam or jelly    1 half-ounce jelly beans (about 15)    1 cup lemonade  Best choices: Dried fruit can be a satisfying sweet. Choose low-fat sweets. And watch your serving sizes!      For more on the DASH eating plan, visit:  www.nhlbi.nih.gov/health/health-topics/topics/dash   Date Last Reviewed: 6/1/2016 2000-2018 [employer]. [address] [place] [address], [place] [address]. All rights reserved. This information is not intended as a substitute for professional medical care. Always follow your healthcare professional's instructions.

## 2019-06-12 ENCOUNTER — ALLIED HEALTH/NURSE VISIT (OUTPATIENT)
Dept: NURSING | Facility: CLINIC | Age: 39
End: 2019-06-12
Payer: COMMERCIAL

## 2019-06-12 VITALS — DIASTOLIC BLOOD PRESSURE: 82 MMHG | SYSTOLIC BLOOD PRESSURE: 142 MMHG | HEART RATE: 61 BPM | OXYGEN SATURATION: 98 %

## 2019-06-12 DIAGNOSIS — I10 ESSENTIAL HYPERTENSION WITH GOAL BLOOD PRESSURE LESS THAN 130/80: ICD-10-CM

## 2019-06-12 DIAGNOSIS — I10 ESSENTIAL HYPERTENSION, BENIGN: ICD-10-CM

## 2019-06-12 DIAGNOSIS — Z01.30 BLOOD PRESSURE CHECK: Primary | ICD-10-CM

## 2019-06-12 PROCEDURE — 99207 ZZC NO CHARGE NURSE ONLY: CPT

## 2019-06-12 RX ORDER — HYDROCHLOROTHIAZIDE 25 MG/1
25 TABLET ORAL DAILY
Qty: 30 TABLET | Refills: 0 | Status: SHIPPED | OUTPATIENT
Start: 2019-06-12 | End: 2019-07-19

## 2019-06-12 NOTE — PROGRESS NOTES
"William Bolton is being followed for Blood Pressure management.    NURSING ASSESSMENT/PLAN:  Blood pressure reading today is not at the provider specified goal of <130/80.    Current blood pressure medication(s):  Hydrochlorothiazide 12.5 mg daily    1.  The patient will dosage change: Hydrochlorothiazide 25 mg daily.     2.  We will not be checking a metabolic lab panel today.      3.  Follow up instructions include:     Next Nurse visit: 2 weeks.    SUBJECTIVE:                                                    The patient is taking medication as prescribed and is tolerating well.   Patient is not monitoring Blood Pressure at home.   Last 3 home readings NA  In addition notes: Has cuff at home but has not been monitoring lately.     Out of the following complicating factors: Cough, Headache, Lightheadedness, Shortness of breath, Fatigue, Nausea, Sexual Dysfunction, New onset of swelling or edema, Weakness and New onset of Chest Pain, the patient reports:  None    OBJECTIVE:                                                    Is pulse 55 or greater? - Yes  Pulse Readings from Last 1 Encounters:   06/12/19 61     Today's BP completed using cuff size: large on left side  arm.  BP Readings from Last 3 Encounters:   06/12/19 142/82   05/30/19 146/88   05/23/19 123/73       Current Outpatient Medications   Medication Sig Dispense Refill     hydrochlorothiazide (HYDRODIURIL) 25 MG tablet Take 1 tablet (25 mg) by mouth daily 30 tablet 0     B-D LUER-DAVID SYRINGE 23G X 1\" 3 ML MISC U WITH INJECTIONS  2     iron (FERROUS GLUCONATE) 256 (28 Fe) MG tablet Take 1 tablet (100 mg) by mouth daily 90 tablet 0     testosterone cypionate (DEPOTESTOSTERONE) 200 MG/ML injection INJECT 0.8ML INTO THE MUSCLE ONCE EVERY WEEK  1     Potassium   Date Value Ref Range Status   07/17/2018 3.9 3.4 - 5.3 mmol/L Final     Creatinine   Date Value Ref Range Status   07/17/2018 0.98 0.66 - 1.25 mg/dL Final     Urea Nitrogen   Date Value Ref Range " Status   07/17/2018 11 7 - 30 mg/dL Final     GFR Estimate   Date Value Ref Range Status   07/17/2018 87 >60 mL/min/1.7m2 Final     Comment:     Non  GFR Calc      A baseline potassium, creatinine, BUN, GFR has been done within past 12 months    Education:  written materials handout and general discussion/verbal explanation  Limit dietary sodium intake between 1500-2000mg every day  Regular aerobic exercise.  These interventions were used: Empathy/Understanding, Collaboration, Evocation, Support autonomy, Permission to raise concern or advise and Open ended questions  Education material provided and patient was given an opportunity to ask questions.    Patient verbalized understanding of this plan and is agreeable.    Professional Reference click here   Copy of current RN HTN MGMT order or refer to Other Orders:    Dosage adjustment made based on patient specific, physician directed, care plan.  Chary Peralta RN

## 2019-06-12 NOTE — PATIENT INSTRUCTIONS
Increase your hydrochlorothiazide to 25 mg per day. We can follow up in 2-3 weeks. I will send over new bottle with new dosage. You can use up your old ones taking 2 tablets of the 12.5 mg. Please note new bottle will be correct dosage and take only one tablet of 25 mg hydrochlorothiazide.     Call for follow up appointment. [phone].    Thanks for choosing [facility]!

## 2019-06-13 ENCOUNTER — HOSPITAL ENCOUNTER (OUTPATIENT)
Facility: CLINIC | Age: 39
Discharge: HOME | End: 2019-06-13
Attending: INTERNAL MEDICINE | Admitting: INTERNAL MEDICINE
Payer: COMMERCIAL

## 2019-06-13 VITALS
OXYGEN SATURATION: 97 % | SYSTOLIC BLOOD PRESSURE: 108 MMHG | HEART RATE: 62 BPM | DIASTOLIC BLOOD PRESSURE: 64 MMHG | RESPIRATION RATE: 12 BRPM

## 2019-06-13 LAB — COLONOSCOPY: NORMAL

## 2019-06-13 PROCEDURE — 88305 TISSUE EXAM BY PATHOLOGIST: CPT | Performed by: INTERNAL MEDICINE

## 2019-06-13 PROCEDURE — 88305 TISSUE EXAM BY PATHOLOGIST: CPT | Mod: 26 | Performed by: INTERNAL MEDICINE

## 2019-06-13 PROCEDURE — 25000128 H RX IP 250 OP 636: Performed by: INTERNAL MEDICINE

## 2019-06-13 PROCEDURE — G0500 MOD SEDAT ENDO SERVICE >5YRS: HCPCS | Performed by: INTERNAL MEDICINE

## 2019-06-13 PROCEDURE — 45385 COLONOSCOPY W/LESION REMOVAL: CPT | Performed by: INTERNAL MEDICINE

## 2019-06-13 RX ORDER — ONDANSETRON 4 MG/1
4 TABLET, ORALLY DISINTEGRATING ORAL EVERY 6 HOURS PRN
Status: DISCONTINUED | OUTPATIENT
Start: 2019-06-13 | End: 2019-06-13 | Stop reason: HOSPADM

## 2019-06-13 RX ORDER — FENTANYL CITRATE 50 UG/ML
INJECTION, SOLUTION INTRAMUSCULAR; INTRAVENOUS PRN
Status: DISCONTINUED | OUTPATIENT
Start: 2019-06-13 | End: 2019-06-13 | Stop reason: HOSPADM

## 2019-06-13 RX ORDER — NALOXONE HYDROCHLORIDE 0.4 MG/ML
.1-.4 INJECTION, SOLUTION INTRAMUSCULAR; INTRAVENOUS; SUBCUTANEOUS
Status: DISCONTINUED | OUTPATIENT
Start: 2019-06-13 | End: 2019-06-13 | Stop reason: HOSPADM

## 2019-06-13 RX ORDER — FLUMAZENIL 0.1 MG/ML
0.2 INJECTION, SOLUTION INTRAVENOUS
Status: DISCONTINUED | OUTPATIENT
Start: 2019-06-13 | End: 2019-06-13 | Stop reason: HOSPADM

## 2019-06-13 RX ORDER — LIDOCAINE 40 MG/G
CREAM TOPICAL
Status: DISCONTINUED | OUTPATIENT
Start: 2019-06-13 | End: 2019-06-13 | Stop reason: HOSPADM

## 2019-06-13 RX ORDER — ONDANSETRON 2 MG/ML
4 INJECTION INTRAMUSCULAR; INTRAVENOUS
Status: DISCONTINUED | OUTPATIENT
Start: 2019-06-13 | End: 2019-06-13 | Stop reason: HOSPADM

## 2019-06-13 RX ORDER — ONDANSETRON 2 MG/ML
4 INJECTION INTRAMUSCULAR; INTRAVENOUS EVERY 6 HOURS PRN
Status: DISCONTINUED | OUTPATIENT
Start: 2019-06-13 | End: 2019-06-13 | Stop reason: HOSPADM

## 2019-06-13 NOTE — H&P
Pre-Endoscopy History and Physical     William Bolton MRN# [id number]   YOB: 1980 Age: 39 year old     Date of Procedure: 6/13/2019  Primary care provider: Juan Antonio Villa Jr.  Type of Endoscopy: Colonoscopy with possible biopsy, possible polypectomy  Reason for Procedure: screen  Type of Anesthesia Anticipated: Conscious Sedation    HPI:    William is a 39 year old male who will be undergoing the above procedure.      A history and physical has been performed. The patient's medications and allergies have been reviewed. The risks and benefits of the procedure and the sedation options and risks were discussed with the patient.  All questions were answered and informed consent was obtained.      He denies a personal or family history of anesthesia complications or bleeding disorders.     Patient Active Problem List   Diagnosis     Hyperlipidemia with target LDL less than 160     ADD (attention deficit disorder) without hyperactivity     Major depressive disorder, recurrent episode, mild (H)     Non morbid obesity due to excess calories     Abnormal bone xray - sclerosis of phalynx of right great toe 7/31/17 - suggest repeat imaging at annual visit.     NATHAN (obstructive sleep apnea)     Nocturnal hypoxemia     Anxiety     Obesity (BMI 35.0-39.9) with comorbidity (H)        Past Medical History:   Diagnosis Date     Abnormal bone xray - sclerosis of phalynx of right great toe 7/31/17 - suggest repeat imaging at annual visit. 7/31/2017     Sleep apnea         Past Surgical History:   Procedure Laterality Date     COLONOSCOPY  06/13/2019    Dr. Navarro [facility]     ESOPHAGOSCOPY, GASTROSCOPY, DUODENOSCOPY (EGD), COMBINED N/A 5/23/2019    Procedure: ESOPHAGOGASTRODUODENOSCOPY, WITH BIOPSY;  Surgeon: Say Navarro MD;  Location:  GI     HEAD & NECK SURGERY      wisdom teeth removed     SURGICAL HISTORY OF -          Social History     Tobacco Use     Smoking status: Never Smoker     Smokeless tobacco:  "Never Used   Substance Use Topics     Alcohol use: Yes     Comment: 3 x week - 2 drinks       Family History   Problem Relation Age of Onset     Coronary Artery Disease Paternal Grandfather      Hypertension Father      Other - See Comments Father         primary sclerosing cholangitis     Hyperlipidemia Mother      Hypertension Sister      Colon Cancer Cousin        Prior to Admission medications    Medication Sig Start Date End Date Taking? Authorizing Provider   B-D LUER-DAVID SYRINGE 23G X 1\" 3 ML MISC U WITH INJECTIONS 2/27/19  Yes Reported, Patient   iron (FERROUS GLUCONATE) 256 (28 Fe) MG tablet Take 1 tablet (100 mg) by mouth daily 5/16/19 8/14/19 Yes Abilio Gonzalez MD   testosterone cypionate (DEPOTESTOSTERONE) 200 MG/ML injection INJECT 0.8ML INTO THE MUSCLE ONCE EVERY WEEK 3/8/19  Yes Reported, Patient   hydrochlorothiazide (HYDRODIURIL) 25 MG tablet Take 1 tablet (25 mg) by mouth daily 6/12/19   Smith Lemons, DO       No Known Allergies     REVIEW OF SYSTEMS:   5 point ROS negative except as noted above in HPI, including Gen., Resp., CV, GI &  system review.    PHYSICAL EXAM:   There were no vitals taken for this visit. Estimated body mass index is 36.89 kg/m  as calculated from the following:    Height as of 5/30/19: 1.829 m (6').    Weight as of 5/30/19: 123.4 kg (272 lb).   GENERAL APPEARANCE: alert, and oriented  MENTAL STATUS: alert  AIRWAY EXAM: Mallampatti Class I (visualization of the soft palate, fauces, uvula, anterior and posterior pillars)  RESP: lungs clear to auscultation - no rales, rhonchi or wheezes  CV: regular rates and rhythm  DIAGNOSTICS:    Not indicated    IMPRESSION   ASA Class 1 - Healthy patient, no medical problems    PLAN:   Plan for Colonoscopy with possible biopsy, possible polypectomy. We discussed the risks, benefits and alternatives and the patient wished to proceed.    The above has been forwarded to the consulting provider.      Signed Electronically by: Say" AV Navarro  June 13, 2019

## 2019-06-13 NOTE — LETTER
Loraine 10, 2019      William Che  [address] [facility] [address]        Dear William,       Thank you for choosing [facility]. You are scheduled for the following service:     Date:  6/13/2019 Thursday             Procedure:  COLONOSCOPY  Doctor:        Dr. Say Navarro   Arrival Time:  9:30 am   *Check in at Emergency/Endoscopy desk*  Procedure Time:  10:00 am      Location:   [facility]        Endoscopy Department, First Floor (Enter through ER Doors) *        [address] [place], [place] [address]      [phone] or [phone] ([facility]) to reschedule      MIRALAX -GATORADE  PREP  Colonoscopy is the most accurate test to detect colon polyps and colon cancer; and the only test where polyps can be removed. During this procedure, a doctor examines the lining of your large intestine and rectum through a flexible tube.   Transportation  Arrange for a ride for the day of your procedure with a responsible adult.  A taxi ride is not an option unless you are accompanied by a responsible adult. If you fail to arrange transportation with a responsible adult, your procedure will be cancelled and rescheduled.    Purchase the  following supplies at your local pharmacy:  - 2 (two) bisacodyl tablets: each tablet contains 5 mg.  (Dulcolax  laxative NOT Dulcolax  stool softener)   - 1 (one) 8.3 oz bottle of Polyethylene Glycol (PEG) 3350 Powder   (MiraLAX , Smooth LAX , ClearLAX  or equivalent)  - 64 oz Gatorade    Regular Gatorade, Gatorade G2 , Powerade , Powerade Zero  or Pedialyte  is acceptable. Red colored flavors are not allowed; all other colors (yellow, green, orange, purple and blue) are okay. It is also okay to buy two 2.12 oz packets of powdered Gatorade that can be mixed with water to a total volume of 64 oz of liquid.  - 1 (one) 10 oz bottle of Magnesium Citrate (Red colored flavors are not allowed)  It is also okay for you to use a 0.5 oz package of  powdered magnesium citrate (17 g) mixed with 10 oz of water.      PREPARATION FOR COLONOSCOPY    7 days before:    Discontinue fiber supplements and medications containing iron. This includes Metamucil  and Fibercon ; and multivitamins with iron.    3 days before:    Begin a low-fiber diet. A low-fiber diet helps making the cleanout more effective.     Examples of a low-fiber diet include (but are not limited to): white bread, white rice, pasta, crackers, fish, chicken, eggs, ground beef, creamy peanut butter, cooked/steamed/boiled vegetables, canned fruit, bananas, melons, milk, plain yogurt cheese, salad dressing and other condiments.     The following are not allowed on a low-fiber diet: seeds, nuts, popcorn, bran, whole wheat, corn, quinoa, raw fruits and vegetables, berries and dried fruit, beans and lentils.    For additional details on low-fiber diet, please refer to the table on the last page.    2 days before:    Continue the low-fiber diet.     Drink at least 8 glasses of water throughout the day.     Stop eating solid foods at 11:45 pm.    1 day before:    In the morning: begin a clear liquid diet (liquids you can see through).     Examples of a clear liquid diet include: water, clear broth or bouillon, Gatorade, Pedialyte or Powerade, carbonated and non-carbonated soft drinks (Sprite , 7-Up , ginger ale), strained fruit juices without pulp (apple, white grape, white cranberry), Jell-O  and popsicles.     The following are not allowed on a clear liquid diet: red liquids, alcoholic beverages, dairy products (milk, creamer, and yogurt), protein shakes, creamy broths, juice with pulp and chewing tobacco.    At noon: take 2 (two) bisacodyl tablets     At 4 (and no later than 6pm): start drinking the Miralax-Gatorade preparation (8.3 oz of Miralax mixed with 64 oz of Gatorade in a large pitcher). Drink 1(one) 8 oz glass every 15 minutes thereafter, until the mixture is gone.    COLON CLEANSING TIPS: drink  adequate amounts of fluids before and after your colon cleansing to prevent dehydration. Stay near a toilet because you will have diarrhea. Even if you are sitting on the toilet, continue to drink the cleansing solution every 15 minutes. If you feel nauseous or vomit, rinse your mouth with water, take a 15 to 30-minute-break and then continue drinking the solution. You will be uncomfortable until the stool has flushed from your colon (in about 2 to 4 hours). You may feel chilled.    Day of your procedure  You may take all of your morning medications including blood pressure medications, blood thinners (if you have not been instructed to stop these by our office), methadone, anti-seizure medications with sips of water 3 hours prior to your procedure or earlier. Do not take insulin or vitamins prior to your procedure. Continue the clear liquid diet.       4 hours prior: drink 10 oz of magnesium citrate. It may be easier to drink it with a straw.    STOP consuming all liquids after that.     Do not take anything by mouth during this time.     Allow extra time to travel to your procedure as you may need to stop and use a restroom along the way.    You are ready for the procedure, if you followed all instructions and your stool is no longer formed, but clear or yellow liquid. If you are unsure whether your colon is clean, please call our office at [phone] before you leave for your appointment.    Bring the following to your procedure:  - Insurance Card/Photo ID.   - List of current medications including over-the-counter medications and supplements.   - Your rescue inhaler if you currently use one to control asthma.      Canceling or rescheduling your appointment:   If you must cancel or reschedule your appointment, please call [phone] as soon as possible.      COLONOSCOPY PRE-PROCEDURE CHECKLIST    If you have diabetes, ask your regular doctor for diet and medication restrictions.  If you take an anticoagulant  or anti-platelet medication (such as Coumadin , Lovenox , Pradaxa , Xarelto , Eliquis , etc.), please call your primary doctor for advice on holding this medication.  If you take aspirin you may continue to do so.  If you are or may be pregnant, please discuss the risks and benefits of this procedure with your doctor.        What happens during a colonoscopy?    Plan to spend up to two hours, starting at registration time, at the endoscopy center the day of your procedure. The colonoscopy takes an average of 15 to 30 minutes. Recovery time is about 30 minutes.      Before the exam:    You will change into a gown.    Your medical history and medication list will be reviewed with you, unless that has been done over the phone prior to the procedure.     A nurse will insert an intravenous (IV) line into your hand or arm.    The doctor will meet with you and will give you a consent form to sign.  During the exam:     Medicine will be given through the IV line to help you relax.     Your heart rate and oxygen levels will be monitored. If your blood pressure is low, you may be given fluids through the IV line.     The doctor will insert a flexible hollow tube, called a colonoscope, into your rectum. The scope will be advanced slowly through the large intestine (colon).    You may have a feeling of fullness or pressure.     If an abnormal tissue or a polyp is found, the doctor may remove it through the endoscope for closer examination, or biopsy. Tissue removal is painless    After the exam:           Any tissue samples removed during the exam will be sent to a lab for evaluation. It may take 5-7 working days for you to be notified of the results.     A nurse will provide you with complete discharge instructions before you leave the endoscopy center. Be sure to ask the nurse for specific instructions if you take blood thinners such as Aspirin, Coumadin or Plavix.     The doctor will prepare a full report for you and for the  physician who referred you for the procedure.     Your doctor will talk with you about the initial results of your exam.      Medication given during the exam will prohibit you from driving for the rest of the day.     Following the exam, you may resume your normal diet. Your first meal should be light, no greasy foods. Avoid alcohol until the next day.     You may resume your regular activities the day after the procedure.         LOW-FIBER DIET    Foods RECOMMENDED Foods to AVOID   Breads, Cereal, Rice and Pasta:   White bread, rolls, biscuits, croissant and abrahan toast.   Waffles, Surinamese toast and pancakes.   White rice, noodles, pasta, macaroni and peeled cooked potatoes.   Plain crackers and saltines.   Cooked cereals: farina, cream of rice.   Cold cereals: Puffed Rice , Rice Krispies , Corn Flakes  and Special K    Breads, Cereal, Rice and Pasta:   Breads or rolls with nuts, seeds or fruit.   Whole wheat, pumpernickel, rye breads and cornbread.   Potatoes with skin, brown or wild rice, and kasha (buckwheat).     Vegetables:   Tender cooked and canned vegetables without seeds: carrots, asparagus tips, green or wax beans, pumpkin, spinach, lima beans. Vegetables:   Raw or steamed vegetables.   Vegetables with seeds.   Sauerkraut.   Winter squash, peas, broccoli, Brussel sprouts, cabbage, onions, cauliflower, baked beans, peas and corn.   Fruits:   Strained fruit juice.   Canned fruit, except pineapple.   Ripe bananas and melon. Fruits:   Prunes and prune juice.   Raw fruits.   Dried fruits: figs, dates and raisins.   Milk/Dairy:   Milk: plain or flavored.   Yogurt, custard and ice cream.   Cheese and cottage cheese Milk/Dairy:     Meat and other proteins:   ground, well-cooked tender beef, lamb, ham, veal, pork, fish, poultry and organ meats.   Eggs.   Peanut butter without nuts. Meat and other proteins:   Tough, fibrous meats with gristle.   Dry beans, peas and lentils.   Peanut butter with nuts.   Tofu.    Fats, Snack, Sweets, Condiments and Beverages:   Margarine, butter, oils, mayonnaise, sour cream and salad dressing, plain gravy.   Sugar, hard candy, clear jelly, honey and syrup.   Spices, cooked herbs, bouillon, broth and soups made with allowed vegetable, ketchup and mustard.   Coffee, tea and carbonated drinks.   Plain cakes, cookies and pretzels.   Gelatin, plain puddings, custard, ice cream, sherbet and popsicles. Fats, Snack, Sweets, Condiments and Beverages:   Nuts, seeds and coconut.   Jam, marmalade and preserves.   Pickles, olives, relish and horseradish.   All desserts containing nuts, seeds, dried fruit and coconut; or made from whole grains or bran.   Candy made with nuts or seeds.   Popcorn.                     DIRECTIONS TO THE ENDOSCOPY DEPARTMENT     From the north ([facility])  Take [address] South, exit on [address] [address]. Get into the left hand ayana, turn left (east), go one-half mile to [address] and turn left. Go north to the first stoplight, take a right on [address] and follow it to the Emergency entrance.    From the south ([facility])  Take 35N to the [address]E split and exit on [address] [address]. On [address] [address], turn left (west) to [address]. Turn right (north) on [address]. Go north to the first stoplight, take a right on [address] and follow it to the Emergency entrance.    From the east via 35E ([facility])  Take [address] south to [address] [address] exit. Turn right on [address] . Go west to [address]. Turn right (north) on [address]. Go to the first stoplight, take a right and follow on [address] to the Emergency entrance.    From the east via [address] ([facility])  Take [address] to [address]. Turn left (south) on [address] to [address]. Turn left (east) on [address] and follow it to the Emergency entrance.    From the west via [address] ([place], [place])  Take [address] east  to [address]. Turn right (south) on [address] to [address]. Turn left (east) on [address] and follow it to the Emergency entrance.

## 2019-06-13 NOTE — DISCHARGE INSTRUCTIONS
Understanding Colon and Rectal Polyps     The colon has a smooth lining composed of millions of cells.     The colon (also called the large intestine) is a muscular tube that forms the last part of the digestive tract. It absorbs water and stores food waste. The colon is about 4 to 6 feet long. The rectum is the last 6 inches of the colon. The colon and rectum have a smooth lining composed of millions of cells. Changes in these cells can lead to growths in the colon that can become cancerous and should be removed.     When the Colon Lining Changes  Changes that occur in the cells that line the colon or rectum can lead to growths called polyps. Over a period of years, polyps can turn cancerous. Removing polyps early may prevent cancer from ever forming.      Polyps  Polyps are fleshy clumps of tissue that form on the lining of the colon or rectum. Small polyps are usually benign (not cancerous). However, over time, cells in a polyp can change and become cancerous. The larger a polyp grows, the more likely this is to happen. Also, certain types of polyps known as adenomatous polyps are considered premalignant. This means that they will almost always become cancerous if they re not removed.          Cancer  Almost all colorectal cancers start when polyp cells begin growing abnormally. As a cancerous tumor grows, it may involve more and more of the colon or rectum. In time, cancer can also grow beyond the colon or rectum and spread to nearby organs or to glands called lymph nodes. The cells can also travel to other parts of the body. This is known as metastasis. The earlier a cancerous tumor is removed, the better the chance of preventing its spread.        [id number] [facility], [address] [place] [address], [place] [address]. All rights reserved. This information is not intended as a substitute for professional medical care. Always follow your healthcare professional's instructions.

## 2019-06-14 PROBLEM — D12.4 ADENOMATOUS POLYP OF DESCENDING COLON: Status: ACTIVE | Noted: 2019-06-14

## 2019-06-14 LAB — COPATH REPORT: NORMAL

## 2019-07-19 ENCOUNTER — ALLIED HEALTH/NURSE VISIT (OUTPATIENT)
Dept: NURSING | Facility: CLINIC | Age: 39
End: 2019-07-19
Payer: COMMERCIAL

## 2019-07-19 VITALS — HEART RATE: 70 BPM | SYSTOLIC BLOOD PRESSURE: 150 MMHG | DIASTOLIC BLOOD PRESSURE: 82 MMHG | OXYGEN SATURATION: 97 %

## 2019-07-19 DIAGNOSIS — D50.9 IRON DEFICIENCY ANEMIA, UNSPECIFIED IRON DEFICIENCY ANEMIA TYPE: ICD-10-CM

## 2019-07-19 DIAGNOSIS — R89.9 ABNORMAL LABORATORY TEST: ICD-10-CM

## 2019-07-19 DIAGNOSIS — Z01.30 BLOOD PRESSURE CHECK: Primary | ICD-10-CM

## 2019-07-19 DIAGNOSIS — I10 ESSENTIAL HYPERTENSION WITH GOAL BLOOD PRESSURE LESS THAN 130/80: ICD-10-CM

## 2019-07-19 LAB
ANION GAP SERPL CALCULATED.3IONS-SCNC: 6 MMOL/L (ref 3–14)
ANISOCYTOSIS BLD QL SMEAR: ABNORMAL
ANISOCYTOSIS BLD QL SMEAR: NORMAL
BASOPHILS # BLD AUTO: NORMAL 10E9/L (ref 0–0.2)
BASOPHILS NFR BLD AUTO: 2 %
BASOPHILS NFR BLD AUTO: NORMAL %
BUN SERPL-MCNC: 8 MG/DL (ref 7–30)
CALCIUM SERPL-MCNC: 9 MG/DL (ref 8.5–10.1)
CHLORIDE SERPL-SCNC: 105 MMOL/L (ref 94–109)
CO2 SERPL-SCNC: 27 MMOL/L (ref 20–32)
CREAT SERPL-MCNC: 0.88 MG/DL (ref 0.66–1.25)
DIFFERENTIAL METHOD BLD: ABNORMAL
DIFFERENTIAL METHOD BLD: NORMAL
ELLIPTOCYTES BLD QL SMEAR: NORMAL
ELLIPTOCYTES BLD QL SMEAR: SLIGHT
EOSINOPHIL # BLD AUTO: NORMAL 10E9/L (ref 0–0.7)
EOSINOPHIL NFR BLD AUTO: 1 %
EOSINOPHIL NFR BLD AUTO: NORMAL %
ERYTHROCYTE [DISTWIDTH] IN BLOOD BY AUTOMATED COUNT: 26.9 % (ref 10–15)
ERYTHROCYTE [DISTWIDTH] IN BLOOD BY AUTOMATED COUNT: 27 % (ref 10–15)
FERRITIN SERPL-MCNC: 28 NG/ML (ref 26–388)
GFR SERPL CREATININE-BSD FRML MDRD: >90 ML/MIN/{1.73_M2}
GLUCOSE SERPL-MCNC: 75 MG/DL (ref 70–99)
HCT VFR BLD AUTO: 42.2 % (ref 40–53)
HCT VFR BLD AUTO: 44.5 % (ref 40–53)
HGB BLD-MCNC: 13.2 G/DL (ref 13.3–17.7)
HGB BLD-MCNC: 13.5 G/DL (ref 13.3–17.7)
LYMPHOCYTES # BLD AUTO: NORMAL 10E9/L (ref 0.8–5.3)
LYMPHOCYTES NFR BLD AUTO: 31 %
LYMPHOCYTES NFR BLD AUTO: NORMAL %
MACROCYTES BLD QL SMEAR: NORMAL
MACROCYTES BLD QL SMEAR: PRESENT
MCH RBC QN AUTO: 23.9 PG (ref 26.5–33)
MCH RBC QN AUTO: 24 PG (ref 26.5–33)
MCHC RBC AUTO-ENTMCNC: 30.3 G/DL (ref 31.5–36.5)
MCHC RBC AUTO-ENTMCNC: 31.3 G/DL (ref 31.5–36.5)
MCV RBC AUTO: 76 FL (ref 78–100)
MCV RBC AUTO: 79 FL (ref 78–100)
MICROCYTES BLD QL SMEAR: NORMAL
MICROCYTES BLD QL SMEAR: PRESENT
MONOCYTES # BLD AUTO: NORMAL 10E9/L (ref 0–1.3)
MONOCYTES NFR BLD AUTO: 9 %
MONOCYTES NFR BLD AUTO: NORMAL %
NEUTROPHILS # BLD AUTO: NORMAL 10E9/L (ref 1.6–8.3)
NEUTROPHILS NFR BLD AUTO: 57 %
NEUTROPHILS NFR BLD AUTO: NORMAL %
PLATELET # BLD AUTO: 280 10E9/L (ref 150–450)
PLATELET # BLD AUTO: 288 10E9/L (ref 150–450)
PLATELET # BLD EST: ABNORMAL 10*3/UL
PLATELET # BLD EST: NORMAL 10*3/UL
POTASSIUM SERPL-SCNC: 3.7 MMOL/L (ref 3.4–5.3)
RBC # BLD AUTO: 5.53 10E12/L (ref 4.4–5.9)
RBC # BLD AUTO: 5.63 10E12/L (ref 4.4–5.9)
RETICS # AUTO: 139.9 10E9/L (ref 25–95)
RETICS/RBC NFR AUTO: 2.5 % (ref 0.5–2)
SODIUM SERPL-SCNC: 138 MMOL/L (ref 133–144)
WBC # BLD AUTO: 6.8 10E9/L (ref 4–11)
WBC # BLD AUTO: 7 10E9/L (ref 4–11)

## 2019-07-19 PROCEDURE — 36415 COLL VENOUS BLD VENIPUNCTURE: CPT | Performed by: FAMILY MEDICINE

## 2019-07-19 PROCEDURE — 85045 AUTOMATED RETICULOCYTE COUNT: CPT | Performed by: FAMILY MEDICINE

## 2019-07-19 PROCEDURE — 82728 ASSAY OF FERRITIN: CPT | Performed by: FAMILY MEDICINE

## 2019-07-19 PROCEDURE — 85060 BLOOD SMEAR INTERPRETATION: CPT | Performed by: FAMILY MEDICINE

## 2019-07-19 PROCEDURE — 85025 COMPLETE CBC W/AUTO DIFF WBC: CPT | Performed by: FAMILY MEDICINE

## 2019-07-19 PROCEDURE — 80048 BASIC METABOLIC PNL TOTAL CA: CPT | Performed by: FAMILY MEDICINE

## 2019-07-19 RX ORDER — LISINOPRIL 10 MG/1
10 TABLET ORAL DAILY
Qty: 30 TABLET | Refills: 0 | Status: SHIPPED | OUTPATIENT
Start: 2019-07-19 | End: 2019-08-06 | Stop reason: ALTCHOICE

## 2019-07-19 RX ORDER — HYDROCHLOROTHIAZIDE 25 MG/1
25 TABLET ORAL DAILY
Qty: 90 TABLET | Refills: 0 | Status: SHIPPED | OUTPATIENT
Start: 2019-07-19 | End: 2019-08-06 | Stop reason: ALTCHOICE

## 2019-07-19 NOTE — PROGRESS NOTES
BMP will be done today, is able to be added on to labs drawn today. Has been one year since last done. Chary Peralta R.N.

## 2019-07-19 NOTE — PROGRESS NOTES
"William Bolton is being followed for Blood Pressure management.    NURSING ASSESSMENT/PLAN:  Blood pressure reading today is not at the provider specified goal of <130/80.    Current blood pressure medication(s):  Hydrochlorothiazide 25 mg daily.     1.  The patient will begin: Lisinopril 10 mg daily.     2.  We will not be checking a metabolic lab panel today.     3.  Follow up instructions include:     Next Nurse visit: 2-3 weeks.    SUBJECTIVE:                                                    The patient is taking medication as prescribed and is tolerating well.   Patient is not monitoring Blood Pressure at home.       Out of the following complicating factors: Cough, Headache, Lightheadedness, Shortness of breath, Fatigue, Nausea, Sexual Dysfunction, New onset of swelling or edema, Weakness and New onset of Chest Pain, the patient reports:  None    OBJECTIVE:                                                    Is pulse 55 or greater? - Yes  Pulse Readings from Last 1 Encounters:   07/19/19 70     Today's BP completed using cuff size: large on left side  arm.  BP Readings from Last 3 Encounters:   07/19/19 150/82   06/13/19 108/64   06/12/19 142/82       Current Outpatient Medications   Medication Sig Dispense Refill     hydrochlorothiazide (HYDRODIURIL) 25 MG tablet Take 1 tablet (25 mg) by mouth daily 90 tablet 0     lisinopril (PRINIVIL/ZESTRIL) 10 MG tablet Take 1 tablet (10 mg) by mouth daily 30 tablet 0     B-D LUER-DAVID SYRINGE 23G X 1\" 3 ML MISC U WITH INJECTIONS  2     iron (FERROUS GLUCONATE) 256 (28 Fe) MG tablet Take 1 tablet (100 mg) by mouth daily 90 tablet 0     testosterone cypionate (DEPOTESTOSTERONE) 200 MG/ML injection INJECT 0.8ML INTO THE MUSCLE ONCE EVERY WEEK  1     Potassium   Date Value Ref Range Status   07/17/2018 3.9 3.4 - 5.3 mmol/L Final     Creatinine   Date Value Ref Range Status   07/17/2018 0.98 0.66 - 1.25 mg/dL Final     Urea Nitrogen   Date Value Ref Range Status   07/17/2018 " 11 7 - 30 mg/dL Final     GFR Estimate   Date Value Ref Range Status   07/17/2018 87 >60 mL/min/1.7m2 Final     Comment:     Non  GFR Calc      A baseline potassium, creatinine, BUN, GFR has been done within past 12 months    Education:  general discussion/verbal explanation    These interventions were used: Empathy/Understanding, Collaboration, Evocation, Support autonomy, Permission to raise concern or advise and Open ended questions  Education material provided and patient was given an opportunity to ask questions.    Patient verbalized understanding of this plan and is agreeable.    Professional Reference click here   Copy of current RN HTN MGMT order or refer to Other Orders:    Dosage adjustment made based on patient specific, physician directed, care plan.  Chary Peralta RN

## 2019-07-22 LAB — COPATH REPORT: NORMAL

## 2019-08-05 ENCOUNTER — TELEPHONE (OUTPATIENT)
Dept: FAMILY MEDICINE | Facility: CLINIC | Age: 39
End: 2019-08-05

## 2019-08-05 NOTE — TELEPHONE ENCOUNTER
Patient was scheduled on MA schedule for BP check on 8/6/19 at 2:30pm. Unfortunately, I already have a patient scheduled at that time and cannot double book. Please call patient and offer different time. Thank you.  CELENA LizamaN, RN  [facility]

## 2019-08-05 NOTE — TELEPHONE ENCOUNTER
Left message for pt to come at 3:00 8/6/19 if this time does not to call back and reschedule. Leatha Sauceda CMA

## 2019-08-06 ENCOUNTER — ALLIED HEALTH/NURSE VISIT (OUTPATIENT)
Dept: NURSING | Facility: CLINIC | Age: 39
End: 2019-08-06
Payer: COMMERCIAL

## 2019-08-06 VITALS — SYSTOLIC BLOOD PRESSURE: 130 MMHG | DIASTOLIC BLOOD PRESSURE: 86 MMHG | HEART RATE: 71 BPM | OXYGEN SATURATION: 100 %

## 2019-08-06 DIAGNOSIS — I10 ESSENTIAL HYPERTENSION WITH GOAL BLOOD PRESSURE LESS THAN 130/80: Primary | ICD-10-CM

## 2019-08-06 RX ORDER — LISINOPRIL AND HYDROCHLOROTHIAZIDE 20; 25 MG/1; MG/1
1 TABLET ORAL DAILY
Qty: 90 TABLET | Refills: 0 | Status: SHIPPED | OUTPATIENT
Start: 2019-08-06 | End: 2019-11-14

## 2019-08-06 NOTE — PATIENT INSTRUCTIONS
Start lisinopril-hydrochlorothiazide combination pill. Follow up with nurse only visit in 4-6 weeks.

## 2019-08-06 NOTE — PROGRESS NOTES
"William Bolton is being followed for Blood Pressure management.    NURSING ASSESSMENT/PLAN:  Blood pressure reading today is not at the provider specified goal of <130/80.    Current blood pressure medication(s):  Lisinopril 10mg, hydrochlorothiazide 25mg  1.  The patient will begin: lisinopril-hydrochlorothiazide 20-25mg.     2.  We will not be checking a metabolic lab panel today.      3.  Follow up instructions include:     Next Nurse visit: 1 month - [place] and will need to check work schedule    SUBJECTIVE:                                                    The patient is taking medication as prescribed and is tolerating well.   Patient is not monitoring Blood Pressure at home.     Out of the following complicating factors: Cough, Headache, Lightheadedness, Shortness of breath, Fatigue, Nausea, Sexual Dysfunction, New onset of swelling or edema, Weakness and New onset of Chest Pain, the patient reports:  None    OBJECTIVE:                                                    Is pulse 55 or greater? - Yes  Pulse Readings from Last 1 Encounters:   08/06/19 71     Today's BP completed using cuff size: large on left side  arm.  BP Readings from Last 3 Encounters:   08/06/19 130/86   07/19/19 150/82   06/13/19 108/64       Current Outpatient Medications   Medication Sig Dispense Refill     lisinopril-hydrochlorothiazide (PRINZIDE/ZESTORETIC) 20-25 MG tablet Take 1 tablet by mouth daily 90 tablet 0     B-D LUER-DAVID SYRINGE 23G X 1\" 3 ML MISC U WITH INJECTIONS  2     iron (FERROUS GLUCONATE) 256 (28 Fe) MG tablet Take 1 tablet (100 mg) by mouth daily 90 tablet 0     testosterone cypionate (DEPOTESTOSTERONE) 200 MG/ML injection INJECT 0.8ML INTO THE MUSCLE ONCE EVERY WEEK  1     Potassium   Date Value Ref Range Status   07/19/2019 3.7 3.4 - 5.3 mmol/L Final     Creatinine   Date Value Ref Range Status   07/19/2019 0.88 0.66 - 1.25 mg/dL Final     Urea Nitrogen   Date Value Ref Range Status "   07/19/2019 8 7 - 30 mg/dL Final     GFR Estimate   Date Value Ref Range Status   07/19/2019 >90 >60 mL/min/[1.73_m2] Final     Comment:     Non  GFR Calc  Starting 12/18/2018, serum creatinine based estimated GFR (eGFR) will be   calculated using the Chronic Kidney Disease Epidemiology Collaboration   (CKD-EPI) equation.        A baseline potassium, creatinine, BUN, GFR has been done within past 12 months    Education:  general discussion/verbal explanation  These interventions were used: Permission to raise concern or advise and Open ended questions  Patient was given an opportunity to ask questions.    Patient verbalized understanding of this plan and is agreeable.    Copy of current RN HTN MGMT order or refer to Other Orders    Dosage adjustment made based on patient specific, physician directed, care plan.  CELENA LizamaN, RN

## 2019-08-18 DIAGNOSIS — I10 ESSENTIAL HYPERTENSION WITH GOAL BLOOD PRESSURE LESS THAN 130/80: ICD-10-CM

## 2019-08-19 RX ORDER — LISINOPRIL 10 MG/1
TABLET ORAL
Qty: 30 TABLET | Refills: 0 | OUTPATIENT
Start: 2019-08-19

## 2019-08-19 NOTE — TELEPHONE ENCOUNTER
Denied.  Medication discontinued for alternate therapy from BP nurse visit.    CELENA HaqueN, RN  [employer] [facility]

## 2019-08-19 NOTE — TELEPHONE ENCOUNTER
"Requested Prescriptions   Pending Prescriptions Disp Refills     lisinopril (PRINIVIL/ZESTRIL) 10 MG tablet [Pharmacy Med Name: LISINOPRIL 10MG TABLETS]  Discontinued for alternate therapy on 8/6/2019.  Last Written Prescription Date:  7/19/2019  Last Fill Quantity: 30 tablet,  # refills: 0   Last office visit: 5/30/2019 with prescribing provider:  Cristo     Future Office Visit:         30 tablet 0     Sig: TAKE 1 TABLET(10 MG) BY MOUTH DAILY       ACE Inhibitors (Including Combos) Protocol Failed - 8/18/2019  2:57 PM        Failed - Medication is active on med list        Passed - Blood pressure under 140/90 in past 12 months     BP Readings from Last 3 Encounters:   08/06/19 130/86   07/19/19 150/82   06/13/19 108/64             Passed - Recent (12 mo) or future (30 days) visit within the authorizing provider's specialty     Patient had office visit in the last 12 months or has a visit in the next 30 days with authorizing provider or within the authorizing provider's specialty.  See \"Patient Info\" tab in inbasket, or \"Choose Columns\" in Meds & Orders section of the refill encounter.              Passed - Patient is age 18 or older        Passed - Normal serum creatinine on file in past 12 months     Recent Labs   Lab Test 07/19/19  1430   CR 0.88             Passed - Normal serum potassium on file in past 12 months     Recent Labs   Lab Test 07/19/19  1430   POTASSIUM 3.7             "

## 2019-09-05 PROBLEM — I10 ESSENTIAL HYPERTENSION WITH GOAL BLOOD PRESSURE LESS THAN 130/80: Status: ACTIVE | Noted: 2019-09-05

## 2019-11-14 ENCOUNTER — OFFICE VISIT (OUTPATIENT)
Dept: FAMILY MEDICINE | Facility: CLINIC | Age: 39
End: 2019-11-14
Payer: COMMERCIAL

## 2019-11-14 VITALS
HEART RATE: 73 BPM | BODY MASS INDEX: 33.86 KG/M2 | TEMPERATURE: 97.5 F | HEIGHT: 72 IN | WEIGHT: 250 LBS | SYSTOLIC BLOOD PRESSURE: 144 MMHG | OXYGEN SATURATION: 98 % | DIASTOLIC BLOOD PRESSURE: 84 MMHG

## 2019-11-14 DIAGNOSIS — I10 ESSENTIAL HYPERTENSION WITH GOAL BLOOD PRESSURE LESS THAN 130/80: Primary | ICD-10-CM

## 2019-11-14 DIAGNOSIS — R61 HYPERHIDROSIS: ICD-10-CM

## 2019-11-14 DIAGNOSIS — E78.2 MIXED HYPERLIPIDEMIA: ICD-10-CM

## 2019-11-14 PROCEDURE — 36415 COLL VENOUS BLD VENIPUNCTURE: CPT | Performed by: NURSE PRACTITIONER

## 2019-11-14 PROCEDURE — 80048 BASIC METABOLIC PNL TOTAL CA: CPT | Performed by: NURSE PRACTITIONER

## 2019-11-14 PROCEDURE — 99214 OFFICE O/P EST MOD 30 MIN: CPT | Performed by: NURSE PRACTITIONER

## 2019-11-14 PROCEDURE — 80061 LIPID PANEL: CPT | Performed by: NURSE PRACTITIONER

## 2019-11-14 PROCEDURE — 84443 ASSAY THYROID STIM HORMONE: CPT | Performed by: NURSE PRACTITIONER

## 2019-11-14 RX ORDER — LISINOPRIL AND HYDROCHLOROTHIAZIDE 20; 25 MG/1; MG/1
1 TABLET ORAL DAILY
Qty: 90 TABLET | Refills: 1 | Status: SHIPPED | OUTPATIENT
Start: 2019-11-14 | End: 2020-05-03

## 2019-11-14 ASSESSMENT — MIFFLIN-ST. JEOR: SCORE: 2096.99

## 2019-11-14 NOTE — PATIENT INSTRUCTIONS
Patient Education     Understanding Hyperhidrosis  Hyperhidrosis is excessive sweating. It s often an ongoing (chronic) condition. Sweating is a normal process. It helps manage body temperature and other processes of the body. But excessive sweating is more than is needed to do this. The symptoms can start when you re a child and continue into adulthood.  How to say it  hy-per-hy-DROH-sis   What causes hyperhidrosis?  In most cases, the cause isn t known. It may be because of a problem with how the nervous system responds to stress. In some cases, it may be caused by another health condition or a medicine. This is known as secondary hyperhidrosis.  Symptoms of hyperhidrosis  The main symptom of hyperhidrosis is heavy sweating that:    Can cause problems with daily activities and social events    Happens during the day but not at night    May happen with no physical activity  The sweating occurs most often in any or all of these areas:    Bottoms of the feet    Palms    Underarms  In some cases, it may also occur in these areas:    Face    Groin    Scalp    Under the breasts  Treatment for hyperhidrosis  Treatment choices include:    Antiperspirant. An antiperspirant that has 10% to 15% aluminum chloride can block sweat glands and help stop sweating. It comes in creams, sticks, gels, and sprays. You can buy antiperspirant at a drugstore. Or your healthcare provider may give you a stronger prescription antiperspirant that has 20% aluminum chloride. Antiperspirant should be applied to dry skin at night before bed. It needs to be applied every night for a week or two, and then once or twice a week or as needed. This can irritate the skin for some people.    Botulinum toxin. This is a type of medicine. The medicine is injected into the areas with sweat glands. This can help prevent sweat glands from working normally for a few months. You ll need to get injections every few months.    Iontophoresis. This treatment uses  electricity to block sweat glands. Moist pads are put on the skin, or your hands or feet are placed in shallow water. Chemicals may be added to the water. An electrical current is sent through fluid. The process is done several times a week until sweating is reduced, and then once a week or as advised.    Surgery. In severe cases, surgery can be done to remove your sweat glands. Or surgery can be done to cut the nerves that send signals to the sweat glands. Either of these types of surgery can stop sweat permanently.  But this can lead to compensatory hyperhidrosis. This means you will start sweating from another part of your body.    Treating another health condition, or changing a medicine. A health condition or a medicine can cause secondary hyperhidrosis. This can be managed by treating the health condition, or by a change in medicine. Your healthcare provider will talk with you about these options if you have secondary hyperhidrosis.    Oral medicines. There are medicines that can be taken by mouth that will help reduce sweating.  Possible complications of hyperhidrosis  You may have skin problems in the areas where you sweat. The skin may become moist, pale, swollen, and soft enough to rub away easily. This is known as skin maceration. It can lead to loss of skin, pain, and skin infection. You can help prevent this problem by treating your hyperhidrosis and keeping your skin dry as much as possible.  Living with hyperhidrosis  Hyperhidrosis may be caused by or made worse by emotional stress and heat. It can also cause problems with work and social life. You may have stains on your clothes, and not want to shake hands with people. It can be upsetting to cope with the problems of excess sweat. Talk with your healthcare provider about the following:    Support groups    How to prevent skin maceration    Other ways to manage your condition long-term  When to call your healthcare provider  Call your healthcare  provider right away if you have any of these:    Symptoms that don t get better, or get worse    New symptoms   Date Last Reviewed: 5/1/2016 2000-2018 [employer]. [address] [place] [address], [place] [address]. All rights reserved. This information is not intended as a substitute for professional medical care. Always follow your healthcare professional's instructions.

## 2019-11-15 ENCOUNTER — MYC MEDICAL ADVICE (OUTPATIENT)
Dept: FAMILY MEDICINE | Facility: CLINIC | Age: 39
End: 2019-11-15

## 2019-11-15 DIAGNOSIS — E78.5 HYPERLIPIDEMIA, UNSPECIFIED HYPERLIPIDEMIA TYPE: Primary | ICD-10-CM

## 2019-11-15 DIAGNOSIS — I10 ESSENTIAL HYPERTENSION WITH GOAL BLOOD PRESSURE LESS THAN 130/80: ICD-10-CM

## 2019-11-15 LAB
ANION GAP SERPL CALCULATED.3IONS-SCNC: 8 MMOL/L (ref 3–14)
BUN SERPL-MCNC: 12 MG/DL (ref 7–30)
CALCIUM SERPL-MCNC: 9.8 MG/DL (ref 8.5–10.1)
CHLORIDE SERPL-SCNC: 101 MMOL/L (ref 94–109)
CHOLEST SERPL-MCNC: 343 MG/DL
CO2 SERPL-SCNC: 27 MMOL/L (ref 20–32)
CREAT SERPL-MCNC: 0.94 MG/DL (ref 0.66–1.25)
GFR SERPL CREATININE-BSD FRML MDRD: >90 ML/MIN/{1.73_M2}
GLUCOSE SERPL-MCNC: 94 MG/DL (ref 70–99)
HDLC SERPL-MCNC: 57 MG/DL
LDLC SERPL CALC-MCNC: 255 MG/DL
NONHDLC SERPL-MCNC: 286 MG/DL
POTASSIUM SERPL-SCNC: 3.9 MMOL/L (ref 3.4–5.3)
SODIUM SERPL-SCNC: 136 MMOL/L (ref 133–144)
TRIGL SERPL-MCNC: 156 MG/DL
TSH SERPL DL<=0.005 MIU/L-ACNC: 0.95 MU/L (ref 0.4–4)

## 2019-11-15 RX ORDER — ROSUVASTATIN CALCIUM 20 MG/1
20 TABLET, COATED ORAL DAILY
Qty: 30 TABLET | Refills: 6 | Status: SHIPPED | OUTPATIENT
Start: 2019-11-15 | End: 2020-05-14

## 2019-11-15 NOTE — TELEPHONE ENCOUNTER
"Requested Prescriptions   Pending Prescriptions Disp Refills     hydrochlorothiazide (HYDRODIURIL) 12.5 MG tablet [Pharmacy Med Name: HYDROCHLOROTHIAZIDE 12.5MG TABLETS]  Discontinued for alternate therapy.  Last Written Prescription Date:  5/30/2019  Last Fill Quantity: 30 tablet,  # refills: 0   Last office visit: 11/14/2019 with prescribing provider:  Todd     Future Office Visit:       30 tablet 0     Sig: TAKE 1 TABLET(12.5 MG) BY MOUTH DAILY       Diuretics (Including Combos) Protocol Failed - 11/15/2019  4:14 AM        Failed - Blood pressure under 140/90 in past 12 months     BP Readings from Last 3 Encounters:   11/14/19 (!) 144/84   08/06/19 130/86   07/19/19 150/82                 Failed - Medication is active on med list        Passed - Recent (12 mo) or future (30 days) visit within the authorizing provider's specialty     Patient has had an office visit with the authorizing provider or a provider within the authorizing providers department within the previous 12 mos or has a future within next 30 days. See \"Patient Info\" tab in inbasket, or \"Choose Columns\" in Meds & Orders section of the refill encounter.              Passed - Patient is age 18 or older        Passed - Normal serum creatinine on file in past 12 months     Recent Labs   Lab Test 07/19/19  1430   CR 0.88              Passed - Normal serum potassium on file in past 12 months     Recent Labs   Lab Test 07/19/19  1430   POTASSIUM 3.7                    Passed - Normal serum sodium on file in past 12 months     Recent Labs   Lab Test 07/19/19  1430                           hydrochlorothiazide (HYDRODIURIL) 25 MG tablet [Pharmacy Med Name: HYDROCHLOROTHIAZIDE 25MG TABLETS]  Discontinued for alternate therapy.  Last Written Prescription Date:  7/19/2019  Last Fill Quantity: 90 tablet,  # refills: 0   Last office visit: 11/14/2019 with prescribing provider:  Todd     Future Office Visit:       30 tablet 0     Sig: TAKE 1 TABLET(25 MG) " "BY MOUTH DAILY       Diuretics (Including Combos) Protocol Failed - 11/15/2019  4:14 AM        Failed - Blood pressure under 140/90 in past 12 months     BP Readings from Last 3 Encounters:   11/14/19 (!) 144/84   08/06/19 130/86   07/19/19 150/82                 Failed - Medication is active on med list        Passed - Recent (12 mo) or future (30 days) visit within the authorizing provider's specialty     Patient has had an office visit with the authorizing provider or a provider within the authorizing providers department within the previous 12 mos or has a future within next 30 days. See \"Patient Info\" tab in inbasket, or \"Choose Columns\" in Meds & Orders section of the refill encounter.              Passed - Patient is age 18 or older        Passed - Normal serum creatinine on file in past 12 months     Recent Labs   Lab Test 07/19/19  1430   CR 0.88              Passed - Normal serum potassium on file in past 12 months     Recent Labs   Lab Test 07/19/19  1430   POTASSIUM 3.7                    Passed - Normal serum sodium on file in past 12 months     Recent Labs   Lab Test 07/19/19  1430                 "

## 2019-11-15 NOTE — RESULT ENCOUNTER NOTE
TSO3 message sent, please call with results as well.      Dear William,     -LDL(bad) cholesterol level continues to be very elevated, and your triglycerides are elevated which can increase your heart disease risk.  A diet high in fat and simple carbohydrates, genetics and being overweight can contribute to this. ADVISE: exercising 150 minutes of aerobic exercise per week (30 minutes for 5 days per week or 50 minutes for 3 days per week are options), eating a low saturated fat/low carbohydrate diet, and omega-3 fatty acids (fish oil) 2143-8695 mg daily are helpful to improve this.     Current guidelines from the American Heart Association support starting a cholesterol lowering medication to lower your heart and stroke disease risk.  I am sending a prescription to your pharmacy: rosuvastatin(Crestor) 20 mg each evening.  You can call your pharmacy to let them know you would like your prescription filled and if you have concerns about this recommendation then please contact me. In 6 months, you should recheck your fasting cholesterol panel by scheduling a lab-only appointment.    -Kidney function is normal (Cr, GFR), Sodium is normal, Potassium is normal, Calcium is normal, Glucose is normal.     -TSH (thyroid stimulating hormone) level is normal which indicates normal thyroid function.      Please send a TSO[employer] message or call [phone]  if you have any questions.      Kezia Brooks, ODALIS, CNP  [facility]    If you have further questions about the interpretation of your labs, [email or web address] is a good website to check out for further information.

## 2019-11-18 RX ORDER — HYDROCHLOROTHIAZIDE 12.5 MG/1
TABLET ORAL
Qty: 30 TABLET | Refills: 0
Start: 2019-11-18

## 2019-11-18 RX ORDER — HYDROCHLOROTHIAZIDE 25 MG/1
TABLET ORAL
Qty: 30 TABLET | Refills: 0
Start: 2019-11-18

## 2019-11-18 NOTE — TELEPHONE ENCOUNTER
Both medications denied. Patient is in lisinopril-hydrochlorothiazide (PRINZIDE/ZESTORETIC) 20-25 MG. Will route not to pharmacy. Chary Peralta R.N.

## 2020-03-02 ENCOUNTER — HEALTH MAINTENANCE LETTER (OUTPATIENT)
Age: 40
End: 2020-03-02

## 2020-05-01 DIAGNOSIS — I10 ESSENTIAL HYPERTENSION WITH GOAL BLOOD PRESSURE LESS THAN 130/80: ICD-10-CM

## 2020-05-01 NOTE — TELEPHONE ENCOUNTER
LOV was 11/14/19--he is due in May. He was not in range at last appointment and was to follow up with RN on about 12/14/2019 and did not. Therefore he is now due to be seen by provider. Will route to provider to advise if okay to wait until more openings and give 30-90 luis refill, or some sort of virtual appointment in next week.     Kezia, please then route to  to schedule appointment after you review.     Thank you,  Chary Peralta R.N.

## 2020-05-03 RX ORDER — LISINOPRIL AND HYDROCHLOROTHIAZIDE 20; 25 MG/1; MG/1
1 TABLET ORAL DAILY
Qty: 90 TABLET | Refills: 0 | Status: SHIPPED | OUTPATIENT
Start: 2020-05-03 | End: 2020-05-14

## 2020-05-08 DIAGNOSIS — E78.5 HYPERLIPIDEMIA, UNSPECIFIED HYPERLIPIDEMIA TYPE: ICD-10-CM

## 2020-05-08 LAB
CHOLEST SERPL-MCNC: 214 MG/DL
HDLC SERPL-MCNC: 66 MG/DL
LDLC SERPL CALC-MCNC: 119 MG/DL
NONHDLC SERPL-MCNC: 148 MG/DL
TRIGL SERPL-MCNC: 144 MG/DL

## 2020-05-08 PROCEDURE — 80061 LIPID PANEL: CPT | Performed by: NURSE PRACTITIONER

## 2020-05-08 PROCEDURE — 36415 COLL VENOUS BLD VENIPUNCTURE: CPT | Performed by: NURSE PRACTITIONER

## 2020-05-11 NOTE — RESULT ENCOUNTER NOTE
Dear William,     -Cholesterol levels are getting closer to your goal levels and have greatly improved.  ADVISE: continuing your medication, a regular exercise program with at least 150 minutes of aerobic exercise per week, and eating a low saturated fat/low carbohydrate diet.  Also, you should recheck this fasting cholesterol panel in 12 months.      Please send a [email or web address] message or call [phone]  if you have any questions.      Kezia Brooks, ODALIS, CNP  [facility]    If you have further questions about the interpretation of your labs, [email or web address] is a good website to check out for further information.

## 2020-05-14 ENCOUNTER — VIRTUAL VISIT (OUTPATIENT)
Dept: FAMILY MEDICINE | Facility: CLINIC | Age: 40
End: 2020-05-14
Payer: COMMERCIAL

## 2020-05-14 DIAGNOSIS — E78.5 HYPERLIPIDEMIA, UNSPECIFIED HYPERLIPIDEMIA TYPE: ICD-10-CM

## 2020-05-14 DIAGNOSIS — G47.33 OSA (OBSTRUCTIVE SLEEP APNEA): ICD-10-CM

## 2020-05-14 DIAGNOSIS — I10 ESSENTIAL HYPERTENSION WITH GOAL BLOOD PRESSURE LESS THAN 130/80: ICD-10-CM

## 2020-05-14 PROCEDURE — 99214 OFFICE O/P EST MOD 30 MIN: CPT | Mod: TEL | Performed by: NURSE PRACTITIONER

## 2020-05-14 RX ORDER — ROSUVASTATIN CALCIUM 20 MG/1
20 TABLET, COATED ORAL DAILY
Qty: 90 TABLET | Refills: 3 | Status: SHIPPED | OUTPATIENT
Start: 2020-05-14 | End: 2020-09-17

## 2020-05-14 RX ORDER — LISINOPRIL AND HYDROCHLOROTHIAZIDE 20; 25 MG/1; MG/1
1 TABLET ORAL DAILY
Qty: 90 TABLET | Refills: 1 | Status: SHIPPED | OUTPATIENT
Start: 2020-05-14 | End: 2020-07-30

## 2020-05-14 ASSESSMENT — PATIENT HEALTH QUESTIONNAIRE - PHQ9: SUM OF ALL RESPONSES TO PHQ QUESTIONS 1-9: 0

## 2020-05-14 NOTE — PROGRESS NOTES
"William Bolton is a 40 year old male who is being evaluated via a billable telephone visit.      The patient has been notified of following:     \"This telephone visit will be conducted via a call between you and your physician/provider. We have found that certain health care needs can be provided without the need for a physical exam.  This service lets us provide the care you need with a short phone conversation.  If a prescription is necessary we can send it directly to your pharmacy.  If lab work is needed we can place an order for that and you can then stop by our lab to have the test done at a later time.    Telephone visits are billed at different rates depending on your insurance coverage. During this emergency period, for some insurers they may be billed the same as an in-person visit.  Please reach out to your insurance provider with any questions.    If during the course of the call the physician/provider feels a telephone visit is not appropriate, you will not be charged for this service.\"    Patient has given verbal consent for Telephone visit?  Yes    What phone number would you like to be contacted at? [phone]    How would you like to obtain your AVS? Ruben Hopkins     William Bolton is a 40 year old male who presents to clinic today for the following health issues:    HPI     Hypertension Follow-up      Do you check your blood pressure regularly outside of the clinic? No     Doesn't feel like it has been elevated.      Denies headache, vision changes, chest pain.          Are you following a low salt diet? Yes    Are your blood pressures ever more than 140 on the top number (systolic) OR more   than 90 on the bottom number (diastolic), for example 140/90? No      How many servings of fruits and vegetables do you eat daily?  2-3    On average, how many sweetened beverages do you drink each day (Examples: soda, juice, sweet tea, etc.  Do NOT count diet or artificially sweetened beverages)?   " 1    How many days per week do you exercise enough to make your heart beat faster? 3 or less    How many minutes a day do you exercise enough to make your heart beat faster? 20 - 29    How many days per week do you miss taking your medication? 0    Recent Labs   Lab Test 05/08/20  0909 11/14/19  1142   CHOL 214* 343*   HDL 66 57   * 255*   TRIG 144 156*     Is doing well with Rosuvastatin 20 mg daily.        4 children at home currently.    Fostering 1 child.      Exercise:  Slightly more at home.      FMH:    Father with possible aneurysm - though patient is unsure regarding this - needs to get more information on this.      Patient Active Problem List   Diagnosis     Mixed hyperlipidemia     ADD (attention deficit disorder) without hyperactivity     Major depressive disorder, recurrent episode, mild (H)     Non morbid obesity due to excess calories     Abnormal bone xray - sclerosis of phalynx of right great toe 7/31/17 - suggest repeat imaging at annual visit.     NATHAN (obstructive sleep apnea)     Nocturnal hypoxemia     Anxiety     Obesity (BMI 35.0-39.9) with comorbidity (H)     Adenomatous polyp of descending colon     Essential hypertension with goal blood pressure less than 130/80     Past Surgical History:   Procedure Laterality Date     COLONOSCOPY  06/13/2019    Dr. Navarro [facility]     ESOPHAGOSCOPY, GASTROSCOPY, DUODENOSCOPY (EGD), COMBINED N/A 5/23/2019    Procedure: ESOPHAGOGASTRODUODENOSCOPY, WITH BIOPSY;  Surgeon: Say Navarro MD;  Location:  GI     HEAD & NECK SURGERY      wisdom teeth removed     SURGICAL HISTORY OF -          Social History     Tobacco Use     Smoking status: Never Smoker     Smokeless tobacco: Never Used   Substance Use Topics     Alcohol use: Yes     Comment: 3 x week - 2 drinks     Family History   Problem Relation Age of Onset     Coronary Artery Disease Paternal Grandfather      Hypertension Father      Other - See Comments Father         primary sclerosing  "cholangitis     Hyperlipidemia Mother      Hypertension Sister      Colon Cancer Cousin          Current Outpatient Medications   Medication Sig Dispense Refill     aluminum chloride (DRYSOL) 20 % external solution Apply topically At Bedtime Apply once daily at bedtime; once excessive sweating has stopped, may decrease to once or twice weekly, or as needed. Wash treated area in the morning. 60 mL 3     B-D LUER-DAVID SYRINGE 23G X 1\" 3 ML MISC U WITH INJECTIONS  2     lisinopril-hydrochlorothiazide (ZESTORETIC) 20-25 MG tablet TAKE 1 TABLET BY MOUTH DAILY 90 tablet 0     rosuvastatin (CRESTOR) 20 MG tablet Take 1 tablet (20 mg) by mouth daily 30 tablet 6     testosterone cypionate (DEPOTESTOSTERONE) 200 MG/ML injection INJECT 0.8ML INTO THE MUSCLE ONCE EVERY WEEK  1     No Known Allergies    Reviewed and updated as needed this visit by Provider         Review of Systems   Constitutional, HEENT, cardiovascular, pulmonary, gi and gu systems are negative, except as otherwise noted.       Objective   Reported vitals:  There were no vitals taken for this visit.   General:  healthy, alert and no distress  PSYCH: Alert and oriented times 3; coherent speech, normal   rate and volume, able to articulate logical thoughts, able   to abstract reason, no tangential thoughts, no hallucinations   or delusions  His affect is normal  RESP: No cough, no audible wheezing, able to talk in full sentences  Remainder of exam unable to be completed due to telephone visits        Assessment/Plan:  Diagnoses and all orders for this visit:    Essential hypertension with goal blood pressure less than 130/80  Stable, refill completed.    -     lisinopril-hydrochlorothiazide (ZESTORETIC) 20-25 MG tablet; Take 1 tablet by mouth daily    NATHAN (obstructive sleep apnea)  [facility] [place], last seen in February 2018.      Hyperlipidemia, unspecified hyperlipidemia type  Lab Results   Component Value Date    CHOL 214 05/08/2020 "    CHOL 343 11/14/2019     Lab Results   Component Value Date    HDL 66 05/08/2020    HDL 57 11/14/2019     Lab Results   Component Value Date     05/08/2020     11/14/2019     Lab Results   Component Value Date    TRIG 144 05/08/2020    TRIG 156 11/14/2019     No results found for: CHOLHDCATRACHITA  Noted improvement in LDL with initiation of Rosuvastatin, 20 mg daily.  Repeat fasting lipids in 6 months.    -     rosuvastatin (CRESTOR) 20 MG tablet; Take 1 tablet (20 mg) by mouth daily        No follow-ups on file.      Phone call duration:  13 minutes  10:03 a.m. to 10:16 a.m.       Kezia Brooks, ODALIS CNP

## 2020-07-18 NOTE — PROGRESS NOTES
30 DAY [facility] VISIT    Subjective measures:   Pt reports benefit from using device, however he is having some insomnia now.     Assessment: Pt meeting objective benchmarks.  Patient meeting subjective benchmarks. He states that it now takes him longer to fall asleep (30-45 minutes)  Action plan:   Patient has a follow up visit with Dr. Rodriguez on 2/9/2018.   Pt will discuss insomnia with his provider at up coming appt if he continues to struggle with this.   Device type: Auto-CPAP  PAP settings: CPAP min 5 cm  H20     CPAP max 15 cm  H20    95th% pressure 11.1 cm  H20   Objective measures: 14 day rolling measures      Compliance  78 %      Leak  11.72 lpm  last  upload      AHI 1.98   last  upload      Average number of minutes 342    Diagnostic AHI:   7.3        Objective measure goal  Compliance   Goal >70%  Leak   Goal < 24 lpm  AHI  Goal < 5  Usage  Goal >240  
Opt out

## 2020-07-30 DIAGNOSIS — R93.7 ABNORMAL X-RAY OF BONE: Primary | ICD-10-CM

## 2020-07-30 DIAGNOSIS — I10 ESSENTIAL HYPERTENSION WITH GOAL BLOOD PRESSURE LESS THAN 130/80: ICD-10-CM

## 2020-07-30 RX ORDER — LISINOPRIL AND HYDROCHLOROTHIAZIDE 20; 25 MG/1; MG/1
1 TABLET ORAL DAILY
Qty: 90 TABLET | Refills: 1 | Status: SHIPPED | OUTPATIENT
Start: 2020-07-30 | End: 2020-09-15

## 2020-07-30 NOTE — LETTER
[facility]  [address] [facility] [id number] [phone]  August 7, 2020    William Bolton  [address] [facility] [address]    Dear William,    We received a refill request from your pharmacy for your blood pressure medication.  At this time the nurses were able to give you a luis refill, but you are due to be seen for a few things before the next refill.  Per a note from Dr. Villa, he wants you to come in for an appointment to check your vitals, do some blood work, and also do an xray of your left foot.  He said if your blood work looks good and your blood pressure is at goal, he can refill your medication for a full year.  Let me know your availability and I will get back to you with some options.    Please call us at [phone] (or use [employer] to address the above recommendations.     Thank you for trusting [facility] and we appreciate the opportunity to serve you.  We look forward to supporting your healthcare needs in the future.    Healthy Regards,     [facility] Care Team  On behalf of   Juan Antonio Villa Jr, MD

## 2020-07-30 NOTE — TELEPHONE ENCOUNTER
Refill given for 90 days.  William needs a BMP, BP recheck and a left foot x-ray to follow sclerotic area of bone seen on x-ray in July 2017.  He was supposed to have this two years ago, but looks like didn't follow up.  I placed orders for BMP and x-ray.  He can make a vitals & lab visit to get all three items done.  If BP is at goal and BMP OK, could then give 1 year refills of his BP meds.  Please call patient.     Chart reviewed.  Rx sent to pt's preferred pharmacy.       [facility] - [address] [id number] [address] [facility] [address] [facility] [address] [facility]    Juan Antonio Villa MD

## 2020-07-30 NOTE — TELEPHONE ENCOUNTER
Routing refill request to provider for review/approval because:  Labs out of range:  BP  Latesha Alfred RN, BSN  [facility]

## 2020-07-31 NOTE — TELEPHONE ENCOUNTER
Left message for patient to call and schedule   Xray  Lab  Vitals check with MA  Please assist him in scheduling if he calls back.    Cookie Early MA

## 2020-08-03 ENCOUNTER — MYC MEDICAL ADVICE (OUTPATIENT)
Dept: FAMILY MEDICINE | Facility: CLINIC | Age: 40
End: 2020-08-03

## 2020-09-14 ENCOUNTER — MYC MEDICAL ADVICE (OUTPATIENT)
Dept: FAMILY MEDICINE | Facility: CLINIC | Age: 40
End: 2020-09-14

## 2020-09-14 NOTE — PROGRESS NOTES
"Pre-Visit Planning     Chart review: Last seen by our office on 5/14/20 via telephone visit for hypertension follow up. He requested a refill of his medications on 7/30/20 at which time it was recommended that he be seen for follow up: Juan Antonio Villa Jr., MD    Note      Refill given for 90 days.  William needs a BMP, BP recheck and a left foot x-ray to follow sclerotic area of bone seen on x-ray in July 2017.  He was supposed to have this two years ago, but looks like didn't follow up.  I placed orders for BMP and x-ray.  He can make a vitals & lab visit to get all three items done.  If BP is at goal and BMP OK, could then give 1 year refills of his BP meds.          Future Appointments   Date Time Provider Department Center   9/15/2020  2:00 PM Kzeia Brooks APRN CNP [facility]     Arrival Time for this Appointment:  1:40 PM   Appointment Notes for this encounter:   Medication check for both medications    Questionnaires Reviewed/Assigned  No additional questionnaires are needed      Communicated with patient via [employer]. Are there any additional questions or concerns you'd like to review with your provider during your visit? Yes: would like a flu shot.    Patient does have additional questions or concerns. Adjusted Appointment Notes.       Visit is not preventive.    Meds  Is there anything on your medication list that needs to be updated? No    Current Outpatient Medications   Medication     aluminum chloride (DRYSOL) 20 % external solution     B-D LUER-DAVID SYRINGE 23G X 1\" 3 ML MISC     lisinopril-hydrochlorothiazide (ZESTORETIC) 20-25 MG tablet     rosuvastatin (CRESTOR) 20 MG tablet     testosterone cypionate (DEPOTESTOSTERONE) 200 MG/ML injection     No current facility-administered medications for this visit.      Which pharmacy do you prefer to use for medications during this visit if needed? [facility] [address]    Do you need refills on any of your medications? No    Health Maintenance Due   Topic " Date Due     ANNUAL REVIEW OF HM ORDERS  1982     HIV SCREENING  02/11/1997     PREVENTIVE CARE VISIT  07/17/2019     INFLUENZA VACCINE (1) 09/01/2020     Patient is due for:  See health maintenance above    MyChart  Patient is active on [employer].    Questionnaire Review   Offered information on completing questionnaires via [employer].    CELENA LizamaN, RN  [facility]

## 2020-09-15 ENCOUNTER — ANCILLARY PROCEDURE (OUTPATIENT)
Dept: GENERAL RADIOLOGY | Facility: CLINIC | Age: 40
End: 2020-09-15
Attending: NURSE PRACTITIONER
Payer: COMMERCIAL

## 2020-09-15 ENCOUNTER — OFFICE VISIT (OUTPATIENT)
Dept: FAMILY MEDICINE | Facility: CLINIC | Age: 40
End: 2020-09-15
Payer: COMMERCIAL

## 2020-09-15 VITALS
HEART RATE: 64 BPM | TEMPERATURE: 98 F | HEIGHT: 72 IN | BODY MASS INDEX: 34.4 KG/M2 | SYSTOLIC BLOOD PRESSURE: 118 MMHG | OXYGEN SATURATION: 98 % | DIASTOLIC BLOOD PRESSURE: 76 MMHG | WEIGHT: 254 LBS

## 2020-09-15 DIAGNOSIS — E66.01 MORBID OBESITY (H): ICD-10-CM

## 2020-09-15 DIAGNOSIS — R93.7 ABNORMAL X-RAY OF BONE: ICD-10-CM

## 2020-09-15 DIAGNOSIS — E78.5 HYPERLIPIDEMIA, UNSPECIFIED HYPERLIPIDEMIA TYPE: ICD-10-CM

## 2020-09-15 DIAGNOSIS — E78.2 MIXED HYPERLIPIDEMIA: ICD-10-CM

## 2020-09-15 DIAGNOSIS — R20.2 PARESTHESIA OF FINGER: ICD-10-CM

## 2020-09-15 DIAGNOSIS — I10 ESSENTIAL HYPERTENSION WITH GOAL BLOOD PRESSURE LESS THAN 130/80: Primary | ICD-10-CM

## 2020-09-15 PROCEDURE — 90471 IMMUNIZATION ADMIN: CPT | Performed by: NURSE PRACTITIONER

## 2020-09-15 PROCEDURE — 99214 OFFICE O/P EST MOD 30 MIN: CPT | Mod: 25 | Performed by: NURSE PRACTITIONER

## 2020-09-15 PROCEDURE — 73660 X-RAY EXAM OF TOE(S): CPT | Mod: RT

## 2020-09-15 PROCEDURE — 36415 COLL VENOUS BLD VENIPUNCTURE: CPT | Performed by: FAMILY MEDICINE

## 2020-09-15 PROCEDURE — 80061 LIPID PANEL: CPT | Performed by: FAMILY MEDICINE

## 2020-09-15 PROCEDURE — 80048 BASIC METABOLIC PNL TOTAL CA: CPT | Performed by: FAMILY MEDICINE

## 2020-09-15 PROCEDURE — 90472 IMMUNIZATION ADMIN EACH ADD: CPT | Performed by: NURSE PRACTITIONER

## 2020-09-15 PROCEDURE — 90686 IIV4 VACC NO PRSV 0.5 ML IM: CPT | Performed by: NURSE PRACTITIONER

## 2020-09-15 PROCEDURE — 90715 TDAP VACCINE 7 YRS/> IM: CPT | Performed by: NURSE PRACTITIONER

## 2020-09-15 RX ORDER — LISINOPRIL AND HYDROCHLOROTHIAZIDE 20; 25 MG/1; MG/1
1 TABLET ORAL DAILY
Qty: 90 TABLET | Refills: 3 | Status: SHIPPED | OUTPATIENT
Start: 2020-09-15 | End: 2021-09-28

## 2020-09-15 ASSESSMENT — MIFFLIN-ST. JEOR: SCORE: 2110.14

## 2020-09-15 NOTE — Clinical Note
Please call to schedule x-ray only appt for Left foot x-ray (was missed at last appt).  - Sandra, CNP

## 2020-09-15 NOTE — PROGRESS NOTES
"Subjective     William Bolton is a 40 year old male who presents to clinic today for the following health issues:    HPI     Hyperlipidemia Follow-Up      Are you regularly taking any medication or supplement to lower your cholesterol?   Yes- rosuvastatin    Are you having muscle aches or other side effects that you think could be caused by your cholesterol lowering medication?  No  Recent Labs   Lab Test 05/08/20  0909 11/14/19  1142   CHOL 214* 343*   HDL 66 57   * 255*   TRIG 144 156*       Hypertension Follow-up      Do you check your blood pressure regularly outside of the clinic? No     Are you following a low salt diet? Yes    Are your blood pressures ever more than 140 on the top number (systolic) OR more   than 90 on the bottom number (diastolic), for example 140/90? No      How many servings of fruits and vegetables do you eat daily?  2-3    On average, how many sweetened beverages do you drink each day (Examples: soda, juice, sweet tea, etc.  Do NOT count diet or artificially sweetened beverages)?   2    How many days per week do you exercise enough to make your heart beat faster? 3 or less    How many minutes a day do you exercise enough to make your heart beat faster? 10 - 19    How many days per week do you miss taking your medication? 0    Per MD GEOVANY needs foot xray and BMP  History of great toe fracture (right) - 2014.    Injury of left great toe in 2017.      Woke up approximately 1 month ago with left 5th digit with tingling sensation and now has had that on a daily basis.  Left pinky will \"get stuck\" intermittently.    No neck pain.  No left UE pain.      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   Working from home full time.        Review of Systems   Constitutional, HEENT, cardiovascular, pulmonary, gi and gu systems are negative, except as otherwise noted.      Objective    /76   Pulse 64   Temp 98  F (36.7  C) (Tympanic)  "  Ht 1.829 m (6')   Wt 115.2 kg (254 lb)   SpO2 98%   BMI 34.45 kg/m    Body mass index is 34.45 kg/m .  Physical Exam   GENERAL: healthy, alert and no distress  RESP: lungs clear to auscultation - no rales, rhonchi or wheezes  CV: regular rate and rhythm, normal S1 S2, no S3 or S4, no murmur, click or rub, no peripheral edema   NEURO: Normal strength and tone, mentation intact and speech normal  PSYCH: mentation appears normal, affect normal/bright          Assessment & Plan     William was seen today for hypertension.    Diagnoses and all orders for this visit:    Essential hypertension with goal blood pressure less than 130/80  Currently well controlled on Lisinopril-hydrochlorothiazide 20-25 mg daily.    Due for repeat BMP.    -     **Basic metabolic panel FUTURE anytime  -     lisinopril-hydrochlorothiazide (ZESTORETIC) 20-25 MG tablet; Take 1 tablet by mouth daily    Mixed hyperlipidemia    Recent Labs   Lab Test 09/15/20  1446 05/08/20  0909   CHOL 214* 214*   HDL 59 66   * 119*   TRIG 175* 144       Currently on Rosuvastatin 20 mg daily.  Noted improvement with initiation of Rosuvastatin in November 2019, but continues with elevated LDL level. Increase Rosuvastatin to 40 mg daily.    -     Lipid panel reflex to direct LDL Fasting    Abnormal x-ray of bone  History of sclerosis in both right and left great toes, due for repeat imaging.    -     XR Foot Left G/E 3 Views  -     XR Toe Right G/E 2 Views; Future  -     Patient only had repeat right toe xray done in clinic and inadvertently didn't have left foot x-ray completed (as previously ordered and released).      Paresthesia of finger  Intermittent catching of joint  Trigger finger versus nerve injury.   Continue to monitor, with no improvement or worsening would suggest consultation with hand specialist.      Morbid obesity (H)  Co-morbid conditions:  HTN and Hyperlipidemia  Continue to work with patient regarding dietary and lifestyle modifications  to support weight loss.   Encourage 150 mg of exercise each week.      Other orders  -     INFLUENZA VACCINE IM > 6 MONTHS VALENT IIV4 [44967]  -     REVIEW OF HEALTH MAINTENANCE PROTOCOL ORDERS  -     ADMIN 1st VACCINE  -     TDAP, IM (10 - 64 YRS) - Adacel  -     EA ADD'L VACCINE        Return in about 9 months (around 6/15/2021) for Med check.    ODALIS Martin [facility] [place]

## 2020-09-16 LAB
ANION GAP SERPL CALCULATED.3IONS-SCNC: 8 MMOL/L (ref 3–14)
BUN SERPL-MCNC: 10 MG/DL (ref 7–30)
CALCIUM SERPL-MCNC: 9.4 MG/DL (ref 8.5–10.1)
CHLORIDE SERPL-SCNC: 102 MMOL/L (ref 94–109)
CHOLEST SERPL-MCNC: 214 MG/DL
CO2 SERPL-SCNC: 25 MMOL/L (ref 20–32)
CREAT SERPL-MCNC: 0.96 MG/DL (ref 0.66–1.25)
GFR SERPL CREATININE-BSD FRML MDRD: >90 ML/MIN/{1.73_M2}
GLUCOSE SERPL-MCNC: 78 MG/DL (ref 70–99)
HDLC SERPL-MCNC: 59 MG/DL
LDLC SERPL CALC-MCNC: 120 MG/DL
NONHDLC SERPL-MCNC: 155 MG/DL
POTASSIUM SERPL-SCNC: 3.9 MMOL/L (ref 3.4–5.3)
SODIUM SERPL-SCNC: 135 MMOL/L (ref 133–144)
TRIGL SERPL-MCNC: 175 MG/DL

## 2020-09-17 RX ORDER — ROSUVASTATIN CALCIUM 40 MG/1
40 TABLET, COATED ORAL DAILY
Qty: 90 TABLET | Refills: 3 | Status: SHIPPED | OUTPATIENT
Start: 2020-09-17 | End: 2021-09-28

## 2020-09-17 NOTE — RESULT ENCOUNTER NOTE
Mr. Bolton,    -Kidney function is normal (Cr, GFR), Sodium is normal, Potassium is normal, Calcium is normal, Glucose is normal.     If you have further questions about the interpretation of your labs, [email or web address] is a good website to check out for further information.    Please contact the clinic if you have additional questions.  Thank you.    Sincerely,    Juan Antonio Villa MD

## 2020-09-17 NOTE — RESULT ENCOUNTER NOTE
Dear William,     Here is your lipid panel that we discussed.      Please send a Snapwire message or call [phone]  if you have any questions.      ODALIS Martin, CNP  [facility]

## 2020-10-09 ENCOUNTER — MYC MEDICAL ADVICE (OUTPATIENT)
Dept: FAMILY MEDICINE | Facility: CLINIC | Age: 40
End: 2020-10-09

## 2020-10-09 DIAGNOSIS — F98.8 ADD (ATTENTION DEFICIT DISORDER) WITHOUT HYPERACTIVITY: Primary | ICD-10-CM

## 2020-10-12 NOTE — TELEPHONE ENCOUNTER
Please see My Chart message below and advise as appropriate.  CELENA ThompsonN, RN  [employer] [facility]

## 2020-10-13 RX ORDER — DEXTROAMPHETAMINE SACCHARATE, AMPHETAMINE ASPARTATE MONOHYDRATE, DEXTROAMPHETAMINE SULFATE AND AMPHETAMINE SULFATE 5; 5; 5; 5 MG/1; MG/1; MG/1; MG/1
20 CAPSULE, EXTENDED RELEASE ORAL DAILY
Qty: 30 CAPSULE | Refills: 0 | Status: SHIPPED | OUTPATIENT
Start: 2020-10-13 | End: 2020-10-27

## 2020-10-27 ENCOUNTER — E-VISIT (OUTPATIENT)
Dept: FAMILY MEDICINE | Facility: CLINIC | Age: 40
End: 2020-10-27
Payer: COMMERCIAL

## 2020-10-27 DIAGNOSIS — F98.8 ADD (ATTENTION DEFICIT DISORDER) WITHOUT HYPERACTIVITY: ICD-10-CM

## 2020-10-27 PROCEDURE — 99421 OL DIG E/M SVC 5-10 MIN: CPT | Performed by: NURSE PRACTITIONER

## 2020-10-27 RX ORDER — DEXTROAMPHETAMINE SACCHARATE, AMPHETAMINE ASPARTATE MONOHYDRATE, DEXTROAMPHETAMINE SULFATE AND AMPHETAMINE SULFATE 5; 5; 5; 5 MG/1; MG/1; MG/1; MG/1
20 CAPSULE, EXTENDED RELEASE ORAL DAILY
Qty: 30 CAPSULE | Refills: 0 | Status: SHIPPED | OUTPATIENT
Start: 2020-11-10 | End: 2020-10-27

## 2020-10-27 RX ORDER — DEXTROAMPHETAMINE SACCHARATE, AMPHETAMINE ASPARTATE MONOHYDRATE, DEXTROAMPHETAMINE SULFATE AND AMPHETAMINE SULFATE 5; 5; 5; 5 MG/1; MG/1; MG/1; MG/1
20 CAPSULE, EXTENDED RELEASE ORAL DAILY
Qty: 30 CAPSULE | Refills: 0 | Status: SHIPPED | OUTPATIENT
Start: 2020-12-08 | End: 2020-12-15

## 2020-10-27 NOTE — TELEPHONE ENCOUNTER
Provider E-Visit time total (minutes): 8    11:36 a.m. to 11:39 a.m.   12:36 p.m. to 12:41 p.m.

## 2020-12-02 ENCOUNTER — MYC MEDICAL ADVICE (OUTPATIENT)
Dept: FAMILY MEDICINE | Facility: CLINIC | Age: 40
End: 2020-12-02

## 2020-12-12 ASSESSMENT — ANXIETY QUESTIONNAIRES
3. WORRYING TOO MUCH ABOUT DIFFERENT THINGS: NOT AT ALL
GAD7 TOTAL SCORE: 0
7. FEELING AFRAID AS IF SOMETHING AWFUL MIGHT HAPPEN: NOT AT ALL
7. FEELING AFRAID AS IF SOMETHING AWFUL MIGHT HAPPEN: NOT AT ALL
6. BECOMING EASILY ANNOYED OR IRRITABLE: NOT AT ALL
GAD7 TOTAL SCORE: 0
2. NOT BEING ABLE TO STOP OR CONTROL WORRYING: NOT AT ALL
1. FEELING NERVOUS, ANXIOUS, OR ON EDGE: NOT AT ALL
GAD7 TOTAL SCORE: 0
5. BEING SO RESTLESS THAT IT IS HARD TO SIT STILL: NOT AT ALL
4. TROUBLE RELAXING: NOT AT ALL

## 2020-12-12 ASSESSMENT — PATIENT HEALTH QUESTIONNAIRE - PHQ9
SUM OF ALL RESPONSES TO PHQ QUESTIONS 1-9: 0
10. IF YOU CHECKED OFF ANY PROBLEMS, HOW DIFFICULT HAVE THESE PROBLEMS MADE IT FOR YOU TO DO YOUR WORK, TAKE CARE OF THINGS AT HOME, OR GET ALONG WITH OTHER PEOPLE: NOT DIFFICULT AT ALL
SUM OF ALL RESPONSES TO PHQ QUESTIONS 1-9: 0

## 2020-12-13 ASSESSMENT — ANXIETY QUESTIONNAIRES: GAD7 TOTAL SCORE: 0

## 2020-12-13 ASSESSMENT — PATIENT HEALTH QUESTIONNAIRE - PHQ9: SUM OF ALL RESPONSES TO PHQ QUESTIONS 1-9: 0

## 2020-12-14 NOTE — PROGRESS NOTES
Subjective     William Bolton is a 40 year old male who presents to clinic today for the following health issues:    History of Present Illness   He consumes 1 sweetened beverage(s) daily.He exercises with enough effort to increase his heart rate 9 or less minutes per day.  He exercises with enough effort to increase his heart rate 3 or less days per week.   He is taking medications regularly.     MEDICATION follow up of  Adderall      Medication changes: no        Taking medication as prescribed: YES               New side effects: no         Do you feel the medication(s) are helping: YES    Patient reports that he has been doing well with Adderall XR 20 mg daily.  No concerns.  No difficulty with decreased appetite or insomnia.     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    Working at [employer], , working from home.  Is traveling for work as well.          Answers for HPI/ROS submitted by the patient on 12/12/2020   Chronic problems general questions HPI Form  If you checked off any problems, how difficult have these problems made it for you to do your work, take care of things at home, or get along with other people?: Not difficult at all  PHQ9 TOTAL SCORE: 0  SANDOVAL 7 TOTAL SCORE: 0    PHQ 7/17/2018 5/14/2020 12/12/2020   PHQ-9 Total Score 2 0 0   Q9: Thoughts of better off dead/self-harm past 2 weeks Not at all Not at all Not at all     SANDOVAL-7 SCORE 6/5/2017 7/17/2018 12/12/2020   Total Score - - -   Total Score - - 0 (minimal anxiety)   Total Score 3 1 0                Review of Systems   Constitutional, HEENT, cardiovascular, pulmonary, gi and gu systems are negative, except as otherwise noted.      Objective    /72   Pulse 80   Temp 98.3  F (36.8  C) (Tympanic)   Ht 1.829 m (6')   Wt 115.2 kg (254 lb)   SpO2 98%   BMI 34.45 kg/m    Body mass index is 34.45 kg/m .  Physical Exam   GENERAL: healthy, alert and no distress  RESP: lungs clear to  auscultation - no rales, rhonchi or wheezes  CV: regular rate and rhythm, normal S1 S2, no S3 or S4, no murmur  ABDOMEN: soft, nontender, no hepatosplenomegaly, no masses and bowel sounds normal  PSYCH: mentation appears normal, affect normal/bright            Assessment & Plan     Diagnoses and all orders for this visit:    ADD (attention deficit disorder) without hyperactivity  Stable, well controlled on Adderall XR 20 mg daily.    CSA updated at today's visit.    Additional 3 months of prescriptions sent to pharmacy.    -     Discontinue: amphetamine-dextroamphetamine (ADDERALL XR) 20 MG 24 hr capsule; Take 1 capsule (20 mg) by mouth daily  -     Discontinue: amphetamine-dextroamphetamine (ADDERALL XR) 20 MG 24 hr capsule; Take 1 capsule (20 mg) by mouth daily  -     amphetamine-dextroamphetamine (ADDERALL XR) 20 MG 24 hr capsule; Take 1 capsule (20 mg) by mouth daily      Mixed hyperlipidemia  Recent Labs   Lab Test 09/15/20  1446 05/08/20  0909   CHOL 214* 214*   HDL 59 66   * 119*   TRIG 175* 144       Repeat lipid panel in 3 months, currently on Rosuvastatin 40 mg daily.    Dose increased from 20 to 40 mg in September 2020.    -     Lipid panel reflex to direct LDL Fasting; Future      Return in about 6 months (around 6/15/2021) for Med check.    ODALIS Martin [facility]    Answers for HPI/ROS submitted by the patient on 12/12/2020   Chronic problems general questions HPI Form  If you checked off any problems, how difficult have these problems made it for you to do your work, take care of things at home, or get along with other people?: Not difficult at all  PHQ9 TOTAL SCORE: 0  SANDOVAL 7 TOTAL SCORE: 0

## 2020-12-15 ENCOUNTER — OFFICE VISIT (OUTPATIENT)
Dept: FAMILY MEDICINE | Facility: CLINIC | Age: 40
End: 2020-12-15
Payer: COMMERCIAL

## 2020-12-15 VITALS
SYSTOLIC BLOOD PRESSURE: 120 MMHG | WEIGHT: 254 LBS | TEMPERATURE: 98.3 F | DIASTOLIC BLOOD PRESSURE: 72 MMHG | HEART RATE: 80 BPM | HEIGHT: 72 IN | BODY MASS INDEX: 34.4 KG/M2 | OXYGEN SATURATION: 98 %

## 2020-12-15 DIAGNOSIS — E78.2 MIXED HYPERLIPIDEMIA: ICD-10-CM

## 2020-12-15 DIAGNOSIS — F98.8 ADD (ATTENTION DEFICIT DISORDER) WITHOUT HYPERACTIVITY: Primary | ICD-10-CM

## 2020-12-15 PROCEDURE — 99214 OFFICE O/P EST MOD 30 MIN: CPT | Performed by: NURSE PRACTITIONER

## 2020-12-15 RX ORDER — DEXTROAMPHETAMINE SACCHARATE, AMPHETAMINE ASPARTATE MONOHYDRATE, DEXTROAMPHETAMINE SULFATE AND AMPHETAMINE SULFATE 5; 5; 5; 5 MG/1; MG/1; MG/1; MG/1
20 CAPSULE, EXTENDED RELEASE ORAL DAILY
Qty: 30 CAPSULE | Refills: 0 | Status: SHIPPED | OUTPATIENT
Start: 2021-02-02 | End: 2020-12-15

## 2020-12-15 RX ORDER — DEXTROAMPHETAMINE SACCHARATE, AMPHETAMINE ASPARTATE MONOHYDRATE, DEXTROAMPHETAMINE SULFATE AND AMPHETAMINE SULFATE 5; 5; 5; 5 MG/1; MG/1; MG/1; MG/1
20 CAPSULE, EXTENDED RELEASE ORAL DAILY
Qty: 30 CAPSULE | Refills: 0 | Status: SHIPPED | OUTPATIENT
Start: 2021-03-02 | End: 2021-04-23

## 2020-12-15 RX ORDER — DEXTROAMPHETAMINE SACCHARATE, AMPHETAMINE ASPARTATE MONOHYDRATE, DEXTROAMPHETAMINE SULFATE AND AMPHETAMINE SULFATE 5; 5; 5; 5 MG/1; MG/1; MG/1; MG/1
20 CAPSULE, EXTENDED RELEASE ORAL DAILY
Qty: 30 CAPSULE | Refills: 0 | Status: SHIPPED | OUTPATIENT
Start: 2021-01-05 | End: 2020-12-15

## 2020-12-15 ASSESSMENT — MIFFLIN-ST. JEOR: SCORE: 2110.14

## 2020-12-15 NOTE — LETTER
[facility] [place]  12/15/20    Patient: William Bolton  YOB: 1980  Medical Record Number: [id number]  CSN: [id number]                                                                              Non-opioid Controlled Substance Agreement    I understand that my care provider has prescribed a controlled substance to help manage my condition(s). I am taking this medicine to help me function or work. I know this is strong medicine, and that it can cause serious side effects. Controlled substances can be sedating, addicting and may cause a dependency on the drug. They can affect my ability to drive or think, and cause depression. They need to be taken exactly as prescribed. Combining controlled substances with certain medicines or chemicals (such as cocaine, sedatives and tranquilizers, sleeping pills, meth) can be dangerous or even fatal. Also, if I stop controlled substances suddenly, I may have severe withdrawal symptoms.  If not helpful, I may be asked to stop them.    The risks, benefits, and side effects of these medicine(s) were explained to me. I agree that:    1. I will take part in other treatments as advised by my care team. This may be psychiatry or counseling, physical therapy, behavioral therapy, group treatment or a referral to a pain clinic. I will reduce or stop my medicine when my care team tells me to do so.  2. I will take my medicines as prescribed. I will not change the dose or schedule unless my care team tells me to. There will be no refills if I  run out early.   I may be contactedwithout warning and asked to complete a urine drug test or pill count at any time.   3. I will keep all my appointments, and understand this is part of the monitoring of controlled substances. My care team may require an office visit for EVERY controlled substance refill. If I miss appointments or don t follow instructions, my care team may stop my medicine.  4. I will not ask other  providers to prescribe controlled substances, and I will not accept controlled substances from other people. If I need another prescribed controlled substance for a new reason, I will tell my care team within 1 business day.  5. I will use one pharmacy to fill all of my controlled substance prescriptions, and it is up to me to make sure that I do not run out of my medicines on weekends or holidays. If my care team is willing to refill my controlled substance prescription without a visit, I must request refills only during office hours, refills may take up to 3 days to process, and it may take up to 5 to 7 days for my medicine to be mailed and ready at my pharmacy. Prescriptions will not be mailed anywhere except my pharmacy.    6. I am responsible for my prescriptions. If the medicine/prescription is lost or stolen, it will not be replaced. I also agree not to share controlled substance medicines with anyone.              [facility] [place]  12/15/20  Patient:  William Bolton  YOB: 1980  Medical Record Number: [id number]  [facility]: [id number]    7. I agree to not use ANY illegal or recreational drugs. This includes marijuana, cocaine, bath salts or other drugs. I agree not to use alcohol unless my care team says I may. I agree to give urine samples whenever asked. If I don t give a urine sample, the care team may stop my medicine.    8. If I enroll in the [place] Medical Marijuana program, I will tell my care team. I will also sign an agreement to share my medical records with my care team.    9. I will bring in my list of medicines (or my medicine bottles) each time I come to the clinic.   10. I will tell my care team right away if I become pregnant or have a new medical problem treated outside of my regular clinic.  11. I understand that this medicine can affect my thinking and judgment. It may be unsafe for me to drive, use machinery and do dangerous tasks. I will not do any of these  things until I know how the medicine affects me. If my dose changes, I will wait to see how it affects me. I will contact my care team if I have concerns about medicine side effects.    I understand that if I do not follow any of the conditions above, my prescriptions or treatment may be stopped.      I agree that my provider, clinic care team, and pharmacy may work with any city, state or federal law enforcement agency that investigates the misuse, sale, or other diversion of my controlled medicine. I will allow my provider to discuss my care with or share a copy of this agreement with any other treating provider, pharmacy or emergency room where I receive care. I agree to give up (waive) any right of privacy or confidentiality with respect to these consents.   I have read this agreement and have asked questions about anything I did not understand.    ____________________________________________________    ____________  ________  Patient signature                                                         Date      Time    ____________________________________________________     ____________  ________  Witness                                                          Date      Time    ____________________________________________________  Provider signature

## 2020-12-29 ENCOUNTER — E-VISIT (OUTPATIENT)
Dept: URGENT CARE | Facility: URGENT CARE | Age: 40
End: 2020-12-29
Payer: COMMERCIAL

## 2020-12-29 DIAGNOSIS — Z20.822 CLOSE EXPOSURE TO 2019 NOVEL CORONAVIRUS: Primary | ICD-10-CM

## 2020-12-29 PROCEDURE — 99421 OL DIG E/M SVC 5-10 MIN: CPT | Performed by: PHYSICIAN ASSISTANT

## 2020-12-30 DIAGNOSIS — Z20.822 CLOSE EXPOSURE TO 2019 NOVEL CORONAVIRUS: ICD-10-CM

## 2020-12-30 PROCEDURE — U0003 INFECTIOUS AGENT DETECTION BY NUCLEIC ACID (DNA OR RNA); SEVERE ACUTE RESPIRATORY SYNDROME CORONAVIRUS 2 (SARS-COV-2) (CORONAVIRUS DISEASE [COVID-19]), AMPLIFIED PROBE TECHNIQUE, MAKING USE OF HIGH THROUGHPUT TECHNOLOGIES AS DESCRIBED BY CMS-2020-01-R: HCPCS | Performed by: PHYSICIAN ASSISTANT

## 2020-12-30 NOTE — PATIENT INSTRUCTIONS
"  Dear William Bolton,    Based on your exposure to COVID-19 (coronavirus), we would like to test you for this virus. I have placed an order for this test. The best time for testing is 5-7 days after the exposure.    How to schedule:  For all employees or close contacts (except [employer] and [employer] - see below), go to your [employer] home page and scroll down to the section that says  You have an appointment that needs to be scheduled  and click the large green button that says  Schedule Now  and follow the steps to find the next available opening.     If you are unable to complete these steps or if you cannot find any available times, please call [phone] to schedule employee testing.     [employer] employees or close contacts, please call [phone].   [place] ([employer]) employees or close contacts call [phone].    Return to work/school/ guidance:   For people with high risk exposures outside the home    Please let your workplace manager and staffing office know when your quarantine ends.     We can not give you an exact date as it depends on the information below. You can calculate this on your own or work with your manager/staffing office to calculate this. (For example if you were exposed on 10/4, you would have to quarantine for 14 full days. That would be through 10/18. You could return on 10/19.)    Quarantine Guidelines:  Patients (\"contacts\") who have been in close prolonged contact of an infected person(s) (within six feet for at least 15 minutes within a 24 hour period), and remain asymptomatic should enter quarantine based on the following options:    14-day quarantine period (this remains the CDC recommendation for the greatest protection against spread of COVID-19) OR    Minimum 7-day quarantine with negative RT-PCR test collected on day 5 or later OR    10-day quarantine with no test  Quarantine Guideline exceptions are as follows:  ? People whose high-risk exposure was a household " member should always quarantine for 14 days from their last date of exposure  ? Residents of congregate care and congregate living settings should always quarantine for 14 days from their last date of exposure  ? Individuals who work in health care, congregate care, or congregate living should be off work for 14 days from their last date of exposure. Community activities for this group can be resumed based on options above.  ? For people with high risk exposure to an individual they live with it is still recommended to quarantine for 14 days the last date of exposure even if the covid test is negative.     Note: If you have ongoing exposure to the covid positive person, this quarantine period may be more than 14 days. (For example, if you are continued to be exposed to your child who tested positive and cannot isolate from them, then the quarantine of 7-14 days can't start until your child is no longer contagious. This is typically 10 days from onset of the child's symptoms. So the total duration may be 17-24 days in this case.)    You should continue symptom monitoring until day 14 post-exposure. If you develop signs or symptoms of COVID-19, isolate and get tested (even if you have been tested already).    How to quarantine:   Stay home and away from others. Don't go to school or anywhere else. Generally quarantine means staying home from work but there are some exceptions to this. Please contact your workplace.  No hugging, kissing or shaking hands.  Don't let anyone visit.  Cover your mouth and nose with a mask, tissue or washcloth to avoid spreading germs.  Wash your hands and face often. Use soap and water.    What are the symptoms of COVID-19?  The most common symptoms are cough, fever and trouble breathing. Less common symptoms include headache, body aches, fatigue (feeling very tired), chills, sore throat, stuffy or runny nose, diarrhea (loose poop), loss of taste or smell, belly pain, and nausea or vomiting  (feeling sick to your stomach or throwing up).  After 14 days, if you have still don't have symptoms, you likely don't have this virus.  If you develop symptoms, follow these guidelines.  If you're normally healthy: Please start another eVisit.  If you have a serious health problem (like cancer, heart failure, an organ transplant or kidney disease): Call your specialty clinic. Let them know that you might have COVID-19.    Where can I get more information?  [facility] - About COVID-19: [email or web address]  CDC - What to Do If You're Sick: www.cdc.gov/coronavirus/2019-ncov/about/steps-when-sick.html  CDC - Ending Home Isolation: www.cdc.gov/coronavirus/2019-ncov/hcp/disposition-in-home-patients.html  CDC - Caring for Someone: www.cdc.gov/coronavirus/2019-ncov/if-you-are-sick/care-for-someone.html  [facility] clinical trials (COVID-19 research studies): clinicalaffairs.[facility]-clinical-trials  Below are the COVID-19 hotlines at the [facility] ([facility]). Interpreters are available.  For health questions: Call 611-271-8885 or 1-101.141.4067 (7 a.m. to 7 p.m.)  For questions about schools and childcare: Call 881-816-5466 or 1-641.313.8875 (7 a.m. to 7 p.m.)      December 29, 2020  RE:  William Bolton                                                                                                                   [address] [facility] [address]      To whom it may concern:    I evaluated William Bolton on December 29, 2020. William Bolton should be excused from work/school.    They should let their workplace manager and staffing office know when their quarantine ends.    We can not give an exact date as it depends on the information below. They can calculate this on their own or work with their manager/staffing office to calculate this. (For example if they were exposed on 10/04, they would have to quarantine for 14 full days. That would be through 10/18. They could  "return on 10/19.)    Quarantine Guidelines:    Patients (\"contacts\") who have been in close prolonged contact of an infected person(s) (within six feet for at least 15 minutes within a 24 hour period) and remain asymptomatic should enter quarantine based on the following options:      14-day quarantine period (this remains the CDC recommendation for the greatest protection against spread of COVID-19) OR    Minimum 7-day quarantine with negative RT-PCR test collected on day 5 or later OR    10-day quarantine with no test   Quarantine Guideline exceptions are as follows:  ? People whose high-risk exposure was a household member should always quarantine for 14 days from their last date of exposure  ? Residents of congregate care and congregate living settings should always quarantine for 14 days from their last date of exposure  ? Individuals who work in health care, congregate care, or congregate living should be off work for 14 days from their last date of exposure. Community activities for this group can be resumed based on options above.    Note: If there is ongoing exposure to the covid positive person, this quarantine period may be longer than 14 days. (For example, if they are continually exposed to their child, who tested positive and cannot isolate from them, then the quarantine of 7-14 days can't start until their child is no longer contagious. This is typically 10 days from onset to the child's symptoms. So the total duration may be 17-24 days in this case.)    William Bolton should continue symptom monitoring until day 14 post-exposure. If they develop signs or symptoms of COVID-19, they should isolate and get tested (even if they have been tested already).    Sincerely,  Rita Garcia PA-C  "

## 2020-12-31 LAB
SARS-COV-2 RNA SPEC QL NAA+PROBE: NOT DETECTED
SPECIMEN SOURCE: NORMAL

## 2021-03-17 DIAGNOSIS — E78.2 MIXED HYPERLIPIDEMIA: ICD-10-CM

## 2021-03-17 LAB
CHOLEST SERPL-MCNC: 145 MG/DL
HDLC SERPL-MCNC: 56 MG/DL
LDLC SERPL CALC-MCNC: 78 MG/DL
NONHDLC SERPL-MCNC: 89 MG/DL
TRIGL SERPL-MCNC: 53 MG/DL

## 2021-03-17 PROCEDURE — 80061 LIPID PANEL: CPT | Performed by: NURSE PRACTITIONER

## 2021-03-17 PROCEDURE — 36415 COLL VENOUS BLD VENIPUNCTURE: CPT | Performed by: NURSE PRACTITIONER

## 2021-03-18 NOTE — RESULT ENCOUNTER NOTE
Dear William,     -Cholesterol levels are at your goal levels.  ADVISE: continuing your medication, a regular exercise program with at least 150 minutes of aerobic exercise per week, and eating a low saturated fat/low carbohydrate diet.  Also, you should recheck this fasting cholesterol panel in 12 months.      Please send a ConnectionPlus message or call [phone]  if you have any questions.      ODALIS Martin, CNP  [facility]    If you have further questions about the interpretation of your labs, [email or web address] is a good website to check out for further information.

## 2021-04-24 ENCOUNTER — HEALTH MAINTENANCE LETTER (OUTPATIENT)
Age: 41
End: 2021-04-24

## 2021-05-28 ENCOUNTER — VIRTUAL VISIT (OUTPATIENT)
Dept: FAMILY MEDICINE | Facility: CLINIC | Age: 41
End: 2021-05-28
Payer: COMMERCIAL

## 2021-05-28 DIAGNOSIS — F98.8 ADD (ATTENTION DEFICIT DISORDER) WITHOUT HYPERACTIVITY: Primary | ICD-10-CM

## 2021-05-28 DIAGNOSIS — F32.5 MAJOR DEPRESSIVE DISORDER WITH SINGLE EPISODE, IN FULL REMISSION (H): ICD-10-CM

## 2021-05-28 DIAGNOSIS — E66.01 MORBID OBESITY (H): ICD-10-CM

## 2021-05-28 PROCEDURE — 99213 OFFICE O/P EST LOW 20 MIN: CPT | Mod: GT | Performed by: NURSE PRACTITIONER

## 2021-05-28 RX ORDER — DEXTROAMPHETAMINE SACCHARATE, AMPHETAMINE ASPARTATE MONOHYDRATE, DEXTROAMPHETAMINE SULFATE AND AMPHETAMINE SULFATE 5; 5; 5; 5 MG/1; MG/1; MG/1; MG/1
20 CAPSULE, EXTENDED RELEASE ORAL DAILY
Qty: 30 CAPSULE | Refills: 0 | Status: SHIPPED | OUTPATIENT
Start: 2021-06-28 | End: 2021-07-28

## 2021-05-28 RX ORDER — DEXTROAMPHETAMINE SACCHARATE, AMPHETAMINE ASPARTATE MONOHYDRATE, DEXTROAMPHETAMINE SULFATE AND AMPHETAMINE SULFATE 5; 5; 5; 5 MG/1; MG/1; MG/1; MG/1
20 CAPSULE, EXTENDED RELEASE ORAL DAILY
Qty: 30 CAPSULE | Refills: 0 | Status: SHIPPED | OUTPATIENT
Start: 2021-07-29 | End: 2021-08-28

## 2021-05-28 RX ORDER — DEXTROAMPHETAMINE SACCHARATE, AMPHETAMINE ASPARTATE MONOHYDRATE, DEXTROAMPHETAMINE SULFATE AND AMPHETAMINE SULFATE 5; 5; 5; 5 MG/1; MG/1; MG/1; MG/1
20 CAPSULE, EXTENDED RELEASE ORAL DAILY
Qty: 30 CAPSULE | Refills: 0 | Status: SHIPPED | OUTPATIENT
Start: 2021-05-28 | End: 2021-06-27

## 2021-05-28 ASSESSMENT — ANXIETY QUESTIONNAIRES
5. BEING SO RESTLESS THAT IT IS HARD TO SIT STILL: NOT AT ALL
GAD7 TOTAL SCORE: 0
2. NOT BEING ABLE TO STOP OR CONTROL WORRYING: NOT AT ALL
IF YOU CHECKED OFF ANY PROBLEMS ON THIS QUESTIONNAIRE, HOW DIFFICULT HAVE THESE PROBLEMS MADE IT FOR YOU TO DO YOUR WORK, TAKE CARE OF THINGS AT HOME, OR GET ALONG WITH OTHER PEOPLE: NOT DIFFICULT AT ALL
7. FEELING AFRAID AS IF SOMETHING AWFUL MIGHT HAPPEN: NOT AT ALL
6. BECOMING EASILY ANNOYED OR IRRITABLE: NOT AT ALL
3. WORRYING TOO MUCH ABOUT DIFFERENT THINGS: NOT AT ALL
1. FEELING NERVOUS, ANXIOUS, OR ON EDGE: NOT AT ALL

## 2021-05-28 ASSESSMENT — PATIENT HEALTH QUESTIONNAIRE - PHQ9
5. POOR APPETITE OR OVEREATING: NOT AT ALL
SUM OF ALL RESPONSES TO PHQ QUESTIONS 1-9: 0

## 2021-05-28 NOTE — PROGRESS NOTES
William is a 41 year old who is being evaluated via a billable video visit.      How would you like to obtain your AVS? MyChart  If the video visit is dropped, the invitation should be resent by: Text to cell phone: [phone]  Will anyone else be joining your video visit? No    Video Start Time: 9:35 AM    Assessment & Plan      William was seen today for recheck medication.    Diagnoses and all orders for this visit:    ADD (attention deficit disorder) without hyperactivity  Stable, no concerns.    3 months of refills completed.    -     amphetamine-dextroamphetamine (ADDERALL XR) 20 MG 24 hr capsule; Take 1 capsule (20 mg) by mouth daily  -     amphetamine-dextroamphetamine (ADDERALL XR) 20 MG 24 hr capsule; Take 1 capsule (20 mg) by mouth daily  -     amphetamine-dextroamphetamine (ADDERALL XR) 20 MG 24 hr capsule; Take 1 capsule (20 mg) by mouth daily    Major depressive disorder with single episode, in full remission (H)  PHQ 5/14/2020 12/12/2020 5/28/2021   PHQ-9 Total Score 0 0 0   Q9: Thoughts of better off dead/self-harm past 2 weeks Not at all Not at all Not at all     SANDOVAL-7 SCORE 7/17/2018 12/12/2020 5/28/2021   Total Score - - -   Total Score - 0 (minimal anxiety) -   Total Score 1 0 0     In remission, no concerns.      Morbid obesity (H)  Continue to work with patient regarding dietary and lifestyle modifications to support weight loss.          BMI:   Estimated body mass index is 34.45 kg/m  as calculated from the following:    Height as of 12/15/20: 1.829 m (6').    Weight as of 12/15/20: 115.2 kg (254 lb).     Return in about 6 months (around 11/28/2021) for In Clinic, Med check + Preventative Visit, fasting labs.    Kezia Brooks, ODALIS CNP  [facility]     William is a 41 year old who presents for the following health issues:      HPI     Medication Followup of Adderall  Using Monday through Friday for work, less frequently on the weekends.      Taking Medication  as prescribed: yes    Side Effects:  None    Medication Helping Symptoms:  yes       PHQ 5/14/2020 12/12/2020 5/28/2021   PHQ-9 Total Score 0 0 0   Q9: Thoughts of better off dead/self-harm past 2 weeks Not at all Not at all Not at all     SANDOVAL-7 SCORE 7/17/2018 12/12/2020 5/28/2021   Total Score - - -   Total Score - 0 (minimal anxiety) -   Total Score 1 0 0             Review of Systems   Constitutional, HEENT, cardiovascular, pulmonary, gi and gu systems are negative, except as otherwise noted.      Objective           Vitals:  No vitals were obtained today due to virtual visit.    Physical Exam   GENERAL: Healthy, alert and no distress  EYES: Eyes grossly normal to inspection.  No discharge or erythema, or obvious scleral/conjunctival abnormalities.  RESP: No audible wheeze, cough, or visible cyanosis.  No visible retractions or increased work of breathing.    SKIN: Visible skin clear. No significant rash, abnormal pigmentation or lesions.  NEURO: Cranial nerves grossly intact.  Mentation and speech appropriate for age.  PSYCH: Mentation appears normal, affect normal/bright, judgement and insight intact, normal speech and appearance well-groomed.          Video-Visit Details    Type of service:  Video Visit    Video End Time:9:42 AM    Originating Location (pt. Location): Home    Distant Location (provider location):  [facility]     Platform used for Video Visit: [employer]

## 2021-05-29 ASSESSMENT — ANXIETY QUESTIONNAIRES: GAD7 TOTAL SCORE: 0

## 2021-09-17 DIAGNOSIS — F98.8 ADD (ATTENTION DEFICIT DISORDER) WITHOUT HYPERACTIVITY: Primary | ICD-10-CM

## 2021-09-21 RX ORDER — DEXTROAMPHETAMINE SACCHARATE, AMPHETAMINE ASPARTATE MONOHYDRATE, DEXTROAMPHETAMINE SULFATE AND AMPHETAMINE SULFATE 5; 5; 5; 5 MG/1; MG/1; MG/1; MG/1
CAPSULE, EXTENDED RELEASE ORAL
Qty: 30 CAPSULE | Refills: 0 | Status: SHIPPED | OUTPATIENT
Start: 2021-11-16 | End: 2022-04-08

## 2021-09-21 RX ORDER — DEXTROAMPHETAMINE SULFATE, DEXTROAMPHETAMINE SACCHARATE, AMPHETAMINE SULFATE AND AMPHETAMINE ASPARTATE 5; 5; 5; 5 MG/1; MG/1; MG/1; MG/1
CAPSULE, EXTENDED RELEASE ORAL
Qty: 30 CAPSULE | Refills: 0 | Status: SHIPPED | OUTPATIENT
Start: 2021-09-21 | End: 2021-09-21

## 2021-09-21 RX ORDER — DEXTROAMPHETAMINE SACCHARATE, AMPHETAMINE ASPARTATE MONOHYDRATE, DEXTROAMPHETAMINE SULFATE AND AMPHETAMINE SULFATE 5; 5; 5; 5 MG/1; MG/1; MG/1; MG/1
CAPSULE, EXTENDED RELEASE ORAL
Qty: 30 CAPSULE | Refills: 0 | Status: SHIPPED | OUTPATIENT
Start: 2021-10-19 | End: 2021-09-21

## 2021-09-26 DIAGNOSIS — E78.5 HYPERLIPIDEMIA, UNSPECIFIED HYPERLIPIDEMIA TYPE: ICD-10-CM

## 2021-09-26 DIAGNOSIS — I10 ESSENTIAL HYPERTENSION WITH GOAL BLOOD PRESSURE LESS THAN 130/80: ICD-10-CM

## 2021-09-28 RX ORDER — ROSUVASTATIN CALCIUM 40 MG/1
TABLET, COATED ORAL
Qty: 90 TABLET | Refills: 0 | Status: SHIPPED | OUTPATIENT
Start: 2021-09-28 | End: 2021-10-29

## 2021-09-28 RX ORDER — LISINOPRIL AND HYDROCHLOROTHIAZIDE 20; 25 MG/1; MG/1
1 TABLET ORAL DAILY
Qty: 90 TABLET | Refills: 3 | Status: SHIPPED | OUTPATIENT
Start: 2021-09-28 | End: 2021-10-29

## 2021-09-28 NOTE — TELEPHONE ENCOUNTER
Due for visit in 2 months - In Clinic, Med check + Preventative Visit, fasting labs.          Refill completed.            - Sandra, CNP

## 2021-09-28 NOTE — TELEPHONE ENCOUNTER
Routing refill request to provider for review/approval because    Creatinine   Date Value Ref Range Status   09/15/2020 0.96 0.66 - 1.25 mg/dL Final      Potassium   Date Value Ref Range Status   09/15/2020 3.9 3.4 - 5.3 mmol/L Final      Natividad Bob RN

## 2021-10-03 ENCOUNTER — HEALTH MAINTENANCE LETTER (OUTPATIENT)
Age: 41
End: 2021-10-03

## 2021-10-19 ENCOUNTER — MYC MEDICAL ADVICE (OUTPATIENT)
Dept: FAMILY MEDICINE | Facility: CLINIC | Age: 41
End: 2021-10-19

## 2021-10-21 NOTE — TELEPHONE ENCOUNTER
Please see my chart message below     Please review and advise     Thank you     Treva Gillette RN, BSN  [facility]

## 2021-10-21 NOTE — TELEPHONE ENCOUNTER
Please let him know she is out this week. Message will be saved for her review upon her return to discuss at his visit.       BECCA Lopez-BC

## 2021-10-22 NOTE — TELEPHONE ENCOUNTER
My chart message sent     Treva Gillette RN, BSN  [facility]

## 2021-10-29 ENCOUNTER — OFFICE VISIT (OUTPATIENT)
Dept: FAMILY MEDICINE | Facility: CLINIC | Age: 41
End: 2021-10-29
Payer: COMMERCIAL

## 2021-10-29 VITALS
SYSTOLIC BLOOD PRESSURE: 114 MMHG | HEART RATE: 97 BPM | WEIGHT: 264 LBS | RESPIRATION RATE: 16 BRPM | TEMPERATURE: 97.3 F | BODY MASS INDEX: 35.76 KG/M2 | OXYGEN SATURATION: 96 % | DIASTOLIC BLOOD PRESSURE: 86 MMHG | HEIGHT: 72 IN

## 2021-10-29 DIAGNOSIS — N52.9 ERECTILE DYSFUNCTION, UNSPECIFIED ERECTILE DYSFUNCTION TYPE: ICD-10-CM

## 2021-10-29 DIAGNOSIS — Z13.220 SCREENING FOR LIPID DISORDERS: ICD-10-CM

## 2021-10-29 DIAGNOSIS — Z00.00 ROUTINE GENERAL MEDICAL EXAMINATION AT A HEALTH CARE FACILITY: Primary | ICD-10-CM

## 2021-10-29 DIAGNOSIS — E78.5 HYPERLIPIDEMIA, UNSPECIFIED HYPERLIPIDEMIA TYPE: ICD-10-CM

## 2021-10-29 DIAGNOSIS — Z23 HIGH PRIORITY FOR 2019-NCOV VACCINE: ICD-10-CM

## 2021-10-29 DIAGNOSIS — I10 ESSENTIAL HYPERTENSION WITH GOAL BLOOD PRESSURE LESS THAN 130/80: ICD-10-CM

## 2021-10-29 DIAGNOSIS — F98.8 ADD (ATTENTION DEFICIT DISORDER) WITHOUT HYPERACTIVITY: ICD-10-CM

## 2021-10-29 PROCEDURE — 99395 PREV VISIT EST AGE 18-39: CPT | Mod: 25 | Performed by: NURSE PRACTITIONER

## 2021-10-29 PROCEDURE — 99214 OFFICE O/P EST MOD 30 MIN: CPT | Mod: 25 | Performed by: NURSE PRACTITIONER

## 2021-10-29 PROCEDURE — 91301 COVID-19,PF,MODERNA (18+ YRS BOOSTER .25ML): CPT | Performed by: NURSE PRACTITIONER

## 2021-10-29 PROCEDURE — 0064A COVID-19,PF,MODERNA (18+ YRS BOOSTER .25ML): CPT | Performed by: NURSE PRACTITIONER

## 2021-10-29 PROCEDURE — 90471 IMMUNIZATION ADMIN: CPT | Performed by: NURSE PRACTITIONER

## 2021-10-29 PROCEDURE — 90686 IIV4 VACC NO PRSV 0.5 ML IM: CPT | Performed by: NURSE PRACTITIONER

## 2021-10-29 RX ORDER — DEXTROAMPHETAMINE SACCHARATE, AMPHETAMINE ASPARTATE MONOHYDRATE, DEXTROAMPHETAMINE SULFATE AND AMPHETAMINE SULFATE 5; 5; 5; 5 MG/1; MG/1; MG/1; MG/1
20 CAPSULE, EXTENDED RELEASE ORAL DAILY
Qty: 30 CAPSULE | Refills: 0 | Status: SHIPPED | OUTPATIENT
Start: 2021-11-29 | End: 2021-12-29

## 2021-10-29 RX ORDER — DEXTROAMPHETAMINE SACCHARATE, AMPHETAMINE ASPARTATE MONOHYDRATE, DEXTROAMPHETAMINE SULFATE AND AMPHETAMINE SULFATE 5; 5; 5; 5 MG/1; MG/1; MG/1; MG/1
20 CAPSULE, EXTENDED RELEASE ORAL DAILY
Qty: 30 CAPSULE | Refills: 0 | Status: SHIPPED | OUTPATIENT
Start: 2021-12-30 | End: 2022-01-29

## 2021-10-29 RX ORDER — DEXTROAMPHETAMINE SACCHARATE, AMPHETAMINE ASPARTATE, DEXTROAMPHETAMINE SULFATE AND AMPHETAMINE SULFATE 1.25; 1.25; 1.25; 1.25 MG/1; MG/1; MG/1; MG/1
5 TABLET ORAL DAILY
Qty: 30 TABLET | Refills: 0 | Status: SHIPPED | OUTPATIENT
Start: 2021-12-30 | End: 2022-01-29

## 2021-10-29 RX ORDER — LISINOPRIL 20 MG/1
20 TABLET ORAL DAILY
Qty: 90 TABLET | Refills: 3 | Status: SHIPPED | OUTPATIENT
Start: 2021-10-29 | End: 2022-10-13

## 2021-10-29 RX ORDER — ROSUVASTATIN CALCIUM 40 MG/1
40 TABLET, COATED ORAL DAILY
Qty: 90 TABLET | Refills: 3 | Status: SHIPPED | OUTPATIENT
Start: 2021-10-29 | End: 2022-11-01

## 2021-10-29 RX ORDER — DEXTROAMPHETAMINE SACCHARATE, AMPHETAMINE ASPARTATE, DEXTROAMPHETAMINE SULFATE AND AMPHETAMINE SULFATE 1.25; 1.25; 1.25; 1.25 MG/1; MG/1; MG/1; MG/1
5 TABLET ORAL DAILY
Qty: 30 TABLET | Refills: 0 | Status: SHIPPED | OUTPATIENT
Start: 2021-11-29 | End: 2021-12-29

## 2021-10-29 RX ORDER — DEXTROAMPHETAMINE SACCHARATE, AMPHETAMINE ASPARTATE MONOHYDRATE, DEXTROAMPHETAMINE SULFATE AND AMPHETAMINE SULFATE 5; 5; 5; 5 MG/1; MG/1; MG/1; MG/1
20 CAPSULE, EXTENDED RELEASE ORAL DAILY
Qty: 30 CAPSULE | Refills: 0 | Status: SHIPPED | OUTPATIENT
Start: 2021-10-29 | End: 2021-11-28

## 2021-10-29 RX ORDER — DEXTROAMPHETAMINE SACCHARATE, AMPHETAMINE ASPARTATE, DEXTROAMPHETAMINE SULFATE AND AMPHETAMINE SULFATE 1.25; 1.25; 1.25; 1.25 MG/1; MG/1; MG/1; MG/1
5 TABLET ORAL DAILY
Qty: 30 TABLET | Refills: 0 | Status: SHIPPED | OUTPATIENT
Start: 2021-10-29 | End: 2021-11-28

## 2021-10-29 ASSESSMENT — ENCOUNTER SYMPTOMS
FEVER: 0
WEAKNESS: 0
SHORTNESS OF BREATH: 0
HEMATURIA: 0
NAUSEA: 0
DIZZINESS: 0
CHILLS: 0
HEADACHES: 0
DYSURIA: 0
COUGH: 0
FREQUENCY: 0
NERVOUS/ANXIOUS: 0
PALPITATIONS: 0
PARESTHESIAS: 0
EYE PAIN: 0
CONSTIPATION: 0
MYALGIAS: 0
ABDOMINAL PAIN: 0
ARTHRALGIAS: 0
SORE THROAT: 0
DIARRHEA: 0
JOINT SWELLING: 0
HEMATOCHEZIA: 0
HEARTBURN: 0

## 2021-10-29 ASSESSMENT — ANXIETY QUESTIONNAIRES
IF YOU CHECKED OFF ANY PROBLEMS ON THIS QUESTIONNAIRE, HOW DIFFICULT HAVE THESE PROBLEMS MADE IT FOR YOU TO DO YOUR WORK, TAKE CARE OF THINGS AT HOME, OR GET ALONG WITH OTHER PEOPLE: NOT DIFFICULT AT ALL
GAD7 TOTAL SCORE: 0
7. FEELING AFRAID AS IF SOMETHING AWFUL MIGHT HAPPEN: NOT AT ALL
2. NOT BEING ABLE TO STOP OR CONTROL WORRYING: NOT AT ALL
3. WORRYING TOO MUCH ABOUT DIFFERENT THINGS: NOT AT ALL
5. BEING SO RESTLESS THAT IT IS HARD TO SIT STILL: NOT AT ALL
1. FEELING NERVOUS, ANXIOUS, OR ON EDGE: NOT AT ALL
6. BECOMING EASILY ANNOYED OR IRRITABLE: NOT AT ALL

## 2021-10-29 ASSESSMENT — PATIENT HEALTH QUESTIONNAIRE - PHQ9
5. POOR APPETITE OR OVEREATING: NOT AT ALL
SUM OF ALL RESPONSES TO PHQ QUESTIONS 1-9: 0

## 2021-10-29 ASSESSMENT — MIFFLIN-ST. JEOR: SCORE: 2150.5

## 2021-10-29 NOTE — PROGRESS NOTES
"SUBJECTIVE:   CC: William Bolton is an 41 year old male who presents for preventative health visit.         Healthy Habits:     Getting at least 3 servings of Calcium per day:  Yes    Bi-annual eye exam:  NO    Dental care twice a year:  Yes    Sleep apnea or symptoms of sleep apnea:  Sleep apnea    Diet:  Regular (no restrictions)    Frequency of exercise:  1 day/week    Duration of exercise:  15-30 minutes    Taking medications regularly:  Yes    Medication side effects:  Significant flushing    PHQ-2 Total Score: 0    Additional concerns today:  No          Medication Followup of ADD/ Adderall    Taking Medication as prescribed: yes    Side Effects:  None    Medication Helping Symptoms:  Yes    Using Monday through Friday for work, less frequently on the weekends.    Feeling like it is slightly less effective.  Concentration/focus is slightly less.         Hyperlipidemia Follow-Up    Recent Labs   Lab Test 03/17/21  0804 09/15/20  1446   CHOL 145 214*   HDL 56 59   LDL 78 120*   TRIG 53 175*         .Are you regularly taking any medication or supplement to lower your cholesterol?   Yes- Crestor    Are you having muscle aches or other side effects that you think could be caused by your cholesterol lowering medication?  No    Hypertension Follow-up      Do you check your blood pressure regularly outside of the clinic? No     Are you following a low salt diet? Yes    Are your blood pressures ever more than 140 on the top number (systolic) OR more   than 90 on the bottom number (diastolic), for example 140/90? Does not check    ED:      Ruben message:      \"Lisinopril-HCTZ and would like to see if I can switch to a different medication. These are ED related and I hadn t really mentioned much before because I didn t know if it was me just getting older. I am fairly certain it is med related as I did not have any issues before I started. Is this possible as it is really starting to cause some issues\"    Started to " notice ED concerns over the past 6 months (worsening).        Today's PHQ-2 Score:   PHQ-2 ( 1999 [employer]) 10/29/2021   Q1: Little interest or pleasure in doing things 0   Q2: Feeling down, depressed or hopeless 0   PHQ-2 Score 0   Q1: Little interest or pleasure in doing things Not at all   Q2: Feeling down, depressed or hopeless Not at all   PHQ-2 Score 0       Abuse: Current or Past(Physical, Sexual or Emotional)- No  Do you feel safe in your environment? Yes        Social History     Tobacco Use     Smoking status: Never Smoker     Smokeless tobacco: Never Used   Substance Use Topics     Alcohol use: Yes     Comment: 3 x week - 2 drinks     If you drink alcohol do you typically have >3 drinks per day or >7 drinks per week? No    Alcohol Use 10/29/2021   Prescreen: >3 drinks/day or >7 drinks/week? No   Prescreen: >3 drinks/day or >7 drinks/week? -       Last PSA: No results found for: PSA    Reviewed orders with patient. Reviewed health maintenance and updated orders accordingly - Yes  BP Readings from Last 3 Encounters:   10/29/21 114/86   12/15/20 120/72   09/15/20 118/76    Wt Readings from Last 3 Encounters:   10/29/21 119.7 kg (264 lb)   12/15/20 115.2 kg (254 lb)   09/15/20 115.2 kg (254 lb)                  Patient Active Problem List   Diagnosis     Mixed hyperlipidemia     ADD (attention deficit disorder) without hyperactivity     Major depressive disorder with single episode, in full remission (H)     Non morbid obesity due to excess calories     Abnormal bone xray - sclerosis of phalynx of right great toe 7/31/17 - suggest repeat imaging at annual visit.     NATHAN (obstructive sleep apnea)     Nocturnal hypoxemia     Anxiety     Obesity (BMI 35.0-39.9) with comorbidity (H)     Adenomatous polyp of descending colon     Essential hypertension with goal blood pressure less than 130/80     Past Surgical History:   Procedure Laterality Date     COLONOSCOPY  06/13/2019    Dr. Ramon MARSHALL     ESOPHAGOSCOPY,  "GASTROSCOPY, DUODENOSCOPY (EGD), COMBINED N/A 5/23/2019    Procedure: ESOPHAGOGASTRODUODENOSCOPY, WITH BIOPSY;  Surgeon: Say Navarro MD;  Location:  GI     HEAD & NECK SURGERY      wisdom teeth removed     SURGICAL HISTORY OF -          Social History     Tobacco Use     Smoking status: Never Smoker     Smokeless tobacco: Never Used   Substance Use Topics     Alcohol use: Yes     Comment: 3 x week - 2 drinks     Family History   Problem Relation Age of Onset     Coronary Artery Disease Paternal Grandfather      Hypertension Father      Other - See Comments Father         primary sclerosing cholangitis     Hyperlipidemia Mother      Hypertension Sister      Hyperlipidemia Sister      Colon Cancer Cousin          Current Outpatient Medications   Medication Sig Dispense Refill     [START ON 11/16/2021] amphetamine-dextroamphetamine (ADDERALL XR) 20 MG 24 hr capsule TAKE 1 CAPSULE (20 MG) BY MOUTH DAILY 30 capsule 0     lisinopril-hydrochlorothiazide (ZESTORETIC) 20-25 MG tablet TAKE 1 TABLET BY MOUTH DAILY 90 tablet 3     rosuvastatin (CRESTOR) 40 MG tablet TAKE 1 TABLET(40 MG) BY MOUTH DAILY 90 tablet 0     testosterone cypionate (DEPOTESTOSTERONE) 200 MG/ML injection INJECT 0.8ML INTO THE MUSCLE ONCE EVERY WEEK  1     aluminum chloride (DRYSOL) 20 % external solution Apply topically At Bedtime Apply once daily at bedtime; once excessive sweating has stopped, may decrease to once or twice weekly, or as needed. Wash treated area in the morning. 60 mL 3     B-D LUER-DAVID SYRINGE 23G X 1\" 3 ML MISC U WITH INJECTIONS  2       Reviewed and updated as needed this visit by clinical staff  Tobacco  Allergies  Meds   Med Hx  Surg Hx  Fam Hx  Soc Hx        Reviewed and updated as needed this visit by Provider                Past Medical History:   Diagnosis Date     Abnormal bone xray - sclerosis of phalynx of right great toe 7/31/17 - suggest repeat imaging at annual visit. 7/31/2017     Depressive disorder      " Hypertension 2019     Sleep apnea       Past Surgical History:   Procedure Laterality Date     COLONOSCOPY  06/13/2019    Dr. Navarro [facility]     ESOPHAGOSCOPY, GASTROSCOPY, DUODENOSCOPY (EGD), COMBINED N/A 5/23/2019    Procedure: ESOPHAGOGASTRODUODENOSCOPY, WITH BIOPSY;  Surgeon: Say Navarro MD;  Location:  GI     HEAD & NECK SURGERY      wisdom teeth removed     SURGICAL HISTORY OF -          Review of Systems   Constitutional: Negative for chills and fever.   HENT: Negative for congestion, ear pain, hearing loss and sore throat.    Eyes: Negative for pain and visual disturbance.   Respiratory: Negative for cough and shortness of breath.    Cardiovascular: Negative for chest pain, palpitations and peripheral edema.   Gastrointestinal: Negative for abdominal pain, constipation, diarrhea, heartburn, hematochezia and nausea.   Genitourinary: Positive for impotence. Negative for discharge, dysuria, frequency, genital sores, hematuria and urgency.   Musculoskeletal: Negative for arthralgias, joint swelling and myalgias.   Skin: Negative for rash.   Neurological: Negative for dizziness, weakness, headaches and paresthesias.   Psychiatric/Behavioral: Negative for mood changes. The patient is not nervous/anxious.        OBJECTIVE:   /86   Pulse 97   Temp 97.3  F (36.3  C) (Tympanic)   Resp 16   Ht 1.829 m (6')   Wt 119.7 kg (264 lb)   SpO2 96%   BMI 35.80 kg/m      Physical Exam     GENERAL: healthy, alert and no distress  EYES: Eyes grossly normal to inspection, PERRL and conjunctivae and sclerae normal  HENT: ear canals and TM's normal, nose and mouth without ulcers or lesions  NECK: no adenopathy, no asymmetry, masses, or scars and thyroid normal to palpation  RESP: lungs clear to auscultation - no rales, rhonchi or wheezes  CV: regular rate and rhythm, normal S1 S2, no S3 or S4, no murmur, click or rub, no peripheral edema  ABDOMEN: soft, nontender, no hepatosplenomegaly, no masses and bowel sounds  normal  MS: no gross musculoskeletal defects noted, no edema  SKIN: no suspicious lesions or rashes  NEURO: Normal strength and tone, mentation intact and speech normal  PSYCH: mentation appears normal, affect normal/bright      ASSESSMENT/PLAN:     William was seen today for physical and imm/inj.    Diagnoses and all orders for this visit:    Routine general medical examination at a health care facility    High priority for 2019-nCoV vaccine  -     COVID-19,PF,MODERNA (18+ Yrs BOOSTER .25mL)    Screening for lipid disorders  -     Lipid panel reflex to direct LDL Fasting; Future    Hyperlipidemia, unspecified hyperlipidemia type  -     rosuvastatin (CRESTOR) 40 MG tablet; Take 1 tablet (40 mg) by mouth daily    Essential hypertension with goal blood pressure less than 130/80  Blood pressure well controlled currently, new concerns with erectile dysfunction since starting antihypertensive regimen.   Will trial discontinuation of hydrochlorothiazide and continue Lisinopril 20 mg daily.    -     Comprehensive metabolic panel (BMP + Alb, Alk Phos, ALT, AST, Total. Bili, TP); Future  -     lisinopril (ZESTRIL) 20 MG tablet; Take 1 tablet (20 mg) by mouth daily    Erectile dysfunction, unspecified erectile dysfunction type  -     Testosterone, total; Future    ADD (attention deficit disorder) without hyperactivity  Slight change to Adderall regimen - continue with Adderall XR 20 mg daily and trial addition of 5 mg tablet mid day.    CSA reviewed and updated with patient.  3 month supply of medications sent to pharmacy.    -     amphetamine-dextroamphetamine (ADDERALL XR) 20 MG 24 hr capsule; Take 1 capsule (20 mg) by mouth daily  -     amphetamine-dextroamphetamine (ADDERALL XR) 20 MG 24 hr capsule; Take 1 capsule (20 mg) by mouth daily  -     amphetamine-dextroamphetamine (ADDERALL XR) 20 MG 24 hr capsule; Take 1 capsule (20 mg) by mouth daily  -     amphetamine-dextroamphetamine (ADDERALL) 5 MG tablet; Take 1 tablet (5 mg)  by mouth daily  -     amphetamine-dextroamphetamine (ADDERALL) 5 MG tablet; Take 1 tablet (5 mg) by mouth daily  -     amphetamine-dextroamphetamine (ADDERALL) 5 MG tablet; Take 1 tablet (5 mg) by mouth daily  -     Drug Abuse Screen Panel 13, Urine (Pain Care Package) - lab collect; Future    Other orders  -     INFLUENZA VACCINE IM > 6 MONTHS VALENT IIV4 (AFLURIA/FLUZONE)            COUNSELING:   Reviewed preventive health counseling, as reflected in patient instructions    Estimated body mass index is 35.8 kg/m  as calculated from the following:    Height as of this encounter: 1.829 m (6').    Weight as of this encounter: 119.7 kg (264 lb).       He reports that he has never smoked. He has never used smokeless tobacco.      Counseling Resources:  ATP IV Guidelines  Pooled Cohorts Equation Calculator  FRAX Risk Assessment  ICSI Preventive Guidelines  Dietary Guidelines for Americans, 2010  USDA's MyPlate  ASA Prophylaxis  Lung CA Screening    Kezia Brooks, ODALIS CNP  [facility]

## 2021-10-29 NOTE — LETTER
[facility] [place]  10/29/21  Patient: William Bolton  YOB: 1980  Medical Record Number: [id number]                                                                                  Non-Opioid Controlled Substance Agreement    This is an agreement between you and your provider regarding safe and appropriate use of controlled substances prescribed by your care team. Controlled substances are?medicines that can cause physical and mental dependence (abuse).     There are strict laws about having and using these medicines. We here at [facility] are  committed to working with you in your efforts to get better. To support you in this work, we'll help you schedule regular office appointments for medicine refills. If we must cancel or change your appointment for any reason, we'll make sure you have enough medicine to last until your next appointment.     As a Provider, I will:     Listen carefully to your concerns while treating you with respect.     Recommend a treatment plan that I believe is in your best interest and may involve therapies other than medicine.      Talk with you often about the possible benefits and the risk of harm of any medicine that we prescribe for you.    Assess the safety of this medicine and check how well it works.      Provide a plan on how to taper (discontinue or go off) using this medicine if the decision is made to stop its use.      ::  As a Patient, I understand controlled substances:       Are prescribed by my care provider to help me function or work and manage my condition(s).?    Are strong medicines and can cause serious side effects.       Need to be taken exactly as prescribed.?Combining controlled substances with certain medicines or chemicals (such as illegal drugs, alcohol, sedatives, sleeping pills, and benzodiazepines) can be dangerous or even fatal.? If I stop taking my medicines suddenly, I may have severe withdrawal symptoms.     The  risks, benefits, and side effects of these medicine(s) were explained to me. I agree that:    1. I will take part in other treatments as advised by my care team. This may be psychiatry or counseling, physical therapy, behavioral therapy, group treatment or a referral to specialist.    2. I will keep all my appointments and understand this is part of the monitoring of controlled substances.?My care team may require an office visit for EVERY controlled substance refill. If I miss appointments or don t follow instructions, my care team may stop my medicine    3. I will take my medicines as prescribed. I will not change the dose or schedule unless my care team tells me to. There will be no refills if I run out early.      4. I may be asked to come to the clinic and complete a urine drug test or complete a pill count. If I don t give a urine sample or participate in a pill count, the care team may stop my medicine.    5. I will only receive controlled substance prescriptions from this clinic. If I am treated by another provider, I will tell them that I am taking controlled substances and that I have a treatment agreement with this provider. I will inform my [facility] care team within one business day if I am given a prescription for any controlled substance by another healthcare provider. My [facility] care team can contact other providers and pharmacists about my use of any medicines.    6. It is up to me to make sure that I don't run out of my medicines on weekends or holidays.?If my care team is willing to refill my prescription without a visit, I must request refills only during office hours. Refills may take up to 3 business days to process. I will use one pharmacy to fill all my controlled substance prescriptions. I will notify the clinic about any changes to my insurance or medicine availability.    7. I am responsible for my prescriptions. If the medicine/prescription is lost, stolen or destroyed,  it will not be replaced.?I also agree not to share controlled substance medicines with anyone.     8. I am aware I should not use any illegal or recreational drugs. I agree not to drink alcohol unless my care team says I can.     9. If I enroll in the [place] Medical Cannabis program, I will tell my care team before my next refill.    10. I will tell my care team right away if I become pregnant, have a new medical problem treated outside of my regular clinic, or have a change in my medicines.     11. I understand that this medicine can affect my thinking, judgment and reaction time.? Alcohol and drugs affect the brain and body, which can affect the safety of my driving. Being under the influence of alcohol or drugs can affect my decision-making, behaviors, personal safety and the safety of others. Driving while impaired (DWI) can occur if a person is driving, operating or in physical control of a car, motorcycle, boat, snowmobile, ATV, motorbike, off-road vehicle or any other motor vehicle ([place] Statute 169A.20). I understand the risk if I choose to drive or operate any vehicle or machinery.    I understand that if I do not follow any of the conditions above, my prescriptions or treatment may be stopped or changed.   I agree that my provider, clinic care team and pharmacy may work with any city, state or federal law enforcement agency that investigates the misuse, sale or other diversion of my controlled medicine. I will allow my provider to discuss my care with, or share a copy of, this agreement with any other treating provider, pharmacy or emergency room where I receive care.     I have read this agreement and have asked questions about anything I did not understand.    ________________________________________________________  Patient Signature - William Bolton     ___________________                   Date     ________________________________________________________  Provider Signature - ODALIS Martin  CNP       ___________________                   Date     ________________________________________________________  Witness Signature (required if provider not present while patient signing)          ___________________                   Date

## 2021-10-30 ASSESSMENT — ANXIETY QUESTIONNAIRES: GAD7 TOTAL SCORE: 0

## 2021-11-12 ENCOUNTER — LAB (OUTPATIENT)
Dept: LAB | Facility: CLINIC | Age: 41
End: 2021-11-12
Payer: COMMERCIAL

## 2021-11-12 DIAGNOSIS — N52.9 ERECTILE DYSFUNCTION, UNSPECIFIED ERECTILE DYSFUNCTION TYPE: ICD-10-CM

## 2021-11-12 DIAGNOSIS — F98.8 ADD (ATTENTION DEFICIT DISORDER) WITHOUT HYPERACTIVITY: ICD-10-CM

## 2021-11-12 DIAGNOSIS — Z13.220 SCREENING FOR LIPID DISORDERS: ICD-10-CM

## 2021-11-12 DIAGNOSIS — I10 ESSENTIAL HYPERTENSION WITH GOAL BLOOD PRESSURE LESS THAN 130/80: ICD-10-CM

## 2021-11-12 LAB
ALBUMIN SERPL-MCNC: 3.6 G/DL (ref 3.4–5)
ALP SERPL-CCNC: 69 U/L (ref 40–150)
ALT SERPL W P-5'-P-CCNC: 125 U/L (ref 0–70)
ANION GAP SERPL CALCULATED.3IONS-SCNC: 4 MMOL/L (ref 3–14)
AST SERPL W P-5'-P-CCNC: 58 U/L (ref 0–45)
BILIRUB SERPL-MCNC: 0.5 MG/DL (ref 0.2–1.3)
BUN SERPL-MCNC: 8 MG/DL (ref 7–30)
CALCIUM SERPL-MCNC: 9 MG/DL (ref 8.5–10.1)
CHLORIDE BLD-SCNC: 108 MMOL/L (ref 94–109)
CHOLEST SERPL-MCNC: 161 MG/DL
CO2 SERPL-SCNC: 28 MMOL/L (ref 20–32)
CREAT SERPL-MCNC: 0.9 MG/DL (ref 0.66–1.25)
FASTING STATUS PATIENT QL REPORTED: YES
GFR SERPL CREATININE-BSD FRML MDRD: >90 ML/MIN/1.73M2
GLUCOSE BLD-MCNC: 117 MG/DL (ref 70–99)
HDLC SERPL-MCNC: 55 MG/DL
LDLC SERPL CALC-MCNC: 88 MG/DL
NONHDLC SERPL-MCNC: 106 MG/DL
POTASSIUM BLD-SCNC: 4.3 MMOL/L (ref 3.4–5.3)
PROT SERPL-MCNC: 7.4 G/DL (ref 6.8–8.8)
SODIUM SERPL-SCNC: 140 MMOL/L (ref 133–144)
TRIGL SERPL-MCNC: 92 MG/DL

## 2021-11-12 PROCEDURE — 80061 LIPID PANEL: CPT

## 2021-11-12 PROCEDURE — 36415 COLL VENOUS BLD VENIPUNCTURE: CPT

## 2021-11-12 PROCEDURE — 80053 COMPREHEN METABOLIC PANEL: CPT

## 2021-11-12 PROCEDURE — 84403 ASSAY OF TOTAL TESTOSTERONE: CPT

## 2021-11-16 DIAGNOSIS — R74.8 ELEVATED LIVER ENZYMES: Primary | ICD-10-CM

## 2021-11-16 LAB — TESTOST SERPL-MCNC: 850 NG/DL (ref 240–950)

## 2021-11-17 NOTE — RESULT ENCOUNTER NOTE
Dear William,     -Cholesterol levels are at your goal levels.  ADVISE: continuing your medication, a regular exercise program with at least 150 minutes of aerobic exercise per week, and eating a low saturated fat/low carbohydrate diet.  Also, you should recheck this fasting cholesterol panel in 12 months.    -Liver and gallbladder tests (ALT, AST) are elevated.  Let's plan to recheck these levels in 3-6 months.  If these levels continue to be elevated I recommend proceeding with a liver ultrasound for further evaluation.    -Kidney function (GFR) is normal.  -Sodium is normal.  -Potassium is normal.  -Calcium is normal.    -Glucose is slightly elevated and may be a sign of early diabetes (prediabetes). ADVISE: eating a low carbohydrate diet, exercising, trying to lose weight (if necessary) and rechecking your glucose level in 12 months.    -Testosterone level was within the normal range and reassuring.      Additionally, we were unable to process your yearly urine screening test due to it being cold when it was brought into the clinic.    We can plan to get this next time you are here for a visit.        Please send a [employer] message or call [phone]  if you have any questions.      Kezia Brooks, ODALIS, CNP  [facility] [place] [facility] [place]    If you have further questions about the interpretation of your labs, [email or web address] is a good website to check out for further information.

## 2022-02-01 DIAGNOSIS — F98.8 ADD (ATTENTION DEFICIT DISORDER) WITHOUT HYPERACTIVITY: Primary | ICD-10-CM

## 2022-02-01 NOTE — TELEPHONE ENCOUNTER
amphetamine-dextroamphetamine (ADDERALL XR) 20 MG 24 hr capsule 30 capsule 0 11/16/2021  No   Sig: TAKE 1 CAPSULE (20 MG) BY MOUTH DAILY   Sent to pharmacy as: Amphetamine-Dextroamphet ER 20 MG Oral Capsule Extended Release 24 Hour (Adderall XR)   Class: E-Prescribe   Earliest Fill Date: 11/16/2021       Last office visit: 10/29/2021     Future Office Visit:        CSA -- Patient Level:    Controlled Substance Agreement - Non - Opioid - Scan on 11/24/2021 12:36 PM: NON-OPIOID CONTROLLED SUBSTANCE AGREEMENT  Controlled Substance Agreement - Non - Opioid - Scan on 12/16/2020  6:41 AM: NON-OPIOID CONTROLLED SUBSTANCE AGREEMENT             Routing refill request to provider for review/approval because:  Drug not on the [facility] refill protocol     Treva Gillette RN, BSN  [facility]

## 2022-02-02 RX ORDER — DEXTROAMPHETAMINE SACCHARATE, AMPHETAMINE ASPARTATE, DEXTROAMPHETAMINE SULFATE AND AMPHETAMINE SULFATE 1.25; 1.25; 1.25; 1.25 MG/1; MG/1; MG/1; MG/1
TABLET ORAL
Qty: 30 TABLET | Refills: 0 | Status: SHIPPED | OUTPATIENT
Start: 2022-02-02 | End: 2022-03-07

## 2022-03-05 DIAGNOSIS — F98.8 ADD (ATTENTION DEFICIT DISORDER) WITHOUT HYPERACTIVITY: ICD-10-CM

## 2022-03-07 RX ORDER — DEXTROAMPHETAMINE SACCHARATE, AMPHETAMINE ASPARTATE, DEXTROAMPHETAMINE SULFATE AND AMPHETAMINE SULFATE 1.25; 1.25; 1.25; 1.25 MG/1; MG/1; MG/1; MG/1
TABLET ORAL
Qty: 30 TABLET | Refills: 0 | Status: SHIPPED | OUTPATIENT
Start: 2022-03-07 | End: 2022-04-08

## 2022-03-07 NOTE — TELEPHONE ENCOUNTER
amphetamine-dextroamphetamine (ADDERALL) 5 MG tablet 30 tablet 0 2/2/2022  No   Sig: TAKE 1 TABLET (5 MG) BY MOUTH DAILY   Sent to pharmacy as: Amphetamine-Dextroamphetamine 5 MG Oral Tablet (ADDERALL)   Class: E-Prescribe   Earliest Fill Date: 2/2/2022       Last office visit: 10/29/2021     Future Office Visit:        CSA -- Patient Level:    Controlled Substance Agreement - Non - Opioid - Scan on 11/24/2021 12:36 PM: NON-OPIOID CONTROLLED SUBSTANCE AGREEMENT  Controlled Substance Agreement - Non - Opioid - Scan on 12/16/2020  6:41 AM: NON-OPIOID CONTROLLED SUBSTANCE AGREEMENT             Routing refill request to provider for review/approval because:  Drug not on the [facility] refill protocol     Treva Gillette RN, BSN  [facility]

## 2022-04-07 ENCOUNTER — MYC MEDICAL ADVICE (OUTPATIENT)
Dept: FAMILY MEDICINE | Facility: CLINIC | Age: 42
End: 2022-04-07
Payer: COMMERCIAL

## 2022-04-07 DIAGNOSIS — F98.8 ADD (ATTENTION DEFICIT DISORDER) WITHOUT HYPERACTIVITY: ICD-10-CM

## 2022-04-07 NOTE — TELEPHONE ENCOUNTER
Called patient , phone was answered and call was disconnected.   Will attempt again tomorrow. Clarifying that he is only taking the 5mg tablets   MyChart message sent.4/7/22    Routing refill request to provider for review/approval because:  Drug not on the [facility] refill protocol   Adderall 5mg was last filled 3/7/22 for 30 tablets.  Adderall 20mg was last filled 11/16/21  LOV:  10/29/2021    CSA -- Patient Level:    Controlled Substance Agreement - Non - Opioid - Scan on 11/24/2021 12:36 PM: NON-OPIOID CONTROLLED SUBSTANCE AGREEMENT  Controlled Substance Agreement - Non - Opioid - Scan on 12/16/2020  6:41 AM: NON-OPIOID CONTROLLED SUBSTANCE AGREEMENT        Andreea VALLES RN   [facility]

## 2022-04-08 RX ORDER — DEXTROAMPHETAMINE SACCHARATE, AMPHETAMINE ASPARTATE, DEXTROAMPHETAMINE SULFATE AND AMPHETAMINE SULFATE 1.25; 1.25; 1.25; 1.25 MG/1; MG/1; MG/1; MG/1
TABLET ORAL
Qty: 30 TABLET | Refills: 0 | Status: SHIPPED | OUTPATIENT
Start: 2022-04-08 | End: 2022-04-29

## 2022-04-08 RX ORDER — DEXTROAMPHETAMINE SACCHARATE, AMPHETAMINE ASPARTATE MONOHYDRATE, DEXTROAMPHETAMINE SULFATE AND AMPHETAMINE SULFATE 5; 5; 5; 5 MG/1; MG/1; MG/1; MG/1
CAPSULE, EXTENDED RELEASE ORAL
Qty: 30 CAPSULE | Refills: 0 | Status: SHIPPED | OUTPATIENT
Start: 2022-04-08 | End: 2022-04-29

## 2022-04-08 NOTE — TELEPHONE ENCOUNTER
See mychart message. Patient is taking both.     Routing refill request to provider for review/approval because:  Drug not on the [facility] refill protocol     Pam Loyola RN  [facility]

## 2022-04-29 ENCOUNTER — OFFICE VISIT (OUTPATIENT)
Dept: FAMILY MEDICINE | Facility: CLINIC | Age: 42
End: 2022-04-29
Payer: COMMERCIAL

## 2022-04-29 VITALS
HEART RATE: 82 BPM | HEIGHT: 72 IN | DIASTOLIC BLOOD PRESSURE: 74 MMHG | SYSTOLIC BLOOD PRESSURE: 128 MMHG | WEIGHT: 278 LBS | OXYGEN SATURATION: 98 % | BODY MASS INDEX: 37.65 KG/M2 | TEMPERATURE: 98.1 F

## 2022-04-29 DIAGNOSIS — R74.8 ELEVATED LIVER ENZYMES: ICD-10-CM

## 2022-04-29 DIAGNOSIS — F98.8 ADD (ATTENTION DEFICIT DISORDER) WITHOUT HYPERACTIVITY: Primary | ICD-10-CM

## 2022-04-29 PROBLEM — F41.9 ANXIETY: Status: RESOLVED | Noted: 2018-02-23 | Resolved: 2022-04-29

## 2022-04-29 LAB
AMPHETAMINES UR QL: DETECTED
BARBITURATES UR QL SCN: NOT DETECTED
BENZODIAZ UR QL SCN: NOT DETECTED
BUPRENORPHINE UR QL: NOT DETECTED
CANNABINOIDS UR QL: NOT DETECTED
COCAINE UR QL SCN: NOT DETECTED
D-METHAMPHET UR QL: NOT DETECTED
METHADONE UR QL SCN: NOT DETECTED
OPIATES UR QL SCN: NOT DETECTED
OXYCODONE UR QL SCN: NOT DETECTED
PCP UR QL SCN: NOT DETECTED
PROPOXYPH UR QL: NOT DETECTED
TRICYCLICS UR QL SCN: NOT DETECTED

## 2022-04-29 PROCEDURE — 36415 COLL VENOUS BLD VENIPUNCTURE: CPT | Performed by: NURSE PRACTITIONER

## 2022-04-29 PROCEDURE — 80306 DRUG TEST PRSMV INSTRMNT: CPT | Performed by: NURSE PRACTITIONER

## 2022-04-29 PROCEDURE — 99213 OFFICE O/P EST LOW 20 MIN: CPT | Performed by: NURSE PRACTITIONER

## 2022-04-29 PROCEDURE — 80076 HEPATIC FUNCTION PANEL: CPT | Performed by: NURSE PRACTITIONER

## 2022-04-29 RX ORDER — DEXTROAMPHETAMINE SACCHARATE, AMPHETAMINE ASPARTATE, DEXTROAMPHETAMINE SULFATE AND AMPHETAMINE SULFATE 1.25; 1.25; 1.25; 1.25 MG/1; MG/1; MG/1; MG/1
5 TABLET ORAL DAILY
Qty: 30 TABLET | Refills: 0 | Status: SHIPPED | OUTPATIENT
Start: 2022-05-06 | End: 2022-06-13

## 2022-04-29 RX ORDER — DEXTROAMPHETAMINE SACCHARATE, AMPHETAMINE ASPARTATE MONOHYDRATE, DEXTROAMPHETAMINE SULFATE AND AMPHETAMINE SULFATE 5; 5; 5; 5 MG/1; MG/1; MG/1; MG/1
CAPSULE, EXTENDED RELEASE ORAL
Qty: 30 CAPSULE | Refills: 0 | Status: SHIPPED | OUTPATIENT
Start: 2022-05-06 | End: 2022-06-13

## 2022-04-29 ASSESSMENT — ANXIETY QUESTIONNAIRES
1. FEELING NERVOUS, ANXIOUS, OR ON EDGE: NOT AT ALL
GAD7 TOTAL SCORE: 0
7. FEELING AFRAID AS IF SOMETHING AWFUL MIGHT HAPPEN: NOT AT ALL
GAD7 TOTAL SCORE: 0
2. NOT BEING ABLE TO STOP OR CONTROL WORRYING: NOT AT ALL
6. BECOMING EASILY ANNOYED OR IRRITABLE: NOT AT ALL
5. BEING SO RESTLESS THAT IT IS HARD TO SIT STILL: NOT AT ALL
4. TROUBLE RELAXING: NOT AT ALL
7. FEELING AFRAID AS IF SOMETHING AWFUL MIGHT HAPPEN: NOT AT ALL
GAD7 TOTAL SCORE: 0
3. WORRYING TOO MUCH ABOUT DIFFERENT THINGS: NOT AT ALL

## 2022-04-29 ASSESSMENT — PATIENT HEALTH QUESTIONNAIRE - PHQ9
SUM OF ALL RESPONSES TO PHQ QUESTIONS 1-9: 0
SUM OF ALL RESPONSES TO PHQ QUESTIONS 1-9: 0
10. IF YOU CHECKED OFF ANY PROBLEMS, HOW DIFFICULT HAVE THESE PROBLEMS MADE IT FOR YOU TO DO YOUR WORK, TAKE CARE OF THINGS AT HOME, OR GET ALONG WITH OTHER PEOPLE: NOT DIFFICULT AT ALL

## 2022-04-29 NOTE — PROGRESS NOTES
Assessment & Plan     Diagnoses and all orders for this visit:    ADD (attention deficit disorder) without hyperactivity  Due for annual drug screen for surveillance.   Stable on Adderall XL 20 mg daily and Adderall IR 5 mg (intermittent use in the afternoon).    -     Urine Drugs of Abuse Screen Panel 13    Elevated liver enzymes  Need repeat hepatic liver panel.   Discussed with continued elevation, would recommend proceeding with liver ultrasound.    -     Hepatic panel (Albumin, ALT, AST, Bili, Alk Phos, TP)    Other orders  -     REVIEW OF HEALTH MAINTENANCE PROTOCOL ORDERS        BMI:   Estimated body mass index is 37.7 kg/m  as calculated from the following:    Height as of this encounter: 1.829 m (6').    Weight as of this encounter: 126.1 kg (278 lb).     Return in about 6 months (around 10/29/2022) for Preventative Visit/Med Check/Fasting labs.      Kezia Brooks, ODALIS [facility] PHILIPPE Thomas is a 42 year old who presents for the following health issues:      History of Present Illness     Mental Health Follow-up:   PHQ 5/28/2021 10/29/2021 4/29/2022   PHQ-9 Total Score 0 0 0   Q9: Thoughts of better off dead/self-harm past 2 weeks Not at all Not at all Not at all     SANDOVAL-7 SCORE 5/28/2021 10/29/2021 4/29/2022   Total Score - - -   Total Score - - 0 (minimal anxiety)   Total Score 0 0 0     Today's PHQ-9         PHQ-9 Total Score: 0  PHQ-9 Q9 Thoughts of better off dead/self-harm past 2 weeks :   (P) Not at all    How difficult have these problems made it for you to do your work, take care of things at home, or get along with other people: Not difficult at all    Today's SANDOVAL-7 Score: 0    Reason for visit:  ADHD follow up    He eats 2-3 servings of fruits and vegetables daily.He consumes 2 sweetened beverage(s) daily.He exercises with enough effort to increase his heart rate 10 to 19 minutes per day.  He exercises with enough effort to increase his heart  rate 3 or less days per week.   He is taking medications regularly.       Medication Followup of Adderall    Taking Medication as prescribed: yes    Side Effects:  Lesss Appitite    Medication Helping Symptoms:  Yes       Using Monday through Friday for work, less frequently on the weekends.    Doesn't work as well when he first started medication, but doing ok.      Takes Adderall XR 20 mg in the morning and rarely using Adderall 5 mg in the afternoon.       Children are ages 11, 7, 5 and 3   Planning a daddy-daughter dance in his 11 year old's dance recital coming up in June.      History of elevated liver enzymes.  Declines excessive Acetaminophen or Alcohol use.   Reports 2 drinks a few times per week.        Review of Systems     Constitutional, HEENT, cardiovascular, pulmonary, gi and gu systems are negative, except as otherwise noted.      Objective    /74   Pulse 82   Temp 98.1  F (36.7  C) (Tympanic)   Ht 1.829 m (6')   Wt 126.1 kg (278 lb)   SpO2 98%   BMI 37.70 kg/m    Body mass index is 37.7 kg/m .  Physical Exam     GENERAL: healthy, alert and no distress  RESP: lungs clear to auscultation - no rales, rhonchi or wheezes  CV: regular rate and rhythm, normal S1 S2  PSYCH: mentation appears normal, affect normal/bright

## 2022-04-30 LAB
ALBUMIN SERPL-MCNC: 4 G/DL (ref 3.4–5)
ALP SERPL-CCNC: 79 U/L (ref 40–150)
ALT SERPL W P-5'-P-CCNC: 157 U/L (ref 0–70)
AST SERPL W P-5'-P-CCNC: 83 U/L (ref 0–45)
BILIRUB DIRECT SERPL-MCNC: 0.2 MG/DL (ref 0–0.2)
BILIRUB SERPL-MCNC: 0.6 MG/DL (ref 0.2–1.3)
PROT SERPL-MCNC: 7.7 G/DL (ref 6.8–8.8)

## 2022-04-30 ASSESSMENT — ANXIETY QUESTIONNAIRES: GAD7 TOTAL SCORE: 0

## 2022-04-30 ASSESSMENT — PATIENT HEALTH QUESTIONNAIRE - PHQ9: SUM OF ALL RESPONSES TO PHQ QUESTIONS 1-9: 0

## 2022-05-03 ENCOUNTER — MYC MEDICAL ADVICE (OUTPATIENT)
Dept: FAMILY MEDICINE | Facility: CLINIC | Age: 42
End: 2022-05-03
Payer: COMMERCIAL

## 2022-05-03 DIAGNOSIS — R74.8 ELEVATED LIVER ENZYMES: Primary | ICD-10-CM

## 2022-05-03 NOTE — RESULT ENCOUNTER NOTE
Dear William,     -Drug screen showed expected results.    -Hepatic panel (liver functions) continue to show elevated AST and ALT levels.  I recommend proceeding with a liver ultrasound for further evaluation.  You can all [phone] to get this scheduled.        Please send a [email or web address] message or call [phone]  if you have any questions.      ODALIS Martin, CNP  [facility]    If you have further questions about the interpretation of your labs, [email or web address] is a good website to check out for further information.

## 2022-05-04 NOTE — TELEPHONE ENCOUNTER
My chart message sent     Treva Gillette RN, BSN  [facility]

## 2022-05-05 ENCOUNTER — TELEPHONE (OUTPATIENT)
Dept: INTERNAL MEDICINE | Facility: CLINIC | Age: 42
End: 2022-05-05
Payer: COMMERCIAL

## 2022-05-05 NOTE — TELEPHONE ENCOUNTER
Called pt to see if he would like to move his appt to today, 05/05/2022. Pt wanted to cancel appt since the symptoms have decreased. He stated that he didn't want to waste medication when someone else can use it.     Appt was cancelled.     Flash Rodriguez Jr., CMA on 5/5/2022 at 1:23 PM

## 2022-06-10 DIAGNOSIS — F98.8 ADD (ATTENTION DEFICIT DISORDER) WITHOUT HYPERACTIVITY: ICD-10-CM

## 2022-06-13 RX ORDER — DEXTROAMPHETAMINE SACCHARATE, AMPHETAMINE ASPARTATE, DEXTROAMPHETAMINE SULFATE AND AMPHETAMINE SULFATE 1.25; 1.25; 1.25; 1.25 MG/1; MG/1; MG/1; MG/1
5 TABLET ORAL DAILY
Qty: 30 TABLET | Refills: 0 | Status: SHIPPED | OUTPATIENT
Start: 2022-06-13 | End: 2022-07-19

## 2022-06-13 RX ORDER — DEXTROAMPHETAMINE SACCHARATE, AMPHETAMINE ASPARTATE MONOHYDRATE, DEXTROAMPHETAMINE SULFATE AND AMPHETAMINE SULFATE 5; 5; 5; 5 MG/1; MG/1; MG/1; MG/1
CAPSULE, EXTENDED RELEASE ORAL
Qty: 30 CAPSULE | Refills: 0 | Status: SHIPPED | OUTPATIENT
Start: 2022-06-13 | End: 2022-07-19

## 2022-06-13 NOTE — TELEPHONE ENCOUNTER
Routing refill request to provider for review/approval because:  Drug not on the [facility] refill protocol     LOV:  4/29/2022     No future appointments.     CSA -- Patient Level:    Controlled Substance Agreement - Non - Opioid - Scan on 11/24/2021 12:36 PM: NON-OPIOID CONTROLLED SUBSTANCE AGREEMENT  Controlled Substance Agreement - Non - Opioid - Scan on 12/16/2020  6:41 AM: NON-OPIOID CONTROLLED SUBSTANCE AGREEMENT         Pam Loyola RN  [facility]

## 2022-07-10 ENCOUNTER — LAB (OUTPATIENT)
Dept: LAB | Facility: CLINIC | Age: 42
End: 2022-07-10
Payer: COMMERCIAL

## 2022-07-10 ENCOUNTER — NURSE TRIAGE (OUTPATIENT)
Dept: NURSING | Facility: CLINIC | Age: 42
End: 2022-07-10

## 2022-07-10 DIAGNOSIS — Z20.822 ENCOUNTER FOR LABORATORY TESTING FOR COVID-19 VIRUS: ICD-10-CM

## 2022-07-10 PROCEDURE — U0003 INFECTIOUS AGENT DETECTION BY NUCLEIC ACID (DNA OR RNA); SEVERE ACUTE RESPIRATORY SYNDROME CORONAVIRUS 2 (SARS-COV-2) (CORONAVIRUS DISEASE [COVID-19]), AMPLIFIED PROBE TECHNIQUE, MAKING USE OF HIGH THROUGHPUT TECHNOLOGIES AS DESCRIBED BY CMS-2020-01-R: HCPCS

## 2022-07-10 PROCEDURE — U0005 INFEC AGEN DETEC AMPLI PROBE: HCPCS

## 2022-07-10 NOTE — TELEPHONE ENCOUNTER
[place] going [place] and [place] covid test.  He had one scheduled today but missed it.  He is wondering if he can get in for a covid test today.  Called scheduling they have one at 6pm and one at 6:15pm today.  Sent patient to scheduling to see if those would work for the patient.    Ana Rosa George RN   07/10/22 5:54 PM  [facility] Nurse Advisor    Reason for Disposition    General information question, no triage required and triager able to answer question    Additional Information    Negative: [1] Caller is not with the adult (patient) AND [2] reporting urgent symptoms    Negative: Lab result questions    Negative: Medication questions    Negative: Caller can't be reached by phone    Negative: Caller has already spoken to PCP or another triager    Negative: RN needs further essential information from caller in order to complete triage    Negative: Requesting regular office appointment    Negative: [1] Caller requesting NON-URGENT health information AND [2] PCP's office is the best resource    Negative: Health Information question, no triage required and triager able to answer question    Protocols used: INFORMATION ONLY CALL - NO TRIAGE[facility]

## 2022-07-11 LAB — SARS-COV-2 RNA RESP QL NAA+PROBE: NEGATIVE

## 2022-07-15 DIAGNOSIS — F98.8 ADD (ATTENTION DEFICIT DISORDER) WITHOUT HYPERACTIVITY: ICD-10-CM

## 2022-07-19 RX ORDER — DEXTROAMPHETAMINE SACCHARATE, AMPHETAMINE ASPARTATE MONOHYDRATE, DEXTROAMPHETAMINE SULFATE AND AMPHETAMINE SULFATE 5; 5; 5; 5 MG/1; MG/1; MG/1; MG/1
CAPSULE, EXTENDED RELEASE ORAL
Qty: 30 CAPSULE | Refills: 0 | Status: SHIPPED | OUTPATIENT
Start: 2022-07-19 | End: 2022-08-08

## 2022-07-19 RX ORDER — DEXTROAMPHETAMINE SACCHARATE, AMPHETAMINE ASPARTATE, DEXTROAMPHETAMINE SULFATE AND AMPHETAMINE SULFATE 1.25; 1.25; 1.25; 1.25 MG/1; MG/1; MG/1; MG/1
5 TABLET ORAL DAILY
Qty: 30 TABLET | Refills: 0 | Status: SHIPPED | OUTPATIENT
Start: 2022-07-19 | End: 2022-08-08

## 2022-07-19 NOTE — TELEPHONE ENCOUNTER
Routing refill request to provider for review/approval because:  Drug not on the [facility] refill protocol     Natividad Bob RN

## 2022-08-08 ENCOUNTER — OFFICE VISIT (OUTPATIENT)
Dept: FAMILY MEDICINE | Facility: CLINIC | Age: 42
End: 2022-08-08
Payer: COMMERCIAL

## 2022-08-08 VITALS
OXYGEN SATURATION: 96 % | HEART RATE: 97 BPM | BODY MASS INDEX: 37.11 KG/M2 | DIASTOLIC BLOOD PRESSURE: 82 MMHG | TEMPERATURE: 97.3 F | SYSTOLIC BLOOD PRESSURE: 124 MMHG | HEIGHT: 72 IN | WEIGHT: 274 LBS

## 2022-08-08 DIAGNOSIS — B37.0 THRUSH: Primary | ICD-10-CM

## 2022-08-08 DIAGNOSIS — J02.9 SORE THROAT: ICD-10-CM

## 2022-08-08 DIAGNOSIS — F98.8 ADD (ATTENTION DEFICIT DISORDER) WITHOUT HYPERACTIVITY: ICD-10-CM

## 2022-08-08 LAB
DEPRECATED S PYO AG THROAT QL EIA: NEGATIVE
GROUP A STREP BY PCR: NOT DETECTED
KOH PREPARATION: ABNORMAL
KOH PREPARATION: ABNORMAL

## 2022-08-08 PROCEDURE — 87651 STREP A DNA AMP PROBE: CPT | Performed by: FAMILY MEDICINE

## 2022-08-08 PROCEDURE — 99213 OFFICE O/P EST LOW 20 MIN: CPT | Performed by: FAMILY MEDICINE

## 2022-08-08 PROCEDURE — 87220 TISSUE EXAM FOR FUNGI: CPT | Performed by: FAMILY MEDICINE

## 2022-08-08 RX ORDER — NYSTATIN 100000/ML
500000 SUSPENSION, ORAL (FINAL DOSE FORM) ORAL 4 TIMES DAILY
Qty: 80 ML | Refills: 0 | Status: SHIPPED | OUTPATIENT
Start: 2022-08-08 | End: 2022-08-08

## 2022-08-08 RX ORDER — DEXTROAMPHETAMINE SACCHARATE, AMPHETAMINE ASPARTATE, DEXTROAMPHETAMINE SULFATE AND AMPHETAMINE SULFATE 1.25; 1.25; 1.25; 1.25 MG/1; MG/1; MG/1; MG/1
5 TABLET ORAL DAILY
Qty: 30 TABLET | Refills: 0 | Status: SHIPPED | OUTPATIENT
Start: 2022-10-17 | End: 2022-12-21

## 2022-08-08 RX ORDER — DEXTROAMPHETAMINE SACCHARATE, AMPHETAMINE ASPARTATE MONOHYDRATE, DEXTROAMPHETAMINE SULFATE AND AMPHETAMINE SULFATE 5; 5; 5; 5 MG/1; MG/1; MG/1; MG/1
20 CAPSULE, EXTENDED RELEASE ORAL EVERY MORNING
Qty: 30 CAPSULE | Refills: 0 | Status: SHIPPED | OUTPATIENT
Start: 2022-09-17 | End: 2022-10-17

## 2022-08-08 RX ORDER — DEXTROAMPHETAMINE SACCHARATE, AMPHETAMINE ASPARTATE, DEXTROAMPHETAMINE SULFATE AND AMPHETAMINE SULFATE 1.25; 1.25; 1.25; 1.25 MG/1; MG/1; MG/1; MG/1
5 TABLET ORAL DAILY
Qty: 30 TABLET | Refills: 0 | Status: SHIPPED | OUTPATIENT
Start: 2022-08-18 | End: 2022-09-17

## 2022-08-08 RX ORDER — DEXTROAMPHETAMINE SACCHARATE, AMPHETAMINE ASPARTATE MONOHYDRATE, DEXTROAMPHETAMINE SULFATE AND AMPHETAMINE SULFATE 5; 5; 5; 5 MG/1; MG/1; MG/1; MG/1
20 CAPSULE, EXTENDED RELEASE ORAL EVERY MORNING
Qty: 30 CAPSULE | Refills: 0 | Status: SHIPPED | OUTPATIENT
Start: 2022-10-07 | End: 2022-11-01

## 2022-08-08 RX ORDER — NYSTATIN 100000/ML
500000 SUSPENSION, ORAL (FINAL DOSE FORM) ORAL 4 TIMES DAILY
Qty: 80 ML | Refills: 0 | Status: SHIPPED | OUTPATIENT
Start: 2022-08-08 | End: 2022-11-01

## 2022-08-08 RX ORDER — DEXTROAMPHETAMINE SACCHARATE, AMPHETAMINE ASPARTATE, DEXTROAMPHETAMINE SULFATE AND AMPHETAMINE SULFATE 1.25; 1.25; 1.25; 1.25 MG/1; MG/1; MG/1; MG/1
5 TABLET ORAL DAILY
Qty: 30 TABLET | Refills: 0 | Status: SHIPPED | OUTPATIENT
Start: 2022-09-17 | End: 2022-10-17

## 2022-08-08 RX ORDER — DEXTROAMPHETAMINE SACCHARATE, AMPHETAMINE ASPARTATE MONOHYDRATE, DEXTROAMPHETAMINE SULFATE AND AMPHETAMINE SULFATE 5; 5; 5; 5 MG/1; MG/1; MG/1; MG/1
20 CAPSULE, EXTENDED RELEASE ORAL EVERY MORNING
Qty: 30 CAPSULE | Refills: 0 | Status: SHIPPED | OUTPATIENT
Start: 2022-08-18 | End: 2022-09-17

## 2022-08-08 ASSESSMENT — ENCOUNTER SYMPTOMS
SORE THROAT: 1
CARDIOVASCULAR NEGATIVE: 1
ALLERGIC/IMMUNOLOGIC NEGATIVE: 1
PSYCHIATRIC NEGATIVE: 1
CONSTITUTIONAL NEGATIVE: 1
ENDOCRINE NEGATIVE: 1
HEMATOLOGIC/LYMPHATIC NEGATIVE: 1
EYES NEGATIVE: 1
NEUROLOGICAL NEGATIVE: 1
RESPIRATORY NEGATIVE: 1
MUSCULOSKELETAL NEGATIVE: 1
GASTROINTESTINAL NEGATIVE: 1

## 2022-08-08 ASSESSMENT — ANXIETY QUESTIONNAIRES
1. FEELING NERVOUS, ANXIOUS, OR ON EDGE: NOT AT ALL
8. IF YOU CHECKED OFF ANY PROBLEMS, HOW DIFFICULT HAVE THESE MADE IT FOR YOU TO DO YOUR WORK, TAKE CARE OF THINGS AT HOME, OR GET ALONG WITH OTHER PEOPLE?: NOT DIFFICULT AT ALL
GAD7 TOTAL SCORE: 0
IF YOU CHECKED OFF ANY PROBLEMS ON THIS QUESTIONNAIRE, HOW DIFFICULT HAVE THESE PROBLEMS MADE IT FOR YOU TO DO YOUR WORK, TAKE CARE OF THINGS AT HOME, OR GET ALONG WITH OTHER PEOPLE: NOT DIFFICULT AT ALL
3. WORRYING TOO MUCH ABOUT DIFFERENT THINGS: NOT AT ALL
7. FEELING AFRAID AS IF SOMETHING AWFUL MIGHT HAPPEN: NOT AT ALL
7. FEELING AFRAID AS IF SOMETHING AWFUL MIGHT HAPPEN: NOT AT ALL
2. NOT BEING ABLE TO STOP OR CONTROL WORRYING: NOT AT ALL
6. BECOMING EASILY ANNOYED OR IRRITABLE: NOT AT ALL
5. BEING SO RESTLESS THAT IT IS HARD TO SIT STILL: NOT AT ALL
4. TROUBLE RELAXING: NOT AT ALL

## 2022-08-08 NOTE — PROGRESS NOTES
Assessment & Plan     Thrush  New onset 3 days ago. Negative strep test. Budding hyphae visible under microscope.  Possibly related to CPAP and recommended sterilizing  - Discussed sterilization of household items such as toothbrush and CPAP machine.  - nystatin (MYCOSTATIN) 885569 UNIT/ML suspension  Dispense: 80 mL; Refill: 0    Sore throat  - Streptococcus A Rapid Screen w/Reflex to PCR - Clinic Collect  - Group A Streptococcus PCR Throat Swab  - KOH prep (skin, hair or nails only)    ADD (attention deficit disorder) without hyperactivity  Stable - continue medication(s).-Futures prescription sent in to get through until next wellness.  - amphetamine-dextroamphetamine (ADDERALL XR) 20 MG 24 hr capsule  Dispense: 30 capsule; Refill: 0  - amphetamine-dextroamphetamine (ADDERALL) 5 MG tablet  Dispense: 30 tablet; Refill: 0  - amphetamine-dextroamphetamine (ADDERALL) 5 MG tablet  Dispense: 30 tablet; Refill: 0  - amphetamine-dextroamphetamine (ADDERALL XR) 20 MG 24 hr capsule  Dispense: 30 capsule; Refill: 0  - amphetamine-dextroamphetamine (ADDERALL XR) 20 MG 24 hr capsule  Dispense: 30 capsule; Refill: 0  - amphetamine-dextroamphetamine (ADDERALL) 5 MG tablet  Dispense: 30 tablet; Refill: 0    Review of external notes as documented elsewhere in note   Time spent doing chart review, history and exam, documentation and further activities per the note       BMI:   Estimated body mass index is 37.16 kg/m  as calculated from the following:    Height as of this encounter: 1.829 m (6').    Weight as of this encounter: 124.3 kg (274 lb).   Weight management plan: Discussed healthy diet and exercise guidelines    See Patient Instructions    Return in about 2 weeks (around 8/22/2022) for symptoms failing to improve or sooner if worsening; .    Milan Pardo MD  [facility] PHILIPPE Thomas is a 42 year old, presenting for the following health issues:  Pharyngitis      Pharyngitis     History  of Present Illness   William reports a sore throat that began last Friday that is worse when swallowing and has kept him up at night. He has also had mild chills that have resolved today. Denies fever, cough, shortness of breath, fatigue. His wife tested positive for COVID last week, but William has had several negative results including one today. He has a history of sleep apnea and currently uses a CPAP machine.     Reason for visit:  Sore throat and spots in mouth  Symptom onset:  3 days ago  Symptoms include:  Sore throat  Symptom intensity:  Severe  Symptom progression:  Staying the same  Had these symptoms before:  No    He eats 2-3 servings of fruits and vegetables daily.He consumes 1 sweetened beverage(s) daily.He exercises with enough effort to increase his heart rate 9 or less minutes per day.  He exercises with enough effort to increase his heart rate 3 or less days per week.   He is taking medications regularly.    Today's PHQ-9         PHQ-9 Total Score: 0    PHQ-9 Q9 Thoughts of better off dead/self-harm past 2 weeks :   Not at all    How difficult have these problems made it for you to do your work, take care of things at home, or get along with other people: Not difficult at all  Today's SANDOVAL-7 Score: 0     Acute Illness  Acute illness concerns: sore throat, chills  Onset/Duration: started on Friday - Wife diagnosed with covid two weeks and he has tested several times and it continues to be negative  Symptoms:  Fever: No  Chills/Sweats: YES  Headache (location?): YES  Sinus Pressure: No  Conjunctivitis:  No  Ear Pain: no  Rhinorrhea: No  Congestion: No  Sore Throat: YES  Cough: no  Wheeze: No  Decreased Appetite: No  Nausea: No  Vomiting: No  Diarrhea: No  Dysuria/Freq.: No  Dysuria or Hematuria: No  Fatigue/Achiness: Yes  Sick/Strep Exposure: Wife had covid  Therapies tried and outcome: cough drops, ibuprofen      Review of Systems   Constitutional: Negative.    HENT: Positive for sore throat.    Eyes:  Negative.    Respiratory: Negative.    Cardiovascular: Negative.    Gastrointestinal: Negative.    Endocrine: Negative.    Genitourinary: Negative.    Musculoskeletal: Negative.    Skin: Positive for rash.   Allergic/Immunologic: Negative.    Neurological: Negative.    Hematological: Negative.    Psychiatric/Behavioral: Negative.           Objective    /82 (BP Location: Right arm, Cuff Size: Adult Large)   Pulse 97   Temp 97.3  F (36.3  C) (Tympanic)   Ht 1.829 m (6')   Wt 124.3 kg (274 lb)   SpO2 96%   BMI 37.16 kg/m    Body mass index is 37.16 kg/m .  Physical Exam   GENERAL: healthy, alert and no distress  EYES: Eyes grossly normal to inspection, PERRL and conjunctivae and sclerae normal  HENT: ear canals and TM's normal, white exudates around uvula, able to scrape off with tongue depressor.  NECK: no adenopathy, no asymmetry, masses, or scars and thyroid normal to palpation  RESP: lungs clear to auscultation - no rales, rhonchi or wheezes  CV: regular rate and rhythm, normal S1 S2, no S3 or S4, no murmur, click or rub, no peripheral edema and peripheral pulses strong  ABDOMEN: soft, nontender, no hepatosplenomegaly, no masses and bowel sounds normal  MS: no gross musculoskeletal defects noted, no edema  SKIN: no suspicious lesions or rashes  NEURO: Normal strength and tone, mentation intact and speech normal  PSYCH: mentation appears normal, affect normal/bright      Liborio Polk MS3,  has participated in the care of this patient.     Provider Disclosure:  I agree with above History, Review of Systems, Physical exam and Plan.  I have reviewed the content of the documentation and have edited it as needed. I have personally performed the services documented here and the documentation accurately represents those services and the decisions I have made.      Electronically signed by:          Hunter Pardo M.D.

## 2022-09-10 ENCOUNTER — HEALTH MAINTENANCE LETTER (OUTPATIENT)
Age: 42
End: 2022-09-10

## 2022-10-13 DIAGNOSIS — I10 ESSENTIAL HYPERTENSION WITH GOAL BLOOD PRESSURE LESS THAN 130/80: ICD-10-CM

## 2022-10-13 RX ORDER — LISINOPRIL 20 MG/1
TABLET ORAL
Qty: 90 TABLET | Refills: 0 | Status: SHIPPED | OUTPATIENT
Start: 2022-10-13 | End: 2022-11-01

## 2022-10-13 NOTE — TELEPHONE ENCOUNTER
Prescription approved per [facility] Refill Protocol.  Stephanie LEY RN, BSN

## 2022-10-17 ENCOUNTER — HOSPITAL ENCOUNTER (OUTPATIENT)
Dept: ULTRASOUND IMAGING | Facility: CLINIC | Age: 42
Discharge: HOME | End: 2022-10-17
Attending: NURSE PRACTITIONER | Admitting: NURSE PRACTITIONER
Payer: COMMERCIAL

## 2022-10-17 DIAGNOSIS — R74.8 ELEVATED LIVER ENZYMES: ICD-10-CM

## 2022-10-17 PROCEDURE — 76705 ECHO EXAM OF ABDOMEN: CPT

## 2022-10-18 NOTE — RESULT ENCOUNTER NOTE
Dear William,     Your recent liver ultrasound showed diffuse fatty infiltration of the liver.  This can be related to weight and/or alcohol intake.   I encourage continued work on increasing your physical activity (150-200 minutes per week), working on weight loss (just starting with 5-10% weight loss) and focus on decreasing inflammation in your diet - focus on the Mediterranean diet.      You are due for your preventative visit after 10/29/22 and we can repeat fasting labs at that time.      Please send a [employer] message or call [phone]  if you have any questions.      Kezia Brooks, APRN, CNP  [facility]

## 2022-11-01 ENCOUNTER — OFFICE VISIT (OUTPATIENT)
Dept: FAMILY MEDICINE | Facility: CLINIC | Age: 42
End: 2022-11-01
Payer: COMMERCIAL

## 2022-11-01 VITALS
HEART RATE: 104 BPM | WEIGHT: 278.4 LBS | DIASTOLIC BLOOD PRESSURE: 100 MMHG | OXYGEN SATURATION: 97 % | BODY MASS INDEX: 37.71 KG/M2 | TEMPERATURE: 99.2 F | SYSTOLIC BLOOD PRESSURE: 148 MMHG | HEIGHT: 72 IN

## 2022-11-01 DIAGNOSIS — Z00.00 ROUTINE GENERAL MEDICAL EXAMINATION AT A HEALTH CARE FACILITY: Primary | ICD-10-CM

## 2022-11-01 DIAGNOSIS — E78.5 HYPERLIPIDEMIA, UNSPECIFIED HYPERLIPIDEMIA TYPE: ICD-10-CM

## 2022-11-01 DIAGNOSIS — F98.8 ADD (ATTENTION DEFICIT DISORDER) WITHOUT HYPERACTIVITY: ICD-10-CM

## 2022-11-01 DIAGNOSIS — I10 ESSENTIAL HYPERTENSION WITH GOAL BLOOD PRESSURE LESS THAN 130/80: ICD-10-CM

## 2022-11-01 DIAGNOSIS — Z83.79 FAMILY HISTORY OF EARLY LIVER DISEASE: ICD-10-CM

## 2022-11-01 DIAGNOSIS — K76.0 FATTY LIVER: ICD-10-CM

## 2022-11-01 DIAGNOSIS — E66.01 MORBID OBESITY (H): ICD-10-CM

## 2022-11-01 DIAGNOSIS — R74.8 ELEVATED LIVER ENZYMES: ICD-10-CM

## 2022-11-01 DIAGNOSIS — K44.9 HIATAL HERNIA: ICD-10-CM

## 2022-11-01 PROCEDURE — 99214 OFFICE O/P EST MOD 30 MIN: CPT | Mod: 25 | Performed by: NURSE PRACTITIONER

## 2022-11-01 PROCEDURE — 99396 PREV VISIT EST AGE 40-64: CPT | Mod: 25 | Performed by: NURSE PRACTITIONER

## 2022-11-01 PROCEDURE — 90471 IMMUNIZATION ADMIN: CPT | Performed by: NURSE PRACTITIONER

## 2022-11-01 PROCEDURE — 90686 IIV4 VACC NO PRSV 0.5 ML IM: CPT | Performed by: NURSE PRACTITIONER

## 2022-11-01 RX ORDER — LISINOPRIL 20 MG/1
20 TABLET ORAL DAILY
Qty: 90 TABLET | Refills: 0 | Status: CANCELLED | OUTPATIENT
Start: 2022-11-01

## 2022-11-01 RX ORDER — ROSUVASTATIN CALCIUM 40 MG/1
40 TABLET, COATED ORAL DAILY
Qty: 90 TABLET | Refills: 3 | Status: SHIPPED | OUTPATIENT
Start: 2022-11-01 | End: 2023-10-19

## 2022-11-01 RX ORDER — LISINOPRIL 30 MG/1
30 TABLET ORAL DAILY
Qty: 90 TABLET | Refills: 3 | Status: SHIPPED | OUTPATIENT
Start: 2022-11-01 | End: 2023-09-22

## 2022-11-01 NOTE — PROGRESS NOTES
{PROVIDER CHARTING PREFERENCE:823940}    Sandeep Thomas is a 42 year old{ACCOMPANIED BY STATEMENT (Optional):379073}, presenting for the following health issues:  Recheck Medication      History of Present Illness       Hypertension: He presents for follow up of hypertension.  He does not check blood pressure  regularly outside of the clinic. Outside blood pressures have been over 140/90. He follows a low salt diet.       Lisinopril (Zestril)   Amphetamine-dextroamphetamine (Adderall)    {SUPERLIST (Optional):168325}  {additonal problems for provider to add (Optional):990911}    Review of Systems   {ROS COMP (Optional):472701}      Objective    There were no vitals taken for this visit.  There is no height or weight on file to calculate BMI.  Physical Exam   {Exam List (Optional):135364}    {Diagnostic Test Results (Optional):946961}    {AMBULATORY ATTESTATION (Optional):542119}

## 2022-11-01 NOTE — PROGRESS NOTES
SUBJECTIVE:       CC: William is an 42 year old who presents for preventative health visit. Pt has had busy year. 4 kids at home (todder to adolescent). He is acutely concerned for liver US results done 17 OCT. This showed some fatty deposits. ALT/AST noted to be elevated at this time as well. Father  in his 50s of PSC (primary sclerosing cholangitis).     Pt states he drinks approx 4-5 days per week, 2-5 ~7% ABV beers per night. He is concerned he may have difficulty cutting back or quitting drinking. Does not wish to begin therapy or see addiction specialist at this time but is opening to considering if he is not able to cutback without assistance .      Pt is additionally concerned his adderall is becoming less effective in managing his ADHD symptoms. Notes it loses effectiveness roughly around 2:00 p.m. each day. States his wife has commented on his lack of attention at home. Feels he has fallen behind somewhat at work, which is becoming more difficult as he is traveling more.     {  Patient has been advised of split billing requirements and indicates understanding: Yes  History of Present Illness       Hypertension: He presents for follow up of hypertension.  He does not check blood pressure  regularly outside of the clinic. Outside blood pressures have been over 140/90. He follows a low salt diet.    He is not taking prescribed medications regularly due to None.  Healthy Habits:    Getting at least 3 servings of Calcium per day:  Yes    Bi-annual eye exam:  NO    Dental care twice a year:  Yes    Sleep apnea or symptoms of sleep apnea:  Sleep apnea    Diet:  Regular (no restrictions)    Frequency of exercise:  1 day/week    Duration of exercise:  15-30 minutes    Taking medications regularly:  Yes    Barriers to taking medications:  None    Medication side effects:  None    PHQ-2 Total Score:    Additional concerns today:  No              Today's PHQ-2 Score:   PHQ-2 (  [employer]) 2022   Q1: Little  interest or pleasure in doing things 0   Q2: Feeling down, depressed or hopeless 0   PHQ-2 Score 0   PHQ-2 Total Score (12-17 Years)- Positive if 3 or more points; Administer PHQ-A if positive -   Q1: Little interest or pleasure in doing things Not at all   Q2: Feeling down, depressed or hopeless Not at all   PHQ-2 Score 0       Abuse: Current or Past(Physical, Sexual or Emotional)-   Do you feel safe in your environment?     Have you ever done Advance Care Planning? (For example, a Health Directive, POLST, or a discussion with a medical provider or your loved ones about your wishes):     Social History     Tobacco Use     Smoking status: Never     Smokeless tobacco: Never   Substance Use Topics     Alcohol use: Yes     Comment: 3 x week - 2 drinks         Alcohol Use 10/29/2021   Prescreen: >3 drinks/day or >7 drinks/week? No   Prescreen: >3 drinks/day or >7 drinks/week? -       Last PSA: No results found for: PSA    Reviewed orders with patient. Reviewed health maintenance and updated orders accordingly -   Lab work is in process    Reviewed and updated as needed this visit by clinical staff   Tobacco  Allergies  Meds              Reviewed and updated as needed this visit by Provider   Tobacco   Meds                 Review of Systems  CONSTITUTIONAL: NEGATIVE for fever, chills, change in weight  INTEGUMENTARY/SKIN: NEGATIVE for worrisome rashes, moles or lesions  EYES: NEGATIVE for vision changes or irritation  ENT: NEGATIVE for ear, mouth and throat problems  RESP: NEGATIVE for significant cough or SOB  CV: NEGATIVE for chest pain, palpitations or peripheral edema  GI: NEGATIVE for nausea, abdominal pain, heartburn, or change in bowel habits   male: negative for dysuria, hematuria, decreased urinary stream, erectile dysfunction, urethral discharge  MUSCULOSKELETAL: NEGATIVE for significant arthralgias or myalgia  NEURO: NEGATIVE for weakness, dizziness or paresthesias  PSYCHIATRIC: NEGATIVE for changes in  mood or affect    OBJECTIVE:   BP (!) 148/100   Pulse 104   Temp 99.2  F (37.3  C) (Tympanic)   Ht 1.829 m (6')   Wt 126.3 kg (278 lb 6.4 oz)   SpO2 97%   BMI 37.76 kg/m      Physical Exam  GENERAL: healthy, alert and no distress  NECK: no adenopathy, no asymmetry, masses, or scars and thyroid normal to palpation  RESP: lungs clear to auscultation - no rales, rhonchi or wheezes  CV: regular rate and rhythm, normal S1 S2, no S3 or S4, no murmur, click or rub, no peripheral edema   ABDOMEN: soft, nontender, + mild hepatomegaly on palpation, no masses and bowel sounds normal  MS: no gross musculoskeletal defects noted, no edema    Diagnostic Test Results:  Labs reviewed in Epic    ASSESSMENT/PLAN:     William was seen today for recheck medication and physical. Given elevated AST/ALT, fam hx of hepatic disease, and recent fatty liver noted on US, reviewed importance of abstaining from alcohol use.      Diagnoses and all orders for this visit:    Routine general medical examination at a health care facility  -     INFLUENZA VACCINE IM > 6 MONTHS VALENT IIV4 (AFLURIA/FLUZONE)    Essential hypertension with goal blood pressure less than 130/80  Noted elevated blood pressure in clinic today.    Follow-up in 4-6 weeks for recheck of BP. Monitor home blood pressures, bring in recordings to follow-up visit.    -     lisinopril (ZESTRIL) 30 MG tablet; Take 1 tablet (30 mg) by mouth daily    ADD (attention deficit disorder) without hyperactivity  - Trial increase of Adderall XR to 25 mg per day.     Morbid obesity (H)  - Work to reduce ETOH intake and increase exercise.      Fatty liver  -     Adult GI  Referral - Consult Only; Future    Hyperlipidemia, unspecified hyperlipidemia type  Recent Labs   Lab Test 11/09/22  0812 11/12/21  0742   CHOL 164 161   HDL 44 55   * 88   TRIG 88 92     -     Lipid panel reflex to direct LDL Fasting; Future  -     rosuvastatin (CRESTOR) 40 MG tablet; Take 1 tablet (40 mg) by  mouth daily    Elevated liver enzymes  -     Comprehensive metabolic panel (BMP + Alb, Alk Phos, ALT, AST, Total. Bili, TP); Future  -     CBC with platelets and differential; Future  -     GGT; Future  -     Hepatitis C antibody; Future  -     Hepatitis B surface antigen; Future    Hiatal hernia  -     Adult GI  Referral - Consult Only; Future    Family history of early liver disease (PSC)   Comments:  Father      COUNSELING:   Reviewed preventive health counseling, as reflected in patient instructions       Regular exercise       Healthy diet/nutrition       Alcohol Use     Estimated body mass index is 37.76 kg/m  as calculated from the following:    Height as of this encounter: 1.829 m (6').    Weight as of this encounter: 126.3 kg (278 lb 6.4 oz).         He reports that he has never smoked. He has never used smokeless tobacco.      Counseling Resources:  ATP IV Guidelines  Pooled Cohorts Equation Calculator  FRAX Risk Assessment  ICSI Preventive Guidelines  Dietary Guidelines for Americans, 2010  USDA's MyPlate  ASA Prophylaxis  Lung CA Screening    Micky Mccabe RN, FNP Student     I was present with the student who participated in the service and in the documentation of the note, which I have reviewed and verified. The history, reviews of systems, objective data, and assessment/plan were completed by myself.    Kezia Brooks, ODALIS CNP  [facility]

## 2022-11-01 NOTE — LETTER
[facility]  11/01/22  Patient: William Bolton  YOB: 1980  Medical Record Number: [id number]                                                                                  Non-Opioid Controlled Substance Agreement    This is an agreement between you and your provider regarding safe and appropriate use of controlled substances prescribed by your care team. Controlled substances are?medicines that can cause physical and mental dependence (abuse).     There are strict laws about having and using these medicines. We here at [facility] are  committed to working with you in your efforts to get better. To support you in this work, we'll help you schedule regular office appointments for medicine refills. If we must cancel or change your appointment for any reason, we'll make sure you have enough medicine to last until your next appointment.     As a Provider, I will:     Listen carefully to your concerns while treating you with respect.     Recommend a treatment plan that I believe is in your best interest and may involve therapies other than medicine.      Talk with you often about the possible benefits and the risk of harm of any medicine that we prescribe for you.    Assess the safety of this medicine and check how well it works.      Provide a plan on how to taper (discontinue or go off) using this medicine if the decision is made to stop its use.      ::  As a Patient, I understand controlled substances:       Are prescribed by my care provider to help me function or work and manage my condition(s).?    Are strong medicines and can cause serious side effects.       Need to be taken exactly as prescribed.?Combining controlled substances with certain medicines or chemicals (such as illegal drugs, alcohol, sedatives, sleeping pills, and benzodiazepines) can be dangerous or even fatal.? If I stop taking my medicines suddenly, I may have severe withdrawal symptoms.     The  risks, benefits, and side effects of these medicine(s) were explained to me. I agree that:    1. I will take part in other treatments as advised by my care team. This may be psychiatry or counseling, physical therapy, behavioral therapy, group treatment or a referral to specialist.    2. I will keep all my appointments and understand this is part of the monitoring of controlled substances.?My care team may require an office visit for EVERY controlled substance refill. If I miss appointments or don t follow instructions, my care team may stop my medicine    3. I will take my medicines as prescribed. I will not change the dose or schedule unless my care team tells me to. There will be no refills if I run out early.      4. I may be asked to come to the clinic and complete a urine drug test or complete a pill count. If I don t give a urine sample or participate in a pill count, the care team may stop my medicine.    5. I will only receive controlled substance prescriptions from this clinic. If I am treated by another provider, I will tell them that I am taking controlled substances and that I have a treatment agreement with this provider. I will inform my [facility] care team within one business day if I am given a prescription for any controlled substance by another healthcare provider. My [facility] care team can contact other providers and pharmacists about my use of any medicines.    6. It is up to me to make sure that I don't run out of my medicines on weekends or holidays.?If my care team is willing to refill my prescription without a visit, I must request refills only during office hours. Refills may take up to 3 business days to process. I will use one pharmacy to fill all my controlled substance prescriptions. I will notify the clinic about any changes to my insurance or medicine availability.    7. I am responsible for my prescriptions. If the medicine/prescription is lost, stolen or destroyed,  it will not be replaced.?I also agree not to share controlled substance medicines with anyone.     8. I am aware I should not use any illegal or recreational drugs. I agree not to drink alcohol unless my care team says I can.     9. If I enroll in the [place] [facility] Cannabis program, I will tell my care team before my next refill.    10. I will tell my care team right away if I become pregnant, have a new medical problem treated outside of my regular clinic, or have a change in my medicines.     11. I understand that this medicine can affect my thinking, judgment and reaction time.? Alcohol and drugs affect the brain and body, which can affect the safety of my driving. Being under the influence of alcohol or drugs can affect my decision-making, behaviors, personal safety and the safety of others. Driving while impaired (DWI) can occur if a person is driving, operating or in physical control of a car, motorcycle, boat, snowmobile, ATV, motorbike, off-road vehicle or any other motor vehicle ([place] Statute 169A.20). I understand the risk if I choose to drive or operate any vehicle or machinery.    I understand that if I do not follow any of the conditions above, my prescriptions or treatment may be stopped or changed.   I agree that my provider, clinic care team and pharmacy may work with any city, state or federal law enforcement agency that investigates the misuse, sale or other diversion of my controlled medicine. I will allow my provider to discuss my care with, or share a copy of, this agreement with any other treating provider, pharmacy or emergency room where I receive care.     I have read this agreement and have asked questions about anything I did not understand.    ________________________________________________________  Patient Signature - William Bolton     ___________________                   Date     ________________________________________________________  Provider Signature - ODALIS Martin  CNP       ___________________                   Date     ________________________________________________________  Witness Signature (required if provider not present while patient signing)          ___________________                   Date

## 2022-11-04 NOTE — TELEPHONE ENCOUNTER
RECORDS RECEIVED FROM: Internal   Appt Date: 11.30.22   NOTES STATUS DETAILS   OFFICE NOTE from referring provider Internal 11.1.22 BENNY Brooks   OFFICE NOTES from other specialists     DISCHARGE SUMMARY from hospital     MEDICATION LIST Internal    LIVER BIOSPY (IF APPLICABLE)      PATHOLOGY REPORTS      IMAGING     ENDOSCOPY (IF AVAILABLE)     COLONOSCOPY (IF AVAILABLE)     ULTRASOUND LIVER Internal 10.17.22   CT OF ABDOMEN     MRI OF LIVER     FIBROSCAN, US ELASTOGRAPHY, FIBROSIS SCAN, MR ELASTOGRAPHY     LABS     HEPATIC PANEL (LIVER PANEL) Internal 5.16.19   BASIC METABOLIC PANEL Internal 9.15.20   COMPLETE METABOLIC PANEL     COMPLETE BLOOD COUNT (CBC) Internal 7.19.19   INTERNATIONAL NORMALIZED RATIO (INR)     HEPATITIS C ANTIBODY     HEPATITIS C VIRAL LOAD/PCR     HEPATITIS C GENOTYPE     HEPATITIS B SURFACE ANTIGEN     HEPATITIS B SURFACE ANTIBODY     HEPATITIS B DNA QUANT LEVEL     HEPATITIS B CORE ANTIBODY

## 2022-11-09 ENCOUNTER — LAB (OUTPATIENT)
Dept: LAB | Facility: CLINIC | Age: 42
End: 2022-11-09
Payer: COMMERCIAL

## 2022-11-09 DIAGNOSIS — R73.01 ELEVATED FASTING GLUCOSE: ICD-10-CM

## 2022-11-09 DIAGNOSIS — R74.8 ELEVATED LIVER ENZYMES: ICD-10-CM

## 2022-11-09 DIAGNOSIS — E78.5 HYPERLIPIDEMIA, UNSPECIFIED HYPERLIPIDEMIA TYPE: ICD-10-CM

## 2022-11-09 LAB
ALBUMIN SERPL-MCNC: 3.8 G/DL (ref 3.4–5)
ALP SERPL-CCNC: 75 U/L (ref 40–150)
ALT SERPL W P-5'-P-CCNC: 136 U/L (ref 0–70)
ANION GAP SERPL CALCULATED.3IONS-SCNC: 6 MMOL/L (ref 3–14)
AST SERPL W P-5'-P-CCNC: 60 U/L (ref 0–45)
BASOPHILS # BLD AUTO: 0 10E3/UL (ref 0–0.2)
BASOPHILS NFR BLD AUTO: 1 %
BILIRUB SERPL-MCNC: 0.5 MG/DL (ref 0.2–1.3)
BUN SERPL-MCNC: 10 MG/DL (ref 7–30)
CALCIUM SERPL-MCNC: 9.3 MG/DL (ref 8.5–10.1)
CHLORIDE BLD-SCNC: 107 MMOL/L (ref 94–109)
CHOLEST SERPL-MCNC: 164 MG/DL
CO2 SERPL-SCNC: 27 MMOL/L (ref 20–32)
CREAT SERPL-MCNC: 0.86 MG/DL (ref 0.66–1.25)
EOSINOPHIL # BLD AUTO: 0.2 10E3/UL (ref 0–0.7)
EOSINOPHIL NFR BLD AUTO: 3 %
ERYTHROCYTE [DISTWIDTH] IN BLOOD BY AUTOMATED COUNT: 13.1 % (ref 10–15)
FASTING STATUS PATIENT QL REPORTED: YES
GFR SERPL CREATININE-BSD FRML MDRD: >90 ML/MIN/1.73M2
GGT SERPL-CCNC: 108 U/L (ref 0–75)
GLUCOSE BLD-MCNC: 123 MG/DL (ref 70–99)
HBV SURFACE AG SERPL QL IA: NONREACTIVE
HCT VFR BLD AUTO: 45.2 % (ref 40–53)
HCV AB SERPL QL IA: NONREACTIVE
HDLC SERPL-MCNC: 44 MG/DL
HGB BLD-MCNC: 15.2 G/DL (ref 13.3–17.7)
IMM GRANULOCYTES # BLD: 0 10E3/UL
IMM GRANULOCYTES NFR BLD: 0 %
LDLC SERPL CALC-MCNC: 102 MG/DL
LYMPHOCYTES # BLD AUTO: 1.6 10E3/UL (ref 0.8–5.3)
LYMPHOCYTES NFR BLD AUTO: 25 %
MCH RBC QN AUTO: 29.5 PG (ref 26.5–33)
MCHC RBC AUTO-ENTMCNC: 33.6 G/DL (ref 31.5–36.5)
MCV RBC AUTO: 88 FL (ref 78–100)
MONOCYTES # BLD AUTO: 0.8 10E3/UL (ref 0–1.3)
MONOCYTES NFR BLD AUTO: 12 %
NEUTROPHILS # BLD AUTO: 3.9 10E3/UL (ref 1.6–8.3)
NEUTROPHILS NFR BLD AUTO: 60 %
NONHDLC SERPL-MCNC: 120 MG/DL
PLATELET # BLD AUTO: 239 10E3/UL (ref 150–450)
POTASSIUM BLD-SCNC: 4.1 MMOL/L (ref 3.4–5.3)
PROT SERPL-MCNC: 7.1 G/DL (ref 6.8–8.8)
RBC # BLD AUTO: 5.15 10E6/UL (ref 4.4–5.9)
SODIUM SERPL-SCNC: 140 MMOL/L (ref 133–144)
TRIGL SERPL-MCNC: 88 MG/DL
WBC # BLD AUTO: 6.5 10E3/UL (ref 4–11)

## 2022-11-09 PROCEDURE — 82977 ASSAY OF GGT: CPT

## 2022-11-09 PROCEDURE — 80053 COMPREHEN METABOLIC PANEL: CPT

## 2022-11-09 PROCEDURE — 36415 COLL VENOUS BLD VENIPUNCTURE: CPT

## 2022-11-09 PROCEDURE — 83036 HEMOGLOBIN GLYCOSYLATED A1C: CPT

## 2022-11-09 PROCEDURE — 85025 COMPLETE CBC W/AUTO DIFF WBC: CPT

## 2022-11-09 PROCEDURE — 86803 HEPATITIS C AB TEST: CPT

## 2022-11-09 PROCEDURE — 87340 HEPATITIS B SURFACE AG IA: CPT

## 2022-11-09 PROCEDURE — 80061 LIPID PANEL: CPT

## 2022-11-11 DIAGNOSIS — R73.01 ELEVATED FASTING GLUCOSE: ICD-10-CM

## 2022-11-11 DIAGNOSIS — K76.0 FATTY LIVER: Primary | ICD-10-CM

## 2022-11-11 LAB — HBA1C MFR BLD: 6.5 % (ref 0–5.6)

## 2022-11-11 RX ORDER — DEXTROAMPHETAMINE SACCHARATE, AMPHETAMINE ASPARTATE MONOHYDRATE, DEXTROAMPHETAMINE SULFATE AND AMPHETAMINE SULFATE 6.25; 6.25; 6.25; 6.25 MG/1; MG/1; MG/1; MG/1
25 CAPSULE, EXTENDED RELEASE ORAL EVERY MORNING
Qty: 30 CAPSULE | Refills: 0 | Status: SHIPPED | OUTPATIENT
Start: 2022-11-11 | End: 2022-12-19

## 2022-11-15 DIAGNOSIS — R73.01 ELEVATED FASTING GLUCOSE: Primary | ICD-10-CM

## 2022-11-15 NOTE — RESULT ENCOUNTER NOTE
Dear William,     -A1C (diabetic test) is elevated and a sign of diabetes.  ADVISE: repeating fasting blood glucose and HgbA1C in 1 month.  You can schedule a lab only appointment.        Please send a [employer] message or call [phone]  if you have any questions.      ODALIS Martin, CNP  [facility] [place]    If you have further questions about the interpretation of your labs, [email or web address] is a good website to check out for further information.    
Dearussel Thomas,     -Normal red blood cell (hgb) levels, normal white blood cell count and normal platelet levels.  -Cholesterol levels are very close to your goal levels.  ADVISE: continuing your medication, a regular exercise program with at least 150 minutes of aerobic exercise per week, and eating a low saturated fat/low carbohydrate diet.  Also, you should recheck this fasting cholesterol panel in 12 months.  -Liver and gallbladder tests (ALT,AST) continue to be elevated, but have slightly improved from 6 months ago.    -GGT is elevated which is another inflammatory liver test and I encourage recheck of your liver function and GGT in 6 months.    -Kidney function (GFR) is normal.  -Sodium is normal.  -Potassium is normal.  -Calcium is normal.  -Glucose is slightly elevated and may be a sign of early diabetes (prediabetes). ADVISE:  eating a low carbohydrate diet, exercising, trying to lose weight (if necessary) and rechecking your glucose level in 12 months.  I am also checking a HgbA1C (which looks at your average blood sugar over the past 3 months) to test for Diabetes.    -Hepatitis C antibody screen test shows no signs of a previous hepatitis C infection.  -Hepatitis B antigen test was negative.        Please send a [employer] message or call [phone]  if you have any questions.      ODALIS Martin, CNP  [facility] [place]    If you have further questions about the interpretation of your labs, [email or web address] is a good website to check out for further information.    
No

## 2022-11-22 PROBLEM — Z83.79: Status: ACTIVE | Noted: 2022-11-22

## 2022-11-22 PROBLEM — R74.8 ELEVATED LIVER ENZYMES: Status: ACTIVE | Noted: 2022-11-22

## 2022-11-22 PROBLEM — K76.0 FATTY LIVER: Status: ACTIVE | Noted: 2022-11-22

## 2022-11-25 ENCOUNTER — LAB (OUTPATIENT)
Dept: LAB | Facility: CLINIC | Age: 42
End: 2022-11-25
Payer: COMMERCIAL

## 2022-11-25 ENCOUNTER — TELEPHONE (OUTPATIENT)
Dept: GASTROENTEROLOGY | Facility: CLINIC | Age: 42
End: 2022-11-25

## 2022-11-25 DIAGNOSIS — R79.89 ELEVATED LFTS: ICD-10-CM

## 2022-11-25 DIAGNOSIS — R79.89 ELEVATED LFTS: Primary | ICD-10-CM

## 2022-11-25 LAB
ALBUMIN SERPL BCG-MCNC: 4.5 G/DL (ref 3.5–5.2)
ALP SERPL-CCNC: 82 U/L (ref 40–129)
ALT SERPL W P-5'-P-CCNC: 51 U/L (ref 10–50)
ANION GAP SERPL CALCULATED.3IONS-SCNC: 18 MMOL/L (ref 7–15)
AST SERPL W P-5'-P-CCNC: 34 U/L (ref 10–50)
BILIRUB DIRECT SERPL-MCNC: <0.2 MG/DL (ref 0–0.3)
BILIRUB SERPL-MCNC: 0.5 MG/DL
BUN SERPL-MCNC: 8 MG/DL (ref 6–20)
CALCIUM SERPL-MCNC: 9.5 MG/DL (ref 8.6–10)
CHLORIDE SERPL-SCNC: 103 MMOL/L (ref 98–107)
CREAT SERPL-MCNC: 1 MG/DL (ref 0.67–1.17)
DEPRECATED HCO3 PLAS-SCNC: 21 MMOL/L (ref 22–29)
ERYTHROCYTE [DISTWIDTH] IN BLOOD BY AUTOMATED COUNT: 12.9 % (ref 10–15)
GFR SERPL CREATININE-BSD FRML MDRD: >90 ML/MIN/1.73M2
GLUCOSE SERPL-MCNC: 105 MG/DL (ref 70–99)
HCT VFR BLD AUTO: 45.5 % (ref 40–53)
HGB BLD-MCNC: 15.4 G/DL (ref 13.3–17.7)
INR PPP: 1.03 (ref 0.85–1.15)
MCH RBC QN AUTO: 29.4 PG (ref 26.5–33)
MCHC RBC AUTO-ENTMCNC: 33.8 G/DL (ref 31.5–36.5)
MCV RBC AUTO: 87 FL (ref 78–100)
PLATELET # BLD AUTO: 263 10E3/UL (ref 150–450)
POTASSIUM SERPL-SCNC: 3.9 MMOL/L (ref 3.4–5.3)
PROT SERPL-MCNC: 7.4 G/DL (ref 6.4–8.3)
RBC # BLD AUTO: 5.23 10E6/UL (ref 4.4–5.9)
SODIUM SERPL-SCNC: 142 MMOL/L (ref 136–145)
WBC # BLD AUTO: 7.6 10E3/UL (ref 4–11)

## 2022-11-25 PROCEDURE — 85610 PROTHROMBIN TIME: CPT

## 2022-11-25 PROCEDURE — 82248 BILIRUBIN DIRECT: CPT

## 2022-11-25 PROCEDURE — 85027 COMPLETE CBC AUTOMATED: CPT

## 2022-11-25 PROCEDURE — 36415 COLL VENOUS BLD VENIPUNCTURE: CPT

## 2022-11-25 PROCEDURE — 80053 COMPREHEN METABOLIC PANEL: CPT

## 2022-11-25 NOTE — TELEPHONE ENCOUNTER
[facility]    Phone Message    May a detailed message be left on voicemail: yes     Reason for Call: Other: Pt called, stated that he was in labs, but labs had no record for him to get labs drawn for liver/ Rebecca, however tt is scheduled for labs in [place] at 9:30 am today. and under appointment notes it reads Rebecca labs. Pt requested a call back, please call him back at [phone], pt is currently in lab.     Action Taken: Other: hepa    Travel Screening: Not Applicable

## 2022-11-30 ENCOUNTER — PRE VISIT (OUTPATIENT)
Dept: GASTROENTEROLOGY | Facility: CLINIC | Age: 42
End: 2022-11-30

## 2022-11-30 ENCOUNTER — VIRTUAL VISIT (OUTPATIENT)
Dept: GASTROENTEROLOGY | Facility: CLINIC | Age: 42
End: 2022-11-30
Attending: NURSE PRACTITIONER
Payer: COMMERCIAL

## 2022-11-30 DIAGNOSIS — K76.0 FATTY LIVER: ICD-10-CM

## 2022-11-30 DIAGNOSIS — K44.9 HIATAL HERNIA: ICD-10-CM

## 2022-11-30 PROCEDURE — 99205 OFFICE O/P NEW HI 60 MIN: CPT | Mod: GT | Performed by: PHYSICIAN ASSISTANT

## 2022-11-30 NOTE — LETTER
11/30/2022         RE: William Bolton  [address] [facility] [address]        Dear Colleague,    Thank you for referring your patient, William Bolton, to the [facility] [place]. Please see a copy of my visit note below.    Hepatology Clinic note  William Bolton   Date of Birth 1980  Date of Service 11/29/2022    REASON FOR CONSULTATION: NAFLD  REFERRING PROVIDER: ODALIS Rogers          Assessment/plan:   William Botlon is a 42 year old male with history of elevated LFT's and history of hepatic steatosis on imaging  .Risk factors for fatty liver disease includes: obesity, NATHAN, HTN, hyperlipidemia, prediabetes. We discussed the pathophysiology and natural history of nonalcoholic fatty liver disease and cirrhosis. We discussed NAFLD treatment includes slow gradual weight loss, as well as optimization of risk factors including hyperlipidemia/blood glucose.Will also rule out genetic and autoimmune causes of hepatobiliary disease.      - Will obtain a fibrosis scan to evaluate any fibrosis  - Recommend slow gradual weight loss   - Maintain good control of cholesterol (No contraindication to starting a statin with LFT elevations)   - Optimize blood glucose as needed. Can consider use of GLP-1 inhibitor for both insulin resistance and help with weight loss.   - Improve nutrition:  emphasizing limit carbohydrates, especially simple carbohydrates.  - Increase physical activity: maintain physical activity more than 150 minutes a week  - Labs: Hep B, Hep C, Iron panel, alpha-1-antitrypsin deficiency, ISRA, F-actin, TTG   - Hx of iron deficiency: Iron panel   - Limit alcohol use to <3 drinks a week and less than one a day.  - Follow up in six months in person     Precious Fernandez PA-C   [facility]     Total time for E/M services performed on the date of the encounter 60 minutes.  This included review of previous: clinic visits, hospital records, lab results,  imaging studies, and procedural documentation. Time also includes patient visit, documentation.  The findings from this review are summarized in the above note.   -----------------------------------------------------       HPI:   William Bolton is a 42 year old male  presenting for the evaluation of elevated LFT's.     Per chart review, transaminases first noted to be elevated ALT 58 - 150's and AST 30-80's.     Appetite is normal. Currently does not follow particular. Tries to get a serving or two fruits/veggies a day. Recently has made some changes (past month), breakfast oatmeal, add salad for lunch and dinner varies as meals are with kids.     In regards to weight, might be up 15-20 lbs in past few years. In the last 10 years, slow gradual. Previously 175-180's.  Has regular bowel movements.     Patient denies jaundice, lower extremity edema, abdominal distension or confusion.  Patient also denies melena, hematochezia or hematemesis. Patient denies weight loss, sweats or chills. Recent fever likely due     PMH: ADHD, HTN, HLD, NATHAN, prediabetes    SMH: wisdom teeth, vasectomy     Medications:   See below     No previous tobacco use. In regards to alcohol, more over the past (4-5 nights a week may have 1-5 beers). No previous IV/IN drug use. Currently works an  for [employer]. Heavy travel schedule with work (2 weeks away). Patient currently lives wife and kids (oldest 12, 8, 6, and youngest 4).  Dad had primary sclerosing cholangitis.     Previous work-up:  HCV antibody nonreactive  HBV SAb:   HBV SAg: nonreactive  HBV CAb:   ISRA:   F-actin  AMA:  Iron panel:  Ferritin: 28  Iron Sats: 5%  TTG:   Alpha-1-antripsin  Ceruloplasmin   TSH   Cholesterol Total   HDL 44  LDL  Triglycerides  Hemoglobin A1c - 6.5     Medical hx Surgical hx   Past Medical History:   Diagnosis Date     Abnormal bone xray - sclerosis of phalynx of right great toe 7/31/17 - suggest repeat imaging at annual visit. 7/31/2017     Depressive  "disorder      Hypertension 2019     Sleep apnea     Past Surgical History:   Procedure Laterality Date     COLONOSCOPY  06/13/2019    Dr. Navarro [facility]     ESOPHAGOSCOPY, GASTROSCOPY, DUODENOSCOPY (EGD), COMBINED N/A 5/23/2019    Procedure: ESOPHAGOGASTRODUODENOSCOPY, WITH BIOPSY;  Surgeon: Say Navarro MD;  Location:  GI     HEAD & NECK SURGERY      wisdom teeth removed     SURGICAL HISTORY OF -                    Medications:     Current Outpatient Medications   Medication     amphetamine-dextroamphetamine (ADDERALL XR) 25 MG 24 hr capsule     amphetamine-dextroamphetamine (ADDERALL) 5 MG tablet     B-D LUER-DAVID SYRINGE 23G X 1\" 3 ML MISC     lisinopril (ZESTRIL) 30 MG tablet     rosuvastatin (CRESTOR) 40 MG tablet     No current facility-administered medications for this visit.            Allergies:   No Known Allergies         Social History:     Social History     Socioeconomic History     Marital status:      Spouse name: Not on file     Number of children: Not on file     Years of education: Not on file     Highest education level: Not on file   Occupational History     Employer: [employer]   Tobacco Use     Smoking status: Never     Smokeless tobacco: Never   Substance and Sexual Activity     Alcohol use: Yes     Comment: 3 x week - 2 drinks     Drug use: No     Sexual activity: Yes     Partners: Female     Birth control/protection: Pill, Male Surgical   Other Topics Concern     Parent/sibling w/ CABG, MI or angioplasty before 65F 55M? Yes   Social History Narrative     Not on file     Social Determinants of Health     Financial Resource Strain: Not on file   Food Insecurity: Not on file   Transportation Needs: Not on file   Physical Activity: Not on file   Stress: Not on file   Social Connections: Not on file   Intimate Partner Violence: Not on file   Housing Stability: Not on file            Family History:     Family History   Problem Relation Age of Onset     Coronary Artery " Disease Paternal Grandfather      Hypertension Father      Other - See Comments Father         primary sclerosing cholangitis     Hyperlipidemia Mother      Hypertension Sister      Hyperlipidemia Sister      Colon Cancer Cousin               Review of Systems:   Gen: See HPI     HEENT: No change in vision or hearing, mouth sores, dysphagia, lymph nodes  Resp: No shortness of breath, coughing, hx of asthma  CV: No chest pain, palpitations, syncope   GI: See HPI  : No dysuria, history of stones, urine color    Skin: No rash; no pruritus or psoriasis  MS: No arthralgias, myalgias, joint swelling  Neuro: No memory changes, confusion, numbness    Heme: No difficulty clotting, bruising, bleeding  Psych:  No anxiety, depression, agitation          Physical Exam:   GENERAL: healthy, alert and no distress  EYES: Eyes grossly normal to inspection, conjunctivae and sclerae normal  RESP: no audible wheeze, cough, or visible cyanosis.  No visible retractions or increased work of breathing.  Able to speak fully in complete sentences  NEURO: Cranial nerves grossly intact, mentation intact and speech normal  PSYCH: mentation appears normal, affect normal/bright, judgement and insight intact, normal speech and appearance well-groomed         Data:   Reviewed in person and significant for:    Lab Results   Component Value Date     11/25/2022     09/15/2020      Lab Results   Component Value Date    POTASSIUM 3.9 11/25/2022    POTASSIUM 4.1 11/09/2022    POTASSIUM 3.9 09/15/2020     Lab Results   Component Value Date    CHLORIDE 103 11/25/2022    CHLORIDE 107 11/09/2022    CHLORIDE 102 09/15/2020     Lab Results   Component Value Date    CO2 21 11/25/2022    CO2 27 11/09/2022    CO2 25 09/15/2020     Lab Results   Component Value Date    BUN 8.0 11/25/2022    BUN 10 11/09/2022    BUN 10 09/15/2020     Lab Results   Component Value Date    CR 1.00 11/25/2022    CR 0.96 09/15/2020       Lab Results   Component Value Date     WBC 7.6 11/25/2022    WBC 6.8 07/19/2019    WBC 7.0 07/19/2019     Lab Results   Component Value Date    HGB 15.4 11/25/2022    HGB 13.2 07/19/2019    HGB 13.5 07/19/2019     Lab Results   Component Value Date    HCT 45.5 11/25/2022    HCT 42.2 07/19/2019    HCT 44.5 07/19/2019     Lab Results   Component Value Date    MCV 87 11/25/2022    MCV 76 07/19/2019    MCV 79 07/19/2019     Lab Results   Component Value Date     11/25/2022     07/19/2019     07/19/2019       Lab Results   Component Value Date    AST 34 11/25/2022    AST 29 05/16/2019     Lab Results   Component Value Date    ALT 51 11/25/2022    ALT 58 05/16/2019     No results found for: BILICONJ   Lab Results   Component Value Date    BILITOTAL 0.5 11/25/2022    BILITOTAL 0.4 05/16/2019       Lab Results   Component Value Date    ALBUMIN 4.5 11/25/2022    ALBUMIN 3.8 11/09/2022    ALBUMIN 3.9 05/16/2019     Lab Results   Component Value Date    PROTTOTAL 7.4 11/25/2022    PROTTOTAL 7.5 05/16/2019      Lab Results   Component Value Date    ALKPHOS 82 11/25/2022    ALKPHOS 67 05/16/2019       Lab Results   Component Value Date    INR 1.03 11/25/2022         Imaging:        US ABDOMEN LIMITED 10/17/2022 8:09 AM     CLINICAL HISTORY: Elevated liver enzymes.  TECHNIQUE: Limited abdominal ultrasound.  COMPARISON: None.     FINDINGS:  GALLBLADDER: The gallbladder is unremarkable. No gallstones, wall  thickening, or pericholecystic fluid. Negative sonographic Molina's  sign.     BILE DUCTS: There is no biliary dilatation. The common duct measures 3  mm. Portions of the common duct could not be visualized due to  overlying bowel gas.     LIVER: Diffuse fatty infiltration of the liver. No focal hepatic  masses.     RIGHT KIDNEY: Unremarkable. No hydronephrosis.     PANCREAS: The visualized portions of the pancreas are normal.     No ascites.                                                                      IMPRESSION:  1. Diffuse fatty  infiltration of the liver.  2. Otherwise unremarkable right upper quadrant ultrasound.    Again, thank you for allowing me to participate in the care of your patient.      Sincerely,    Precious Fernandez PA-C

## 2022-11-30 NOTE — PROGRESS NOTES
Hepatology Clinic note  William Bolton   Date of Birth 1980  Date of Service 11/29/2022    REASON FOR CONSULTATION: NAFLD  REFERRING PROVIDER: ODALIS Rogers          Assessment/plan:   William Bolton is a 42 year old male with history of elevated LFT's and history of hepatic steatosis on imaging  .Risk factors for fatty liver disease includes: obesity, NATHAN, HTN, hyperlipidemia, prediabetes. We discussed the pathophysiology and natural history of nonalcoholic fatty liver disease and cirrhosis. We discussed NAFLD treatment includes slow gradual weight loss, as well as optimization of risk factors including hyperlipidemia/blood glucose.Will also rule out genetic and autoimmune causes of hepatobiliary disease.      - Will obtain a fibrosis scan to evaluate any fibrosis  - Recommend slow gradual weight loss   - Maintain good control of cholesterol (No contraindication to starting a statin with LFT elevations)   - Optimize blood glucose as needed. Can consider use of GLP-1 inhibitor for both insulin resistance and help with weight loss.   - Improve nutrition:  emphasizing limit carbohydrates, especially simple carbohydrates.  - Increase physical activity: maintain physical activity more than 150 minutes a week  - Labs: Hep B, Hep C, Iron panel, alpha-1-antitrypsin deficiency, ISRA, F-actin, TTG   - Hx of iron deficiency: Iron panel   - Limit alcohol use to <3 drinks a week and less than one a day.  - Follow up in six months in person     Precious Fernandez PA-C   [facility] Hepatology     Total time for E/M services performed on the date of the encounter 60 minutes.  This included review of previous: clinic visits, hospital records, lab results, imaging studies, and procedural documentation. Time also includes patient visit, documentation.  The findings from this review are summarized in the above note.   -----------------------------------------------------       HPI:   William Bolton is a 42 year old male   presenting for the evaluation of elevated LFT's.     Per chart review, transaminases first noted to be elevated ALT 58 - 150's and AST 30-80's.     Appetite is normal. Currently does not follow particular. Tries to get a serving or two fruits/veggies a day. Recently has made some changes (past month), breakfast oatmeal, add salad for lunch and dinner varies as meals are with kids.     In regards to weight, might be up 15-20 lbs in past few years. In the last 10 years, slow gradual. Previously 175-180's.  Has regular bowel movements.     Patient denies jaundice, lower extremity edema, abdominal distension or confusion.  Patient also denies melena, hematochezia or hematemesis. Patient denies weight loss, sweats or chills. Recent fever likely due     PMH: ADHD, HTN, HLD, NATHAN, prediabetes    SMH: wisdom teeth, vasectomy     Medications:   See below     No previous tobacco use. In regards to alcohol, more over the past (4-5 nights a week may have 1-5 beers). No previous IV/IN drug use. Currently works an  for [employer]. Heavy travel schedule with work (2 weeks away). Patient currently lives wife and kids (oldest 12, 8, 6, and youngest 4).  Dad had primary sclerosing cholangitis.     Previous work-up:  HCV antibody nonreactive  HBV SAb:   HBV SAg: nonreactive  HBV CAb:   ISRA:   F-actin  AMA:  Iron panel:  Ferritin: 28  Iron Sats: 5%  TTG:   Alpha-1-antripsin  Ceruloplasmin   TSH   Cholesterol Total   HDL 44  LDL  Triglycerides  Hemoglobin A1c - 6.5     Medical hx Surgical hx   Past Medical History:   Diagnosis Date     Abnormal bone xray - sclerosis of phalynx of right great toe 7/31/17 - suggest repeat imaging at annual visit. 7/31/2017     Depressive disorder      Hypertension 2019     Sleep apnea     Past Surgical History:   Procedure Laterality Date     COLONOSCOPY  06/13/2019    Dr. Ramon MARSHALL     ESOPHAGOSCOPY, GASTROSCOPY, DUODENOSCOPY (EGD), COMBINED N/A 5/23/2019    Procedure: ESOPHAGOGASTRODUODENOSCOPY, WITH  "BIOPSY;  Surgeon: Say Navarro MD;  Location:  GI     HEAD & NECK SURGERY      wisdom teeth removed     SURGICAL HISTORY OF -                    Medications:     Current Outpatient Medications   Medication     amphetamine-dextroamphetamine (ADDERALL XR) 25 MG 24 hr capsule     amphetamine-dextroamphetamine (ADDERALL) 5 MG tablet     B-D LUER-DAVID SYRINGE 23G X 1\" 3 ML MISC     lisinopril (ZESTRIL) 30 MG tablet     rosuvastatin (CRESTOR) 40 MG tablet     No current facility-administered medications for this visit.            Allergies:   No Known Allergies         Social History:     Social History     Socioeconomic History     Marital status:      Spouse name: Not on file     Number of children: Not on file     Years of education: Not on file     Highest education level: Not on file   Occupational History     Employer: [employer]   Tobacco Use     Smoking status: Never     Smokeless tobacco: Never   Substance and Sexual Activity     Alcohol use: Yes     Comment: 3 x week - 2 drinks     Drug use: No     Sexual activity: Yes     Partners: Female     Birth control/protection: Pill, Male Surgical   Other Topics Concern     Parent/sibling w/ CABG, MI or angioplasty before 65F 55M? Yes   Social History Narrative     Not on file     Social Determinants of Health     Financial Resource Strain: Not on file   Food Insecurity: Not on file   Transportation Needs: Not on file   Physical Activity: Not on file   Stress: Not on file   Social Connections: Not on file   Intimate Partner Violence: Not on file   Housing Stability: Not on file            Family History:     Family History   Problem Relation Age of Onset     Coronary Artery Disease Paternal Grandfather      Hypertension Father      Other - See Comments Father         primary sclerosing cholangitis     Hyperlipidemia Mother      Hypertension Sister      Hyperlipidemia Sister      Colon Cancer Cousin               Review of Systems:   Gen: See HPI "     HEENT: No change in vision or hearing, mouth sores, dysphagia, lymph nodes  Resp: No shortness of breath, coughing, hx of asthma  CV: No chest pain, palpitations, syncope   GI: See HPI  : No dysuria, history of stones, urine color    Skin: No rash; no pruritus or psoriasis  MS: No arthralgias, myalgias, joint swelling  Neuro: No memory changes, confusion, numbness    Heme: No difficulty clotting, bruising, bleeding  Psych:  No anxiety, depression, agitation          Physical Exam:   GENERAL: healthy, alert and no distress  EYES: Eyes grossly normal to inspection, conjunctivae and sclerae normal  RESP: no audible wheeze, cough, or visible cyanosis.  No visible retractions or increased work of breathing.  Able to speak fully in complete sentences  NEURO: Cranial nerves grossly intact, mentation intact and speech normal  PSYCH: mentation appears normal, affect normal/bright, judgement and insight intact, normal speech and appearance well-groomed         Data:   Reviewed in person and significant for:    Lab Results   Component Value Date     11/25/2022     09/15/2020      Lab Results   Component Value Date    POTASSIUM 3.9 11/25/2022    POTASSIUM 4.1 11/09/2022    POTASSIUM 3.9 09/15/2020     Lab Results   Component Value Date    CHLORIDE 103 11/25/2022    CHLORIDE 107 11/09/2022    CHLORIDE 102 09/15/2020     Lab Results   Component Value Date    CO2 21 11/25/2022    CO2 27 11/09/2022    CO2 25 09/15/2020     Lab Results   Component Value Date    BUN 8.0 11/25/2022    BUN 10 11/09/2022    BUN 10 09/15/2020     Lab Results   Component Value Date    CR 1.00 11/25/2022    CR 0.96 09/15/2020       Lab Results   Component Value Date    WBC 7.6 11/25/2022    WBC 6.8 07/19/2019    WBC 7.0 07/19/2019     Lab Results   Component Value Date    HGB 15.4 11/25/2022    HGB 13.2 07/19/2019    HGB 13.5 07/19/2019     Lab Results   Component Value Date    HCT 45.5 11/25/2022    HCT 42.2 07/19/2019    HCT 44.5  07/19/2019     Lab Results   Component Value Date    MCV 87 11/25/2022    MCV 76 07/19/2019    MCV 79 07/19/2019     Lab Results   Component Value Date     11/25/2022     07/19/2019     07/19/2019       Lab Results   Component Value Date    AST 34 11/25/2022    AST 29 05/16/2019     Lab Results   Component Value Date    ALT 51 11/25/2022    ALT 58 05/16/2019     No results found for: BILICONJ   Lab Results   Component Value Date    BILITOTAL 0.5 11/25/2022    BILITOTAL 0.4 05/16/2019       Lab Results   Component Value Date    ALBUMIN 4.5 11/25/2022    ALBUMIN 3.8 11/09/2022    ALBUMIN 3.9 05/16/2019     Lab Results   Component Value Date    PROTTOTAL 7.4 11/25/2022    PROTTOTAL 7.5 05/16/2019      Lab Results   Component Value Date    ALKPHOS 82 11/25/2022    ALKPHOS 67 05/16/2019       Lab Results   Component Value Date    INR 1.03 11/25/2022         Imaging:        US ABDOMEN LIMITED 10/17/2022 8:09 AM     CLINICAL HISTORY: Elevated liver enzymes.  TECHNIQUE: Limited abdominal ultrasound.  COMPARISON: None.     FINDINGS:  GALLBLADDER: The gallbladder is unremarkable. No gallstones, wall  thickening, or pericholecystic fluid. Negative sonographic Molina's  sign.     BILE DUCTS: There is no biliary dilatation. The common duct measures 3  mm. Portions of the common duct could not be visualized due to  overlying bowel gas.     LIVER: Diffuse fatty infiltration of the liver. No focal hepatic  masses.     RIGHT KIDNEY: Unremarkable. No hydronephrosis.     PANCREAS: The visualized portions of the pancreas are normal.     No ascites.                                                                      IMPRESSION:  1. Diffuse fatty infiltration of the liver.  2. Otherwise unremarkable right upper quadrant ultrasound.

## 2022-11-30 NOTE — PROGRESS NOTES
Left voicemail for patient to call back to set up telemedicine visit, will call again before appointment time.  Andree Fields CMA on 11/30/2022 at 9:51 AM

## 2022-12-19 ENCOUNTER — MYC REFILL (OUTPATIENT)
Dept: FAMILY MEDICINE | Facility: CLINIC | Age: 42
End: 2022-12-19

## 2022-12-19 ENCOUNTER — MYC MEDICAL ADVICE (OUTPATIENT)
Dept: FAMILY MEDICINE | Facility: CLINIC | Age: 42
End: 2022-12-19

## 2022-12-19 DIAGNOSIS — F98.8 ADD (ATTENTION DEFICIT DISORDER) WITHOUT HYPERACTIVITY: ICD-10-CM

## 2022-12-19 RX ORDER — DEXTROAMPHETAMINE SACCHARATE, AMPHETAMINE ASPARTATE MONOHYDRATE, DEXTROAMPHETAMINE SULFATE AND AMPHETAMINE SULFATE 6.25; 6.25; 6.25; 6.25 MG/1; MG/1; MG/1; MG/1
25 CAPSULE, EXTENDED RELEASE ORAL EVERY MORNING
Qty: 30 CAPSULE | Refills: 0 | Status: SHIPPED | OUTPATIENT
Start: 2022-12-19 | End: 2022-12-21

## 2022-12-19 RX ORDER — DEXTROAMPHETAMINE SACCHARATE, AMPHETAMINE ASPARTATE, DEXTROAMPHETAMINE SULFATE AND AMPHETAMINE SULFATE 1.25; 1.25; 1.25; 1.25 MG/1; MG/1; MG/1; MG/1
5 TABLET ORAL DAILY
Qty: 30 TABLET | Refills: 0 | OUTPATIENT
Start: 2022-12-19

## 2022-12-19 NOTE — TELEPHONE ENCOUNTER
Routing refill request to provider for review/approval because:  Drug not on the [facility] refill protocol     Pam Loyola RN  [facility]

## 2022-12-21 ENCOUNTER — VIRTUAL VISIT (OUTPATIENT)
Dept: SLEEP MEDICINE | Facility: CLINIC | Age: 42
End: 2022-12-21
Payer: COMMERCIAL

## 2022-12-21 VITALS — BODY MASS INDEX: 35.21 KG/M2 | HEIGHT: 72 IN | WEIGHT: 260 LBS

## 2022-12-21 DIAGNOSIS — G47.34 NOCTURNAL HYPOXEMIA: ICD-10-CM

## 2022-12-21 DIAGNOSIS — G47.33 OSA (OBSTRUCTIVE SLEEP APNEA): Primary | ICD-10-CM

## 2022-12-21 PROCEDURE — 99203 OFFICE O/P NEW LOW 30 MIN: CPT | Mod: GT | Performed by: INTERNAL MEDICINE

## 2022-12-21 RX ORDER — DEXTROAMPHETAMINE SACCHARATE, AMPHETAMINE ASPARTATE MONOHYDRATE, DEXTROAMPHETAMINE SULFATE AND AMPHETAMINE SULFATE 6.25; 6.25; 6.25; 6.25 MG/1; MG/1; MG/1; MG/1
25 CAPSULE, EXTENDED RELEASE ORAL EVERY MORNING
Qty: 30 CAPSULE | Refills: 0 | Status: SHIPPED | OUTPATIENT
Start: 2023-01-16 | End: 2022-12-21

## 2022-12-21 RX ORDER — DEXTROAMPHETAMINE SACCHARATE, AMPHETAMINE ASPARTATE, DEXTROAMPHETAMINE SULFATE AND AMPHETAMINE SULFATE 1.25; 1.25; 1.25; 1.25 MG/1; MG/1; MG/1; MG/1
5 TABLET ORAL DAILY
Qty: 30 TABLET | Refills: 0 | Status: SHIPPED | OUTPATIENT
Start: 2023-02-13 | End: 2023-03-23

## 2022-12-21 RX ORDER — DEXTROAMPHETAMINE SACCHARATE, AMPHETAMINE ASPARTATE, DEXTROAMPHETAMINE SULFATE AND AMPHETAMINE SULFATE 1.25; 1.25; 1.25; 1.25 MG/1; MG/1; MG/1; MG/1
5 TABLET ORAL DAILY
Qty: 30 TABLET | Refills: 0 | Status: SHIPPED | OUTPATIENT
Start: 2023-01-16 | End: 2022-12-21

## 2022-12-21 RX ORDER — DEXTROAMPHETAMINE SACCHARATE, AMPHETAMINE ASPARTATE MONOHYDRATE, DEXTROAMPHETAMINE SULFATE AND AMPHETAMINE SULFATE 6.25; 6.25; 6.25; 6.25 MG/1; MG/1; MG/1; MG/1
25 CAPSULE, EXTENDED RELEASE ORAL EVERY MORNING
Qty: 30 CAPSULE | Refills: 0 | Status: SHIPPED | OUTPATIENT
Start: 2023-02-13 | End: 2023-03-20

## 2022-12-21 ASSESSMENT — SLEEP AND FATIGUE QUESTIONNAIRES
HOW LIKELY ARE YOU TO NOD OFF OR FALL ASLEEP WHILE LYING DOWN TO REST IN THE AFTERNOON WHEN CIRCUMSTANCES PERMIT: WOULD NEVER DOZE
HOW LIKELY ARE YOU TO NOD OFF OR FALL ASLEEP WHILE WATCHING TV: WOULD NEVER DOZE
HOW LIKELY ARE YOU TO NOD OFF OR FALL ASLEEP WHILE SITTING AND READING: WOULD NEVER DOZE
HOW LIKELY ARE YOU TO NOD OFF OR FALL ASLEEP WHILE SITTING INACTIVE IN A PUBLIC PLACE: WOULD NEVER DOZE
HOW LIKELY ARE YOU TO NOD OFF OR FALL ASLEEP IN A CAR, WHILE STOPPED FOR A FEW MINUTES IN TRAFFIC: WOULD NEVER DOZE
HOW LIKELY ARE YOU TO NOD OFF OR FALL ASLEEP WHEN YOU ARE A PASSENGER IN A CAR FOR AN HOUR WITHOUT A BREAK: WOULD NEVER DOZE
HOW LIKELY ARE YOU TO NOD OFF OR FALL ASLEEP WHILE SITTING QUIETLY AFTER LUNCH WITHOUT ALCOHOL: WOULD NEVER DOZE
HOW LIKELY ARE YOU TO NOD OFF OR FALL ASLEEP WHILE SITTING AND TALKING TO SOMEONE: WOULD NEVER DOZE

## 2022-12-21 ASSESSMENT — PAIN SCALES - GENERAL: PAINLEVEL: NO PAIN (0)

## 2022-12-21 NOTE — PROGRESS NOTES
William is a 42 year old who is being evaluated via a billable video visit.      How would you like to obtain your AVS? MyChart  If the video visit is dropped, the invitation should be resent by: Text to cell phone: [phone]  Will anyone else be joining your video visit? Desirae    Paulino Loyola      Video-Visit Details    Type of service:  Video Visit     Originating Location (pt. Location): Home    Distant Location (provider location):  Off-site  Platform used for Video Visit: Carol     Chief complaint: Reestablish care for obstructive sleep apnea    History of Present Illness: 42-year-old gentleman with history of hypertension, obesity and overall mild obstructive sleep apnea.  Its been a few years since he has been seen and he wants to make sure that his settings are correct.  He continues to report benefit with the use of CPAP.  In general he thinks he sleeps a little bit more soundly.  He denies any problems with the mask or settings.  He he wonders if the pressures are adequate because sometimes he feels like its not very much pressure.  He uses a fullface mask.  His wife is sleeping better as well as he is no longer snoring.  He thinks he gets about 6 to 7 hours of sleep.  During the day he does drink coffee up to 8 cups.  This is been a longstanding habit.  He is also on stimulants for ADD.    He denies any significant issues initiating sleep.  He is not taking any sleep aids.  He only occasionally drinks alcohol in the evenings.  He only occasionally has gone without CPAP.  He has done this on occasion when traveling 1 or 2 nights away from home.  He tends to wake up more those nights.  But is not sure if it is due to lack of CPAP or the travel itself.      No history of restless legs, sleepwalking, sleep talking or dream enactment behavior.  No upcoming surgeries or procedures planned.  He is gained about 10 pounds since his sleep study.    He uses the humidifier typically with distilled water.  On 1 occasion  "when traveling he used bottled water and it irritated his skin.  He thinks in retrospect it might of been a mineral water.  He uses a fullface mask style mask.        Woodward Sleepiness Scale   Sitting and reading: Would never doze   Watching TV: Would never doze   Sitting, inactive in a public place (e.g. a theatre or a meeting): Would never doze   As a passenger in a car for an hour without a break: Would never doze   Lying down to rest in the afternoon when circumstances permit: Would never doze   Sitting and talking to someone: Would never doze   Sitting quietly after a lunch without alcohol: Would never doze   In a car, while stopped for a few minutes in traffic: Would never doze   Total score - Woodward: 0   (Less than 10 normal)    Insomnia Severity Scale  TRELL Total Score: 8  Total score categories:  0-7 = No clinically significant insomnia   8-14 = Subthreshold insomnia   15-21 = Clinical insomnia (moderate severity)  22-28 = Clinical insomnia (severe)      Past Medical History:   Diagnosis Date     Abnormal bone xray - sclerosis of phalynx of right great toe 7/31/17 - suggest repeat imaging at annual visit. 7/31/2017     Depressive disorder      Hypertension 2019     Sleep apnea        No Known Allergies    Current Outpatient Medications   Medication     amphetamine-dextroamphetamine (ADDERALL XR) 25 MG 24 hr capsule     amphetamine-dextroamphetamine (ADDERALL) 5 MG tablet     B-D LUER-DAVID SYRINGE 23G X 1\" 3 ML MISC     lisinopril (ZESTRIL) 30 MG tablet     rosuvastatin (CRESTOR) 40 MG tablet     No current facility-administered medications for this visit.       Social History     Socioeconomic History     Marital status:      Spouse name: Not on file     Number of children: Not on file     Years of education: Not on file     Highest education level: Not on file   Occupational History     Employer: [employer]   Tobacco Use     Smoking status: Never     Smokeless tobacco: Never   Substance " and Sexual Activity     Alcohol use: Yes     Comment: 3 x week - 2 drinks     Drug use: No     Sexual activity: Yes     Partners: Female     Birth control/protection: Pill, Male Surgical   Other Topics Concern     Parent/sibling w/ CABG, MI or angioplasty before 65F 55M? Yes   Social History Narrative     Not on file     Social Determinants of Health     Financial Resource Strain: Not on file   Food Insecurity: Not on file   Transportation Needs: Not on file   Physical Activity: Not on file   Stress: Not on file   Social Connections: Not on file   Intimate Partner Violence: Not on file   Housing Stability: Not on file       Family History   Problem Relation Age of Onset     Coronary Artery Disease Paternal Grandfather      Hypertension Father      Other - See Comments Father         primary sclerosing cholangitis     Hyperlipidemia Mother      Hypertension Sister      Hyperlipidemia Sister      Colon Cancer Cousin          EXAM:   Ht 1.829 m (6')   Wt 117.9 kg (260 lb)   BMI 35.26 kg/m    GENERAL:Alert and no distress  EYES: Eyes grossly normal to inspection.  No discharge or erythema, or obvious scleral/conjunctival abnormalities.  RESP: No audible wheeze, cough, or visible cyanosis.  No visible retractions or increased work of breathing.    SKIN: Visible skin clear. No significant rash, abnormal pigmentation or lesions.  NEURO: Cranial nerves grossly intact.  Mentation and speech appropriate for age.  PSYCH: Mentation appears normal, affect normal, judgement and insight intact, normal speech and appearance well-groomed.       HSAT   12/11/2017  Weight 250 lbs, BMI 33.9  AHI 7.3, Lowest O2 sat 79%  Mild associated hypoxemia    CPAP set up 12/26/2017    ResMed air sense 10 auto PAP download from 11/19/2022 to 12/18/2022 reviewed:  Per cent of days used greater than 4 Hours 97% (minimum goal greater than 70%)  Average use on days used: 6 hours 17 min  Settings: Min EPAP 5 cmH2O    Max EPAP 15 cmH2O  Pressures  delivered 90/95th percentile for pressure 14.7 cmH2O  Average AHI 0.7 events per hour (goal less than 5)  Leak acceptable    TSH   Date Value Ref Range Status   11/14/2019 0.95 0.40 - 4.00 mU/L Final         ASSESSMENT:  42-year-old gentleman with history of hypertension, obesity, overall mild obstructive sleep apnea and attention deficit disorder on stimulants.  He is meeting compliance goals and getting good clinical benefit with the use of CPAP.  Ongoing treatment of obstructive sleep apnea is medically indicated.  His CPAP machine appears to be functioning properly.    PLAN:  Orders generated to keep his CPAP supplies up-to-date.  Discussed the option of increasing the lower limit pressure from 5 up to 8 or 9 for comfort.  However, patient declines at this time.  Patient reminded of the importance of getting 7 hours of sleep or more nightly.  Consider cutting back on caffeine.  Avoid further weight gain, ideally lose weight.  We will renew his prescription for supplies in 1 year, recommended follow-up in 2 years.      40 minutes spent on the date of the encounter doing chart review, history and exam, documentation and further activities per the note    Shruthi Neville M.D.  Pulmonary/Critical Care/Sleep Medicine    [facility]   Floor 1, [address]   [address] [place] [address]. [place] [address]   Appointments: [phone]    The above note was dictated using voice recognition software and may include typographical errors. Please contact the author for any clarifications.

## 2022-12-21 NOTE — TELEPHONE ENCOUNTER
Review MyChart and advise. Pt referring to Adderall  XR  25 mg and Adderall 5 mg. Would like 3 months prescribed if possible.   Capo Linares RN [facility]

## 2022-12-21 NOTE — TELEPHONE ENCOUNTER
My chart message sent     Treva Gillette RN, BSN  [facility] [place]

## 2022-12-21 NOTE — PATIENT INSTRUCTIONS
For general sleep health questions:   http://sleepeducation.org    For tips about PAP and COVID-19:  https://www.thoracic.org/patients/patient-resources/resources/covid-19-and-home-pap-therapy.pdf    For general info about COVID-19 including vaccines:  https://MarketArt.org/covid19      Continue PAP therapy every night, for all hours that you are sleeping (including naps.)  As always, try to get at least 8 hours of sleep or more each day, keep a regular sleep schedule, and avoid sleep deprivation. Avoid alcohol.  Reasons that you might need a change to your pressure therapy would be weight gain or loss, waking having inadvertently removed your PAP overnight, having previously felt refreshed by sleep with CPAP use and now waking un-refreshed, and return of daytime sleepiness. Also, the development of new medical problems  (such as heart failure, stroke, medications such as narcotics) can sometimes affect breathing at night and change your PAP therapy needs.  Please bring PAP with you if you are hospitalized.  If anticipating surgery be sure to discuss with your surgeon that you have sleep apnea and use PAP therapy.    Maintain your equipment as recommended which includes routine cleaning and replacement of supplies.      Call DME for any questions regarding supplies or maintenance.    [facility], [facility] [phone]    Do not drive on engage in potentially dangerous activities if feeling sleepy.  Please follow up in sleep clinic again in 24 months.        Tips for your PAP use-    Mask fitting tips  Mask fitting exercise:    To improve your mask seal and your mobility at night, put mask on and secure in place.  Lie down in bed with full pressure and roll to one side, adjust headgear while in that position to eliminate any leaks. Repeat process rolling to other side.     The mask seal does not have to be perfect:   CPAP machines are designed to make up for small  leaks. However, you will not tolerate leaks blowing in your eyes so you will need to adjust.   Any leak should only be near or at the bottom of the mask.  We expect your mask to leak slightly at night.    Do not over-tighten the headgear straps, tighter IS NOT better, we expect minimal leak.    First try re-positioning the mask or headgear before tightening the headgear straps.  Mask leaks are expected due to changing sleeping positions. Try pulling the mask away from your skin allowing the cushion to re-inflate will minimize the leak.  If you struggle for a good fit, try turning the CPAP off and then readjust the mask by pulling it away from your face and then turning back on the CPAP.        Humidifier tips  Humidifiers can be adjusted to increase or decrease the amount of moisture according to your comfort level. You may need to adjust this frequently at first, but then might only change it with seasonal weather changes.     Try INCREASING the humidity if:  You experience a dry, irritated nasal passage or throat.  You have a runny, drippy nose or sneezing fits after using CPAP.  You experience nasal congestion during or after CPAP use.    Try DECREASING the humidity if:  You have excessive condensation or  rain out  in the tubing or mask.  Otherwise keep the tubing warm during the night by running it underneath the blankets or pillow.      Clinic visit after initial PAP set-up   Bring your equipment with you to your 5-8 week follow up clinic visit.  We will be extracting your data from the machine if not available from the cloud based modem.        Travel  Always take your equipment with you when you travel.  If you fly with your equipment bring it on with you as a carry on.  Medical equipment does not count as a carry on.    If you travel international the machines take 110-240v.  The only adapter needed is the adapter that will fit into the receptacle (outlet).    You may also want to bring an extension cord as  many hotel rooms have limited outlets at the bedside.  Do not travel with water in your humidifier chamber.     Cleaning and Maintenance Guidelines    Equipment Frequency Cleaning Method   Mask First Day    Daily      Weekly Soak mask in hot soapy water for 30 minutes, rinse and air dry.  Wipe nasal cushion with a hot soapy (Ivory, baby shampoo) cloth and rinse.  Baby wipes may also be used.  Do not use anti-bacterial soaps,Tamiko  liquid soap, rubbing alcohol, bleach or ammonia.  Wash frame in hot soapy water (Ivory, baby shampoo) rinse and let air dry   Headgear Biweekly Wash in hot soapy water, rinse and air dry   Reusable Gray Filter Weekly Wash in hot soapy water, rinse, put in towel squeeze moisture out, let air dry   Disposable White Filter Check Weekly Replace when brown or gray in color; at least every 2 to 3 months   Humidifier Chamber Daily    Weekly Empty distilled water from humidifier and let air dry    Hand wash in hot soapy water, rinse and air dry   Tubing Weekly Wash in hot soapy water, rinse and let air dry   Mask, Tubing and Humidifier Chamber As needed Disinfect: Soak in 1 part distilled white vinegar to 3 parts hot water for 30 minutes, rinse well and air dry  Not the material headgear        MASK AND SUPPLY REORDERING and EQUIPMENT NEEDS through your DME and per your insurance  Reminder: Most insurance companies will allow for a new mask, headgear, tubing, and reusable gray filter every six months.  Disposable white ultra-fine filters are covered monthly.      HOME AND SAFETY INSTRUCTIONS  Do not use frayed or cracked electrical cords, multi plug adaptors, or switched receptacles  Do not immerse electrical equipment into water  Assure that electrical cords do not become a tripping hazard

## 2023-03-20 ENCOUNTER — MYC REFILL (OUTPATIENT)
Dept: FAMILY MEDICINE | Facility: CLINIC | Age: 43
End: 2023-03-20
Payer: COMMERCIAL

## 2023-03-20 ENCOUNTER — MYC MEDICAL ADVICE (OUTPATIENT)
Dept: FAMILY MEDICINE | Facility: CLINIC | Age: 43
End: 2023-03-20
Payer: COMMERCIAL

## 2023-03-20 DIAGNOSIS — F98.8 ADD (ATTENTION DEFICIT DISORDER) WITHOUT HYPERACTIVITY: ICD-10-CM

## 2023-03-20 RX ORDER — DEXTROAMPHETAMINE SACCHARATE, AMPHETAMINE ASPARTATE, DEXTROAMPHETAMINE SULFATE AND AMPHETAMINE SULFATE 1.25; 1.25; 1.25; 1.25 MG/1; MG/1; MG/1; MG/1
5 TABLET ORAL DAILY
Qty: 30 TABLET | Refills: 0 | Status: CANCELLED | OUTPATIENT
Start: 2023-03-20

## 2023-03-21 RX ORDER — DEXTROAMPHETAMINE SACCHARATE, AMPHETAMINE ASPARTATE MONOHYDRATE, DEXTROAMPHETAMINE SULFATE AND AMPHETAMINE SULFATE 6.25; 6.25; 6.25; 6.25 MG/1; MG/1; MG/1; MG/1
25 CAPSULE, EXTENDED RELEASE ORAL EVERY MORNING
Qty: 30 CAPSULE | Refills: 0 | Status: SHIPPED | OUTPATIENT
Start: 2023-03-21 | End: 2023-04-26

## 2023-03-21 NOTE — TELEPHONE ENCOUNTER
Last office visit: 11/1/2022     Future Office Visit:        CSA -- Patient Level:     [Media Unavailable] Controlled Substance Agreement - Non - Opioid - Scan on 11/24/2021 12:36 PM: NON-OPIOID CONTROLLED SUBSTANCE AGREEMENT   [Media Unavailable] Controlled Substance Agreement - Non - Opioid - Scan on 12/16/2020  6:41 AM: NON-OPIOID CONTROLLED SUBSTANCE AGREEMENT             Routing refill request to provider for review/approval because:  Drug not on the [facility] refill protocol     Treva Gillette RN, BSN  [facility]

## 2023-03-22 ENCOUNTER — MYC REFILL (OUTPATIENT)
Dept: FAMILY MEDICINE | Facility: CLINIC | Age: 43
End: 2023-03-22
Payer: COMMERCIAL

## 2023-03-22 DIAGNOSIS — F98.8 ADD (ATTENTION DEFICIT DISORDER) WITHOUT HYPERACTIVITY: ICD-10-CM

## 2023-03-23 RX ORDER — DEXTROAMPHETAMINE SACCHARATE, AMPHETAMINE ASPARTATE, DEXTROAMPHETAMINE SULFATE AND AMPHETAMINE SULFATE 1.25; 1.25; 1.25; 1.25 MG/1; MG/1; MG/1; MG/1
5 TABLET ORAL DAILY
Qty: 30 TABLET | Refills: 0 | Status: SHIPPED | OUTPATIENT
Start: 2023-03-23 | End: 2023-04-26

## 2023-03-23 RX ORDER — DEXTROAMPHETAMINE SACCHARATE, AMPHETAMINE ASPARTATE, DEXTROAMPHETAMINE SULFATE AND AMPHETAMINE SULFATE 1.25; 1.25; 1.25; 1.25 MG/1; MG/1; MG/1; MG/1
5 TABLET ORAL DAILY
Qty: 30 TABLET | Refills: 0 | Status: CANCELLED | OUTPATIENT
Start: 2023-03-23

## 2023-03-23 NOTE — TELEPHONE ENCOUNTER
Routing refill request to provider for review/approval because:  Drug not on the [facility] refill protocol   amphetamine-dextroamphetamine (ADDERALL) 5 MG tablet 30 tablet 0 2/13/2023  No   Sig - Route: Take 1 tablet (5 mg) by mouth daily - Oral     LOV: 12/19/22    CSA -- Patient Level:     [Media Unavailable] Controlled Substance Agreement - Non - Opioid - Scan on 11/24/2021 12:36 PM: NON-OPIOID CONTROLLED SUBSTANCE AGREEMENT   [Media Unavailable] Controlled Substance Agreement - Non - Opioid - Scan on 12/16/2020  6:41 AM: NON-OPIOID CONTROLLED SUBSTANCE AGREEMENT       Andreea VALLES RN   [facility] Triage

## 2023-04-26 ENCOUNTER — MYC REFILL (OUTPATIENT)
Dept: FAMILY MEDICINE | Facility: CLINIC | Age: 43
End: 2023-04-26
Payer: COMMERCIAL

## 2023-04-26 DIAGNOSIS — F98.8 ADD (ATTENTION DEFICIT DISORDER) WITHOUT HYPERACTIVITY: ICD-10-CM

## 2023-04-26 RX ORDER — DEXTROAMPHETAMINE SACCHARATE, AMPHETAMINE ASPARTATE MONOHYDRATE, DEXTROAMPHETAMINE SULFATE AND AMPHETAMINE SULFATE 6.25; 6.25; 6.25; 6.25 MG/1; MG/1; MG/1; MG/1
25 CAPSULE, EXTENDED RELEASE ORAL EVERY MORNING
Qty: 30 CAPSULE | Refills: 0 | Status: SHIPPED | OUTPATIENT
Start: 2023-04-26 | End: 2023-04-26

## 2023-04-26 RX ORDER — DEXTROAMPHETAMINE SACCHARATE, AMPHETAMINE ASPARTATE, DEXTROAMPHETAMINE SULFATE AND AMPHETAMINE SULFATE 1.25; 1.25; 1.25; 1.25 MG/1; MG/1; MG/1; MG/1
5 TABLET ORAL DAILY
Qty: 30 TABLET | Refills: 0 | Status: SHIPPED | OUTPATIENT
Start: 2023-04-26 | End: 2023-04-26

## 2023-04-26 RX ORDER — DEXTROAMPHETAMINE SACCHARATE, AMPHETAMINE ASPARTATE, DEXTROAMPHETAMINE SULFATE AND AMPHETAMINE SULFATE 1.25; 1.25; 1.25; 1.25 MG/1; MG/1; MG/1; MG/1
5 TABLET ORAL DAILY
Qty: 30 TABLET | Refills: 0 | Status: SHIPPED | OUTPATIENT
Start: 2023-05-24 | End: 2023-09-22

## 2023-04-26 RX ORDER — DEXTROAMPHETAMINE SACCHARATE, AMPHETAMINE ASPARTATE MONOHYDRATE, DEXTROAMPHETAMINE SULFATE AND AMPHETAMINE SULFATE 6.25; 6.25; 6.25; 6.25 MG/1; MG/1; MG/1; MG/1
25 CAPSULE, EXTENDED RELEASE ORAL EVERY MORNING
Qty: 30 CAPSULE | Refills: 0 | Status: SHIPPED | OUTPATIENT
Start: 2023-05-24 | End: 2023-09-22

## 2023-04-26 NOTE — TELEPHONE ENCOUNTER
Last office visit: 11/1/2022     Future Office Visit:        CSA -- Patient Level:     [Media Unavailable] Controlled Substance Agreement - Non - Opioid - Scan on 11/24/2021 12:36 PM: NON-OPIOID CONTROLLED SUBSTANCE AGREEMENT   [Media Unavailable] Controlled Substance Agreement - Non - Opioid - Scan on 12/16/2020  6:41 AM: NON-OPIOID CONTROLLED SUBSTANCE AGREEMENT             Routing refill request to provider for review/approval because:  Drug not on the [facility] refill protocol     Treva Gillette RN, BSN  [facility]

## 2023-04-26 NOTE — TELEPHONE ENCOUNTER
Last office visit: 11/1/2022     Future Office Visit:        CSA -- Patient Level:     [Media Unavailable] Controlled Substance Agreement - Non - Opioid - Scan on 11/24/2021 12:36 PM: NON-OPIOID CONTROLLED SUBSTANCE AGREEMENT   [Media Unavailable] Controlled Substance Agreement - Non - Opioid - Scan on 12/16/2020  6:41 AM: NON-OPIOID CONTROLLED SUBSTANCE AGREEMENT             Routing refill request to provider for review/approval because:  Drug not on the [facility] refill protocol     Treva Gillette RN, BSN  [facility]

## 2023-07-03 ENCOUNTER — MYC MEDICAL ADVICE (OUTPATIENT)
Dept: FAMILY MEDICINE | Facility: CLINIC | Age: 43
End: 2023-07-03

## 2023-07-03 ENCOUNTER — OFFICE VISIT (OUTPATIENT)
Dept: FAMILY MEDICINE | Facility: CLINIC | Age: 43
End: 2023-07-03
Payer: COMMERCIAL

## 2023-07-03 VITALS
DIASTOLIC BLOOD PRESSURE: 89 MMHG | WEIGHT: 272 LBS | RESPIRATION RATE: 16 BRPM | BODY MASS INDEX: 36.84 KG/M2 | OXYGEN SATURATION: 98 % | TEMPERATURE: 97.9 F | HEIGHT: 72 IN | SYSTOLIC BLOOD PRESSURE: 129 MMHG | HEART RATE: 96 BPM

## 2023-07-03 DIAGNOSIS — E11.9 TYPE 2 DIABETES MELLITUS WITHOUT COMPLICATION, WITHOUT LONG-TERM CURRENT USE OF INSULIN (H): ICD-10-CM

## 2023-07-03 DIAGNOSIS — F90.9 ADULT ADHD: Primary | ICD-10-CM

## 2023-07-03 LAB — HBA1C MFR BLD: 7.6 % (ref 0–5.6)

## 2023-07-03 PROCEDURE — 36415 COLL VENOUS BLD VENIPUNCTURE: CPT | Performed by: FAMILY MEDICINE

## 2023-07-03 PROCEDURE — 99214 OFFICE O/P EST MOD 30 MIN: CPT | Performed by: FAMILY MEDICINE

## 2023-07-03 PROCEDURE — 83036 HEMOGLOBIN GLYCOSYLATED A1C: CPT | Performed by: FAMILY MEDICINE

## 2023-07-03 RX ORDER — DEXTROAMPHETAMINE SACCHARATE, AMPHETAMINE ASPARTATE MONOHYDRATE, DEXTROAMPHETAMINE SULFATE AND AMPHETAMINE SULFATE 6.25; 6.25; 6.25; 6.25 MG/1; MG/1; MG/1; MG/1
25 CAPSULE, EXTENDED RELEASE ORAL DAILY
Qty: 30 CAPSULE | Refills: 0 | Status: SHIPPED | OUTPATIENT
Start: 2023-09-03 | End: 2023-09-08

## 2023-07-03 RX ORDER — DEXTROAMPHETAMINE SACCHARATE, AMPHETAMINE ASPARTATE, DEXTROAMPHETAMINE SULFATE AND AMPHETAMINE SULFATE 1.25; 1.25; 1.25; 1.25 MG/1; MG/1; MG/1; MG/1
5 TABLET ORAL DAILY
Qty: 30 TABLET | Refills: 0 | Status: SHIPPED | OUTPATIENT
Start: 2023-09-03 | End: 2023-09-08

## 2023-07-03 RX ORDER — DEXTROAMPHETAMINE SACCHARATE, AMPHETAMINE ASPARTATE MONOHYDRATE, DEXTROAMPHETAMINE SULFATE AND AMPHETAMINE SULFATE 6.25; 6.25; 6.25; 6.25 MG/1; MG/1; MG/1; MG/1
25 CAPSULE, EXTENDED RELEASE ORAL DAILY
Qty: 30 CAPSULE | Refills: 0 | Status: SHIPPED | OUTPATIENT
Start: 2023-07-03 | End: 2023-07-05

## 2023-07-03 RX ORDER — DEXTROAMPHETAMINE SACCHARATE, AMPHETAMINE ASPARTATE MONOHYDRATE, DEXTROAMPHETAMINE SULFATE AND AMPHETAMINE SULFATE 6.25; 6.25; 6.25; 6.25 MG/1; MG/1; MG/1; MG/1
25 CAPSULE, EXTENDED RELEASE ORAL DAILY
Qty: 30 CAPSULE | Refills: 0 | Status: SHIPPED | OUTPATIENT
Start: 2023-08-03 | End: 2023-09-02

## 2023-07-03 RX ORDER — DEXTROAMPHETAMINE SACCHARATE, AMPHETAMINE ASPARTATE, DEXTROAMPHETAMINE SULFATE AND AMPHETAMINE SULFATE 1.25; 1.25; 1.25; 1.25 MG/1; MG/1; MG/1; MG/1
5 TABLET ORAL DAILY
Qty: 30 TABLET | Refills: 0 | Status: SHIPPED | OUTPATIENT
Start: 2023-07-03 | End: 2023-08-02

## 2023-07-03 RX ORDER — DEXTROAMPHETAMINE SACCHARATE, AMPHETAMINE ASPARTATE, DEXTROAMPHETAMINE SULFATE AND AMPHETAMINE SULFATE 1.25; 1.25; 1.25; 1.25 MG/1; MG/1; MG/1; MG/1
5 TABLET ORAL DAILY
Qty: 30 TABLET | Refills: 0 | Status: SHIPPED | OUTPATIENT
Start: 2023-08-03 | End: 2023-09-02

## 2023-07-03 ASSESSMENT — PATIENT HEALTH QUESTIONNAIRE - PHQ9
SUM OF ALL RESPONSES TO PHQ QUESTIONS 1-9: 0
10. IF YOU CHECKED OFF ANY PROBLEMS, HOW DIFFICULT HAVE THESE PROBLEMS MADE IT FOR YOU TO DO YOUR WORK, TAKE CARE OF THINGS AT HOME, OR GET ALONG WITH OTHER PEOPLE: NOT DIFFICULT AT ALL

## 2023-07-03 ASSESSMENT — ENCOUNTER SYMPTOMS: DECREASED CONCENTRATION: 0

## 2023-07-03 NOTE — TELEPHONE ENCOUNTER
Please see my chart message below     Please review and advise     Thank you     Treva Gillette RN, BSN  [facility]

## 2023-07-03 NOTE — PROGRESS NOTES
Assessment & Plan     Adult ADHD  Continue current medical management, emphasized importance of following with his same primary care clinic in prior [place] is a been managing his ADHD.  I also suspect patient may have underlying generalized anxiety disorder due to life stressors.  May want to consider screening for that in the future.  PDMP reviewed, appropriate.  3 months refill sent for patient, no further refills until follow-up.  - amphetamine-dextroamphetamine (ADDERALL XR) 25 MG 24 hr capsule  Dispense: 30 capsule; Refill: 0  - amphetamine-dextroamphetamine (ADDERALL XR) 25 MG 24 hr capsule  Dispense: 30 capsule; Refill: 0  - amphetamine-dextroamphetamine (ADDERALL XR) 25 MG 24 hr capsule  Dispense: 30 capsule; Refill: 0  - amphetamine-dextroamphetamine (ADDERALL) 5 MG tablet  Dispense: 30 tablet; Refill: 0  - amphetamine-dextroamphetamine (ADDERALL) 5 MG tablet  Dispense: 30 tablet; Refill: 0  - amphetamine-dextroamphetamine (ADDERALL) 5 MG tablet  Dispense: 30 tablet; Refill: 0    Type 2 diabetes mellitus without complication, without long-term current use of insulin (H)  New diagnosis, borderline, repeat A1c 7.6 today.  Start metformin, follow-up with PCP for further management.  - Hemoglobin A1c  - Hemoglobin A1c  - metFORMIN (GLUCOPHAGE) 500 MG tablet  Dispense: 60 tablet; Refill: 2               BMI:   Estimated body mass index is 36.89 kg/m  as calculated from the following:    Height as of this encounter: 1.829 m (6').    Weight as of this encounter: 123.4 kg (272 lb).           Ata Lozada MD  [facility]    Sandeep Thomas is a 43 year old, presenting for the following health issues:  Attention Deficit Disorder (Follow-up meds, ADD)        7/3/2023     8:43 AM   Additional Questions   Roomed by Sophia Verdugo     History of Present Illness       Reason for visit:  ADHD   He is taking medications regularly.    Today's PHQ-9         PHQ-9 Total Score: 0    PHQ-9 Q9 Thoughts of  better off dead/self-harm past 2 weeks :   Not at all    How difficult have these problems made it for you to do your work, take care of things at home, or get along with other people: Not difficult at all       Patient is a 43-year-old male presents for ADHD follow-up.       Review of Systems   Psychiatric/Behavioral: Negative for decreased concentration.            Objective    /89 (BP Location: Right arm, Patient Position: Chair, Cuff Size: Adult Large)   Pulse 96   Temp 97.9  F (36.6  C) (Oral)   Resp 16   Ht 1.829 m (6')   Wt 123.4 kg (272 lb)   SpO2 98%   BMI 36.89 kg/m    Body mass index is 36.89 kg/m .  Physical Exam  Vitals reviewed.   Constitutional:       Appearance: He is not ill-appearing.   Cardiovascular:      Rate and Rhythm: Normal rate and regular rhythm.   Pulmonary:      Effort: Pulmonary effort is normal.            Results for orders placed or performed in visit on 07/03/23 (from the past 24 hour(s))   Hemoglobin A1c   Result Value Ref Range    Hemoglobin A1C 7.6 (H) 0.0 - 5.6 %

## 2023-07-05 ENCOUNTER — MYC REFILL (OUTPATIENT)
Dept: FAMILY MEDICINE | Facility: CLINIC | Age: 43
End: 2023-07-05
Payer: COMMERCIAL

## 2023-07-05 DIAGNOSIS — F90.9 ADULT ADHD: ICD-10-CM

## 2023-07-05 NOTE — TELEPHONE ENCOUNTER
Will call and let pt know they need a follow up for diabetes. And will ask [facility] if they got a PA request.    [facility], benefits are from [facility] pharmacy- he is not sure where they sent it.    Advised pt to call insurance and have them refax the PA request to us [phone]

## 2023-07-05 NOTE — TELEPHONE ENCOUNTER
Anamaria kern states Dr. Peter talked to him about diabetes, he was wondering when you would want him to come back for this?

## 2023-07-05 NOTE — TELEPHONE ENCOUNTER
Was prior authorization started for this medication change?  I was not prescribing provider, was a [facility] provider.     Patient is additionally due for in clinic follow-up due to new Diabetes diagnosis.    Please reach out to patient to assist with scheduling.     - Sandra, CNP

## 2023-07-06 RX ORDER — DEXTROAMPHETAMINE SACCHARATE, AMPHETAMINE ASPARTATE MONOHYDRATE, DEXTROAMPHETAMINE SULFATE AND AMPHETAMINE SULFATE 6.25; 6.25; 6.25; 6.25 MG/1; MG/1; MG/1; MG/1
25 CAPSULE, EXTENDED RELEASE ORAL DAILY
Qty: 30 CAPSULE | Refills: 0 | Status: SHIPPED | OUTPATIENT
Start: 2023-07-06 | End: 2023-09-22

## 2023-07-06 NOTE — TELEPHONE ENCOUNTER
Dr. Lozada sent Adderall 25mg xr script to requested pharmacy. MyChart message sent to patient in 7/3/23 [facility] communication encounter to notify that rx has been sent. Closing this encounter.

## 2023-09-05 ENCOUNTER — MYC MEDICAL ADVICE (OUTPATIENT)
Dept: FAMILY MEDICINE | Facility: CLINIC | Age: 43
End: 2023-09-05
Payer: COMMERCIAL

## 2023-09-05 DIAGNOSIS — F90.9 ADULT ADHD: ICD-10-CM

## 2023-09-06 NOTE — TELEPHONE ENCOUNTER
Please see my chart message below     Please review and advise     Thank you     Treva Gillette RN, BSN  [facility]

## 2023-09-08 RX ORDER — DEXTROAMPHETAMINE SACCHARATE, AMPHETAMINE ASPARTATE MONOHYDRATE, DEXTROAMPHETAMINE SULFATE AND AMPHETAMINE SULFATE 6.25; 6.25; 6.25; 6.25 MG/1; MG/1; MG/1; MG/1
25 CAPSULE, EXTENDED RELEASE ORAL DAILY
Qty: 30 CAPSULE | Refills: 0 | OUTPATIENT
Start: 2023-09-08

## 2023-09-21 ASSESSMENT — ANXIETY QUESTIONNAIRES
GAD7 TOTAL SCORE: 0
1. FEELING NERVOUS, ANXIOUS, OR ON EDGE: NOT AT ALL
6. BECOMING EASILY ANNOYED OR IRRITABLE: NOT AT ALL
3. WORRYING TOO MUCH ABOUT DIFFERENT THINGS: NOT AT ALL
GAD7 TOTAL SCORE: 0
4. TROUBLE RELAXING: NOT AT ALL
IF YOU CHECKED OFF ANY PROBLEMS ON THIS QUESTIONNAIRE, HOW DIFFICULT HAVE THESE PROBLEMS MADE IT FOR YOU TO DO YOUR WORK, TAKE CARE OF THINGS AT HOME, OR GET ALONG WITH OTHER PEOPLE: NOT DIFFICULT AT ALL
7. FEELING AFRAID AS IF SOMETHING AWFUL MIGHT HAPPEN: NOT AT ALL
5. BEING SO RESTLESS THAT IT IS HARD TO SIT STILL: NOT AT ALL
2. NOT BEING ABLE TO STOP OR CONTROL WORRYING: NOT AT ALL

## 2023-09-22 ENCOUNTER — OFFICE VISIT (OUTPATIENT)
Dept: FAMILY MEDICINE | Facility: CLINIC | Age: 43
End: 2023-09-22
Payer: COMMERCIAL

## 2023-09-22 VITALS
SYSTOLIC BLOOD PRESSURE: 116 MMHG | HEIGHT: 72 IN | BODY MASS INDEX: 37.38 KG/M2 | RESPIRATION RATE: 16 BRPM | TEMPERATURE: 97.4 F | DIASTOLIC BLOOD PRESSURE: 72 MMHG | HEART RATE: 93 BPM | OXYGEN SATURATION: 98 % | WEIGHT: 276 LBS

## 2023-09-22 DIAGNOSIS — Z23 NEED FOR INFLUENZA VACCINATION: ICD-10-CM

## 2023-09-22 DIAGNOSIS — Z23 NEED FOR PNEUMOCOCCAL VACCINE: ICD-10-CM

## 2023-09-22 DIAGNOSIS — I10 ESSENTIAL HYPERTENSION WITH GOAL BLOOD PRESSURE LESS THAN 130/80: ICD-10-CM

## 2023-09-22 DIAGNOSIS — E66.01 MORBID OBESITY (H): ICD-10-CM

## 2023-09-22 DIAGNOSIS — F98.8 ADD (ATTENTION DEFICIT DISORDER) WITHOUT HYPERACTIVITY: ICD-10-CM

## 2023-09-22 DIAGNOSIS — E11.9 TYPE 2 DIABETES MELLITUS WITHOUT COMPLICATION, WITHOUT LONG-TERM CURRENT USE OF INSULIN (H): Primary | ICD-10-CM

## 2023-09-22 PROCEDURE — 99214 OFFICE O/P EST MOD 30 MIN: CPT | Mod: 25 | Performed by: NURSE PRACTITIONER

## 2023-09-22 PROCEDURE — 90677 PCV20 VACCINE IM: CPT | Performed by: NURSE PRACTITIONER

## 2023-09-22 PROCEDURE — 90686 IIV4 VACC NO PRSV 0.5 ML IM: CPT | Performed by: NURSE PRACTITIONER

## 2023-09-22 PROCEDURE — 90472 IMMUNIZATION ADMIN EACH ADD: CPT | Performed by: NURSE PRACTITIONER

## 2023-09-22 PROCEDURE — 90471 IMMUNIZATION ADMIN: CPT | Performed by: NURSE PRACTITIONER

## 2023-09-22 RX ORDER — DEXTROAMPHETAMINE SACCHARATE, AMPHETAMINE ASPARTATE MONOHYDRATE, DEXTROAMPHETAMINE SULFATE AND AMPHETAMINE SULFATE 6.25; 6.25; 6.25; 6.25 MG/1; MG/1; MG/1; MG/1
25 CAPSULE, EXTENDED RELEASE ORAL DAILY
Qty: 30 CAPSULE | Refills: 0 | Status: SHIPPED | OUTPATIENT
Start: 2023-10-20 | End: 2024-01-12

## 2023-09-22 RX ORDER — DEXTROAMPHETAMINE SACCHARATE, AMPHETAMINE ASPARTATE, DEXTROAMPHETAMINE SULFATE AND AMPHETAMINE SULFATE 1.25; 1.25; 1.25; 1.25 MG/1; MG/1; MG/1; MG/1
5 TABLET ORAL DAILY
Qty: 30 TABLET | Refills: 0 | Status: SHIPPED | OUTPATIENT
Start: 2023-10-20 | End: 2024-01-12

## 2023-09-22 RX ORDER — DEXTROAMPHETAMINE SACCHARATE, AMPHETAMINE ASPARTATE, DEXTROAMPHETAMINE SULFATE AND AMPHETAMINE SULFATE 1.25; 1.25; 1.25; 1.25 MG/1; MG/1; MG/1; MG/1
5 TABLET ORAL DAILY
Qty: 30 TABLET | Refills: 0 | Status: SHIPPED | OUTPATIENT
Start: 2023-09-22 | End: 2023-09-22

## 2023-09-22 RX ORDER — LISINOPRIL 30 MG/1
30 TABLET ORAL DAILY
Qty: 90 TABLET | Refills: 3 | Status: SHIPPED | OUTPATIENT
Start: 2023-09-22 | End: 2024-01-12

## 2023-09-22 RX ORDER — DEXTROAMPHETAMINE SACCHARATE, AMPHETAMINE ASPARTATE MONOHYDRATE, DEXTROAMPHETAMINE SULFATE AND AMPHETAMINE SULFATE 6.25; 6.25; 6.25; 6.25 MG/1; MG/1; MG/1; MG/1
25 CAPSULE, EXTENDED RELEASE ORAL DAILY
Qty: 30 CAPSULE | Refills: 0 | Status: SHIPPED | OUTPATIENT
Start: 2023-09-22 | End: 2023-09-22

## 2023-09-22 NOTE — PROGRESS NOTES
Assessment & Plan     William was seen today for recheck medication.    Diagnoses and all orders for this visit:    Type 2 diabetes mellitus without complication, without long-term current use of insulin (H)  Stable on Metformin 500 mg twice daily.  Continue to work on dietary and lifestyle modifications to support lowering of blood glucose levels and weight loss.  Will recheck HgbA1C in 1 month at lab only appointment.    -     Albumin Random Urine Quantitative with Creat Ratio; Future  -     HEMOGLOBIN A1C; Future  -     Lipid panel reflex to direct LDL Fasting; Future  -     metFORMIN (GLUCOPHAGE) 500 MG tablet; Take 1 tablet (500 mg) by mouth 2 times daily (with meals)    ADD (attention deficit disorder) without hyperactivity  Stable on Adderll XR 25 mg daily and Adderall IR 5 mg as needed in the afternoon.    Utox at next lab only visit.    -     Discontinue: amphetamine-dextroamphetamine (ADDERALL XR) 25 MG 24 hr capsule; Take 1 capsule (25 mg) by mouth daily  -     Discontinue: amphetamine-dextroamphetamine (ADDERALL) 5 MG tablet; Take 1 tablet (5 mg) by mouth daily  -     amphetamine-dextroamphetamine (ADDERALL XR) 25 MG 24 hr capsule; Take 1 capsule (25 mg) by mouth daily  -     amphetamine-dextroamphetamine (ADDERALL) 5 MG tablet; Take 1 tablet (5 mg) by mouth daily    Essential hypertension with goal blood pressure less than 130/80  Currently stable on Lisinopril 30 mg daily.    -     lisinopril (ZESTRIL) 30 MG tablet; Take 1 tablet (30 mg) by mouth daily    Morbid obesity (H)  Co-morbid conditions:  HTN, DM  Continue to work with patient regarding dietary and lifestyle modifications.      Need for influenza vaccination  -     INFLUENZA VACCINE IM > 6 MONTHS VALENT IIV4 (AFLURIA/FLUZONE)    Need for pneumococcal vaccine  -     Pneumococcal 20 Valent Conjugate (Prevnar 20)        BMI:   Estimated body mass index is 37.43 kg/m  as calculated from the following:    Height as of this encounter: 1.829 m (6').     Weight as of this encounter: 125.2 kg (276 lb).     Return in about 6 months (around 3/22/2024) for Med check, In Clinic.      ODALIS Martin [facility] PRIOR PHILIPPE Thomas is a 43 year old, presenting for the following health issues:  Recheck Medication        9/22/2023     8:17 AM   Additional Questions   Roomed by Felicita HUFF CMA     History of Present Illness     Social:  2 biological and 2 adopted children and new baby in foster care (3 month old boy).  Received a call from previously adopted child's birth mother regarding birth of a new baby and they were called the same day to come  the baby from the hospital.      Wife works as a special  in [place].     Diabetes:   He presents for follow up of diabetes.    He is not checking blood glucose.         He has no concerns regarding his diabetes at this time.   He is not experiencing numbness or burning in feet, excessive thirst, blurry vision, weight changes or redness, sores or blisters on feet. The patient has not had a diabetic eye exam in the last 12 months.    Hemoglobin A1C   Date Value Ref Range Status   07/03/2023 7.6 (H) 0.0 - 5.6 % Final     Comment:     Normal <5.7%   Prediabetes 5.7-6.4%    Diabetes 6.5% or higher     Note: Adopted from ADA consensus guidelines.   11/09/2022 6.5 (H) 0.0 - 5.6 % Final     Comment:     Normal <5.7%   Prediabetes 5.7-6.4%    Diabetes 6.5% or higher     Note: Adopted from ADA consensus guidelines.     Is taking Metformin on a regular basis.      Hyperlipidemia:  He presents for follow up of hyperlipidemia.   He is taking medication to lower cholesterol. He is not having myalgia or other side effects to statin medications.  Recent Labs   Lab Test 11/09/22  0812 11/12/21  0742   CHOL 164 161   HDL 44 55   * 88   TRIG 88 92       Hypertension: He presents for follow up of hypertension.  He does not check blood pressure  regularly outside of the clinic.  Outpatient blood pressures have not been over 140/90. He follows a low salt diet.     He eats 2-3 servings of fruits and vegetables daily.He consumes 1 sweetened beverage(s) daily.He exercises with enough effort to increase his heart rate 9 or less minutes per day.  He exercises with enough effort to increase his heart rate 3 or less days per week.   He is taking medications regularly.       Medication Followup of amphetamine-dextroamphetamine (ADDERALL XR) 25 MG 24 hr capsule and amphetamine-dextroamphetamine (ADDERALL) 5 MG tablet   Taking Medication as prescribed: yes  Side Effects:  None  Medication Helping Symptoms:  yes         Review of Systems   Constitutional, HEENT, cardiovascular, pulmonary, gi and gu systems are negative, except as otherwise noted.      Objective    /72   Pulse 93   Temp 97.4  F (36.3  C) (Tympanic)   Resp 16   Ht 1.829 m (6')   Wt 125.2 kg (276 lb)   SpO2 98%   BMI 37.43 kg/m    Body mass index is 37.43 kg/m .    Physical Exam     GENERAL: healthy, alert and no distress  RESP: lungs clear to auscultation - no rales, rhonchi or wheezes  CV: regular rate and rhythm, normal S1 S2, no S3 or S4, no murmur, click or rub, no peripheral edema  PSYCH: mentation appears normal, affect normal/bright

## 2023-10-02 ENCOUNTER — PATIENT OUTREACH (OUTPATIENT)
Dept: CARE COORDINATION | Facility: CLINIC | Age: 43
End: 2023-10-02
Payer: COMMERCIAL

## 2023-10-16 ENCOUNTER — PATIENT OUTREACH (OUTPATIENT)
Dept: CARE COORDINATION | Facility: CLINIC | Age: 43
End: 2023-10-16
Payer: COMMERCIAL

## 2023-10-19 DIAGNOSIS — E78.5 HYPERLIPIDEMIA, UNSPECIFIED HYPERLIPIDEMIA TYPE: ICD-10-CM

## 2023-10-19 RX ORDER — ROSUVASTATIN CALCIUM 40 MG/1
40 TABLET, COATED ORAL DAILY
Qty: 90 TABLET | Refills: 1 | Status: SHIPPED | OUTPATIENT
Start: 2023-10-19 | End: 2024-01-12

## 2023-11-14 ENCOUNTER — LAB (OUTPATIENT)
Dept: LAB | Facility: CLINIC | Age: 43
End: 2023-11-14
Payer: COMMERCIAL

## 2023-11-14 DIAGNOSIS — I10 ESSENTIAL HYPERTENSION WITH GOAL BLOOD PRESSURE LESS THAN 130/80: Primary | ICD-10-CM

## 2023-11-14 DIAGNOSIS — F98.8 ADD (ATTENTION DEFICIT DISORDER) WITHOUT HYPERACTIVITY: ICD-10-CM

## 2023-11-14 DIAGNOSIS — E11.9 TYPE 2 DIABETES MELLITUS WITHOUT COMPLICATION, WITHOUT LONG-TERM CURRENT USE OF INSULIN (H): ICD-10-CM

## 2023-11-14 DIAGNOSIS — R73.01 ELEVATED FASTING GLUCOSE: ICD-10-CM

## 2023-11-14 DIAGNOSIS — K76.0 FATTY LIVER: ICD-10-CM

## 2023-11-14 LAB
ALBUMIN SERPL BCG-MCNC: 4.3 G/DL (ref 3.5–5.2)
ALP SERPL-CCNC: 71 U/L (ref 40–150)
ALT SERPL W P-5'-P-CCNC: 53 U/L (ref 0–70)
ANION GAP SERPL CALCULATED.3IONS-SCNC: 12 MMOL/L (ref 7–15)
AST SERPL W P-5'-P-CCNC: 42 U/L (ref 0–45)
BILIRUB DIRECT SERPL-MCNC: <0.2 MG/DL (ref 0–0.3)
BILIRUB SERPL-MCNC: 0.4 MG/DL
BUN SERPL-MCNC: 9.2 MG/DL (ref 6–20)
CALCIUM SERPL-MCNC: 8.8 MG/DL (ref 8.6–10)
CHLORIDE SERPL-SCNC: 103 MMOL/L (ref 98–107)
CHOLEST SERPL-MCNC: 127 MG/DL
CREAT SERPL-MCNC: 0.83 MG/DL (ref 0.67–1.17)
CREAT UR-MCNC: 277 MG/DL
CREAT UR-MCNC: 277 MG/DL
DEPRECATED HCO3 PLAS-SCNC: 22 MMOL/L (ref 22–29)
EGFRCR SERPLBLD CKD-EPI 2021: >90 ML/MIN/1.73M2
FASTING STATUS PATIENT QL REPORTED: YES
GLUCOSE SERPL-MCNC: 139 MG/DL (ref 70–99)
GLUCOSE SERPL-MCNC: 143 MG/DL (ref 70–99)
HBA1C MFR BLD: 7.2 % (ref 0–5.6)
HDLC SERPL-MCNC: 34 MG/DL
LDLC SERPL CALC-MCNC: 78 MG/DL
MICROALBUMIN UR-MCNC: 14.8 MG/L
MICROALBUMIN/CREAT UR: 5.34 MG/G CR (ref 0–17)
NONHDLC SERPL-MCNC: 93 MG/DL
POTASSIUM SERPL-SCNC: 4.8 MMOL/L (ref 3.4–5.3)
PROT SERPL-MCNC: 6.7 G/DL (ref 6.4–8.3)
SODIUM SERPL-SCNC: 137 MMOL/L (ref 135–145)
TRIGL SERPL-MCNC: 77 MG/DL

## 2023-11-14 PROCEDURE — 81332 SERPINA1 GENE: CPT | Mod: 90

## 2023-11-14 PROCEDURE — 80053 COMPREHEN METABOLIC PANEL: CPT

## 2023-11-14 PROCEDURE — 86038 ANTINUCLEAR ANTIBODIES: CPT

## 2023-11-14 PROCEDURE — 82043 UR ALBUMIN QUANTITATIVE: CPT

## 2023-11-14 PROCEDURE — 99000 SPECIMEN HANDLING OFFICE-LAB: CPT

## 2023-11-14 PROCEDURE — 82103 ALPHA-1-ANTITRYPSIN TOTAL: CPT | Mod: 90

## 2023-11-14 PROCEDURE — 82570 ASSAY OF URINE CREATININE: CPT

## 2023-11-14 PROCEDURE — 82248 BILIRUBIN DIRECT: CPT

## 2023-11-14 PROCEDURE — 83516 IMMUNOASSAY NONANTIBODY: CPT | Mod: 90

## 2023-11-14 PROCEDURE — 36415 COLL VENOUS BLD VENIPUNCTURE: CPT

## 2023-11-14 PROCEDURE — 83036 HEMOGLOBIN GLYCOSYLATED A1C: CPT

## 2023-11-14 PROCEDURE — G0480 DRUG TEST DEF 1-7 CLASSES: HCPCS

## 2023-11-14 PROCEDURE — 86364 TISS TRNSGLTMNASE EA IG CLAS: CPT

## 2023-11-14 PROCEDURE — 82784 ASSAY IGA/IGD/IGG/IGM EACH: CPT

## 2023-11-14 PROCEDURE — 80061 LIPID PANEL: CPT

## 2023-11-14 PROCEDURE — 82947 ASSAY GLUCOSE BLOOD QUANT: CPT | Mod: 59

## 2023-11-15 LAB
ANA SER QL IF: NEGATIVE
IGA SERPL-MCNC: 208 MG/DL (ref 84–499)
TTG IGA SER-ACNC: 0.7 U/ML
TTG IGG SER-ACNC: <0.6 U/ML

## 2023-11-16 LAB
AMPHET UR CFM-MCNC: 5680 NG/ML
AMPHET/CREAT UR: 2051 NG/MG {CREAT}
SMA IGG SER-ACNC: 2 UNITS

## 2023-11-17 NOTE — RESULT ENCOUNTER NOTE
Dear William,     -Cholesterol levels are at your goal levels.  ADVISE: continuing your medication, a regular exercise program with at least 150 minutes of aerobic exercise per week, and eating a low saturated fat/low carbohydrate diet.  Also, you should recheck this fasting cholesterol panel in 12 months.    -Kidney function (GFR) is normal.  -Sodium is normal.  -Potassium is normal.  -Calcium is normal.  -Glucose is elevated due to your diabetes.    -A1C (test of diabetes control the last 2-3 months) is close to your goal and has improved. Please continue with your current plan of Metformin twice daily and work on dietary and exercise modifications to help lower your blood sugars.  . Also, you should make an appointment to see me and recheck your A1C test in 6 months.     -Microalbumin (urine protein) test is normal.  ADVISE: rechecking this annually.    -Drug screen showed expected results.      Please send a [employer] message or call [phone]  if you have any questions.      ODALIS Martin, CNP  [facility]    If you have further questions about the interpretation of your labs, [email or web address] is a good website to check out for further information.

## 2023-11-21 LAB
A1AT PHENOTYP SERPL-IMP: NORMAL
A1AT SERPL-MCNC: 164 MG/DL
A1AT SS SERPL-MCNC: NEGATIVE G/L
A1AT SZ SERPL-MCNC: NORMAL G/L
A1AT ZZ SERPL-MCNC: NEGATIVE G/L
SPECIMEN SOURCE: NORMAL

## 2023-11-26 ENCOUNTER — MYC REFILL (OUTPATIENT)
Dept: FAMILY MEDICINE | Facility: CLINIC | Age: 43
End: 2023-11-26
Payer: COMMERCIAL

## 2023-11-26 DIAGNOSIS — F98.8 ADD (ATTENTION DEFICIT DISORDER) WITHOUT HYPERACTIVITY: ICD-10-CM

## 2023-11-26 RX ORDER — DEXTROAMPHETAMINE SACCHARATE, AMPHETAMINE ASPARTATE MONOHYDRATE, DEXTROAMPHETAMINE SULFATE AND AMPHETAMINE SULFATE 6.25; 6.25; 6.25; 6.25 MG/1; MG/1; MG/1; MG/1
25 CAPSULE, EXTENDED RELEASE ORAL DAILY
Qty: 30 CAPSULE | Refills: 0 | Status: CANCELLED | OUTPATIENT
Start: 2023-11-26

## 2023-11-28 RX ORDER — DEXTROAMPHETAMINE SACCHARATE, AMPHETAMINE ASPARTATE MONOHYDRATE, DEXTROAMPHETAMINE SULFATE AND AMPHETAMINE SULFATE 6.25; 6.25; 6.25; 6.25 MG/1; MG/1; MG/1; MG/1
25 CAPSULE, EXTENDED RELEASE ORAL DAILY
Qty: 30 CAPSULE | Refills: 0 | Status: SHIPPED | OUTPATIENT
Start: 2023-11-28 | End: 2023-12-28

## 2023-11-28 RX ORDER — DEXTROAMPHETAMINE SACCHARATE, AMPHETAMINE ASPARTATE MONOHYDRATE, DEXTROAMPHETAMINE SULFATE AND AMPHETAMINE SULFATE 6.25; 6.25; 6.25; 6.25 MG/1; MG/1; MG/1; MG/1
25 CAPSULE, EXTENDED RELEASE ORAL DAILY
Qty: 30 CAPSULE | Refills: 0 | Status: SHIPPED | OUTPATIENT
Start: 2023-12-29 | End: 2024-01-12

## 2023-11-28 RX ORDER — DEXTROAMPHETAMINE SACCHARATE, AMPHETAMINE ASPARTATE MONOHYDRATE, DEXTROAMPHETAMINE SULFATE AND AMPHETAMINE SULFATE 6.25; 6.25; 6.25; 6.25 MG/1; MG/1; MG/1; MG/1
25 CAPSULE, EXTENDED RELEASE ORAL DAILY
Qty: 30 CAPSULE | Refills: 0 | Status: SHIPPED | OUTPATIENT
Start: 2024-01-29 | End: 2024-01-12

## 2023-11-30 ENCOUNTER — MYC MEDICAL ADVICE (OUTPATIENT)
Dept: FAMILY MEDICINE | Facility: CLINIC | Age: 43
End: 2023-11-30

## 2023-11-30 ENCOUNTER — VIRTUAL VISIT (OUTPATIENT)
Dept: GASTROENTEROLOGY | Facility: CLINIC | Age: 43
End: 2023-11-30
Payer: COMMERCIAL

## 2023-11-30 DIAGNOSIS — K76.0 FATTY LIVER: Primary | ICD-10-CM

## 2023-11-30 DIAGNOSIS — D50.8 OTHER IRON DEFICIENCY ANEMIA: ICD-10-CM

## 2023-11-30 DIAGNOSIS — R06.09 DOE (DYSPNEA ON EXERTION): ICD-10-CM

## 2023-11-30 PROCEDURE — 99215 OFFICE O/P EST HI 40 MIN: CPT | Mod: VID | Performed by: PHYSICIAN ASSISTANT

## 2023-11-30 NOTE — Clinical Note
Please schedule fibroscan and follow up with me in six months. If schedules in person, I can do FibroScan same day.  Thanks, Precious

## 2023-11-30 NOTE — NURSING NOTE
Is the patient currently in the state of [place]? YES    Visit mode:VIDEO    If the visit is dropped, the patient can be reconnected by: VIDEO VISIT: Text to cell phone:   Telephone Information:   Mobile [phone]       Will anyone else be joining the visit? NO  (If patient encounters technical issues they should call [phone]    How would you like to obtain your AVS? MyChart    Are changes needed to the allergy or medication list? No    Reason for visit: RECHECK    Nasir CLAY

## 2023-12-01 ENCOUNTER — LAB (OUTPATIENT)
Dept: LAB | Facility: CLINIC | Age: 43
End: 2023-12-01
Payer: COMMERCIAL

## 2023-12-01 ENCOUNTER — TELEPHONE (OUTPATIENT)
Dept: GASTROENTEROLOGY | Facility: CLINIC | Age: 43
End: 2023-12-01

## 2023-12-01 DIAGNOSIS — D50.8 OTHER IRON DEFICIENCY ANEMIA: ICD-10-CM

## 2023-12-01 DIAGNOSIS — R06.09 DOE (DYSPNEA ON EXERTION): ICD-10-CM

## 2023-12-01 LAB
ERYTHROCYTE [DISTWIDTH] IN BLOOD BY AUTOMATED COUNT: 20.1 % (ref 10–15)
HCT VFR BLD AUTO: 26.2 % (ref 40–53)
HGB BLD-MCNC: 6.6 G/DL (ref 13.3–17.7)
MCH RBC QN AUTO: 16.8 PG (ref 26.5–33)
MCHC RBC AUTO-ENTMCNC: 25.2 G/DL (ref 31.5–36.5)
MCV RBC AUTO: 67 FL (ref 78–100)
PLATELET # BLD AUTO: 372 10E3/UL (ref 150–450)
RBC # BLD AUTO: 3.94 10E6/UL (ref 4.4–5.9)
WBC # BLD AUTO: 7.4 10E3/UL (ref 4–11)

## 2023-12-01 PROCEDURE — 85027 COMPLETE CBC AUTOMATED: CPT

## 2023-12-01 PROCEDURE — 86258 DGP ANTIBODY EACH IG CLASS: CPT

## 2023-12-01 PROCEDURE — 83540 ASSAY OF IRON: CPT

## 2023-12-01 PROCEDURE — 36415 COLL VENOUS BLD VENIPUNCTURE: CPT

## 2023-12-01 PROCEDURE — 82728 ASSAY OF FERRITIN: CPT

## 2023-12-01 PROCEDURE — 83550 IRON BINDING TEST: CPT

## 2023-12-01 PROCEDURE — 84443 ASSAY THYROID STIM HORMONE: CPT

## 2023-12-01 NOTE — TELEPHONE ENCOUNTER
DATE/TIME OF CALL RECEIVED FROM LAB:  12/01/23 at 1:12 PM   LAB TEST:  Hgb  LAB VALUE:  6.6  PROVIDER NOTIFIED?: Yes  PROVIDER NAME: Precious Fernandez PA-C  DATE/TIME LAB VALUE REPORTED TO PROVIDER: 1:15 PM  MECHANISM OF PROVIDER NOTIFICATION:  in-basket  PROVIDER RESPONSE: TBD

## 2023-12-02 LAB
FERRITIN SERPL-MCNC: 6 NG/ML (ref 31–409)
IRON BINDING CAPACITY (ROCHE): 506 UG/DL (ref 240–430)
IRON SATN MFR SERPL: 4 % (ref 15–46)
IRON SERPL-MCNC: 18 UG/DL (ref 61–157)
TSH SERPL DL<=0.005 MIU/L-ACNC: 0.94 UIU/ML (ref 0.3–4.2)

## 2023-12-04 ENCOUNTER — TELEPHONE (OUTPATIENT)
Dept: GASTROENTEROLOGY | Facility: CLINIC | Age: 43
End: 2023-12-04
Payer: COMMERCIAL

## 2023-12-04 DIAGNOSIS — Z12.11 SPECIAL SCREENING FOR MALIGNANT NEOPLASMS, COLON: Primary | ICD-10-CM

## 2023-12-04 NOTE — TELEPHONE ENCOUNTER
"Endoscopy Scheduling Screen    Have you had a positive Covid test in the last 14 days?  No    Are you active on MyChart?   Yes    What insurance is in the chart?  Other:      Ordering/Referring Provider: Precious Fernandez PA-C     (If ordering provider performs procedure, schedule with ordering provider unless otherwise instructed. )    BMI: Estimated body mass index is 37.43 kg/m  as calculated from the following:    Height as of 9/22/23: 1.829 m (6').    Weight as of 9/22/23: 125.2 kg (276 lb).     Sedation Ordered  moderate sedation.   If patient BMI > 50 do not schedule in [facility].    If patient BMI > 45 do not schedule at [facility].    Are you taking methadone or Suboxone?  No    Are you taking any prescription medications for pain 3 or more times per week?   No    Do you have a history of malignant hyperthermia or adverse reaction to anesthesia?  No    (Females) Are you currently pregnant?   No     Have you been diagnosed or told you have pulmonary hypertension?   No    Do you have an LVAD?  No    Have you been told you have moderate to severe sleep apnea?  Yes (RN Review required for scheduling unless scheduling in Hospital.)    Have you been told you have COPD, asthma, or any other lung disease?  No    Do you have any heart conditions?  No     Have you ever had an organ transplant?   No    Have you ever had or are you awaiting a heart or lung transplant?   No    Have you had a stroke or transient ischemic attack (TIA aka \"mini stroke\" in the last 6 months?   No    Have you been diagnosed with or been told you have cirrhosis of the liver?   No    Are you currently on dialysis?   No    Do you need assistance transferring?   No    BMI: Estimated body mass index is 37.43 kg/m  as calculated from the following:    Height as of 9/22/23: 1.829 m (6').    Weight as of 9/22/23: 125.2 kg (276 lb).     Is patients BMI > 40 and scheduling location [facility]?  No    Do you take an injectable medication for weight loss or " diabetes (excluding insulin)?  No    Do you take the medication Naltrexone?  No    Do you take blood thinners?  No       Prep   Are you currently on dialysis or do you have chronic kidney disease?  No    Do you have a diagnosis of diabetes?  Yes (Golytely Prep)    Do you have a diagnosis of cystic fibrosis (CF)?  No    On a regular basis do you go 3 -5 days between bowel movements?  No    BMI > 40?  No    Preferred Pharmacy:      [id number] - [place], [place] - [address] [place] [address] AT [facility] [address]  [facility] [address]  Phone: [phone] Fax: [phone]      Final Scheduling Details   Colonoscopy prep sent?  Standard Golytely    Procedure scheduled  Colonoscopy    Surgeon:  RICH     Date of procedure:  12/28     Pre-OP / PAC:   No - Not required for this site.    Location  [facility] - Patient preference.    Sedation   Moderate Sedation - Per order.      Patient Reminders:   You will receive a call from a Nurse to review instructions and health history.  This assessment must be completed prior to your procedure.  Failure to complete the Nurse assessment may result in the procedure being cancelled.      On the day of your procedure, please designate an adult(s) who can drive you home stay with you for the next 24 hours. The medicines used in the exam will make you sleepy. You will not be able to drive.      You cannot take public transportation, ride share services, or non-medical taxi service without a responsible caregiver.  Medical transport services are allowed with the requirement that a responsible caregiver will receive you at your destination.  We require that drivers and caregivers are confirmed prior to your procedure.

## 2023-12-04 NOTE — TELEPHONE ENCOUNTER
Endoscopy Scheduling Screen    Have you had a positive Covid test in the last 14 days?  No      Are you active on MyChart?   Yes      What insurance is in the chart?  Other:  HP      Ordering/Referring Provider: MIGUEL   (If ordering provider performs procedure, schedule with ordering provider unless otherwise instructed. )    BMI: Estimated body mass index is 37.43 kg/m  as calculated from the following:    Height as of 9/22/23: 1.829 m (6').    Weight as of 9/22/23: 125.2 kg (276 lb).     Sedation   Ordered  moderate sedation.   If patient BMI > 50 do not schedule in ASC.    If patient BMI > 45 do not schedule at [facility].    Are you taking methadone or Suboxone?  No      Are you taking any prescription medications for pain 3 or more times per week?   No      Do you have a history of malignant hyperthermia or adverse reaction to anesthesia?  No      (Females) Are you currently pregnant?          Have you been diagnosed or told you have pulmonary hypertension?   No      Do you have an LVAD?  No      Have you been told you have moderate to severe sleep apnea?  Yes (RN Review required for scheduling unless scheduling in Hospital.)  uses cpap      Have you been told you have COPD, asthma, or any other lung disease?  No      Do you have any heart conditions?  No       CALL DISCONNECTED, UNABLE TO REACH THE PATIENT AGAIN. LEFT VM TO CALL BACK TO COMPLETE SCREENING.

## 2023-12-05 LAB
GLIADIN IGA SER-ACNC: 1.7 U/ML
GLIADIN IGG SER-ACNC: <0.6 U/ML

## 2023-12-05 NOTE — TELEPHONE ENCOUNTER
Is there a same day or next day appt available prior to 12/19 appt.  I feel as though it would be valuable for him to be seen sooner than 12/19.      - KUNeixl, CNP

## 2023-12-07 ENCOUNTER — HOSPITAL ENCOUNTER (OUTPATIENT)
Dept: CT IMAGING | Facility: CLINIC | Age: 43
Discharge: HOME | End: 2023-12-07
Attending: PHYSICIAN ASSISTANT | Admitting: PHYSICIAN ASSISTANT
Payer: COMMERCIAL

## 2023-12-07 DIAGNOSIS — R06.09 DOE (DYSPNEA ON EXERTION): ICD-10-CM

## 2023-12-07 DIAGNOSIS — D50.8 OTHER IRON DEFICIENCY ANEMIA: ICD-10-CM

## 2023-12-07 PROCEDURE — 250N000011 HC RX IP 250 OP 636: Performed by: PHYSICIAN ASSISTANT

## 2023-12-07 PROCEDURE — 250N000009 HC RX 250: Performed by: PHYSICIAN ASSISTANT

## 2023-12-07 PROCEDURE — 74177 CT ABD & PELVIS W/CONTRAST: CPT

## 2023-12-07 RX ORDER — IOPAMIDOL 755 MG/ML
135 INJECTION, SOLUTION INTRAVASCULAR ONCE
Status: COMPLETED | OUTPATIENT
Start: 2023-12-07 | End: 2023-12-07

## 2023-12-07 RX ADMIN — IOPAMIDOL 135 ML: 755 INJECTION, SOLUTION INTRAVENOUS at 17:38

## 2023-12-07 RX ADMIN — SODIUM CHLORIDE 79 ML: 9 INJECTION, SOLUTION INTRAVENOUS at 17:38

## 2023-12-07 ASSESSMENT — ANXIETY QUESTIONNAIRES
3. WORRYING TOO MUCH ABOUT DIFFERENT THINGS: NOT AT ALL
7. FEELING AFRAID AS IF SOMETHING AWFUL MIGHT HAPPEN: NOT AT ALL
GAD7 TOTAL SCORE: 0
GAD7 TOTAL SCORE: 0
1. FEELING NERVOUS, ANXIOUS, OR ON EDGE: NOT AT ALL
IF YOU CHECKED OFF ANY PROBLEMS ON THIS QUESTIONNAIRE, HOW DIFFICULT HAVE THESE PROBLEMS MADE IT FOR YOU TO DO YOUR WORK, TAKE CARE OF THINGS AT HOME, OR GET ALONG WITH OTHER PEOPLE: NOT DIFFICULT AT ALL
2. NOT BEING ABLE TO STOP OR CONTROL WORRYING: NOT AT ALL
5. BEING SO RESTLESS THAT IT IS HARD TO SIT STILL: NOT AT ALL
6. BECOMING EASILY ANNOYED OR IRRITABLE: NOT AT ALL
4. TROUBLE RELAXING: NOT AT ALL

## 2023-12-07 ASSESSMENT — PATIENT HEALTH QUESTIONNAIRE - PHQ9
10. IF YOU CHECKED OFF ANY PROBLEMS, HOW DIFFICULT HAVE THESE PROBLEMS MADE IT FOR YOU TO DO YOUR WORK, TAKE CARE OF THINGS AT HOME, OR GET ALONG WITH OTHER PEOPLE: NOT DIFFICULT AT ALL
SUM OF ALL RESPONSES TO PHQ QUESTIONS 1-9: 0
SUM OF ALL RESPONSES TO PHQ QUESTIONS 1-9: 0

## 2023-12-07 NOTE — TELEPHONE ENCOUNTER
[facility] Oncology message sent to patient to notify of Kezia Brooks wanting to see patient sooner.

## 2023-12-08 ENCOUNTER — TELEPHONE (OUTPATIENT)
Dept: GASTROENTEROLOGY | Facility: CLINIC | Age: 43
End: 2023-12-08

## 2023-12-08 ENCOUNTER — OFFICE VISIT (OUTPATIENT)
Dept: FAMILY MEDICINE | Facility: CLINIC | Age: 43
End: 2023-12-08
Payer: COMMERCIAL

## 2023-12-08 ENCOUNTER — LAB (OUTPATIENT)
Dept: LAB | Facility: CLINIC | Age: 43
End: 2023-12-08
Payer: COMMERCIAL

## 2023-12-08 VITALS
HEART RATE: 108 BPM | TEMPERATURE: 98.5 F | BODY MASS INDEX: 37.25 KG/M2 | SYSTOLIC BLOOD PRESSURE: 148 MMHG | WEIGHT: 275 LBS | OXYGEN SATURATION: 100 % | HEIGHT: 72 IN | DIASTOLIC BLOOD PRESSURE: 62 MMHG | RESPIRATION RATE: 18 BRPM

## 2023-12-08 DIAGNOSIS — E11.9 TYPE 2 DIABETES MELLITUS WITHOUT COMPLICATION, WITHOUT LONG-TERM CURRENT USE OF INSULIN (H): ICD-10-CM

## 2023-12-08 DIAGNOSIS — D50.8 OTHER IRON DEFICIENCY ANEMIA: Primary | ICD-10-CM

## 2023-12-08 DIAGNOSIS — D50.8 OTHER IRON DEFICIENCY ANEMIA: ICD-10-CM

## 2023-12-08 DIAGNOSIS — D64.9 LOW HEMOGLOBIN: Primary | ICD-10-CM

## 2023-12-08 DIAGNOSIS — D50.9 MICROCYTIC ANEMIA: Primary | ICD-10-CM

## 2023-12-08 DIAGNOSIS — R00.2 PALPITATIONS: ICD-10-CM

## 2023-12-08 DIAGNOSIS — R06.09 DYSPNEA ON EXERTION: ICD-10-CM

## 2023-12-08 LAB
BASOPHILS # BLD AUTO: 0.1 10E3/UL (ref 0–0.2)
BASOPHILS NFR BLD AUTO: 1 %
EOSINOPHIL # BLD AUTO: 0.2 10E3/UL (ref 0–0.7)
EOSINOPHIL NFR BLD AUTO: 3 %
ERYTHROCYTE [DISTWIDTH] IN BLOOD BY AUTOMATED COUNT: 23.4 % (ref 10–15)
ERYTHROCYTE [DISTWIDTH] IN BLOOD BY AUTOMATED COUNT: 23.4 % (ref 10–15)
HCT VFR BLD AUTO: 25.9 % (ref 40–53)
HCT VFR BLD AUTO: 25.9 % (ref 40–53)
HGB BLD-MCNC: 6.3 G/DL (ref 13.3–17.7)
HGB BLD-MCNC: 6.3 G/DL (ref 13.3–17.7)
IMM GRANULOCYTES # BLD: 0.1 10E3/UL
IMM GRANULOCYTES NFR BLD: 1 %
LYMPHOCYTES # BLD AUTO: 1.8 10E3/UL (ref 0.8–5.3)
LYMPHOCYTES NFR BLD AUTO: 23 %
MCH RBC QN AUTO: 16.8 PG (ref 26.5–33)
MCH RBC QN AUTO: 16.8 PG (ref 26.5–33)
MCHC RBC AUTO-ENTMCNC: 24.3 G/DL (ref 31.5–36.5)
MCHC RBC AUTO-ENTMCNC: 24.3 G/DL (ref 31.5–36.5)
MCV RBC AUTO: 69 FL (ref 78–100)
MCV RBC AUTO: 69 FL (ref 78–100)
MONOCYTES # BLD AUTO: 0.8 10E3/UL (ref 0–1.3)
MONOCYTES NFR BLD AUTO: 10 %
NEUTROPHILS # BLD AUTO: 4.9 10E3/UL (ref 1.6–8.3)
NEUTROPHILS NFR BLD AUTO: 62 %
PLATELET # BLD AUTO: 393 10E3/UL (ref 150–450)
PLATELET # BLD AUTO: 393 10E3/UL (ref 150–450)
RBC # BLD AUTO: 3.75 10E6/UL (ref 4.4–5.9)
RBC # BLD AUTO: 3.75 10E6/UL (ref 4.4–5.9)
RETICS # AUTO: 0.19 10E6/UL (ref 0.03–0.1)
RETICS/RBC NFR AUTO: 5.1 % (ref 0.5–2)
WBC # BLD AUTO: 7.8 10E3/UL (ref 4–11)
WBC # BLD AUTO: 7.8 10E3/UL (ref 4–11)

## 2023-12-08 PROCEDURE — 93000 ELECTROCARDIOGRAM COMPLETE: CPT | Performed by: NURSE PRACTITIONER

## 2023-12-08 PROCEDURE — 99000 SPECIMEN HANDLING OFFICE-LAB: CPT

## 2023-12-08 PROCEDURE — 83655 ASSAY OF LEAD: CPT | Mod: 90

## 2023-12-08 PROCEDURE — 99214 OFFICE O/P EST MOD 30 MIN: CPT | Mod: 25 | Performed by: NURSE PRACTITIONER

## 2023-12-08 PROCEDURE — 85025 COMPLETE CBC W/AUTO DIFF WBC: CPT

## 2023-12-08 PROCEDURE — 85045 AUTOMATED RETICULOCYTE COUNT: CPT

## 2023-12-08 PROCEDURE — 36415 COLL VENOUS BLD VENIPUNCTURE: CPT

## 2023-12-08 RX ORDER — HEPARIN SODIUM (PORCINE) LOCK FLUSH IV SOLN 100 UNIT/ML 100 UNIT/ML
5 SOLUTION INTRAVENOUS
Status: CANCELLED | OUTPATIENT
Start: 2023-12-08

## 2023-12-08 RX ORDER — ALBUTEROL SULFATE 0.83 MG/ML
2.5 SOLUTION RESPIRATORY (INHALATION)
Status: CANCELLED | OUTPATIENT
Start: 2023-12-08

## 2023-12-08 RX ORDER — DIPHENHYDRAMINE HYDROCHLORIDE 50 MG/ML
50 INJECTION INTRAMUSCULAR; INTRAVENOUS
Status: CANCELLED
Start: 2023-12-08

## 2023-12-08 RX ORDER — METHYLPREDNISOLONE SODIUM SUCCINATE 125 MG/2ML
125 INJECTION, POWDER, LYOPHILIZED, FOR SOLUTION INTRAMUSCULAR; INTRAVENOUS
Status: CANCELLED
Start: 2023-12-08

## 2023-12-08 RX ORDER — HEPARIN SODIUM,PORCINE 10 UNIT/ML
5-20 VIAL (ML) INTRAVENOUS DAILY PRN
Status: CANCELLED | OUTPATIENT
Start: 2023-12-08

## 2023-12-08 RX ORDER — EPINEPHRINE 1 MG/ML
0.3 INJECTION, SOLUTION, CONCENTRATE INTRAVENOUS EVERY 5 MIN PRN
Status: CANCELLED | OUTPATIENT
Start: 2023-12-08

## 2023-12-08 RX ORDER — ALBUTEROL SULFATE 90 UG/1
1-2 AEROSOL, METERED RESPIRATORY (INHALATION)
Status: CANCELLED
Start: 2023-12-08

## 2023-12-08 ASSESSMENT — ENCOUNTER SYMPTOMS: SHORTNESS OF BREATH: 1

## 2023-12-08 NOTE — PROGRESS NOTES
Assessment & Plan     William was seen today for shortness of breath.    Diagnoses and all orders for this visit:    Microcytic anemia, Iron Deficiency Anemia  Dyspnea on exertion  Palpitations  -     EKG 12-lead complete w/read - Clinics - SR, no acute abnormalities or arrhythmias  Hgb of 6.3 on 12/8/23, ferritin level of 6 and iron saturation level of less than 4%.    Planning iron infusions, which are being arranged for next week by GI.   Recommend further consultation with hematology.   EGD/Colonoscopy scheduled for 12/28/23.      Type 2 diabetes mellitus without complication, without long-term current use of insulin (H)  Hemoglobin A1C   Date Value Ref Range Status   11/14/2023 7.2 (H) 0.0 - 5.6 % Final     Comment:     Normal <5.7%   Prediabetes 5.7-6.4%    Diabetes 6.5% or higher     Note: Adopted from ADA consensus guidelines.   07/03/2023 7.6 (H) 0.0 - 5.6 % Final     Comment:     Normal <5.7%   Prediabetes 5.7-6.4%    Diabetes 6.5% or higher     Note: Adopted from ADA consensus guidelines.   11/09/2022 6.5 (H) 0.0 - 5.6 % Final     Comment:     Normal <5.7%   Prediabetes 5.7-6.4%    Diabetes 6.5% or higher     Note: Adopted from ADA consensus guidelines.   Improving control, last HgbA1C was 7.2%.  Currently on Metformin 500 mg twice daily.  Continue with dietary and lifestyle modifications to aid with lowering of blood sugars.           BMI:   Estimated body mass index is 37.3 kg/m  as calculated from the following:    Height as of this encounter: 1.829 m (6').    Weight as of this encounter: 124.7 kg (275 lb).     Return in about 4 weeks (around 1/5/2024) for Preventative Visit + Med Check.    Kezia Brooks, ODALIS CNP  [facility]          Sandeep Thomas is a 43 year old, presenting for the following health issues:  Shortness of Breath        12/8/2023     2:34 PM   Additional Questions   Roomed by Saranya MORENO       History of Present Illness       Reason for visit:  Low  Hemoglobin    He eats 2-3 servings of fruits and vegetables daily.He consumes 1 sweetened beverage(s) daily.He exercises with enough effort to increase his heart rate 9 or less minutes per day.  He exercises with enough effort to increase his heart rate 3 or less days per week.   He is taking medications regularly.     Discuss lab results from today  Salsify message:    1) Fatigue - every day where some days can be extreme  2) Lightheaded/dizzy - have it most days when standing up  3) Out of breath - Every day and it can be pretty extreme. I can be winded going up our stairs at our house and can t really carry kids upstairs  4) I have yellowing/darkening around my eyes that my wife asks about (not in the whites of my eyes, its on the skin around my eye sockets)  5) My mom keeps asking if I m feeling ok and wonders if I have low hemoglobin due to loss of color in lips. She has asked several times  6) I think I am starting to get some swelling/fluids in my feet      Patient reports intermittent shortness of breath.  Reports going to [employer]-daughter dance practice - had to stop and take a break during a practice.  Additionally, noticed this during a plant tour as well.  Noticing worsening of shortness of breath in December.    No shortness of breath at rest.  +Shortness of breath with light activity.  No chest pain.   Has fluttering, increased heart rate, intermittent, slightly less this past one week.  +light headedness - with changing positions, short episodes.      Last seen by GI on 11/30/23.    EGD and Colonoscopy scheduled on 12/28/23.     CBC RESULTS:   Recent Labs   Lab Test 12/08/23  0842   WBC 7.8  7.8   RBC 3.75*  3.75*   HGB 6.3*  6.3*   HCT 25.9*  25.9*   MCV 69*  69*   MCH 16.8*  16.8*   MCHC 24.3*  24.3*   RDW 23.4*  23.4*     393     EGD/Colonoscopy in 2019 due to anemia without significant findings.         Review of Systems   Respiratory:  Positive for shortness of breath.        Constitutional, HEENT, cardiovascular, pulmonary, gi and gu systems are negative, except as otherwise noted.      Objective    BP (!) 148/62   Pulse 108   Temp 98.5  F (36.9  C)   Resp 18   Ht 1.829 m (6')   Wt 124.7 kg (275 lb)   SpO2 100%   BMI 37.30 kg/m    Body mass index is 37.3 kg/m .    Physical Exam     GENERAL: healthy, alert and no distress  EYES: Eyes grossly normal to inspection  RESP: lungs clear to auscultation - no rales, rhonchi or wheezes  CV: regular rhythm, +tachycardia, normal S1 S2, no S3 or S4, no murmur, click or rub, no peripheral edema  SKIN: pale  PSYCH: mentation appears normal, affect normal/bright

## 2023-12-08 NOTE — TELEPHONE ENCOUNTER
DATE/TIME OF CALL RECEIVED FROM LAB:  12/08/23 at 9:39 AM   LAB TEST:  Hemoglobin  LAB VALUE:  6.3  PROVIDER NOTIFIED?: Yes  PROVIDER NAME: DWAYNE Phelps  DATE/TIME LAB VALUE REPORTED TO PROVIDER:   MECHANISM OF PROVIDER NOTIFICATION:  TBD  PROVIDER RESPONSE: TBD

## 2023-12-08 NOTE — TELEPHONE ENCOUNTER
Called patient regarding injectafer x 2 order due to low hemoglobin per Precious Fernandez PA-C.    Injectafer order faxed to [facility] in [place] at [phone]. First injection due week of 12/11/23.    Informed patient to be evaluated in the ER for symptoms of shortness of breath, lightheadedness, chest pain, fatigue or weakness.     Patient understanding.     EDUARDO Matthews, RN, PHN  Hepatology Clinic  [facility]  [facility]

## 2023-12-09 LAB
ACANTHOCYTES BLD QL SMEAR: NORMAL
AUER BODIES BLD QL SMEAR: NORMAL
BASO STIPL BLD QL SMEAR: NORMAL
BITE CELLS BLD QL SMEAR: NORMAL
BLISTER CELLS BLD QL SMEAR: NORMAL
BURR CELLS BLD QL SMEAR: NORMAL
DACRYOCYTES BLD QL SMEAR: NORMAL
ELLIPTOCYTES BLD QL SMEAR: NORMAL
FRAGMENTS BLD QL SMEAR: NORMAL
HGB C CRYSTALS: NORMAL
HOWELL-JOLLY BOD BLD QL SMEAR: NORMAL
LEAD BLDV-MCNC: <2 UG/DL
NEUTS HYPERSEG BLD QL SMEAR: NORMAL
PLAT MORPH BLD: NORMAL
POLYCHROMASIA BLD QL SMEAR: NORMAL
RBC AGGLUT BLD QL: NORMAL
RBC MORPH BLD: NORMAL
ROULEAUX BLD QL SMEAR: NORMAL
SICKLE CELLS BLD QL SMEAR: NORMAL
SMUDGE CELLS BLD QL SMEAR: NORMAL
SPHEROCYTES BLD QL SMEAR: NORMAL
STOMATOCYTES BLD QL SMEAR: NORMAL
TARGETS BLD QL SMEAR: NORMAL
TOXIC GRANULES BLD QL SMEAR: NORMAL
VARIANT LYMPHS BLD QL SMEAR: NORMAL

## 2023-12-10 ENCOUNTER — HEALTH MAINTENANCE LETTER (OUTPATIENT)
Age: 43
End: 2023-12-10

## 2023-12-11 ENCOUNTER — PATIENT OUTREACH (OUTPATIENT)
Dept: ONCOLOGY | Facility: CLINIC | Age: 43
End: 2023-12-11
Payer: COMMERCIAL

## 2023-12-11 ENCOUNTER — TELEPHONE (OUTPATIENT)
Dept: GASTROENTEROLOGY | Facility: CLINIC | Age: 43
End: 2023-12-11
Payer: COMMERCIAL

## 2023-12-11 DIAGNOSIS — D64.9 LOW HEMOGLOBIN: Primary | ICD-10-CM

## 2023-12-11 RX ORDER — ALBUTEROL SULFATE 0.83 MG/ML
2.5 SOLUTION RESPIRATORY (INHALATION)
OUTPATIENT
Start: 2023-12-11

## 2023-12-11 RX ORDER — ALBUTEROL SULFATE 90 UG/1
1-2 AEROSOL, METERED RESPIRATORY (INHALATION)
Start: 2023-12-11

## 2023-12-11 RX ORDER — DIPHENHYDRAMINE HYDROCHLORIDE 50 MG/ML
50 INJECTION INTRAMUSCULAR; INTRAVENOUS
Start: 2023-12-11

## 2023-12-11 RX ORDER — HEPARIN SODIUM,PORCINE 10 UNIT/ML
5-20 VIAL (ML) INTRAVENOUS DAILY PRN
OUTPATIENT
Start: 2023-12-11

## 2023-12-11 RX ORDER — EPINEPHRINE 1 MG/ML
0.3 INJECTION, SOLUTION, CONCENTRATE INTRAVENOUS EVERY 5 MIN PRN
OUTPATIENT
Start: 2023-12-11

## 2023-12-11 RX ORDER — METHYLPREDNISOLONE SODIUM SUCCINATE 125 MG/2ML
125 INJECTION, POWDER, LYOPHILIZED, FOR SOLUTION INTRAMUSCULAR; INTRAVENOUS
Start: 2023-12-11

## 2023-12-11 RX ORDER — HEPARIN SODIUM (PORCINE) LOCK FLUSH IV SOLN 100 UNIT/ML 100 UNIT/ML
5 SOLUTION INTRAVENOUS
OUTPATIENT
Start: 2023-12-11

## 2023-12-11 NOTE — TELEPHONE ENCOUNTER
Injectafer discontinued due to insurance. Ferraheme ordered as a replacement. Sent to [facility]) at [phone].    CELENA MatthewsN, RN, PHN  Hepatology Clinic  [facility]  [facility]

## 2023-12-11 NOTE — PROGRESS NOTES
Hematology referral reviewed for [facility], see below.    Referral reason: iron deficiency anemia, order for injectafer ordered by GI provider sent to [facility], scheduled for 12/18 and 12/26 per [facility] review    Clinical question entered by referring provider or through order transcription: microcytic anemia     Referral received via: Internal referral by [facility]    Current abnormal labs: Available in Chart Review    Outreach: MyChart sent to patient    Plan: Triage instructions updated and sent to [facility] for completion.

## 2023-12-11 NOTE — TELEPHONE ENCOUNTER
RECORDS STATUS - ALL OTHER DIAGNOSIS      RECORDS RECEIVED FROM: [facility] - Internal Records   DATE RECEIVED: 12/11

## 2023-12-11 NOTE — TELEPHONE ENCOUNTER
Patient scheduled for ferreme on 12/12/23 at 9 AM and 12/19/23 at 1:30 PM at [facility]).    CELENA MatthewsN, RN, PHN  Hepatology Clinic  [facility]  [facility]

## 2023-12-12 ENCOUNTER — PRE VISIT (OUTPATIENT)
Dept: ONCOLOGY | Facility: CLINIC | Age: 43
End: 2023-12-12
Payer: COMMERCIAL

## 2023-12-12 RX ORDER — BISACODYL 5 MG/1
TABLET, DELAYED RELEASE ORAL
Qty: 4 TABLET | Refills: 0 | Status: SHIPPED | OUTPATIENT
Start: 2023-12-12 | End: 2024-01-12

## 2023-12-12 NOTE — TELEPHONE ENCOUNTER
Standard Golytely Bowel Prep. Instructions were sent via [employer] [facility]. Bowel prep was sent 12/12/2023 to    [id number] - [place], [place] - [address] AT [place] [id number] & [address]

## 2023-12-13 ENCOUNTER — ONCOLOGY VISIT (OUTPATIENT)
Dept: ONCOLOGY | Facility: CLINIC | Age: 43
End: 2023-12-13
Attending: NURSE PRACTITIONER
Payer: COMMERCIAL

## 2023-12-13 ENCOUNTER — TELEPHONE (OUTPATIENT)
Dept: GASTROENTEROLOGY | Facility: CLINIC | Age: 43
End: 2023-12-13

## 2023-12-13 ENCOUNTER — MYC MEDICAL ADVICE (OUTPATIENT)
Dept: GASTROENTEROLOGY | Facility: CLINIC | Age: 43
End: 2023-12-13

## 2023-12-13 ENCOUNTER — LAB (OUTPATIENT)
Dept: INFUSION THERAPY | Facility: CLINIC | Age: 43
End: 2023-12-13
Attending: INTERNAL MEDICINE
Payer: COMMERCIAL

## 2023-12-13 VITALS
WEIGHT: 274 LBS | OXYGEN SATURATION: 100 % | HEART RATE: 88 BPM | SYSTOLIC BLOOD PRESSURE: 148 MMHG | RESPIRATION RATE: 20 BRPM | HEIGHT: 72 IN | BODY MASS INDEX: 37.11 KG/M2 | DIASTOLIC BLOOD PRESSURE: 92 MMHG

## 2023-12-13 DIAGNOSIS — D50.8 OTHER IRON DEFICIENCY ANEMIA: Primary | ICD-10-CM

## 2023-12-13 DIAGNOSIS — D50.9 MICROCYTIC ANEMIA: ICD-10-CM

## 2023-12-13 DIAGNOSIS — D64.9 LOW HEMOGLOBIN: ICD-10-CM

## 2023-12-13 DIAGNOSIS — E61.1 IRON DEFICIENCY: ICD-10-CM

## 2023-12-13 DIAGNOSIS — E61.1 IRON DEFICIENCY: Primary | ICD-10-CM

## 2023-12-13 LAB
ACANTHOCYTES BLD QL SMEAR: SLIGHT
ALBUMIN SERPL BCG-MCNC: 4.7 G/DL (ref 3.5–5.2)
ALP SERPL-CCNC: 87 U/L (ref 40–150)
ALT SERPL W P-5'-P-CCNC: 65 U/L (ref 0–70)
ANION GAP SERPL CALCULATED.3IONS-SCNC: 12 MMOL/L (ref 7–15)
AST SERPL W P-5'-P-CCNC: 46 U/L (ref 0–45)
AUER BODIES BLD QL SMEAR: ABNORMAL
BASO STIPL BLD QL SMEAR: ABNORMAL
BASOPHILS # BLD AUTO: 0.1 10E3/UL (ref 0–0.2)
BASOPHILS NFR BLD AUTO: 1 %
BILIRUB SERPL-MCNC: 0.4 MG/DL
BITE CELLS BLD QL SMEAR: ABNORMAL
BLISTER CELLS BLD QL SMEAR: ABNORMAL
BUN SERPL-MCNC: 7.6 MG/DL (ref 6–20)
BURR CELLS BLD QL SMEAR: ABNORMAL
CALCIUM SERPL-MCNC: 9.1 MG/DL (ref 8.6–10)
CHLORIDE SERPL-SCNC: 103 MMOL/L (ref 98–107)
CREAT SERPL-MCNC: 0.85 MG/DL (ref 0.67–1.17)
DACRYOCYTES BLD QL SMEAR: ABNORMAL
DEPRECATED HCO3 PLAS-SCNC: 25 MMOL/L (ref 22–29)
EGFRCR SERPLBLD CKD-EPI 2021: >90 ML/MIN/1.73M2
ELLIPTOCYTES BLD QL SMEAR: SLIGHT
EOSINOPHIL # BLD AUTO: 0.3 10E3/UL (ref 0–0.7)
EOSINOPHIL NFR BLD AUTO: 4 %
ERYTHROCYTE [DISTWIDTH] IN BLOOD BY AUTOMATED COUNT: 25.6 % (ref 10–15)
FRAGMENTS BLD QL SMEAR: ABNORMAL
GLUCOSE SERPL-MCNC: 129 MG/DL (ref 70–99)
HCT VFR BLD AUTO: 28.9 % (ref 40–53)
HGB BLD-MCNC: 7.2 G/DL (ref 13.3–17.7)
HGB C CRYSTALS: ABNORMAL
HOLD SPECIMEN: NORMAL
HOWELL-JOLLY BOD BLD QL SMEAR: ABNORMAL
IMM GRANULOCYTES # BLD: 0 10E3/UL
IMM GRANULOCYTES NFR BLD: 0 %
LYMPHOCYTES # BLD AUTO: 1.6 10E3/UL (ref 0.8–5.3)
LYMPHOCYTES NFR BLD AUTO: 22 %
MCH RBC QN AUTO: 17.1 PG (ref 26.5–33)
MCHC RBC AUTO-ENTMCNC: 24.9 G/DL (ref 31.5–36.5)
MCV RBC AUTO: 69 FL (ref 78–100)
MONOCYTES # BLD AUTO: 0.8 10E3/UL (ref 0–1.3)
MONOCYTES NFR BLD AUTO: 10 %
NEUTROPHILS # BLD AUTO: 4.7 10E3/UL (ref 1.6–8.3)
NEUTROPHILS NFR BLD AUTO: 63 %
NEUTS HYPERSEG BLD QL SMEAR: ABNORMAL
NRBC # BLD AUTO: 0.1 10E3/UL
NRBC BLD AUTO-RTO: 1 /100
PATH REPORT.COMMENTS IMP SPEC: NORMAL
PATH REPORT.COMMENTS IMP SPEC: NORMAL
PATH REPORT.FINAL DX SPEC: NORMAL
PATH REPORT.RELEVANT HX SPEC: NORMAL
PLAT MORPH BLD: ABNORMAL
PLATELET # BLD AUTO: 470 10E3/UL (ref 150–450)
POLYCHROMASIA BLD QL SMEAR: SLIGHT
POTASSIUM SERPL-SCNC: 3.8 MMOL/L (ref 3.4–5.3)
PROT SERPL-MCNC: 7.2 G/DL (ref 6.4–8.3)
RBC # BLD AUTO: 4.2 10E6/UL (ref 4.4–5.9)
RBC AGGLUT BLD QL: ABNORMAL
RBC MORPH BLD: ABNORMAL
RETICS # AUTO: 0.19 10E6/UL (ref 0.01–0.11)
RETICS/RBC NFR AUTO: 4.6 % (ref 0.8–2.7)
ROULEAUX BLD QL SMEAR: ABNORMAL
SICKLE CELLS BLD QL SMEAR: ABNORMAL
SMUDGE CELLS BLD QL SMEAR: ABNORMAL
SODIUM SERPL-SCNC: 140 MMOL/L (ref 135–145)
SPHEROCYTES BLD QL SMEAR: ABNORMAL
STOMATOCYTES BLD QL SMEAR: SLIGHT
TARGETS BLD QL SMEAR: SLIGHT
TOXIC GRANULES BLD QL SMEAR: ABNORMAL
VARIANT LYMPHS BLD QL SMEAR: ABNORMAL
WBC # BLD AUTO: 7.5 10E3/UL (ref 4–11)

## 2023-12-13 PROCEDURE — 85060 BLOOD SMEAR INTERPRETATION: CPT | Performed by: PATHOLOGY

## 2023-12-13 PROCEDURE — 85045 AUTOMATED RETICULOCYTE COUNT: CPT

## 2023-12-13 PROCEDURE — 99213 OFFICE O/P EST LOW 20 MIN: CPT | Performed by: INTERNAL MEDICINE

## 2023-12-13 PROCEDURE — 99204 OFFICE O/P NEW MOD 45 MIN: CPT | Performed by: INTERNAL MEDICINE

## 2023-12-13 PROCEDURE — 36415 COLL VENOUS BLD VENIPUNCTURE: CPT

## 2023-12-13 PROCEDURE — 80053 COMPREHEN METABOLIC PANEL: CPT

## 2023-12-13 PROCEDURE — 85004 AUTOMATED DIFF WBC COUNT: CPT

## 2023-12-13 RX ORDER — TESTOSTERONE CYPIONATE 200 MG/ML
0.8 INJECTION, SOLUTION INTRAMUSCULAR WEEKLY
COMMUNITY
Start: 2023-11-28

## 2023-12-13 ASSESSMENT — ENCOUNTER SYMPTOMS
MUSCULOSKELETAL NEGATIVE: 1
SHORTNESS OF BREATH: 1
FATIGUE: 1
PSYCHIATRIC NEGATIVE: 1
PALPITATIONS: 1
NEUROLOGICAL NEGATIVE: 1
HEMATOLOGIC/LYMPHATIC NEGATIVE: 1
GASTROINTESTINAL NEGATIVE: 1
ENDOCRINE NEGATIVE: 1

## 2023-12-13 ASSESSMENT — PAIN SCALES - GENERAL: PAINLEVEL: NO PAIN (0)

## 2023-12-13 NOTE — LETTER
12/13/2023         RE: William Bolton  [address] [facility] [address]        Dear Colleague,    Thank you for referring your patient, William Bolton, to the [facility]. Please see a copy of my visit note below.    Oncology Rooming Note    December 13, 2023 8:20 AM   William Bolton is a 43 year old male who presents for:    Chief Complaint   Patient presents with     Hematology     New consult microcytic anemia      Initial Vitals: BP (!) 148/92   Pulse 88   Resp 20   Ht 1.829 m (6')   Wt 124.3 kg (274 lb)   SpO2 100%   BMI 37.16 kg/m   Estimated body mass index is 37.16 kg/m  as calculated from the following:    Height as of this encounter: 1.829 m (6').    Weight as of this encounter: 124.3 kg (274 lb). Body surface area is 2.51 meters squared.  No Pain (0) Comment: Data Unavailable   No LMP for male patient.  Allergies reviewed: Yes  Medications reviewed: Yes    Medications: Medication refills not needed today.  Pharmacy name entered into [employer]    [facility] [address] [place] [address] DRUG STORE [facility][id number] [facility] [place] [address] [facility] [address]  [id number] [place] [facility] [address]    Clinical concerns:  new consult anemia       Andreea Carrillo              Assessment & Plan  Iron deficiency anemia    Continue iron infusions  Recheck labs and follow up visit in 2 months  GI to scope (EGD / colonoscopy) again, and might need pill camera    History  This is an initial hematology consult visit for this  5M  with iron deficiency anemia.  He had a similar episode in the past, had a full GI evaluation at that time with only hiatus hernia and a 4 mm hyperplastic polyp found.      The patient had been doing pretty well after taking iron but starting in the late summer experienced progressing weakness, fatigue, exertional breathlessness, palpitations, ice craving.  He has been  taking oral iron also but his most recent HGB before today had fallen to 6.3.    He does see a hepatologist because of mildly elevated liver tests.  The hepatologist has already ordered infused iron and the first infusion was given yesterday.  Liver disease runs in the family and his father  of primary biliary cirrhosis.    Upcoming, he's having an ECHO this Friday and colonoscopy on 2023.    ECOG = 1    Patient Active Problem List   Diagnosis     Mixed hyperlipidemia     ADD (attention deficit disorder) without hyperactivity     Non morbid obesity due to excess calories     Abnormal bone xray - sclerosis of phalynx of right great toe 17 - suggest repeat imaging at annual visit.     NATHAN (obstructive sleep apnea)     Nocturnal hypoxemia     Obesity (BMI 35.0-39.9) with comorbidity (H)     Adenomatous polyp of descending colon     Essential hypertension with goal blood pressure less than 130/80     Family history of early liver disease (PSC)      Hiatal hernia     Fatty liver     Elevated liver enzymes     Diabetes mellitus, type 2 (H)     Low hemoglobin     Current Outpatient Medications   Medication     [START ON 2024] amphetamine-dextroamphetamine (ADDERALL XR) 25 MG 24 hr capsule     amphetamine-dextroamphetamine (ADDERALL) 5 MG tablet     lisinopril (ZESTRIL) 30 MG tablet     metFORMIN (GLUCOPHAGE) 500 MG tablet     rosuvastatin (CRESTOR) 40 MG tablet     testosterone cypionate (DEPOTESTOSTERONE) 200 MG/ML injection     amphetamine-dextroamphetamine (ADDERALL XR) 25 MG 24 hr capsule     [START ON 2023] amphetamine-dextroamphetamine (ADDERALL XR) 25 MG 24 hr capsule     amphetamine-dextroamphetamine (ADDERALL XR) 25 MG 24 hr capsule     bisacodyl (DULCOLAX) 5 MG EC tablet     polyethylene glycol (GOLYTELY) 236 g suspension     No current facility-administered medications for this visit.     Past Medical History:   Diagnosis Date     Abnormal bone xray - sclerosis of phalynx of right  great toe 7/31/17 - suggest repeat imaging at annual visit. 7/31/2017     Depressive disorder      Diabetes mellitus, type 2 (H) 7/3/2023     Hypertension 2019     Sleep apnea      Past Surgical History:   Procedure Laterality Date     COLONOSCOPY  06/13/2019    Dr. Navarro [facility]     ESOPHAGOSCOPY, GASTROSCOPY, DUODENOSCOPY (EGD), COMBINED N/A 05/23/2019    Procedure: ESOPHAGOGASTRODUODENOSCOPY, WITH BIOPSY;  Surgeon: Say Navarro MD;  Location:  GI     HEAD & NECK SURGERY      wisdom teeth removed     Socioeconomic History     Marital status:    Occupational History     Employer: [employer]     Review of Systems   Constitutional:  Positive for fatigue.   HENT:  Negative.     Respiratory:  Positive for shortness of breath.    Cardiovascular:  Positive for palpitations.   Gastrointestinal: Negative.    Endocrine: Negative.    Genitourinary: Negative.     Musculoskeletal: Negative.    Skin: Negative.    Neurological: Negative.    Hematological: Negative.    Psychiatric/Behavioral: Negative.     All other systems reviewed and are negative.      BP (!) 148/92   Pulse 88   Resp 20   Ht 1.829 m (6')   Wt 124.3 kg (274 lb)   SpO2 100%   BMI 37.16 kg/m      Physical Exam  Vitals and nursing note reviewed.   Constitutional:       Appearance: Normal appearance. He is obese.      Comments: Relaxed and cheerful   HENT:      Head: Normocephalic and atraumatic.      Mouth/Throat:      Mouth: Mucous membranes are moist.      Dentition: Normal dentition. No dental caries.   Eyes:      Extraocular Movements: Extraocular movements intact.      Conjunctiva/sclera: Conjunctivae normal.      Pupils: Pupils are equal, round, and reactive to light.   Cardiovascular:      Rate and Rhythm: Normal rate and regular rhythm.      Heart sounds: Normal heart sounds.   Pulmonary:      Effort: Pulmonary effort is normal.      Breath sounds: Normal breath sounds.   Abdominal:      General: There is no distension.       Palpations: Abdomen is soft. There is no mass.      Tenderness: There is no abdominal tenderness. There is no guarding.   Musculoskeletal:         General: No deformity. Normal range of motion.      Cervical back: Normal range of motion and neck supple.      Right lower leg: No edema.      Left lower leg: No edema.   Lymphadenopathy:      Cervical: No cervical adenopathy.      Upper Body:      Right upper body: No axillary or epitrochlear adenopathy.      Left upper body: No axillary or epitrochlear adenopathy.   Skin:     General: Skin is warm and dry.   Neurological:      General: No focal deficit present.      Mental Status: He is alert and oriented to person, place, and time.      Cranial Nerves: No cranial nerve deficit.      Motor: No weakness.      Gait: Gait normal.      Deep Tendon Reflexes: Reflexes normal.   Psychiatric:         Mood and Affect: Mood normal.         Behavior: Behavior normal. Behavior is cooperative.         Thought Content: Thought content normal.         Judgment: Judgment normal.       Recent Results (from the past 720 hour(s))   Hemoglobin A1c    Collection Time: 11/14/23  7:47 AM   Result Value Ref Range    Hemoglobin A1C 7.2 (H) 0.0 - 5.6 %   Glucose    Collection Time: 11/14/23  7:47 AM   Result Value Ref Range    Glucose 143 (H) 70 - 99 mg/dL    Patient Fasting > 8hrs? Yes    Alpha 1 antitryp ileana reflex to pheno    Collection Time: 11/14/23  7:47 AM   Result Value Ref Range    Alpha-1-Antitrypsin 164 90 - 200 mg/dL    Alpha-1-Antitrypsin Genotype Specimen Whole Blood     Alpha-1-Antitrypsin S Allele Negative     Alpha-1-Antitrypsin Z Allele Negative     Alpha-1-Antitrypsin Phenotype Not Applicable     Alpha-1-Antitrypsin Interpretation See Note    Anti Nuclear Lianet IgG by IFA with Reflex    Collection Time: 11/14/23  7:47 AM   Result Value Ref Range    ISRA interpretation Negative Negative   F Actin EIA with reflex    Collection Time: 11/14/23  7:47 AM   Result Value Ref Range     F-Actin (Smooth Muscle) Ab, IgG by KIKE 2 0 - 19 Units   IgA    Collection Time: 11/14/23  7:47 AM   Result Value Ref Range    Immunoglobulin A 208 84 - 499 mg/dL   Tissue transglutaminase víctor IgA and IgG    Collection Time: 11/14/23  7:47 AM   Result Value Ref Range    Tissue Transglutaminase Antibody IgA 0.7 <7.0 U/mL    Tissue Transglutaminase Antibody IgG <0.6 <7.0 U/mL   Hepatic panel    Collection Time: 11/14/23  7:47 AM   Result Value Ref Range    Protein Total 6.7 6.4 - 8.3 g/dL    Albumin 4.3 3.5 - 5.2 g/dL    Bilirubin Total 0.4 <=1.2 mg/dL    Alkaline Phosphatase 71 40 - 150 U/L    AST 42 0 - 45 U/L    ALT 53 0 - 70 U/L    Bilirubin Direct <0.20 0.00 - 0.30 mg/dL   Lipid panel reflex to direct LDL Fasting    Collection Time: 11/14/23  7:47 AM   Result Value Ref Range    Cholesterol 127 <200 mg/dL    Triglycerides 77 <150 mg/dL    Direct Measure HDL 34 (L) >=40 mg/dL    LDL Cholesterol Calculated 78 <=100 mg/dL    Non HDL Cholesterol 93 <130 mg/dL   BASIC METABOLIC PANEL    Collection Time: 11/14/23  7:47 AM   Result Value Ref Range    Sodium 137 135 - 145 mmol/L    Potassium 4.8 3.4 - 5.3 mmol/L    Chloride 103 98 - 107 mmol/L    Carbon Dioxide (CO2) 22 22 - 29 mmol/L    Anion Gap 12 7 - 15 mmol/L    Urea Nitrogen 9.2 6.0 - 20.0 mg/dL    Creatinine 0.83 0.67 - 1.17 mg/dL    GFR Estimate >90 >60 mL/min/1.73m2    Calcium 8.8 8.6 - 10.0 mg/dL    Glucose 139 (H) 70 - 99 mg/dL   Albumin Random Urine Quantitative with Creat Ratio    Collection Time: 11/14/23  7:54 AM   Result Value Ref Range    Creatinine Urine mg/dL 277.0 mg/dL    Albumin Urine mg/L 14.8 mg/L    Albumin Urine mg/g Cr 5.34 0.00 - 17.00 mg/g Cr   Urine Drug Confirmation Panel    Collection Time: 11/14/23  7:54 AM   Result Value Ref Range    Amphetamine ng/mL 5,680 (H) <50 ng/mL    Amphetamine 2,051 Absent ng/mg [creat]   Urine Creatinine for Drug Screen Panel    Collection Time: 11/14/23  7:54 AM   Result Value Ref Range    Creatinine Urine for  Drug Screen 277 mg/dL   IRON AND IRON BINDING CAPACITY    Collection Time: 12/01/23 12:59 PM   Result Value Ref Range    Iron 18 (L) 61 - 157 ug/dL    Iron Binding Capacity 506 (H) 240 - 430 ug/dL    Iron Sat Index 4 (L) 15 - 46 %   FERRITIN    Collection Time: 12/01/23 12:59 PM   Result Value Ref Range    Ferritin 6 (L) 31 - 409 ng/mL   TSH with free T4 reflex    Collection Time: 12/01/23 12:59 PM   Result Value Ref Range    TSH 0.94 0.30 - 4.20 uIU/mL   CBC with platelets    Collection Time: 12/01/23 12:59 PM   Result Value Ref Range    WBC Count 7.4 4.0 - 11.0 10e3/uL    RBC Count 3.94 (L) 4.40 - 5.90 10e6/uL    Hemoglobin 6.6 (LL) 13.3 - 17.7 g/dL    Hematocrit 26.2 (L) 40.0 - 53.0 %    MCV 67 (L) 78 - 100 fL    MCH 16.8 (L) 26.5 - 33.0 pg    MCHC 25.2 (L) 31.5 - 36.5 g/dL    RDW 20.1 (H) 10.0 - 15.0 %    Platelet Count 372 150 - 450 10e3/uL   Deamidated Gliadin Peptide Lianet IgA IgG    Collection Time: 12/01/23 12:59 PM   Result Value Ref Range    Deamidated Gliadin Antibody IgA 1.7 <7.0 U/mL    Deamidated Gliadin Antibody IgG <0.6 <7.0 U/mL   CBC with platelets    Collection Time: 12/08/23  8:42 AM   Result Value Ref Range    WBC Count 7.8 4.0 - 11.0 10e3/uL    RBC Count 3.75 (L) 4.40 - 5.90 10e6/uL    Hemoglobin 6.3 (LL) 13.3 - 17.7 g/dL    Hematocrit 25.9 (L) 40.0 - 53.0 %    MCV 69 (L) 78 - 100 fL    MCH 16.8 (L) 26.5 - 33.0 pg    MCHC 24.3 (L) 31.5 - 36.5 g/dL    RDW 23.4 (H) 10.0 - 15.0 %    Platelet Count 393 150 - 450 10e3/uL   Reticulocyte count    Collection Time: 12/08/23  8:42 AM   Result Value Ref Range    % Reticulocyte 5.1 (H) 0.5 - 2.0 %    Absolute Reticulocyte 0.189 (H) 0.025 - 0.095 10e6/uL   Lead Venous Blood Confirm    Collection Time: 12/08/23  8:42 AM   Result Value Ref Range    Lead Venous Blood <2.0 <=4.9 ug/dL   RBC and Platelet Morphology    Collection Time: 12/08/23  8:42 AM   Result Value Ref Range    Platelet Assessment  Automated Count Confirmed. Platelet morphology is normal.      Automated Count Confirmed. Platelet morphology is normal.    Acanthocytes      Cristobal Rods      Basophilic Stippling      Bite Cells      Blister Cells      Beni Cells      Elliptocytes      Hgb C Crystals      So-Jolly Bodies      Hypersegmented Neutrophils      Polychromasia      RBC agglutination      RBC Fragments      Reactive Lymphocytes      Rouleaux      Sickle Cells      Smudge Cells      Spherocytes      Stomatocytes      Target Cells      Teardrop Cells      Toxic Neutrophils      RBC Morphology Confirmed RBC Indices    CBC with platelets and differential    Collection Time: 12/08/23  8:42 AM   Result Value Ref Range    WBC Count 7.8 4.0 - 11.0 10e3/uL    RBC Count 3.75 (L) 4.40 - 5.90 10e6/uL    Hemoglobin 6.3 (LL) 13.3 - 17.7 g/dL    Hematocrit 25.9 (L) 40.0 - 53.0 %    MCV 69 (L) 78 - 100 fL    MCH 16.8 (L) 26.5 - 33.0 pg    MCHC 24.3 (L) 31.5 - 36.5 g/dL    RDW 23.4 (H) 10.0 - 15.0 %    Platelet Count 393 150 - 450 10e3/uL    % Neutrophils 62 %    % Lymphocytes 23 %    % Monocytes 10 %    % Eosinophils 3 %    % Basophils 1 %    % Immature Granulocytes 1 %    Absolute Neutrophils 4.9 1.6 - 8.3 10e3/uL    Absolute Lymphocytes 1.8 0.8 - 5.3 10e3/uL    Absolute Monocytes 0.8 0.0 - 1.3 10e3/uL    Absolute Eosinophils 0.2 0.0 - 0.7 10e3/uL    Absolute Basophils 0.1 0.0 - 0.2 10e3/uL    Absolute Immature Granulocytes 0.1 <=0.4 10e3/uL   Comprehensive metabolic panel    Collection Time: 12/13/23  8:08 AM   Result Value Ref Range    Sodium 140 135 - 145 mmol/L    Potassium 3.8 3.4 - 5.3 mmol/L    Carbon Dioxide (CO2) 25 22 - 29 mmol/L    Anion Gap 12 7 - 15 mmol/L    Urea Nitrogen 7.6 6.0 - 20.0 mg/dL    Creatinine 0.85 0.67 - 1.17 mg/dL    GFR Estimate >90 >60 mL/min/1.73m2    Calcium 9.1 8.6 - 10.0 mg/dL    Chloride 103 98 - 107 mmol/L    Glucose 129 (H) 70 - 99 mg/dL    Alkaline Phosphatase 87 40 - 150 U/L    AST 46 (H) 0 - 45 U/L    ALT 65 0 - 70 U/L    Protein Total 7.2 6.4 - 8.3 g/dL    Albumin 4.7 3.5  - 5.2 g/dL    Bilirubin Total 0.4 <=1.2 mg/dL   CBC with platelets and differential    Collection Time: 12/13/23  8:08 AM   Result Value Ref Range    WBC Count 7.5 4.0 - 11.0 10e3/uL    RBC Count 4.20 (L) 4.40 - 5.90 10e6/uL    Hemoglobin 7.2 (L) 13.3 - 17.7 g/dL    Hematocrit 28.9 (L) 40.0 - 53.0 %    MCV 69 (L) 78 - 100 fL    MCH 17.1 (L) 26.5 - 33.0 pg    MCHC 24.9 (L) 31.5 - 36.5 g/dL    RDW 25.6 (H) 10.0 - 15.0 %    Platelet Count 470 (H) 150 - 450 10e3/uL    % Neutrophils 63 %    % Lymphocytes 22 %    % Monocytes 10 %    % Eosinophils 4 %    % Basophils 1 %    % Immature Granulocytes 0 %    NRBCs per 100 WBC 1 (H) <1 /100    Absolute Neutrophils 4.7 1.6 - 8.3 10e3/uL    Absolute Lymphocytes 1.6 0.8 - 5.3 10e3/uL    Absolute Monocytes 0.8 0.0 - 1.3 10e3/uL    Absolute Eosinophils 0.3 0.0 - 0.7 10e3/uL    Absolute Basophils 0.1 0.0 - 0.2 10e3/uL    Absolute Immature Granulocytes 0.0 <=0.4 10e3/uL    Absolute NRBCs 0.1 10e3/uL   Reticulocyte count    Collection Time: 12/13/23  8:08 AM   Result Value Ref Range    % Reticulocyte 4.6 (H) 0.8 - 2.7 %    Absolute Reticulocyte 0.192 (H) 0.010 - 0.110 10e6/uL   [facility] Blood Bank Purple Top Tube    Collection Time: 12/13/23  8:08 AM   Result Value Ref Range    Hold Specimen [facility]    RBC and Platelet Morphology    Collection Time: 12/13/23  8:08 AM   Result Value Ref Range    Platelet Assessment (A) Automated Count Confirmed. Platelet morphology is normal.     Automated Count Confirmed. Giant platelets are present.    Acanthocytes Slight (A) None Seen    Cristobal Rods      Basophilic Stippling      Bite Cells      Blister Cells      [place] Cells      Elliptocytes Slight (A) None Seen    Hgb C Crystals      So-Jolly Bodies      Hypersegmented Neutrophils      Polychromasia Slight (A) None Seen    RBC agglutination      RBC Fragments      Reactive Lymphocytes      Rouleaux      Sickle Cells      Smudge Cells      Spherocytes      Stomatocytes Slight (A) None Seen    Target Cells  Slight (A) None Seen    Teardrop Cells      Toxic Neutrophils      RBC Morphology Confirmed RBC Indices          Recent Results (from the past 744 hour(s))   CT Abdomen Pelvis w Contrast    Narrative    EXAM: CT ABDOMEN PELVIS W CONTRAST  LOCATION: [facility]  DATE: 12/7/2023    INDICATION: Repeat iron deficiency anemia; 15.6 to 6.5 in the past year. Last occured in 2019.  COMPARISON: None.  TECHNIQUE: CT scan of the abdomen and pelvis was performed following injection of IV contrast. Multiplanar reformats were obtained. Dose reduction techniques were used.  CONTRAST: 135 mL Isovue-370.    FINDINGS:   LOWER CHEST: Large hiatal hernia. Lung bases are clear.    HEPATOBILIARY: Diffuse hepatic steatosis. No significant mass. No bile duct dilatation. No calcified gallstones.    PANCREAS: No significant mass, duct dilatation, or inflammatory change.    SPLEEN: Normal.    ADRENAL GLANDS: Normal.    KIDNEYS/BLADDER: Kidneys enhance symmetrically without evidence for hydronephrosis or pyelonephritis. No renal calculi seen. No renal masses noted. The bilateral ureters and urinary bladder are unremarkable.     BOWEL: Nonspecific nonobstructive bowel gas pattern. Appendix appears within normal limits.    LYMPH NODES: No lymphadenopathy.    VASCULATURE: No abdominal aortic aneurysm.    PELVIC ORGANS: No pelvic masses.    MUSCULOSKELETAL: No concerning osseous abnormalities.      Impression    IMPRESSION:   1.  Large hiatal hernia.    2.  Diffuse fatty infiltration of the liver.    3.  Nonspecific nonobstructive bowel gas pattern. Appendix appears within normal limits.    4.  No acute findings in the abdomen or pelvis.     Again, thank you for allowing me to participate in the care of your patient.        Sincerely,        Radha Manuel MD

## 2023-12-13 NOTE — PROGRESS NOTES
Oncology Rooming Note    December 13, 2023 8:20 AM   William Bolton is a 43 year old male who presents for:    Chief Complaint   Patient presents with    Hematology     New consult microcytic anemia      Initial Vitals: BP (!) 148/92   Pulse 88   Resp 20   Ht 1.829 m (6')   Wt 124.3 kg (274 lb)   SpO2 100%   BMI 37.16 kg/m   Estimated body mass index is 37.16 kg/m  as calculated from the following:    Height as of this encounter: 1.829 m (6').    Weight as of this encounter: 124.3 kg (274 lb). Body surface area is 2.51 meters squared.  No Pain (0) Comment: Data Unavailable   No LMP for male patient.  Allergies reviewed: Yes  Medications reviewed: Yes    Medications: Medication refills not needed today.  Pharmacy name entered into [facility]:    [facility] - [place], [place] - [address] [place] [address] DRUG STORE #[id number] [facility] [address] AT [facility] [address] [facility]  [id number]  [place] - [address]    Clinical concerns:  new consult anemia       Andreea Carrillo

## 2023-12-13 NOTE — TELEPHONE ENCOUNTER
Pre assessment completed for upcoming procedure.      Procedure details:    Patient scheduled for Colonoscopy/Upper endoscopy (EGD) on 12.28.23.     Arrival time: 0945. Procedure time 1030    Pre op exam needed? N/A    Facility [facility] [place] [address]    Sedation type: Conscious sedation     Indication for procedure: anemia;MARK    COVID policy reviewed.    Designated  policy reviewed. Instructed to have someone stay 6 hours post procedure.       Chart review:     Electronic implanted devices? No    Recent diagnosis of diverticulitis within the last 6 weeks?  No    Diabetic? Yes. See medication holding recommendations     Diabetic medication HOLDING recommendations: (if applicable)  Oral diabetic medications: Yes:  Metformin (glucophage): HOLD day of procedure.  Diabetic injectables: No  Insulin: No    Medication review:    Anticoagulants? No    NSAIDS? No    Other medication HOLDING recommendations:  Iron supplements: HOLD 7 days before procedure. Discussed with patient to check with provider who put on the iron (critical low Hgb).       Prep for procedure:     Bowel prep recommendation: Standard Golytely   Due to: diabetes.     Prep instructions sent via [facility] Bowel prep script sent to    [id number] - [place], [place] - [address] [facility] [address] [facility] [address]      Reviewed procedure prep instructions.     Patient verbalized understanding and had no questions or concerns at this time.        Lesia Jacob RN  Endoscopy Procedure Pre Assessment RN  [phone] option 4

## 2023-12-13 NOTE — PROGRESS NOTES
Assessment & Plan   Iron deficiency anemia    Continue iron infusions  Recheck labs and follow up visit in 2 months  GI to scope (EGD / colonoscopy) again, and might need pill camera    History  This is an initial hematology consult visit for this  5M  with iron deficiency anemia.  He had a similar episode in the past, had a full GI evaluation at that time with only hiatus hernia and a 4 mm hyperplastic polyp found.      The patient had been doing pretty well after taking iron but starting in the late summer experienced progressing weakness, fatigue, exertional breathlessness, palpitations, ice craving.  He has been taking oral iron also but his most recent HGB before today had fallen to 6.3.    He does see a hepatologist because of mildly elevated liver tests.  The hepatologist has already ordered infused iron and the first infusion was given yesterday.  Liver disease runs in the family and his father  of primary biliary cirrhosis.    Upcoming, he's having an ECHO this Friday and colonoscopy on 2023.    ECOG = 1    Patient Active Problem List   Diagnosis    Mixed hyperlipidemia    ADD (attention deficit disorder) without hyperactivity    Non morbid obesity due to excess calories    Abnormal bone xray - sclerosis of phalynx of right great toe 17 - suggest repeat imaging at annual visit.    NATHAN (obstructive sleep apnea)    Nocturnal hypoxemia    Obesity (BMI 35.0-39.9) with comorbidity (H)    Adenomatous polyp of descending colon    Essential hypertension with goal blood pressure less than 130/80    Family history of early liver disease (PSC)     Hiatal hernia    Fatty liver    Elevated liver enzymes    Diabetes mellitus, type 2 (H)    Low hemoglobin     Current Outpatient Medications   Medication    [START ON 2024] amphetamine-dextroamphetamine (ADDERALL XR) 25 MG 24 hr capsule    amphetamine-dextroamphetamine (ADDERALL) 5 MG tablet    lisinopril (ZESTRIL) 30 MG tablet     metFORMIN (GLUCOPHAGE) 500 MG tablet    rosuvastatin (CRESTOR) 40 MG tablet    testosterone cypionate (DEPOTESTOSTERONE) 200 MG/ML injection    amphetamine-dextroamphetamine (ADDERALL XR) 25 MG 24 hr capsule    [START ON 12/29/2023] amphetamine-dextroamphetamine (ADDERALL XR) 25 MG 24 hr capsule    amphetamine-dextroamphetamine (ADDERALL XR) 25 MG 24 hr capsule    bisacodyl (DULCOLAX) 5 MG EC tablet    polyethylene glycol (GOLYTELY) 236 g suspension     No current facility-administered medications for this visit.     Past Medical History:   Diagnosis Date    Abnormal bone xray - sclerosis of phalynx of right great toe 7/31/17 - suggest repeat imaging at annual visit. 7/31/2017    Depressive disorder     Diabetes mellitus, type 2 (H) 7/3/2023    Hypertension 2019    Sleep apnea      Past Surgical History:   Procedure Laterality Date    COLONOSCOPY  06/13/2019    Dr. Navarro [facility]    ESOPHAGOSCOPY, GASTROSCOPY, DUODENOSCOPY (EGD), COMBINED N/A 05/23/2019    Procedure: ESOPHAGOGASTRODUODENOSCOPY, WITH BIOPSY;  Surgeon: Say Navarro MD;  Location:  GI    HEAD & NECK SURGERY      wisdom teeth removed     Socioeconomic History    Marital status:    Occupational History     Employer: [employer]     Review of Systems   Constitutional:  Positive for fatigue.   HENT:  Negative.     Respiratory:  Positive for shortness of breath.    Cardiovascular:  Positive for palpitations.   Gastrointestinal: Negative.    Endocrine: Negative.    Genitourinary: Negative.     Musculoskeletal: Negative.    Skin: Negative.    Neurological: Negative.    Hematological: Negative.    Psychiatric/Behavioral: Negative.     All other systems reviewed and are negative.      BP (!) 148/92   Pulse 88   Resp 20   Ht 1.829 m (6')   Wt 124.3 kg (274 lb)   SpO2 100%   BMI 37.16 kg/m      Physical Exam  Vitals and nursing note reviewed.   Constitutional:       Appearance: Normal appearance. He is obese.      Comments: Relaxed  and cheerful   HENT:      Head: Normocephalic and atraumatic.      Mouth/Throat:      Mouth: Mucous membranes are moist.      Dentition: Normal dentition. No dental caries.   Eyes:      Extraocular Movements: Extraocular movements intact.      Conjunctiva/sclera: Conjunctivae normal.      Pupils: Pupils are equal, round, and reactive to light.   Cardiovascular:      Rate and Rhythm: Normal rate and regular rhythm.      Heart sounds: Normal heart sounds.   Pulmonary:      Effort: Pulmonary effort is normal.      Breath sounds: Normal breath sounds.   Abdominal:      General: There is no distension.      Palpations: Abdomen is soft. There is no mass.      Tenderness: There is no abdominal tenderness. There is no guarding.   Musculoskeletal:         General: No deformity. Normal range of motion.      Cervical back: Normal range of motion and neck supple.      Right lower leg: No edema.      Left lower leg: No edema.   Lymphadenopathy:      Cervical: No cervical adenopathy.      Upper Body:      Right upper body: No axillary or epitrochlear adenopathy.      Left upper body: No axillary or epitrochlear adenopathy.   Skin:     General: Skin is warm and dry.   Neurological:      General: No focal deficit present.      Mental Status: He is alert and oriented to person, place, and time.      Cranial Nerves: No cranial nerve deficit.      Motor: No weakness.      Gait: Gait normal.      Deep Tendon Reflexes: Reflexes normal.   Psychiatric:         Mood and Affect: Mood normal.         Behavior: Behavior normal. Behavior is cooperative.         Thought Content: Thought content normal.         Judgment: Judgment normal.       Recent Results (from the past 720 hour(s))   Hemoglobin A1c    Collection Time: 11/14/23  7:47 AM   Result Value Ref Range    Hemoglobin A1C 7.2 (H) 0.0 - 5.6 %   Glucose    Collection Time: 11/14/23  7:47 AM   Result Value Ref Range    Glucose 143 (H) 70 - 99 mg/dL    Patient Fasting > 8hrs? Yes    Alpha  1 antitryp ileana reflex to pheno    Collection Time: 11/14/23  7:47 AM   Result Value Ref Range    Alpha-1-Antitrypsin 164 90 - 200 mg/dL    Alpha-1-Antitrypsin Genotype Specimen Whole Blood     Alpha-1-Antitrypsin S [place] Negative     Alpha-1-Antitrypsin Z [place] Negative     Alpha-1-Antitrypsin Phenotype Not Applicable     Alpha-1-Antitrypsin Interpretation See Note    Anti Nuclear Víctor IgG by IFA with Reflex    Collection Time: 11/14/23  7:47 AM   Result Value Ref Range    ISRA interpretation Negative Negative   F Actin EIA with reflex    Collection Time: 11/14/23  7:47 AM   Result Value Ref Range    F-Actin (Smooth Muscle) Ab, IgG by KIKE 2 0 - 19 Units   IgA    Collection Time: 11/14/23  7:47 AM   Result Value Ref Range    Immunoglobulin A 208 84 - 499 mg/dL   Tissue transglutaminase víctor IgA and IgG    Collection Time: 11/14/23  7:47 AM   Result Value Ref Range    Tissue Transglutaminase Antibody IgA 0.7 <7.0 U/mL    Tissue Transglutaminase Antibody IgG <0.6 <7.0 U/mL   Hepatic panel    Collection Time: 11/14/23  7:47 AM   Result Value Ref Range    Protein Total 6.7 6.4 - 8.3 g/dL    Albumin 4.3 3.5 - 5.2 g/dL    Bilirubin Total 0.4 <=1.2 mg/dL    Alkaline Phosphatase 71 40 - 150 U/L    AST 42 0 - 45 U/L    ALT 53 0 - 70 U/L    Bilirubin Direct <0.20 0.00 - 0.30 mg/dL   Lipid panel reflex to direct LDL Fasting    Collection Time: 11/14/23  7:47 AM   Result Value Ref Range    Cholesterol 127 <200 mg/dL    Triglycerides 77 <150 mg/dL    Direct Measure HDL 34 (L) >=40 mg/dL    LDL Cholesterol Calculated 78 <=100 mg/dL    Non HDL Cholesterol 93 <130 mg/dL   BASIC METABOLIC PANEL    Collection Time: 11/14/23  7:47 AM   Result Value Ref Range    Sodium 137 135 - 145 mmol/L    Potassium 4.8 3.4 - 5.3 mmol/L    Chloride 103 98 - 107 mmol/L    Carbon Dioxide (CO2) 22 22 - 29 mmol/L    Anion Gap 12 7 - 15 mmol/L    Urea Nitrogen 9.2 6.0 - 20.0 mg/dL    Creatinine 0.83 0.67 - 1.17 mg/dL    GFR Estimate >90 >60  mL/min/1.73m2    Calcium 8.8 8.6 - 10.0 mg/dL    Glucose 139 (H) 70 - 99 mg/dL   Albumin Random Urine Quantitative with Creat Ratio    Collection Time: 11/14/23  7:54 AM   Result Value Ref Range    Creatinine Urine mg/dL 277.0 mg/dL    Albumin Urine mg/L 14.8 mg/L    Albumin Urine mg/g Cr 5.34 0.00 - 17.00 mg/g Cr   Urine Drug Confirmation Panel    Collection Time: 11/14/23  7:54 AM   Result Value Ref Range    Amphetamine ng/mL 5,680 (H) <50 ng/mL    Amphetamine 2,051 Absent ng/mg [creat]   Urine Creatinine for Drug Screen Panel    Collection Time: 11/14/23  7:54 AM   Result Value Ref Range    Creatinine Urine for Drug Screen 277 mg/dL   IRON AND IRON BINDING CAPACITY    Collection Time: 12/01/23 12:59 PM   Result Value Ref Range    Iron 18 (L) 61 - 157 ug/dL    Iron Binding Capacity 506 (H) 240 - 430 ug/dL    Iron Sat Index 4 (L) 15 - 46 %   FERRITIN    Collection Time: 12/01/23 12:59 PM   Result Value Ref Range    Ferritin 6 (L) 31 - 409 ng/mL   TSH with free T4 reflex    Collection Time: 12/01/23 12:59 PM   Result Value Ref Range    TSH 0.94 0.30 - 4.20 uIU/mL   CBC with platelets    Collection Time: 12/01/23 12:59 PM   Result Value Ref Range    WBC Count 7.4 4.0 - 11.0 10e3/uL    RBC Count 3.94 (L) 4.40 - 5.90 10e6/uL    Hemoglobin 6.6 (LL) 13.3 - 17.7 g/dL    Hematocrit 26.2 (L) 40.0 - 53.0 %    MCV 67 (L) 78 - 100 fL    MCH 16.8 (L) 26.5 - 33.0 pg    MCHC 25.2 (L) 31.5 - 36.5 g/dL    RDW 20.1 (H) 10.0 - 15.0 %    Platelet Count 372 150 - 450 10e3/uL   Deamidated Gliadin Peptide Lianet IgA IgG    Collection Time: 12/01/23 12:59 PM   Result Value Ref Range    Deamidated Gliadin Antibody IgA 1.7 <7.0 U/mL    Deamidated Gliadin Antibody IgG <0.6 <7.0 U/mL   CBC with platelets    Collection Time: 12/08/23  8:42 AM   Result Value Ref Range    WBC Count 7.8 4.0 - 11.0 10e3/uL    RBC Count 3.75 (L) 4.40 - 5.90 10e6/uL    Hemoglobin 6.3 (LL) 13.3 - 17.7 g/dL    Hematocrit 25.9 (L) 40.0 - 53.0 %    MCV 69 (L) 78 - 100 fL     MCH 16.8 (L) 26.5 - 33.0 pg    MCHC 24.3 (L) 31.5 - 36.5 g/dL    RDW 23.4 (H) 10.0 - 15.0 %    Platelet Count 393 150 - 450 10e3/uL   Reticulocyte count    Collection Time: 12/08/23  8:42 AM   Result Value Ref Range    % Reticulocyte 5.1 (H) 0.5 - 2.0 %    Absolute Reticulocyte 0.189 (H) 0.025 - 0.095 10e6/uL   Lead Venous Blood Confirm    Collection Time: 12/08/23  8:42 AM   Result Value Ref Range    Lead Venous Blood <2.0 <=4.9 ug/dL   RBC and Platelet Morphology    Collection Time: 12/08/23  8:42 AM   Result Value Ref Range    Platelet Assessment  Automated Count Confirmed. Platelet morphology is normal.     Automated Count Confirmed. Platelet morphology is normal.    Acanthocytes      Cristobal Rods      Basophilic Stippling      Bite Cells      Blister Cells      Homosassa Cells      Elliptocytes      Hgb C Crystals      So-Jolly Bodies      Hypersegmented Neutrophils      Polychromasia      RBC agglutination      RBC Fragments      Reactive Lymphocytes      Rouleaux      Sickle Cells      Smudge Cells      Spherocytes      Stomatocytes      Target Cells      Teardrop Cells      Toxic Neutrophils      RBC Morphology Confirmed RBC Indices    CBC with platelets and differential    Collection Time: 12/08/23  8:42 AM   Result Value Ref Range    WBC Count 7.8 4.0 - 11.0 10e3/uL    RBC Count 3.75 (L) 4.40 - 5.90 10e6/uL    Hemoglobin 6.3 (LL) 13.3 - 17.7 g/dL    Hematocrit 25.9 (L) 40.0 - 53.0 %    MCV 69 (L) 78 - 100 fL    MCH 16.8 (L) 26.5 - 33.0 pg    MCHC 24.3 (L) 31.5 - 36.5 g/dL    RDW 23.4 (H) 10.0 - 15.0 %    Platelet Count 393 150 - 450 10e3/uL    % Neutrophils 62 %    % Lymphocytes 23 %    % Monocytes 10 %    % Eosinophils 3 %    % Basophils 1 %    % Immature Granulocytes 1 %    Absolute Neutrophils 4.9 1.6 - 8.3 10e3/uL    Absolute Lymphocytes 1.8 0.8 - 5.3 10e3/uL    Absolute Monocytes 0.8 0.0 - 1.3 10e3/uL    Absolute Eosinophils 0.2 0.0 - 0.7 10e3/uL    Absolute Basophils 0.1 0.0 - 0.2 10e3/uL    Absolute  Immature Granulocytes 0.1 <=0.4 10e3/uL   Comprehensive metabolic panel    Collection Time: 12/13/23  8:08 AM   Result Value Ref Range    Sodium 140 135 - 145 mmol/L    Potassium 3.8 3.4 - 5.3 mmol/L    Carbon Dioxide (CO2) 25 22 - 29 mmol/L    Anion Gap 12 7 - 15 mmol/L    Urea Nitrogen 7.6 6.0 - 20.0 mg/dL    Creatinine 0.85 0.67 - 1.17 mg/dL    GFR Estimate >90 >60 mL/min/1.73m2    Calcium 9.1 8.6 - 10.0 mg/dL    Chloride 103 98 - 107 mmol/L    Glucose 129 (H) 70 - 99 mg/dL    Alkaline Phosphatase 87 40 - 150 U/L    AST 46 (H) 0 - 45 U/L    ALT 65 0 - 70 U/L    Protein Total 7.2 6.4 - 8.3 g/dL    Albumin 4.7 3.5 - 5.2 g/dL    Bilirubin Total 0.4 <=1.2 mg/dL   CBC with platelets and differential    Collection Time: 12/13/23  8:08 AM   Result Value Ref Range    WBC Count 7.5 4.0 - 11.0 10e3/uL    RBC Count 4.20 (L) 4.40 - 5.90 10e6/uL    Hemoglobin 7.2 (L) 13.3 - 17.7 g/dL    Hematocrit 28.9 (L) 40.0 - 53.0 %    MCV 69 (L) 78 - 100 fL    MCH 17.1 (L) 26.5 - 33.0 pg    MCHC 24.9 (L) 31.5 - 36.5 g/dL    RDW 25.6 (H) 10.0 - 15.0 %    Platelet Count 470 (H) 150 - 450 10e3/uL    % Neutrophils 63 %    % Lymphocytes 22 %    % Monocytes 10 %    % Eosinophils 4 %    % Basophils 1 %    % Immature Granulocytes 0 %    NRBCs per 100 WBC 1 (H) <1 /100    Absolute Neutrophils 4.7 1.6 - 8.3 10e3/uL    Absolute Lymphocytes 1.6 0.8 - 5.3 10e3/uL    Absolute Monocytes 0.8 0.0 - 1.3 10e3/uL    Absolute Eosinophils 0.3 0.0 - 0.7 10e3/uL    Absolute Basophils 0.1 0.0 - 0.2 10e3/uL    Absolute Immature Granulocytes 0.0 <=0.4 10e3/uL    Absolute NRBCs 0.1 10e3/uL   Reticulocyte count    Collection Time: 12/13/23  8:08 AM   Result Value Ref Range    % Reticulocyte 4.6 (H) 0.8 - 2.7 %    Absolute Reticulocyte 0.192 (H) 0.010 - 0.110 10e6/uL   Extra Blood Bank Purple Top Tube    Collection Time: 12/13/23  8:08 AM   Result Value Ref Range    Hold Specimen [facility]    RBC and Platelet Morphology    Collection Time: 12/13/23  8:08 AM   Result Value  Ref Range    Platelet Assessment (A) Automated Count Confirmed. Platelet morphology is normal.     Automated Count Confirmed. Giant platelets are present.    Acanthocytes Slight (A) None Seen    Cristobal Rods      Basophilic Stippling      Bite Cells      Blister Cells      Bellerose Cells      Elliptocytes Slight (A) None Seen    Hgb C Crystals      So-Jolly Bodies      Hypersegmented Neutrophils      Polychromasia Slight (A) None Seen    RBC agglutination      RBC Fragments      Reactive Lymphocytes      Rouleaux      Sickle Cells      Smudge Cells      Spherocytes      Stomatocytes Slight (A) None Seen    Target Cells Slight (A) None Seen    Teardrop Cells      Toxic Neutrophils      RBC Morphology Confirmed RBC Indices          Recent Results (from the past 744 hour(s))   CT Abdomen Pelvis w Contrast    Narrative    EXAM: CT ABDOMEN PELVIS W CONTRAST  LOCATION: [facility]  DATE: 12/7/2023    INDICATION: Repeat iron deficiency anemia; 15.6 to 6.5 in the past year. Last occured in 2019.  COMPARISON: None.  TECHNIQUE: CT scan of the abdomen and pelvis was performed following injection of IV contrast. Multiplanar reformats were obtained. Dose reduction techniques were used.  CONTRAST: 135 mL Isovue-370.    FINDINGS:   LOWER CHEST: Large hiatal hernia. Lung bases are clear.    HEPATOBILIARY: Diffuse hepatic steatosis. No significant mass. No bile duct dilatation. No calcified gallstones.    PANCREAS: No significant mass, duct dilatation, or inflammatory change.    SPLEEN: Normal.    ADRENAL GLANDS: Normal.    KIDNEYS/BLADDER: Kidneys enhance symmetrically without evidence for hydronephrosis or pyelonephritis. No renal calculi seen. No renal masses noted. The bilateral ureters and urinary bladder are unremarkable.     BOWEL: Nonspecific nonobstructive bowel gas pattern. Appendix appears within normal limits.    LYMPH NODES: No lymphadenopathy.    VASCULATURE: No abdominal aortic  aneurysm.    PELVIC ORGANS: No pelvic masses.    MUSCULOSKELETAL: No concerning osseous abnormalities.      Impression    IMPRESSION:   1.  Large hiatal hernia.    2.  Diffuse fatty infiltration of the liver.    3.  Nonspecific nonobstructive bowel gas pattern. Appendix appears within normal limits.    4.  No acute findings in the abdomen or pelvis.

## 2023-12-19 ENCOUNTER — DOCUMENTATION ONLY (OUTPATIENT)
Dept: GASTROENTEROLOGY | Facility: CLINIC | Age: 43
End: 2023-12-19

## 2023-12-22 ENCOUNTER — LAB (OUTPATIENT)
Dept: LAB | Facility: CLINIC | Age: 43
End: 2023-12-22
Payer: COMMERCIAL

## 2023-12-22 DIAGNOSIS — D64.9 LOW HEMOGLOBIN: ICD-10-CM

## 2023-12-22 DIAGNOSIS — D50.8 OTHER IRON DEFICIENCY ANEMIA: ICD-10-CM

## 2023-12-22 LAB — HGB BLD-MCNC: 8.2 G/DL (ref 13.3–17.7)

## 2023-12-22 PROCEDURE — 85018 HEMOGLOBIN: CPT

## 2023-12-22 PROCEDURE — 36415 COLL VENOUS BLD VENIPUNCTURE: CPT

## 2023-12-27 ENCOUNTER — TELEPHONE (OUTPATIENT)
Dept: GASTROENTEROLOGY | Facility: CLINIC | Age: 43
End: 2023-12-27
Payer: COMMERCIAL

## 2023-12-27 NOTE — TELEPHONE ENCOUNTER
----- Message from Stefanie Shields sent at 12/26/2023  2:23 PM CST -----  Regarding: FERAHEME - remove  Rigoberto Chambers,    Please remove the infusion appointment request line from the Feraheme therapy plan per the 12/11/23 Patient Outreach stating the orders have been faxed to [facility]    Thank you,  Stefanie CLEMENTS [facility] - 2nd [facility]  [facility] Scheduling  [phone] (option 3, then option 2)

## 2023-12-28 ENCOUNTER — HOSPITAL ENCOUNTER (OUTPATIENT)
Facility: CLINIC | Age: 43
Discharge: HOME | End: 2023-12-28
Attending: SPECIALIST | Admitting: SPECIALIST
Payer: COMMERCIAL

## 2023-12-28 VITALS
DIASTOLIC BLOOD PRESSURE: 67 MMHG | HEIGHT: 72 IN | RESPIRATION RATE: 17 BRPM | OXYGEN SATURATION: 98 % | WEIGHT: 270 LBS | HEART RATE: 80 BPM | BODY MASS INDEX: 36.57 KG/M2 | SYSTOLIC BLOOD PRESSURE: 107 MMHG

## 2023-12-28 LAB
COLONOSCOPY: NORMAL
UPPER GI ENDOSCOPY: NORMAL

## 2023-12-28 PROCEDURE — 99153 MOD SED SAME PHYS/QHP EA: CPT | Performed by: SPECIALIST

## 2023-12-28 PROCEDURE — 43239 EGD BIOPSY SINGLE/MULTIPLE: CPT | Performed by: SPECIALIST

## 2023-12-28 PROCEDURE — 250N000009 HC RX 250: Performed by: SPECIALIST

## 2023-12-28 PROCEDURE — 250N000011 HC RX IP 250 OP 636: Performed by: SPECIALIST

## 2023-12-28 PROCEDURE — 88305 TISSUE EXAM BY PATHOLOGIST: CPT | Mod: TC | Performed by: SPECIALIST

## 2023-12-28 PROCEDURE — G0500 MOD SEDAT ENDO SERVICE >5YRS: HCPCS | Performed by: SPECIALIST

## 2023-12-28 PROCEDURE — 88305 TISSUE EXAM BY PATHOLOGIST: CPT | Mod: 26 | Performed by: PATHOLOGY

## 2023-12-28 PROCEDURE — 45385 COLONOSCOPY W/LESION REMOVAL: CPT | Performed by: SPECIALIST

## 2023-12-28 PROCEDURE — 45380 COLONOSCOPY AND BIOPSY: CPT | Mod: XU | Performed by: SPECIALIST

## 2023-12-28 PROCEDURE — 258N000003 HC RX IP 258 OP 636: Performed by: SPECIALIST

## 2023-12-28 RX ORDER — FENTANYL CITRATE 50 UG/ML
INJECTION, SOLUTION INTRAMUSCULAR; INTRAVENOUS PRN
Status: DISCONTINUED | OUTPATIENT
Start: 2023-12-28 | End: 2023-12-28 | Stop reason: HOSPADM

## 2023-12-28 RX ORDER — ONDANSETRON 2 MG/ML
4 INJECTION INTRAMUSCULAR; INTRAVENOUS
Status: DISCONTINUED | OUTPATIENT
Start: 2023-12-28 | End: 2023-12-28 | Stop reason: HOSPADM

## 2023-12-28 RX ORDER — LIDOCAINE 40 MG/G
CREAM TOPICAL
Status: DISCONTINUED | OUTPATIENT
Start: 2023-12-28 | End: 2023-12-28 | Stop reason: HOSPADM

## 2023-12-28 RX ORDER — SODIUM CHLORIDE 9 MG/ML
INJECTION, SOLUTION INTRAVENOUS CONTINUOUS PRN
Status: DISCONTINUED | OUTPATIENT
Start: 2023-12-28 | End: 2023-12-28 | Stop reason: HOSPADM

## 2023-12-28 ASSESSMENT — ACTIVITIES OF DAILY LIVING (ADL)
ADLS_ACUITY_SCORE: 35
ADLS_ACUITY_SCORE: 33

## 2023-12-28 NOTE — H&P
Pre-Endoscopy History and Physical     William Bolton MRN# [id number]   YOB: 1980 Age: 43 year old     Date of Procedure: 12/28/2023  Primary care provider: Kezia Brooks  Type of Endoscopy: colonoscopy and esophagogastroduodenoscopy (upper GI endoscopy)  Reason for Procedure: iron deficiency anemia (recurrent), occasional dysphagia  Type of Anesthesia Anticipated: Moderate sedation    HPI:    William is a 43 year old male who will be undergoing the above procedure.      A history and physical has been performed. The patient's medications and allergies have been reviewed. The risks and benefits of the procedure and the sedation options and risks were discussed with the patient.  All questions were answered and informed consent was obtained.      He denies a personal or family history of anesthesia complications or bleeding disorders.     No Known Allergies     Current Facility-Administered Medications   Medication    lidocaine (LMX4) cream    lidocaine 1 % 0.1-1 mL    ondansetron (ZOFRAN) injection 4 mg    sodium chloride (PF) 0.9% PF flush 3 mL    sodium chloride (PF) 0.9% PF flush 3 mL       Patient Active Problem List   Diagnosis    Mixed hyperlipidemia    ADD (attention deficit disorder) without hyperactivity    Non morbid obesity due to excess calories    Abnormal bone xray - sclerosis of phalynx of right great toe 7/31/17 - suggest repeat imaging at annual visit.    NATHAN (obstructive sleep apnea)    Nocturnal hypoxemia    Obesity (BMI 35.0-39.9) with comorbidity (H)    Adenomatous polyp of descending colon    Essential hypertension with goal blood pressure less than 130/80    Family history of early liver disease (PSC)     Hiatal hernia    Fatty liver    Elevated liver enzymes    Diabetes mellitus, type 2 (H)    Low hemoglobin        Past Medical History:   Diagnosis Date    Abnormal bone xray - sclerosis of phalynx of right great toe 7/31/17 - suggest repeat imaging at annual visit. 07/31/2017     Depressive disorder     Diabetes mellitus, type 2 (H) 07/03/2023    Hypercholesteremia     Hypertension 2019    Sleep apnea         Past Surgical History:   Procedure Laterality Date    COLONOSCOPY  06/13/2019    Dr. Navarro [facility]    ESOPHAGOSCOPY, GASTROSCOPY, DUODENOSCOPY (EGD), COMBINED N/A 05/23/2019    Procedure: ESOPHAGOGASTRODUODENOSCOPY, WITH BIOPSY;  Surgeon: Say Navarro MD;  Location:  GI    HEAD & NECK SURGERY      wisdom teeth removed       Social History     Tobacco Use    Smoking status: Never     Passive exposure: Never    Smokeless tobacco: Never   Substance Use Topics    Alcohol use: Yes     Comment: 6 a week       Family History   Problem Relation Age of Onset    Hyperlipidemia Mother     Sleep Apnea Mother     Hypertension Father     Other - See Comments Father         primary sclerosing cholangitis    Hypertension Sister     Hyperlipidemia Sister     Coronary Artery Disease Paternal Grandfather     Colon Cancer Cousin        REVIEW OF SYSTEMS:   5 point ROS negative except as noted above in HPI, including Gen., Resp., CV, GI &  system review.    PHYSICAL EXAM:   /81   Pulse 74   Resp 16   Ht 1.829 m (6')   Wt 122.5 kg (270 lb)   SpO2 99%   BMI 36.62 kg/m   Estimated body mass index is 36.62 kg/m  as calculated from the following:    Height as of this encounter: 1.829 m (6').    Weight as of this encounter: 122.5 kg (270 lb).   GENERAL APPEARANCE: healthy and no distress  MENTAL STATUS: alert or interactive  AIRWAY EXAM: Mallampatti Class I (visualization of the soft palate, fauces, uvula, anterior and posterior pillars)  RESP: lungs clear to auscultation - no rales, rhonchi or wheezes  CV: regular rates and rhythm, normal S1 S2, no S3 or S4, and no murmur, click or rub    DIAGNOSTICS:    Not indicated    IMPRESSION   ASA Class 2 - Mild systemic disease    PLAN:   Colonoscopy, endoscopy    The above has been forwarded to the consulting provider.      Signed Electronically by:  Luis Ge MD  December 28, 2023

## 2023-12-29 ENCOUNTER — MYC MEDICAL ADVICE (OUTPATIENT)
Dept: GASTROENTEROLOGY | Facility: CLINIC | Age: 43
End: 2023-12-29
Payer: COMMERCIAL

## 2023-12-29 DIAGNOSIS — D50.8 OTHER IRON DEFICIENCY ANEMIA: ICD-10-CM

## 2023-12-29 DIAGNOSIS — K44.9 HIATAL HERNIA: Primary | ICD-10-CM

## 2023-12-29 LAB
PATH REPORT.COMMENTS IMP SPEC: NORMAL
PATH REPORT.COMMENTS IMP SPEC: NORMAL
PATH REPORT.FINAL DX SPEC: NORMAL
PATH REPORT.GROSS SPEC: NORMAL
PATH REPORT.MICROSCOPIC SPEC OTHER STN: NORMAL
PATH REPORT.RELEVANT HX SPEC: NORMAL
PHOTO IMAGE: NORMAL

## 2024-01-04 ENCOUNTER — TELEPHONE (OUTPATIENT)
Dept: GASTROENTEROLOGY | Facility: CLINIC | Age: 44
End: 2024-01-04
Payer: COMMERCIAL

## 2024-01-04 NOTE — TELEPHONE ENCOUNTER
----- Message from Stefanie Shields sent at 12/13/2023  9:54 AM CST -----  Regarding: FERRLECIT - remove  Rigoberto Chambers,    Would you please remove the infusion appointment request line from the therapy plan per the 12/11/23 Telephone Encounter stating orders have been faxed to [facility]?    Thank you,  Stefanie CLEMENTS [facility] - 2nd [facility]  [facility] Scheduling  [phone] (option 3, then option 2)

## 2024-01-10 ENCOUNTER — OFFICE VISIT (OUTPATIENT)
Dept: SURGERY | Facility: CLINIC | Age: 44
End: 2024-01-10
Payer: COMMERCIAL

## 2024-01-10 VITALS
SYSTOLIC BLOOD PRESSURE: 130 MMHG | DIASTOLIC BLOOD PRESSURE: 80 MMHG | HEART RATE: 98 BPM | RESPIRATION RATE: 16 BRPM | OXYGEN SATURATION: 96 % | HEIGHT: 72 IN | WEIGHT: 270 LBS | BODY MASS INDEX: 36.57 KG/M2

## 2024-01-10 DIAGNOSIS — K92.2 GASTROINTESTINAL HEMORRHAGE, UNSPECIFIED GASTROINTESTINAL HEMORRHAGE TYPE: ICD-10-CM

## 2024-01-10 DIAGNOSIS — K44.9 HIATAL HERNIA: Primary | ICD-10-CM

## 2024-01-10 PROCEDURE — 99205 OFFICE O/P NEW HI 60 MIN: CPT | Performed by: SURGERY

## 2024-01-10 NOTE — PROGRESS NOTES
General surgery clinic note    Had the pleasure to meet with Mr. Bolton to discuss surgical treatment for his hiatal hernia and partially intrathoracic stomach.  For over 5 to 6-year he has been aware of a hiatal hernia.  He had some issues with reflux and indigestion.  He had been found to have iron deficiency anemia for a long time was taking iron supplements and maintaining normal hemoglobin.  At the end of 2022 he stopped taking his iron supplement and this ultimately led to a very low hemoglobin (in the range of 6) for what additional endoscopies were performed and no other pathology was encounter in addition to the now larger hiatal hernia with partial intrathoracic stomach and some irritation of the mucosa in this area.  He has never had any abdominal operations.  He is currently obese with a BMI of 36.  I reviewed CT scan, laboratories, and endoscopy with him.    We went over to discuss the rationale for hiatal hernia repair with fundoplication in order to stop the chronic GI bleeding likely secondary to gastric irritation from the hernia or esophageal irritation from the reflux.  The procedure itself was explained as well as its risk, benefits, hopeful outcomes, possible complications.  His questions were answered.  Literature was provided.    We will obtain an esophagogram.  He does not have any particular odynophagia nor dysphagia at this point.    He is work on some preoperative weight loss, continue to improve his hemoglobin which is still well below 10 and then look for a convenient time to have a surgical intervention.    Will proceed with the above-mentioned procedure when he is ready.    Total encounter time 60 minutes, more than half spent in counseling, review of data, and coordination of care.

## 2024-01-12 ENCOUNTER — OFFICE VISIT (OUTPATIENT)
Dept: FAMILY MEDICINE | Facility: CLINIC | Age: 44
End: 2024-01-12
Payer: COMMERCIAL

## 2024-01-12 ENCOUNTER — TELEPHONE (OUTPATIENT)
Dept: SURGERY | Facility: CLINIC | Age: 44
End: 2024-01-12

## 2024-01-12 VITALS
SYSTOLIC BLOOD PRESSURE: 130 MMHG | TEMPERATURE: 97.8 F | OXYGEN SATURATION: 99 % | HEART RATE: 93 BPM | DIASTOLIC BLOOD PRESSURE: 70 MMHG | BODY MASS INDEX: 37.3 KG/M2 | WEIGHT: 275 LBS

## 2024-01-12 DIAGNOSIS — E78.5 HYPERLIPIDEMIA, UNSPECIFIED HYPERLIPIDEMIA TYPE: ICD-10-CM

## 2024-01-12 DIAGNOSIS — F98.8 ADD (ATTENTION DEFICIT DISORDER) WITHOUT HYPERACTIVITY: ICD-10-CM

## 2024-01-12 DIAGNOSIS — E11.9 TYPE 2 DIABETES MELLITUS WITHOUT COMPLICATION, WITHOUT LONG-TERM CURRENT USE OF INSULIN (H): ICD-10-CM

## 2024-01-12 DIAGNOSIS — I10 ESSENTIAL HYPERTENSION WITH GOAL BLOOD PRESSURE LESS THAN 130/80: ICD-10-CM

## 2024-01-12 DIAGNOSIS — D50.9 MICROCYTIC ANEMIA: ICD-10-CM

## 2024-01-12 DIAGNOSIS — Z00.00 ROUTINE GENERAL MEDICAL EXAMINATION AT A HEALTH CARE FACILITY: Primary | ICD-10-CM

## 2024-01-12 DIAGNOSIS — E66.01 MORBID OBESITY (H): ICD-10-CM

## 2024-01-12 PROCEDURE — 99396 PREV VISIT EST AGE 40-64: CPT | Performed by: NURSE PRACTITIONER

## 2024-01-12 PROCEDURE — 99214 OFFICE O/P EST MOD 30 MIN: CPT | Mod: 25 | Performed by: NURSE PRACTITIONER

## 2024-01-12 RX ORDER — DEXTROAMPHETAMINE SACCHARATE, AMPHETAMINE ASPARTATE, DEXTROAMPHETAMINE SULFATE AND AMPHETAMINE SULFATE 1.25; 1.25; 1.25; 1.25 MG/1; MG/1; MG/1; MG/1
5 TABLET ORAL DAILY
Qty: 30 TABLET | Refills: 0 | Status: SHIPPED | OUTPATIENT
Start: 2024-03-14 | End: 2024-04-13

## 2024-01-12 RX ORDER — DEXTROAMPHETAMINE SACCHARATE, AMPHETAMINE ASPARTATE MONOHYDRATE, DEXTROAMPHETAMINE SULFATE AND AMPHETAMINE SULFATE 6.25; 6.25; 6.25; 6.25 MG/1; MG/1; MG/1; MG/1
25 CAPSULE, EXTENDED RELEASE ORAL DAILY
Qty: 30 CAPSULE | Refills: 0 | Status: SHIPPED | OUTPATIENT
Start: 2024-01-12 | End: 2024-02-11

## 2024-01-12 RX ORDER — LISINOPRIL 30 MG/1
30 TABLET ORAL DAILY
Qty: 90 TABLET | Refills: 3 | Status: SHIPPED | OUTPATIENT
Start: 2024-01-12

## 2024-01-12 RX ORDER — DEXTROAMPHETAMINE SACCHARATE, AMPHETAMINE ASPARTATE MONOHYDRATE, DEXTROAMPHETAMINE SULFATE AND AMPHETAMINE SULFATE 6.25; 6.25; 6.25; 6.25 MG/1; MG/1; MG/1; MG/1
25 CAPSULE, EXTENDED RELEASE ORAL DAILY
Qty: 30 CAPSULE | Refills: 0 | Status: SHIPPED | OUTPATIENT
Start: 2024-02-12 | End: 2024-03-13

## 2024-01-12 RX ORDER — DEXTROAMPHETAMINE SACCHARATE, AMPHETAMINE ASPARTATE MONOHYDRATE, DEXTROAMPHETAMINE SULFATE AND AMPHETAMINE SULFATE 6.25; 6.25; 6.25; 6.25 MG/1; MG/1; MG/1; MG/1
25 CAPSULE, EXTENDED RELEASE ORAL DAILY
Qty: 30 CAPSULE | Refills: 0 | Status: SHIPPED | OUTPATIENT
Start: 2024-03-14 | End: 2024-04-13

## 2024-01-12 RX ORDER — DEXTROAMPHETAMINE SACCHARATE, AMPHETAMINE ASPARTATE, DEXTROAMPHETAMINE SULFATE AND AMPHETAMINE SULFATE 1.25; 1.25; 1.25; 1.25 MG/1; MG/1; MG/1; MG/1
5 TABLET ORAL DAILY
Qty: 30 TABLET | Refills: 0 | Status: SHIPPED | OUTPATIENT
Start: 2024-01-12 | End: 2024-02-11

## 2024-01-12 RX ORDER — ROSUVASTATIN CALCIUM 40 MG/1
40 TABLET, COATED ORAL DAILY
Qty: 90 TABLET | Refills: 3 | Status: SHIPPED | OUTPATIENT
Start: 2024-01-12

## 2024-01-12 RX ORDER — DEXTROAMPHETAMINE SACCHARATE, AMPHETAMINE ASPARTATE, DEXTROAMPHETAMINE SULFATE AND AMPHETAMINE SULFATE 1.25; 1.25; 1.25; 1.25 MG/1; MG/1; MG/1; MG/1
5 TABLET ORAL DAILY
Qty: 30 TABLET | Refills: 0 | Status: SHIPPED | OUTPATIENT
Start: 2024-02-12 | End: 2024-03-13

## 2024-01-12 NOTE — PROGRESS NOTES
SUBJECTIVE:   William is a 43 year old, presenting for the following:  Recheck Medication and Physical (Not Fasting)        1/12/2024     8:38 AM   Additional Questions   Roomed by Napoleon CLEMENTS CMA       Healthy Habits:     Getting at least 3 servings of Calcium per day:  Yes    Bi-annual eye exam:  NO    Dental care twice a year:  Yes    Sleep apnea or symptoms of sleep apnea:  Sleep apnea    Diet:  Regular (no restrictions)    Frequency of exercise:  None    Taking medications regularly:  Yes    Additional concerns today:  No      Iron infusions. Is feeling much better.    EGD/Colonoscopy completed on 12/28/23.    Possible bleeding from hiatal hernia.   Planning surgery on 4/1/24.      Diabetes Follow-up  Hemoglobin A1C   Date Value Ref Range Status   11/14/2023 7.2 (H) 0.0 - 5.6 % Final     Comment:     Normal <5.7%   Prediabetes 5.7-6.4%    Diabetes 6.5% or higher     Note: Adopted from ADA consensus guidelines.   07/03/2023 7.6 (H) 0.0 - 5.6 % Final     Comment:     Normal <5.7%   Prediabetes 5.7-6.4%    Diabetes 6.5% or higher     Note: Adopted from ADA consensus guidelines.   11/09/2022 6.5 (H) 0.0 - 5.6 % Final     Comment:     Normal <5.7%   Prediabetes 5.7-6.4%    Diabetes 6.5% or higher     Note: Adopted from ADA consensus guidelines.       How often are you checking your blood sugar? Not at all  What concerns do you have today about your diabetes? None   Do you have any of these symptoms? (Select all that apply)  No numbness or tingling in feet.  No redness, sores or blisters on feet.  No complaints of excessive thirst.  No reports of blurry vision.  No significant changes to weight.  Have you had a diabetic eye exam in the last 12 months? No      Hyperlipidemia Follow-Up  Recent Labs   Lab Test 11/14/23  0747 11/09/22  0812   CHOL 127 164   HDL 34* 44   LDL 78 102*   TRIG 77 88       Are you regularly taking any medication or supplement to lower your cholesterol?   Yes- rosuvastatin (CRESTOR) 40 MG  "tablet  Are you having muscle aches or other side effects that you think could be caused by your cholesterol lowering medication?  No    Hypertension Follow-up    Do you check your blood pressure regularly outside of the clinic? No   Are you following a low salt diet? Yes  Are your blood pressures ever more than 140 on the top number (systolic) OR more   than 90 on the bottom number (diastolic), for example 140/90? N/A    BP Readings from Last 2 Encounters:   01/12/24 130/70   01/10/24 130/80     Hemoglobin A1C (%)   Date Value   11/14/2023 7.2 (H)   07/03/2023 7.6 (H)     LDL Cholesterol Calculated (mg/dL)   Date Value   11/14/2023 78   11/09/2022 102 (H)   03/17/2021 78   09/15/2020 120 (H)     Medication Followup for ADHD: amphetamine-dextroamphetamine (ADDERALL XR) 25 MG 24 hr capsule & amphetamine-dextroamphetamine (ADDERALL) 5 MG tablet   Stable on dosage.    Taking Medication as prescribed: yes  Side Effects:  None  Medication Helping Symptoms:  yes      Have you ever done Advance Care Planning? (For example, a Health Directive, POLST, or a discussion with a medical provider or your loved ones about your wishes): No, advance care planning information given to patient to review.  Patient declined advance care planning discussion at this time.    Social History     Tobacco Use    Smoking status: Never     Passive exposure: Never    Smokeless tobacco: Never   Substance Use Topics    Alcohol use: Yes     Comment: 6 a week             1/11/2024     5:17 PM   Alcohol Use   Prescreen: >3 drinks/day or >7 drinks/week? No     Last PSA: No results found for: \"PSA\"    Reviewed orders with patient. Reviewed health maintenance and updated orders accordingly - Yes  BP Readings from Last 3 Encounters:   01/12/24 130/70   01/10/24 130/80   12/28/23 107/67    Wt Readings from Last 3 Encounters:   01/12/24 124.7 kg (275 lb)   01/10/24 122.5 kg (270 lb)   12/28/23 122.5 kg (270 lb)                  Patient Active Problem List "   Diagnosis    Mixed hyperlipidemia    ADD (attention deficit disorder) without hyperactivity    Non morbid obesity due to excess calories    Abnormal bone xray - sclerosis of phalynx of right great toe 7/31/17 - suggest repeat imaging at annual visit.    NATHAN (obstructive sleep apnea)    Nocturnal hypoxemia    Obesity (BMI 35.0-39.9) with comorbidity (H)    Adenomatous polyp of descending colon    Essential hypertension with goal blood pressure less than 130/80    Family history of early liver disease (PSC)     Hiatal hernia    Fatty liver    Elevated liver enzymes    Diabetes mellitus, type 2 (H)    Low hemoglobin     Past Surgical History:   Procedure Laterality Date    COLONOSCOPY  06/13/2019    Dr. Navarro [facility]    COLONOSCOPY N/A 12/28/2023    Procedure: Colonoscopy;  Surgeon: Luis Ge MD;  Location: [facility]    COLONOSCOPY N/A 12/28/2023    Procedure: COLONOSCOPY, WITH POLYPECTOMY AND BIOPSY;  Surgeon: Luis Ge MD;  Location: [facility]    ESOPHAGOSCOPY, GASTROSCOPY, DUODENOSCOPY (EGD), COMBINED N/A 05/23/2019    Procedure: ESOPHAGOGASTRODUODENOSCOPY, WITH BIOPSY;  Surgeon: Say Navarro MD;  Location: [facility]    ESOPHAGOSCOPY, GASTROSCOPY, DUODENOSCOPY (EGD), COMBINED N/A 12/28/2023    Procedure: Esophagoscopy, gastroscopy, duodenoscopy (EGD), combined;  Surgeon: Luis Ge MD;  Location: [facility]    HEAD & NECK SURGERY      wisdom teeth removed       Social History     Tobacco Use    Smoking status: Never     Passive exposure: Never    Smokeless tobacco: Never   Substance Use Topics    Alcohol use: Yes     Comment: 6 a week     Family History   Problem Relation Age of Onset    Hyperlipidemia Mother     Sleep Apnea Mother     Hypertension Father     Other - See Comments Father         primary sclerosing cholangitis    Hypertension Sister     Hyperlipidemia Sister     Coronary Artery Disease Paternal Grandfather     Colon Cancer Cousin          Current Outpatient Medications   Medication Sig Dispense  Refill    amphetamine-dextroamphetamine (ADDERALL XR) 25 MG 24 hr capsule Take 1 capsule (25 mg) by mouth daily for 30 days 30 capsule 0    [START ON 2/12/2024] amphetamine-dextroamphetamine (ADDERALL XR) 25 MG 24 hr capsule Take 1 capsule (25 mg) by mouth daily for 30 days 30 capsule 0    [START ON 3/14/2024] amphetamine-dextroamphetamine (ADDERALL XR) 25 MG 24 hr capsule Take 1 capsule (25 mg) by mouth daily for 30 days 30 capsule 0    amphetamine-dextroamphetamine (ADDERALL) 5 MG tablet Take 1 tablet (5 mg) by mouth daily for 30 days 30 tablet 0    [START ON 2/12/2024] amphetamine-dextroamphetamine (ADDERALL) 5 MG tablet Take 1 tablet (5 mg) by mouth daily for 30 days 30 tablet 0    [START ON 3/14/2024] amphetamine-dextroamphetamine (ADDERALL) 5 MG tablet Take 1 tablet (5 mg) by mouth daily for 30 days 30 tablet 0    lisinopril (ZESTRIL) 30 MG tablet Take 1 tablet (30 mg) by mouth daily 90 tablet 3    metFORMIN (GLUCOPHAGE) 500 MG tablet Take 1 tablet (500 mg) by mouth 2 times daily (with meals) 180 tablet 1    rosuvastatin (CRESTOR) 40 MG tablet Take 1 tablet (40 mg) by mouth daily 90 tablet 3    semaglutide (OZEMPIC) 2 MG/3ML pen Inject 0.25 mg Subcutaneous every 7 days 3 mL 1    testosterone cypionate (DEPOTESTOSTERONE) 200 MG/ML injection INJECT 0.8ML INTRAMUSCULARLY ONCE WEEKLY         Reviewed and updated as needed this visit by clinical staff    Allergies  Meds            Reviewed and updated as needed this visit by Provider                  Past Medical History:   Diagnosis Date    Abnormal bone xray - sclerosis of phalynx of right great toe 7/31/17 - suggest repeat imaging at annual visit. 07/31/2017    Depressive disorder     Diabetes mellitus, type 2 (H) 07/03/2023    Hypercholesteremia     Hypertension 2019    Sleep apnea       Past Surgical History:   Procedure Laterality Date    COLONOSCOPY  06/13/2019    Dr. Navarro [facility]    COLONOSCOPY N/A 12/28/2023    Procedure: Colonoscopy;  Surgeon: Luma  Luis POWELL MD;  Location:  [facility]    COLONOSCOPY N/A 12/28/2023    Procedure: COLONOSCOPY, WITH POLYPECTOMY AND BIOPSY;  Surgeon: Luis Ge MD;  Location:  GI    ESOPHAGOSCOPY, GASTROSCOPY, DUODENOSCOPY (EGD), COMBINED N/A 05/23/2019    Procedure: ESOPHAGOGASTRODUODENOSCOPY, WITH BIOPSY;  Surgeon: Say Navarro MD;  Location:  GI    ESOPHAGOSCOPY, GASTROSCOPY, DUODENOSCOPY (EGD), COMBINED N/A 12/28/2023    Procedure: Esophagoscopy, gastroscopy, duodenoscopy (EGD), combined;  Surgeon: Luis Ge MD;  Location:  GI    HEAD & NECK SURGERY      wisdom teeth removed       Review of Systems   ROS: 10 point ROS neg other than the symptoms noted above in the HPI.     OBJECTIVE:   /70 (BP Location: Right arm, Patient Position: Sitting, Cuff Size: Adult Large)   Pulse 93   Temp 97.8  F (36.6  C) (Tympanic)   Wt 124.7 kg (275 lb)   SpO2 99%   BMI 37.30 kg/m      Physical Exam  GENERAL: alert and no distress  EYES: Eyes grossly normal to inspection  HENT: ear canals and TM's normal, nose and mouth without ulcers or lesions  NECK: no adenopathy, no asymmetry, masses, or scars  RESP: lungs clear to auscultation - no rales, rhonchi or wheezes  CV: regular rate and rhythm, normal S1 S2, no S3 or S4, no murmur, click or rub, no peripheral edema  ABDOMEN: soft, nontender, no hepatosplenomegaly, no masses and bowel sounds normal  MS: no gross musculoskeletal defects noted, no edema  SKIN: no suspicious lesions or rashes  NEURO: Normal strength and tone, mentation intact and speech normal  PSYCH: mentation appears normal, affect normal/bright  LYMPH: no cervical, supraclavicular, axillary, or inguinal adenopathy    ASSESSMENT/PLAN:     William was seen today for recheck medication and physical.    Diagnoses and all orders for this visit:    Routine general medical examination at a health care facility  -     PRIMARY CARE FOLLOW-UP SCHEDULING; Future    Type 2 diabetes mellitus without complication, without  long-term current use of insulin (H)  Hemoglobin A1C   Date Value Ref Range Status   11/14/2023 7.2 (H) 0.0 - 5.6 % Final     Comment:     Normal <5.7%   Prediabetes 5.7-6.4%    Diabetes 6.5% or higher     Note: Adopted from ADA consensus guidelines.   07/03/2023 7.6 (H) 0.0 - 5.6 % Final     Comment:     Normal <5.7%   Prediabetes 5.7-6.4%    Diabetes 6.5% or higher     Note: Adopted from ADA consensus guidelines.   11/09/2022 6.5 (H) 0.0 - 5.6 % Final     Comment:     Normal <5.7%   Prediabetes 5.7-6.4%    Diabetes 6.5% or higher     Note: Adopted from ADA consensus guidelines.   Will plan to continue Metformin 500 mg two times daily and plan initiation of GLP-1 agonist - Semaglutide and plan med check in 4 weeks for recheck.    -     Hemoglobin A1c; Future  -     metFORMIN (GLUCOPHAGE) 500 MG tablet; Take 1 tablet (500 mg) by mouth 2 times daily (with meals)  -     semaglutide (OZEMPIC) 2 MG/3ML pen; Inject 0.25 mg Subcutaneous every 7 days    Hyperlipidemia, unspecified hyperlipidemia type  Recent Labs   Lab Test 11/14/23  0747 11/09/22  0812   CHOL 127 164   HDL 34* 44   LDL 78 102*   TRIG 77 88     -     rosuvastatin (CRESTOR) 40 MG tablet; Take 1 tablet (40 mg) by mouth daily    Morbid obesity (H)  Co-morbid dx:  Diabetes, HTN, Hyperlipidemia  -     semaglutide (OZEMPIC) 2 MG/3ML pen; Inject 0.25 mg Subcutaneous every 7 days    Microcytic anemia  S/P iron infusions.   EGD/Colonoscopy completed on 12/28/23.    Possible bleeding from hiatal hernia.   Planning surgery on 4/1/24.     -     CBC with platelets and differential; Future  -     Ferritin; Future  -     Iron and iron binding capacity; Future    Essential hypertension with goal blood pressure less than 130/80  Currently stable on Lisinopril 30 mg daily.    -     lisinopril (ZESTRIL) 30 MG tablet; Take 1 tablet (30 mg) by mouth daily    ADD (attention deficit disorder) without hyperactivity  CSA reviewed and updated. Stable on Adderall XR 25 mg daily and  Adderall IR 5 mg in the early afternoon.    -     amphetamine-dextroamphetamine (ADDERALL XR) 25 MG 24 hr capsule; Take 1 capsule (25 mg) by mouth daily for 30 days  -     amphetamine-dextroamphetamine (ADDERALL XR) 25 MG 24 hr capsule; Take 1 capsule (25 mg) by mouth daily for 30 days  -     amphetamine-dextroamphetamine (ADDERALL XR) 25 MG 24 hr capsule; Take 1 capsule (25 mg) by mouth daily for 30 days  -     amphetamine-dextroamphetamine (ADDERALL) 5 MG tablet; Take 1 tablet (5 mg) by mouth daily for 30 days  -     amphetamine-dextroamphetamine (ADDERALL) 5 MG tablet; Take 1 tablet (5 mg) by mouth daily for 30 days  -     amphetamine-dextroamphetamine (ADDERALL) 5 MG tablet; Take 1 tablet (5 mg) by mouth daily for 30 days            COUNSELING:   Reviewed preventive health counseling, as reflected in patient instructions      BMI:   Estimated body mass index is 37.3 kg/m  as calculated from the following:    Height as of 1/10/24: 1.829 m (6').    Weight as of this encounter: 124.7 kg (275 lb).         He reports that he has never smoked. He has never been exposed to tobacco smoke. He has never used smokeless tobacco.      Return in about 4 weeks (around 2/9/2024) for Med check, Telephone Visit.    ODALIS Martin [facility]

## 2024-01-12 NOTE — LETTER
[facility] [place]  01/12/24  Patient: William Bolton  YOB: 1980  Medical Record Number: [id number]                                                                                  Non-Opioid Controlled Substance Agreement    This is an agreement between you and your provider regarding safe and appropriate use of controlled substances prescribed by your care team. Controlled substances are?medicines that can cause physical and mental dependence (abuse).     There are strict laws about having and using these medicines. We here at [facility] are  committed to working with you in your efforts to get better. To support you in this work, we'll help you schedule regular office appointments for medicine refills. If we must cancel or change your appointment for any reason, we'll make sure you have enough medicine to last until your next appointment.     As a Provider, I will:   Listen carefully to your concerns while treating you with respect.   Recommend a treatment plan that I believe is in your best interest and may involve therapies other than medicine.    Talk with you often about the possible benefits and the risk of harm of any medicine that we prescribe for you.  Assess the safety of this medicine and check how well it works.    Provide a plan on how to taper (discontinue or go off) using this medicine if the decision is made to stop its use.      ::  As a Patient, I understand controlled substances:     Are prescribed by my care provider to help me function or work and manage my condition(s).?  Are strong medicines and can cause serious side effects.     Need to be taken exactly as prescribed.?Combining controlled substances with certain medicines or chemicals (such as illegal drugs, alcohol, sedatives, sleeping pills, and benzodiazepines) can be dangerous or even fatal.? If I stop taking my medicines suddenly, I may have severe withdrawal symptoms.     The risks, benefits, and  side effects of these medicine(s) were explained to me. I agree that:    I will take part in other treatments as advised by my care team. This may be psychiatry or counseling, physical therapy, behavioral therapy, group treatment or a referral to specialist.    I will keep all my appointments and understand this is part of the monitoring of controlled substances.?My care team may require an office visit for EVERY controlled substance refill. If I miss appointments or don t follow instructions, my care team may stop my medicine    I will take my medicines as prescribed. I will not change the dose or schedule unless my care team tells me to. There will be no refills if I run out early.      I may be asked to come to the clinic and complete a urine drug test or complete a pill count. If I don t give a urine sample or participate in a pill count, the care team may stop my medicine.    I will only receive controlled substance prescriptions from this clinic. If I am treated by another provider, I will tell them that I am taking controlled substances and that I have a treatment agreement with this provider. I will inform my [facility] care team within one business day if I am given a prescription for any controlled substance by another healthcare provider. My [facility] care team can contact other providers and pharmacists about my use of any medicines.    It is up to me to make sure that I don't run out of my medicines on weekends or holidays.?If my care team is willing to refill my prescription without a visit, I must request refills only during office hours. Refills may take up to 3 business days to process. I will use one pharmacy to fill all my controlled substance prescriptions. I will notify the clinic about any changes to my insurance or medicine availability.    I am responsible for my prescriptions. If the medicine/prescription is lost, stolen or destroyed, it will not be replaced.?I also agree not  to share controlled substance medicines with anyone.     I am aware I should not use any illegal or recreational drugs. I agree not to drink alcohol unless my care team says I can.     If I enroll in the [place] Medical Cannabis program, I will tell my care team before my next refill.    I will tell my care team right away if I become pregnant, have a new medical problem treated outside of my regular clinic, or have a change in my medicines.     I understand that this medicine can affect my thinking, judgment and reaction time.? Alcohol and drugs affect the brain and body, which can affect the safety of my driving. Being under the influence of alcohol or drugs can affect my decision-making, behaviors, personal safety and the safety of others. Driving while impaired (DWI) can occur if a person is driving, operating or in physical control of a car, motorcycle, boat, snowmobile, ATV, motorbike, off-road vehicle or any other motor vehicle ([place] Statute 169A.20). I understand the risk if I choose to drive or operate any vehicle or machinery.    I understand that if I do not follow any of the conditions above, my prescriptions or treatment may be stopped or changed.   I agree that my provider, clinic care team and pharmacy may work with any city, state or federal law enforcement agency that investigates the misuse, sale or other diversion of my controlled medicine. I will allow my provider to discuss my care with, or share a copy of, this agreement with any other treating provider, pharmacy or emergency room where I receive care.     I have read this agreement and have asked questions about anything I did not understand.    ________________________________________________________  Patient Signature - William Bolton     ___________________                   Date     ________________________________________________________  Provider Signature - ODALIS Martin CNP       ___________________                   Date      ________________________________________________________  Witness Signature (required if provider not present while patient signing)          ___________________                   Date

## 2024-01-12 NOTE — TELEPHONE ENCOUNTER
Type of surgery: Robotic assisted laparoscopic hiatal hernia repair with fundoplication  Location of surgery: [facility]  Date and time of surgery: 4/1/24 at 11:15am  Surgeon: Dr. Andrew Franklin  Pre-Op Appt Date: patient to schedule  Post-Op Appt Date: patient to schedule   Packet sent out: Yes  Pre-cert/Authorization completed:  Not Applicable  Date: 1/12/24

## 2024-01-30 ENCOUNTER — HOSPITAL ENCOUNTER (OUTPATIENT)
Dept: GENERAL RADIOLOGY | Facility: CLINIC | Age: 44
Discharge: HOME | End: 2024-01-30
Attending: SURGERY | Admitting: SURGERY
Payer: COMMERCIAL

## 2024-01-30 DIAGNOSIS — K44.9 HIATAL HERNIA: ICD-10-CM

## 2024-01-30 PROCEDURE — 250N000013 HC RX MED GY IP 250 OP 250 PS 637: Performed by: SURGERY

## 2024-01-30 PROCEDURE — 74246 X-RAY XM UPR GI TRC 2CNTRST: CPT

## 2024-01-30 RX ADMIN — ANTACID/ANTIFLATULENT 4 G: 380; 550; 10; 10 GRANULE, EFFERVESCENT ORAL at 08:02

## 2024-02-08 ENCOUNTER — VIRTUAL VISIT (OUTPATIENT)
Dept: FAMILY MEDICINE | Facility: CLINIC | Age: 44
End: 2024-02-08
Payer: COMMERCIAL

## 2024-02-08 DIAGNOSIS — E11.9 TYPE 2 DIABETES MELLITUS WITHOUT COMPLICATION, WITHOUT LONG-TERM CURRENT USE OF INSULIN (H): Primary | ICD-10-CM

## 2024-02-08 DIAGNOSIS — E66.01 MORBID OBESITY (H): ICD-10-CM

## 2024-02-08 PROCEDURE — 99213 OFFICE O/P EST LOW 20 MIN: CPT | Mod: 95 | Performed by: NURSE PRACTITIONER

## 2024-02-08 NOTE — PROGRESS NOTES
William is a 43 year old who is being evaluated via a billable video visit.      How would you like to obtain your AVS? MyChart  If the video visit is dropped, the invitation should be resent by: Text to cell phone: [phone]  Will anyone else be joining your video visit? No      Assessment & Plan     William was seen today for recheck medication.    Diagnoses and all orders for this visit:    Type 2 diabetes mellitus without complication, without long-term current use of insulin (H)  Doing well with initiation of Semaglutide 0.25 mg once weekly, will plan dose increase to 0.5 mg once weekly and close follow-up in 4 weeks, sooner as needed.    -     semaglutide (OZEMPIC) 2 MG/3ML pen; Inject 0.5 mg Subcutaneous every 7 days  -     Glucose, whole blood; Future    Morbid obesity (H)  Co-morbid dx: Diabetes, HTN, Hyperlipidemia   -     semaglutide (OZEMPIC) 2 MG/3ML pen; Inject 0.5 mg Subcutaneous every 7 days      BMI  Estimated body mass index is 37.3 kg/m  as calculated from the following:    Height as of 1/10/24: 1.829 m (6').    Weight as of 1/12/24: 124.7 kg (275 lb).     Return in about 4 weeks (around 3/7/2024) for Med check, Video Visit.          Subjective   William is a 43 year old, presenting for the following health issues:  Recheck Medication        2/8/2024     1:01 PM   Additional Questions   Roomed by Maryam MALLOY   Accompanied by Self   History of Present Illness     Diabetes:   He presents for follow up of diabetes.    He is not checking blood glucose.   He has no concerns regarding his diabetes at this time.   He is not experiencing numbness or burning in feet, excessive thirst, blurry vision, weight changes or redness, sores or blisters on feet. The patient has not had a diabetic eye exam in the last 12 months.    Hemoglobin A1C   Date Value Ref Range Status   11/14/2023 7.2 (H) 0.0 - 5.6 % Final     Comment:     Normal <5.7%   Prediabetes 5.7-6.4%    Diabetes 6.5% or higher     Note: Adopted from ADA  consensus guidelines.   07/03/2023 7.6 (H) 0.0 - 5.6 % Final     Comment:     Normal <5.7%   Prediabetes 5.7-6.4%    Diabetes 6.5% or higher     Note: Adopted from ADA consensus guidelines.   11/09/2022 6.5 (H) 0.0 - 5.6 % Final     Comment:     Normal <5.7%   Prediabetes 5.7-6.4%    Diabetes 6.5% or higher     Note: Adopted from ADA consensus guidelines.     Semaglutide 0.25 mg - has done 3 doses.    No adverse effects - denies nausea, stomach upset.    Did get a cold last week and wasn't feeling well due to that.      He eats 2-3 servings of fruits and vegetables daily.He consumes 1 sweetened beverage(s) daily.He exercises with enough effort to increase his heart rate 9 or less minutes per day.  He exercises with enough effort to increase his heart rate 3 or less days per week.   He is taking medications regularly.         Review of Systems  Constitutional, HEENT, cardiovascular, pulmonary, gi and gu systems are negative, except as otherwise noted.      Objective           Vitals:  No vitals were obtained today due to virtual visit.    Physical Exam     GENERAL: alert and no distress  PSYCH: Appropriate affect, tone, and pace of words        Video-Visit Details    Type of service:  Video Visit     Originating Location (pt. Location): Home    Distant Location (provider location):  On-site  Platform used for Video Visit: Carol  Signed Electronically by: ODALIS Martin CNP

## 2024-02-09 ENCOUNTER — LAB (OUTPATIENT)
Dept: LAB | Facility: CLINIC | Age: 44
End: 2024-02-09
Payer: COMMERCIAL

## 2024-02-09 DIAGNOSIS — E11.9 TYPE 2 DIABETES MELLITUS WITHOUT COMPLICATION, WITHOUT LONG-TERM CURRENT USE OF INSULIN (H): ICD-10-CM

## 2024-02-09 DIAGNOSIS — D50.9 MICROCYTIC ANEMIA: ICD-10-CM

## 2024-02-09 LAB
BASOPHILS # BLD AUTO: 0 10E3/UL (ref 0–0.2)
BASOPHILS NFR BLD AUTO: 1 %
EOSINOPHIL # BLD AUTO: 0.2 10E3/UL (ref 0–0.7)
EOSINOPHIL NFR BLD AUTO: 3 %
ERYTHROCYTE [DISTWIDTH] IN BLOOD BY AUTOMATED COUNT: 23.5 % (ref 10–15)
FERRITIN SERPL-MCNC: 21 NG/ML (ref 31–409)
GLUCOSE BLD-MCNC: 105 MG/DL (ref 60–99)
HBA1C MFR BLD: 5.2 % (ref 0–5.6)
HCT VFR BLD AUTO: 42.6 % (ref 40–53)
HGB BLD-MCNC: 12.4 G/DL (ref 13.3–17.7)
IMM GRANULOCYTES # BLD: 0 10E3/UL
IMM GRANULOCYTES NFR BLD: 0 %
IRON BINDING CAPACITY (ROCHE): 404 UG/DL (ref 240–430)
IRON SATN MFR SERPL: 15 % (ref 15–46)
IRON SERPL-MCNC: 62 UG/DL (ref 61–157)
LYMPHOCYTES # BLD AUTO: 1.4 10E3/UL (ref 0.8–5.3)
LYMPHOCYTES NFR BLD AUTO: 26 %
MCH RBC QN AUTO: 22.1 PG (ref 26.5–33)
MCHC RBC AUTO-ENTMCNC: 29.1 G/DL (ref 31.5–36.5)
MCV RBC AUTO: 76 FL (ref 78–100)
MONOCYTES # BLD AUTO: 0.6 10E3/UL (ref 0–1.3)
MONOCYTES NFR BLD AUTO: 11 %
NEUTROPHILS # BLD AUTO: 3.1 10E3/UL (ref 1.6–8.3)
NEUTROPHILS NFR BLD AUTO: 59 %
NRBC # BLD AUTO: 0 10E3/UL
NRBC BLD AUTO-RTO: 0 /100
PLATELET # BLD AUTO: 337 10E3/UL (ref 150–450)
RBC # BLD AUTO: 5.6 10E6/UL (ref 4.4–5.9)
WBC # BLD AUTO: 5.3 10E3/UL (ref 4–11)

## 2024-02-09 PROCEDURE — 85025 COMPLETE CBC W/AUTO DIFF WBC: CPT

## 2024-02-09 PROCEDURE — 36415 COLL VENOUS BLD VENIPUNCTURE: CPT

## 2024-02-09 PROCEDURE — 82728 ASSAY OF FERRITIN: CPT

## 2024-02-09 PROCEDURE — 83550 IRON BINDING TEST: CPT

## 2024-02-09 PROCEDURE — 83036 HEMOGLOBIN GLYCOSYLATED A1C: CPT

## 2024-02-09 PROCEDURE — 83540 ASSAY OF IRON: CPT

## 2024-02-09 PROCEDURE — 82947 ASSAY GLUCOSE BLOOD QUANT: CPT

## 2024-02-09 NOTE — RESULT ENCOUNTER NOTE
Dear William,     -Glucose is just minimally elevated.    -A1C (test of diabetes control the last 2-3 months) is at your goal and has improved greatly.        Thank you,     ODALIS Martin, CNP  [facility]

## 2024-02-12 ENCOUNTER — ONCOLOGY VISIT (OUTPATIENT)
Dept: ONCOLOGY | Facility: HOSPITAL | Age: 44
End: 2024-02-12
Attending: INTERNAL MEDICINE
Payer: COMMERCIAL

## 2024-02-12 VITALS
OXYGEN SATURATION: 100 % | SYSTOLIC BLOOD PRESSURE: 136 MMHG | DIASTOLIC BLOOD PRESSURE: 68 MMHG | RESPIRATION RATE: 20 BRPM | HEART RATE: 74 BPM | BODY MASS INDEX: 34.27 KG/M2 | WEIGHT: 253 LBS | HEIGHT: 72 IN

## 2024-02-12 DIAGNOSIS — E61.1 IRON DEFICIENCY: Primary | ICD-10-CM

## 2024-02-12 PROCEDURE — 99214 OFFICE O/P EST MOD 30 MIN: CPT | Performed by: INTERNAL MEDICINE

## 2024-02-12 PROCEDURE — 99213 OFFICE O/P EST LOW 20 MIN: CPT | Performed by: INTERNAL MEDICINE

## 2024-02-12 ASSESSMENT — ENCOUNTER SYMPTOMS
CARDIOVASCULAR NEGATIVE: 1
HEMATOLOGIC/LYMPHATIC NEGATIVE: 1
RESPIRATORY NEGATIVE: 1
FATIGUE: 0
ENDOCRINE COMMENTS: DIABETES MELLITUS
GASTROINTESTINAL NEGATIVE: 1
APPETITE CHANGE: 1
EYES NEGATIVE: 1
NEUROLOGICAL NEGATIVE: 1
MUSCULOSKELETAL NEGATIVE: 1

## 2024-02-12 ASSESSMENT — PAIN SCALES - GENERAL: PAINLEVEL: NO PAIN (0)

## 2024-02-12 NOTE — LETTER
2/12/2024         RE: William Bolton  [address] [facility] [address]        Dear Colleague,    Thank you for referring your patient, William Bolton, to the [facility]. Please see a copy of my visit note below.    Oncology Rooming Note    February 12, 2024 8:20 AM   William Bolton is a 44 year old male who presents for:    Chief Complaint   Patient presents with     Hematology     Iron deficiency     Initial Vitals: /68 (BP Location: Left arm, Patient Position: Sitting, Cuff Size: Adult Large)   Pulse 74   Resp 20   Ht 1.829 m (6')   Wt 114.8 kg (253 lb)   SpO2 100%   BMI 34.31 kg/m   Estimated body mass index is 34.31 kg/m  as calculated from the following:    Height as of this encounter: 1.829 m (6').    Weight as of this encounter: 114.8 kg (253 lb). Body surface area is 2.41 meters squared.  No Pain (0) Comment: Data Unavailable   No LMP for male patient.  Allergies reviewed: Yes  Medications reviewed: Yes    Medications: Medication refills not needed today.  Pharmacy name entered into [employer]:    [facility] [place] [place] [address] [place] [address] DRUG STORE #[id number] [facility] [place] [address] [facility] [address]  [id number] [place], [place] - [address] [facility] [address]    Frailty Screening:   Is the patient here for a new oncology consult visit in cancer care? 2. No      Clinical concerns: 2 months follow up, no concerns per pt.       Cherelle Pemberton MA               Assessment & Plan   Microcytic anemia improved with iron replacement  Hiatus hernia with planned repair  Diabetes on Ozempic with 20# weight loss    Continue oral iron  Recheck CBC and ferritin in about 6 months    History  This is a scheduled follow-up visit for this  5M  with iron deficiency anemia who has been taking oral iron and previously required infusions.  He currently feels well, and still takes iron twice a day.  He's had a very  extensive GI evaluation for blood loss, with the only notable finding a large hiatus hernia.  This will be repaired laparoscopically with mesh on 1 April 2024.    The patient expresses concern that this might not fully resolve his problem, that the hiatus hernia might not be where he's been losing blood.    Apart from that, he's been taking Ozempic for a couple of months and has lost 20#.  He feels better than at his last visit, though he's just a couple of weeks out from a respiratory infection that gave him fatigue and a cough.    ECOG = 0    Patient Active Problem List   Diagnosis     Mixed hyperlipidemia     ADD (attention deficit disorder) without hyperactivity     Non morbid obesity due to excess calories     Abnormal bone xray - sclerosis of phalynx of right great toe 7/31/17 - suggest repeat imaging at annual visit.     NATHAN (obstructive sleep apnea)     Nocturnal hypoxemia     Obesity (BMI 35.0-39.9) with comorbidity (H)     Adenomatous polyp of descending colon     Essential hypertension with goal blood pressure less than 130/80     Family history of early liver disease (PSC)      Hiatal hernia     Fatty liver     Elevated liver enzymes     Diabetes mellitus, type 2 (H)     Low hemoglobin     Current Outpatient Medications   Medication     amphetamine-dextroamphetamine (ADDERALL XR) 25 MG 24 hr capsule     [START ON 3/14/2024] amphetamine-dextroamphetamine (ADDERALL XR) 25 MG 24 hr capsule     amphetamine-dextroamphetamine (ADDERALL) 5 MG tablet     [START ON 3/14/2024] amphetamine-dextroamphetamine (ADDERALL) 5 MG tablet     lisinopril (ZESTRIL) 30 MG tablet     metFORMIN (GLUCOPHAGE) 500 MG tablet     rosuvastatin (CRESTOR) 40 MG tablet     semaglutide (OZEMPIC) 2 MG/3ML pen     testosterone cypionate (DEPOTESTOSTERONE) 200 MG/ML injection     No current facility-administered medications for this visit.     Past Medical History:   Diagnosis Date     Abnormal bone xray - sclerosis of phalynx of right great  toe 7/31/17 - suggest repeat imaging at annual visit. 07/31/2017     Depressive disorder      Diabetes mellitus, type 2 (H) 07/03/2023     Hypercholesteremia      Hypertension 2019     Sleep apnea      Past Surgical History:   Procedure Laterality Date     COLONOSCOPY  06/13/2019    Dr. Navarro [facility]     COLONOSCOPY N/A 12/28/2023    Procedure: Colonoscopy;  Surgeon: Luis Ge MD;  Location: [facility]     COLONOSCOPY N/A 12/28/2023    Procedure: COLONOSCOPY, WITH POLYPECTOMY AND BIOPSY;  Surgeon: Luis Ge MD;  Location: [facility]     ESOPHAGOSCOPY, GASTROSCOPY, DUODENOSCOPY (EGD), COMBINED N/A 05/23/2019    Procedure: ESOPHAGOGASTRODUODENOSCOPY, WITH BIOPSY;  Surgeon: Say Navarro MD;  Location: [facility]     ESOPHAGOSCOPY, GASTROSCOPY, DUODENOSCOPY (EGD), COMBINED N/A 12/28/2023    Procedure: Esophagoscopy, gastroscopy, duodenoscopy (EGD), combined;  Surgeon: Luis Ge MD;  Location:  GI     HEAD & NECK SURGERY      wisdom teeth removed     Socioeconomic History     Marital status:    Occupational History     Employer: [employer]     Social Determinants of Health     Interpersonal Safety: Low Risk  (1/12/2024)    Interpersonal Safety      Do you feel physically and emotionally safe where you currently live?: Yes      Within the past 12 months, have you been hit, slapped, kicked or otherwise physically hurt by someone?: No      Within the past 12 months, have you been humiliated or emotionally abused in other ways by your partner or ex-partner?: No     Lives with wife and a couple of kids, one of whom is 8 months old.    Review of Systems   Constitutional:  Positive for appetite change (related to Ozempic). Negative for fatigue (improved energy).   HENT:  Negative.     Eyes: Negative.    Respiratory: Negative.     Cardiovascular: Negative.    Gastrointestinal: Negative.         Stools have become softer   Endocrine:        Diabetes mellitus   Genitourinary: Negative.      Musculoskeletal: Negative.    Skin: Negative.    Neurological: Negative.    Hematological: Negative.    All other systems reviewed and are negative.      /68 (BP Location: Left arm, Patient Position: Sitting, Cuff Size: Adult Large)   Pulse 74   Resp 20   Ht 1.829 m (6')   Wt 114.8 kg (253 lb)   SpO2 100%   BMI 34.31 kg/m      Physical Exam  Vitals and nursing note reviewed.   Constitutional:       Appearance: Normal appearance. He is well-developed and well-groomed. He is obese.      Comments: Cheerful and pleasant   HENT:      Head: Normocephalic and atraumatic.      Mouth/Throat:      Mouth: Mucous membranes are moist.      Dentition: Normal dentition. No dental caries.   Eyes:      Extraocular Movements: Extraocular movements intact.      Conjunctiva/sclera: Conjunctivae normal.      Pupils: Pupils are equal, round, and reactive to light.   Cardiovascular:      Rate and Rhythm: Normal rate and regular rhythm.      Heart sounds: Normal heart sounds.   Pulmonary:      Effort: Pulmonary effort is normal.      Breath sounds: Normal breath sounds.   Abdominal:      General: There is no distension.      Palpations: Abdomen is soft. There is no mass.      Tenderness: There is no abdominal tenderness. There is no guarding.   Musculoskeletal:         General: No deformity.      Cervical back: Normal range of motion and neck supple.      Right lower leg: No edema.      Left lower leg: No edema.   Lymphadenopathy:      Cervical: No cervical adenopathy.      Upper Body:      Right upper body: No axillary or epitrochlear adenopathy.      Left upper body: No axillary or epitrochlear adenopathy.   Skin:     General: Skin is warm and dry.   Neurological:      General: No focal deficit present.      Mental Status: He is alert and oriented to person, place, and time.      Cranial Nerves: No cranial nerve deficit.      Motor: No weakness.      Gait: Gait normal.      Deep Tendon Reflexes: Reflexes normal.    Psychiatric:         Mood and Affect: Mood normal.         Behavior: Behavior normal. Behavior is cooperative.         Thought Content: Thought content normal.         Judgment: Judgment normal.       Recent Results (from the past 720 hour(s))   Ferritin    Collection Time: 02/09/24  8:14 AM   Result Value Ref Range    Ferritin 21 (L) 31 - 409 ng/mL   Iron and iron binding capacity    Collection Time: 02/09/24  8:14 AM   Result Value Ref Range    Iron 62 61 - 157 ug/dL    Iron Binding Capacity 404 240 - 430 ug/dL    Iron Sat Index 15 15 - 46 %   Hemoglobin A1c    Collection Time: 02/09/24  8:14 AM   Result Value Ref Range    Hemoglobin A1C 5.2 0.0 - 5.6 %   Glucose, whole blood    Collection Time: 02/09/24  8:14 AM   Result Value Ref Range    Glucose Whole Blood 105 (H) 60 - 99 mg/dL   CBC with platelets and differential    Collection Time: 02/09/24  8:14 AM   Result Value Ref Range    WBC Count 5.3 4.0 - 11.0 10e3/uL    RBC Count 5.60 4.40 - 5.90 10e6/uL    Hemoglobin 12.4 (L) 13.3 - 17.7 g/dL    Hematocrit 42.6 40.0 - 53.0 %    MCV 76 (L) 78 - 100 fL    MCH 22.1 (L) 26.5 - 33.0 pg    MCHC 29.1 (L) 31.5 - 36.5 g/dL    RDW 23.5 (H) 10.0 - 15.0 %    Platelet Count 337 150 - 450 10e3/uL    % Neutrophils 59 %    % Lymphocytes 26 %    % Monocytes 11 %    % Eosinophils 3 %    % Basophils 1 %    % Immature Granulocytes 0 %    NRBCs per 100 WBC 0 <1 /100    Absolute Neutrophils 3.1 1.6 - 8.3 10e3/uL    Absolute Lymphocytes 1.4 0.8 - 5.3 10e3/uL    Absolute Monocytes 0.6 0.0 - 1.3 10e3/uL    Absolute Eosinophils 0.2 0.0 - 0.7 10e3/uL    Absolute Basophils 0.0 0.0 - 0.2 10e3/uL    Absolute Immature Granulocytes 0.0 <=0.4 10e3/uL    Absolute NRBCs 0.0 10e3/uL       Recent Results (from the past 720 hour(s))   Ferritin    Collection Time: 02/09/24  8:14 AM   Result Value Ref Range    Ferritin 21 (L) 31 - 409 ng/mL   Iron and iron binding capacity    Collection Time: 02/09/24  8:14 AM   Result Value Ref Range    Iron 62 61 -  157 ug/dL    Iron Binding Capacity 404 240 - 430 ug/dL    Iron Sat Index 15 15 - 46 %   Hemoglobin A1c    Collection Time: 02/09/24  8:14 AM   Result Value Ref Range    Hemoglobin A1C 5.2 0.0 - 5.6 %   Glucose, whole blood    Collection Time: 02/09/24  8:14 AM   Result Value Ref Range    Glucose Whole Blood 105 (H) 60 - 99 mg/dL   CBC with platelets and differential    Collection Time: 02/09/24  8:14 AM   Result Value Ref Range    WBC Count 5.3 4.0 - 11.0 10e3/uL    RBC Count 5.60 4.40 - 5.90 10e6/uL    Hemoglobin 12.4 (L) 13.3 - 17.7 g/dL    Hematocrit 42.6 40.0 - 53.0 %    MCV 76 (L) 78 - 100 fL    MCH 22.1 (L) 26.5 - 33.0 pg    MCHC 29.1 (L) 31.5 - 36.5 g/dL    RDW 23.5 (H) 10.0 - 15.0 %    Platelet Count 337 150 - 450 10e3/uL    % Neutrophils 59 %    % Lymphocytes 26 %    % Monocytes 11 %    % Eosinophils 3 %    % Basophils 1 %    % Immature Granulocytes 0 %    NRBCs per 100 WBC 0 <1 /100    Absolute Neutrophils 3.1 1.6 - 8.3 10e3/uL    Absolute Lymphocytes 1.4 0.8 - 5.3 10e3/uL    Absolute Monocytes 0.6 0.0 - 1.3 10e3/uL    Absolute Eosinophils 0.2 0.0 - 0.7 10e3/uL    Absolute Basophils 0.0 0.0 - 0.2 10e3/uL    Absolute Immature Granulocytes 0.0 <=0.4 10e3/uL    Absolute NRBCs 0.0 10e3/uL       Recent Results (from the past 744 hour(s))   XR Esophagram w Upper GI    Narrative    ESOPHAGRAM WITH UPPER GI DOUBLE CONTRAST 1/30/2024 8:04 AM     HISTORY: Hiatal hernia.    TECHNIQUE: 1.2 minutes fluoroscopy. 9 spot images.    COMPARISON: CT 12/7/2023    FINDINGS: Esophagus is unremarkable. No stricture, mass lesion, or  mucosal abnormality. There is a moderate-sized sliding hiatal hernia.  Approximately 50% of the stomach is located above the diaphragm. No  spontaneous gastroesophageal reflux is observed. Normal duodenal  sweep.      Impression    IMPRESSION:  Moderate-sized hiatal hernia.    ROSY MARROQUIN MD         SYSTEM ID:  [id number]         Again, thank you for allowing me to participate in the care of your  patient.        Sincerely,        Radha Manuel MD

## 2024-02-12 NOTE — PROGRESS NOTES
Oncology Rooming Note    February 12, 2024 8:20 AM   William Bolton is a 44 year old male who presents for:    Chief Complaint   Patient presents with    Hematology     Iron deficiency     Initial Vitals: /68 (BP Location: Left arm, Patient Position: Sitting, Cuff Size: Adult Large)   Pulse 74   Resp 20   Ht 1.829 m (6')   Wt 114.8 kg (253 lb)   SpO2 100%   BMI 34.31 kg/m   Estimated body mass index is 34.31 kg/m  as calculated from the following:    Height as of this encounter: 1.829 m (6').    Weight as of this encounter: 114.8 kg (253 lb). Body surface area is 2.41 meters squared.  No Pain (0) Comment: Data Unavailable   No LMP for male patient.  Allergies reviewed: Yes  Medications reviewed: Yes    Medications: Medication refills not needed today.  Pharmacy name entered into [employer]:    [place], [place] - [address] [place] [address] DRUG STORE #[id number] [facility] [address] [facility] [address] [facility]  [id number] [address] [facility] [facility]    Frailty Screening:   Is the patient here for a new oncology consult visit in cancer care? 2. No      Clinical concerns: 2 months follow up, no concerns per pt.       Cherelle Pemberton MA

## 2024-02-12 NOTE — PROGRESS NOTES
Assessment & Plan   Microcytic anemia improved with iron replacement  Hiatus hernia with planned repair  Diabetes on Ozempic with 20# weight loss    Continue oral iron  Recheck CBC and ferritin in about 6 months    History  This is a scheduled follow-up visit for this  5M  with iron deficiency anemia who has been taking oral iron and previously required infusions.  He currently feels well, and still takes iron twice a day.  He's had a very extensive GI evaluation for blood loss, with the only notable finding a large hiatus hernia.  This will be repaired laparoscopically with mesh on 1 April 2024.    The patient expresses concern that this might not fully resolve his problem, that the hiatus hernia might not be where he's been losing blood.    Apart from that, he's been taking Ozempic for a couple of months and has lost 20#.  He feels better than at his last visit, though he's just a couple of weeks out from a respiratory infection that gave him fatigue and a cough.    ECOG = 0    Patient Active Problem List   Diagnosis    Mixed hyperlipidemia    ADD (attention deficit disorder) without hyperactivity    Non morbid obesity due to excess calories    Abnormal bone xray - sclerosis of phalynx of right great toe 7/31/17 - suggest repeat imaging at annual visit.    NATHAN (obstructive sleep apnea)    Nocturnal hypoxemia    Obesity (BMI 35.0-39.9) with comorbidity (H)    Adenomatous polyp of descending colon    Essential hypertension with goal blood pressure less than 130/80    Family history of early liver disease (PSC)     Hiatal hernia    Fatty liver    Elevated liver enzymes    Diabetes mellitus, type 2 (H)    Low hemoglobin     Current Outpatient Medications   Medication    amphetamine-dextroamphetamine (ADDERALL XR) 25 MG 24 hr capsule    [START ON 3/14/2024] amphetamine-dextroamphetamine (ADDERALL XR) 25 MG 24 hr capsule    amphetamine-dextroamphetamine (ADDERALL) 5 MG tablet    [START ON 3/14/2024]  amphetamine-dextroamphetamine (ADDERALL) 5 MG tablet    lisinopril (ZESTRIL) 30 MG tablet    metFORMIN (GLUCOPHAGE) 500 MG tablet    rosuvastatin (CRESTOR) 40 MG tablet    semaglutide (OZEMPIC) 2 MG/3ML pen    testosterone cypionate (DEPOTESTOSTERONE) 200 MG/ML injection     No current facility-administered medications for this visit.     Past Medical History:   Diagnosis Date    Abnormal bone xray - sclerosis of phalynx of right great toe 7/31/17 - suggest repeat imaging at annual visit. 07/31/2017    Depressive disorder     Diabetes mellitus, type 2 (H) 07/03/2023    Hypercholesteremia     Hypertension 2019    Sleep apnea      Past Surgical History:   Procedure Laterality Date    COLONOSCOPY  06/13/2019    Dr. Navarro [facility]    COLONOSCOPY N/A 12/28/2023    Procedure: Colonoscopy;  Surgeon: Luis Ge MD;  Location:  GI    COLONOSCOPY N/A 12/28/2023    Procedure: COLONOSCOPY, WITH POLYPECTOMY AND BIOPSY;  Surgeon: Luis Ge MD;  Location: [facility]    ESOPHAGOSCOPY, GASTROSCOPY, DUODENOSCOPY (EGD), COMBINED N/A 05/23/2019    Procedure: ESOPHAGOGASTRODUODENOSCOPY, WITH BIOPSY;  Surgeon: Say Navarro MD;  Location: [facility]    ESOPHAGOSCOPY, GASTROSCOPY, DUODENOSCOPY (EGD), COMBINED N/A 12/28/2023    Procedure: Esophagoscopy, gastroscopy, duodenoscopy (EGD), combined;  Surgeon: Luis Ge MD;  Location:  GI    HEAD & NECK SURGERY      wisdom teeth removed     Socioeconomic History    Marital status:    Occupational History     Employer: [employer]     Social Determinants of Health     Interpersonal Safety: Low Risk  (1/12/2024)    Interpersonal Safety     Do you feel physically and emotionally safe where you currently live?: Yes     Within the past 12 months, have you been hit, slapped, kicked or otherwise physically hurt by someone?: No     Within the past 12 months, have you been humiliated or emotionally abused in other ways by your partner or ex-partner?: No     Lives with wife  and a couple of kids, one of whom is 8 months old.    Review of Systems   Constitutional:  Positive for appetite change (related to Ozempic). Negative for fatigue (improved energy).   HENT:  Negative.     Eyes: Negative.    Respiratory: Negative.     Cardiovascular: Negative.    Gastrointestinal: Negative.         Stools have become softer   Endocrine:        Diabetes mellitus   Genitourinary: Negative.     Musculoskeletal: Negative.    Skin: Negative.    Neurological: Negative.    Hematological: Negative.    All other systems reviewed and are negative.      /68 (BP Location: Left arm, Patient Position: Sitting, Cuff Size: Adult Large)   Pulse 74   Resp 20   Ht 1.829 m (6')   Wt 114.8 kg (253 lb)   SpO2 100%   BMI 34.31 kg/m      Physical Exam  Vitals and nursing note reviewed.   Constitutional:       Appearance: Normal appearance. He is well-developed and well-groomed. He is obese.      Comments: Cheerful and pleasant   HENT:      Head: Normocephalic and atraumatic.      Mouth/Throat:      Mouth: Mucous membranes are moist.      Dentition: Normal dentition. No dental caries.   Eyes:      Extraocular Movements: Extraocular movements intact.      Conjunctiva/sclera: Conjunctivae normal.      Pupils: Pupils are equal, round, and reactive to light.   Cardiovascular:      Rate and Rhythm: Normal rate and regular rhythm.      Heart sounds: Normal heart sounds.   Pulmonary:      Effort: Pulmonary effort is normal.      Breath sounds: Normal breath sounds.   Abdominal:      General: There is no distension.      Palpations: Abdomen is soft. There is no mass.      Tenderness: There is no abdominal tenderness. There is no guarding.   Musculoskeletal:         General: No deformity.      Cervical back: Normal range of motion and neck supple.      Right lower leg: No edema.      Left lower leg: No edema.   Lymphadenopathy:      Cervical: No cervical adenopathy.      Upper Body:      Right upper body: No axillary or  epitrochlear adenopathy.      Left upper body: No axillary or epitrochlear adenopathy.   Skin:     General: Skin is warm and dry.   Neurological:      General: No focal deficit present.      Mental Status: He is alert and oriented to person, place, and time.      Cranial Nerves: No cranial nerve deficit.      Motor: No weakness.      Gait: Gait normal.      Deep Tendon Reflexes: Reflexes normal.   Psychiatric:         Mood and Affect: Mood normal.         Behavior: Behavior normal. Behavior is cooperative.         Thought Content: Thought content normal.         Judgment: Judgment normal.       Recent Results (from the past 720 hour(s))   Ferritin    Collection Time: 02/09/24  8:14 AM   Result Value Ref Range    Ferritin 21 (L) 31 - 409 ng/mL   Iron and iron binding capacity    Collection Time: 02/09/24  8:14 AM   Result Value Ref Range    Iron 62 61 - 157 ug/dL    Iron Binding Capacity 404 240 - 430 ug/dL    Iron Sat Index 15 15 - 46 %   Hemoglobin A1c    Collection Time: 02/09/24  8:14 AM   Result Value Ref Range    Hemoglobin A1C 5.2 0.0 - 5.6 %   Glucose, whole blood    Collection Time: 02/09/24  8:14 AM   Result Value Ref Range    Glucose Whole Blood 105 (H) 60 - 99 mg/dL   CBC with platelets and differential    Collection Time: 02/09/24  8:14 AM   Result Value Ref Range    WBC Count 5.3 4.0 - 11.0 10e3/uL    RBC Count 5.60 4.40 - 5.90 10e6/uL    Hemoglobin 12.4 (L) 13.3 - 17.7 g/dL    Hematocrit 42.6 40.0 - 53.0 %    MCV 76 (L) 78 - 100 fL    MCH 22.1 (L) 26.5 - 33.0 pg    MCHC 29.1 (L) 31.5 - 36.5 g/dL    RDW 23.5 (H) 10.0 - 15.0 %    Platelet Count 337 150 - 450 10e3/uL    % Neutrophils 59 %    % Lymphocytes 26 %    % Monocytes 11 %    % Eosinophils 3 %    % Basophils 1 %    % Immature Granulocytes 0 %    NRBCs per 100 WBC 0 <1 /100    Absolute Neutrophils 3.1 1.6 - 8.3 10e3/uL    Absolute Lymphocytes 1.4 0.8 - 5.3 10e3/uL    Absolute Monocytes 0.6 0.0 - 1.3 10e3/uL    Absolute Eosinophils 0.2 0.0 - 0.7  10e3/uL    Absolute Basophils 0.0 0.0 - 0.2 10e3/uL    Absolute Immature Granulocytes 0.0 <=0.4 10e3/uL    Absolute NRBCs 0.0 10e3/uL       Recent Results (from the past 720 hour(s))   Ferritin    Collection Time: 02/09/24  8:14 AM   Result Value Ref Range    Ferritin 21 (L) 31 - 409 ng/mL   Iron and iron binding capacity    Collection Time: 02/09/24  8:14 AM   Result Value Ref Range    Iron 62 61 - 157 ug/dL    Iron Binding Capacity 404 240 - 430 ug/dL    Iron Sat Index 15 15 - 46 %   Hemoglobin A1c    Collection Time: 02/09/24  8:14 AM   Result Value Ref Range    Hemoglobin A1C 5.2 0.0 - 5.6 %   Glucose, whole blood    Collection Time: 02/09/24  8:14 AM   Result Value Ref Range    Glucose Whole Blood 105 (H) 60 - 99 mg/dL   CBC with platelets and differential    Collection Time: 02/09/24  8:14 AM   Result Value Ref Range    WBC Count 5.3 4.0 - 11.0 10e3/uL    RBC Count 5.60 4.40 - 5.90 10e6/uL    Hemoglobin 12.4 (L) 13.3 - 17.7 g/dL    Hematocrit 42.6 40.0 - 53.0 %    MCV 76 (L) 78 - 100 fL    MCH 22.1 (L) 26.5 - 33.0 pg    MCHC 29.1 (L) 31.5 - 36.5 g/dL    RDW 23.5 (H) 10.0 - 15.0 %    Platelet Count 337 150 - 450 10e3/uL    % Neutrophils 59 %    % Lymphocytes 26 %    % Monocytes 11 %    % Eosinophils 3 %    % Basophils 1 %    % Immature Granulocytes 0 %    NRBCs per 100 WBC 0 <1 /100    Absolute Neutrophils 3.1 1.6 - 8.3 10e3/uL    Absolute Lymphocytes 1.4 0.8 - 5.3 10e3/uL    Absolute Monocytes 0.6 0.0 - 1.3 10e3/uL    Absolute Eosinophils 0.2 0.0 - 0.7 10e3/uL    Absolute Basophils 0.0 0.0 - 0.2 10e3/uL    Absolute Immature Granulocytes 0.0 <=0.4 10e3/uL    Absolute NRBCs 0.0 10e3/uL       Recent Results (from the past 744 hour(s))   XR Esophagram w Upper GI    Narrative    ESOPHAGRAM WITH UPPER GI DOUBLE CONTRAST 1/30/2024 8:04 AM     HISTORY: Hiatal hernia.    TECHNIQUE: 1.2 minutes fluoroscopy. 9 spot images.    COMPARISON: CT 12/7/2023    FINDINGS: Esophagus is unremarkable. No stricture, mass lesion,  or  mucosal abnormality. There is a moderate-sized sliding hiatal hernia.  Approximately 50% of the stomach is located above the diaphragm. No  spontaneous gastroesophageal reflux is observed. Normal duodenal  sweep.      Impression    IMPRESSION:  Moderate-sized hiatal hernia.    ROSY MARROQUIN MD         SYSTEM ID:  [id number]

## 2024-03-06 ENCOUNTER — MYC MEDICAL ADVICE (OUTPATIENT)
Dept: FAMILY MEDICINE | Facility: CLINIC | Age: 44
End: 2024-03-06
Payer: COMMERCIAL

## 2024-03-08 NOTE — TELEPHONE ENCOUNTER
Called patient.     Reports weight loss of 20 lbs.      GLP-1 agonist will need to be held for 1 week prior to surgery.     Will keep at current dose based on conversation.     - Sandra, CNP

## 2024-03-15 ENCOUNTER — OFFICE VISIT (OUTPATIENT)
Dept: FAMILY MEDICINE | Facility: CLINIC | Age: 44
End: 2024-03-15
Payer: COMMERCIAL

## 2024-03-15 VITALS
OXYGEN SATURATION: 99 % | TEMPERATURE: 97.9 F | HEART RATE: 84 BPM | BODY MASS INDEX: 33.59 KG/M2 | WEIGHT: 248 LBS | DIASTOLIC BLOOD PRESSURE: 66 MMHG | RESPIRATION RATE: 16 BRPM | HEIGHT: 72 IN | SYSTOLIC BLOOD PRESSURE: 116 MMHG

## 2024-03-15 DIAGNOSIS — Z01.818 PREOP GENERAL PHYSICAL EXAM: Primary | ICD-10-CM

## 2024-03-15 DIAGNOSIS — K44.9 HIATAL HERNIA: ICD-10-CM

## 2024-03-15 LAB
ERYTHROCYTE [DISTWIDTH] IN BLOOD BY AUTOMATED COUNT: 22.8 % (ref 10–15)
HCT VFR BLD AUTO: 47.5 % (ref 40–53)
HGB BLD-MCNC: 14.3 G/DL (ref 13.3–17.7)
MCH RBC QN AUTO: 24 PG (ref 26.5–33)
MCHC RBC AUTO-ENTMCNC: 30.1 G/DL (ref 31.5–36.5)
MCV RBC AUTO: 80 FL (ref 78–100)
PLATELET # BLD AUTO: 269 10E3/UL (ref 150–450)
RBC # BLD AUTO: 5.95 10E6/UL (ref 4.4–5.9)
WBC # BLD AUTO: 6.6 10E3/UL (ref 4–11)

## 2024-03-15 PROCEDURE — 36415 COLL VENOUS BLD VENIPUNCTURE: CPT | Performed by: NURSE PRACTITIONER

## 2024-03-15 PROCEDURE — 80048 BASIC METABOLIC PNL TOTAL CA: CPT | Performed by: NURSE PRACTITIONER

## 2024-03-15 PROCEDURE — 99214 OFFICE O/P EST MOD 30 MIN: CPT | Performed by: NURSE PRACTITIONER

## 2024-03-15 PROCEDURE — 85027 COMPLETE CBC AUTOMATED: CPT | Performed by: NURSE PRACTITIONER

## 2024-03-15 RX ORDER — BACILLUS COAGULANS 1B CELL
1 CAPSULE ORAL DAILY
COMMUNITY
Start: 2024-03-15

## 2024-03-15 ASSESSMENT — ANXIETY QUESTIONNAIRES
4. TROUBLE RELAXING: NOT AT ALL
5. BEING SO RESTLESS THAT IT IS HARD TO SIT STILL: NOT AT ALL
GAD7 TOTAL SCORE: 0
8. IF YOU CHECKED OFF ANY PROBLEMS, HOW DIFFICULT HAVE THESE MADE IT FOR YOU TO DO YOUR WORK, TAKE CARE OF THINGS AT HOME, OR GET ALONG WITH OTHER PEOPLE?: NOT DIFFICULT AT ALL
IF YOU CHECKED OFF ANY PROBLEMS ON THIS QUESTIONNAIRE, HOW DIFFICULT HAVE THESE PROBLEMS MADE IT FOR YOU TO DO YOUR WORK, TAKE CARE OF THINGS AT HOME, OR GET ALONG WITH OTHER PEOPLE: NOT DIFFICULT AT ALL
3. WORRYING TOO MUCH ABOUT DIFFERENT THINGS: NOT AT ALL
GAD7 TOTAL SCORE: 0
GAD7 TOTAL SCORE: 0
2. NOT BEING ABLE TO STOP OR CONTROL WORRYING: NOT AT ALL
1. FEELING NERVOUS, ANXIOUS, OR ON EDGE: NOT AT ALL
7. FEELING AFRAID AS IF SOMETHING AWFUL MIGHT HAPPEN: NOT AT ALL
7. FEELING AFRAID AS IF SOMETHING AWFUL MIGHT HAPPEN: NOT AT ALL
6. BECOMING EASILY ANNOYED OR IRRITABLE: NOT AT ALL

## 2024-03-15 NOTE — PATIENT INSTRUCTIONS
Preparing for Your Surgery  Getting started  A nurse will call you to review your health history and instructions. They will give you an arrival time based on your scheduled surgery time. Please be ready to share:  Your doctor's clinic name and phone number  Your medical, surgical, and anesthesia history  A list of allergies and sensitivities  A list of medicines, including herbal treatments and over-the-counter drugs  Whether the patient has a legal guardian (ask how to send us the papers in advance)  Please tell us if you're pregnant--or if there's any chance you might be pregnant. Some surgeries may injure a fetus (unborn baby), so they require a pregnancy test. Surgeries that are safe for a fetus don't always need a test, and you can choose whether to have one.   If you have a child who's having surgery, please ask for a copy of Preparing for Your Child's Surgery.    Preparing for surgery  Within 10 to 30 days of surgery: Have a pre-op exam (sometimes called an H&P, or History and Physical). This can be done at a clinic or pre-operative center.  If you're having a , you may not need this exam. Talk to your care team.  At your pre-op exam, talk to your care team about all medicines you take. If you need to stop any medicines before surgery, ask when to start taking them again.  We do this for your safety. Many medicines can make you bleed too much during surgery. Some change how well surgery (anesthesia) drugs work.  Call your insurance company to let them know you're having surgery. (If you don't have insurance, call [phone].)  Call your clinic if there's any change in your health. This includes signs of a cold or flu (sore throat, runny nose, cough, rash, fever). It also includes a scrape or scratch near the surgery site.  If you have questions on the day of surgery, call your hospital or surgery center.  Eating and drinking guidelines  For your safety: Unless your surgeon tells you otherwise,  follow the guidelines below.  Eat and drink as usual until 8 hours before you arrive for surgery. After that, no food or milk.  Drink clear liquids until 2 hours before you arrive. These are liquids you can see through, like water, Gatorade, and Propel Water. They also include plain black coffee and tea (no cream or milk), candy, and breath mints. You can spit out gum when you arrive.  If you drink alcohol: Stop drinking it the night before surgery.  If your care team tells you to take medicine on the morning of surgery, it's okay to take it with a sip of water.  Preventing infection  Shower or bathe the night before and morning of your surgery. Follow the instructions your clinic gave you. (If no instructions, use regular soap.)  Don't shave or clip hair near your surgery site. We'll remove the hair if needed.  Don't smoke or vape the morning of surgery. You may chew nicotine gum up to 2 hours before surgery. A nicotine patch is okay.  Note: Some surgeries require you to completely quit smoking and nicotine. Check with your surgeon.  Your care team will make every effort to keep you safe from infection. We will:  Clean our hands often with soap and water (or an alcohol-based hand rub).  Clean the skin at your surgery site with a special soap that kills germs.  Give you a special gown to keep you warm. (Cold raises the risk of infection.)  Wear special hair covers, masks, gowns and gloves during surgery.  Give antibiotic medicine, if prescribed. Not all surgeries need antibiotics.  What to bring on the day of surgery  Photo ID and insurance card  Copy of your health care directive, if you have one  Glasses and hearing aids (bring cases)  You can't wear contacts during surgery  Inhaler and eye drops, if you use them (tell us about these when you arrive)  CPAP machine or breathing device, if you use them  A few personal items, if spending the night  If you have . . .  A pacemaker, ICD (cardiac defibrillator) or other  implant: Bring the ID card.  An implanted stimulator: Bring the remote control.  A legal guardian: Bring a copy of the certified (court-stamped) guardianship papers.  Please remove any jewelry, including body piercings. Leave jewelry and other valuables at home.  If you're going home the day of surgery  You must have a responsible adult drive you home. They should stay with you overnight as well.  If you don't have someone to stay with you, and you aren't safe to go home alone, we may keep you overnight. Insurance often won't pay for this.  After surgery  If it's hard to control your pain or you need more pain medicine, please call your surgeon's office.  Questions?   If you have any questions for your care team, list them here: _________________________________________________________________________________________________________________________________________________________________________ ____________________________________ ____________________________________ ____________________________________  For informational purposes only. Not to replace the advice of your health care provider. Copyright   2003, 2019 [employer] [employer]. All rights reserved. Clinically reviewed by Paula Sun MD. [employer] 510465 - REV 12/22.    How to Take Your Medication Before Surgery   - ACE/ARB- LISINOPRIL: HOLD on day of surgery (minimum 11 hours for general anesthesia).   - metformin: HOLD day of surgery.   - GLP-1 Injectable (Semaglutide): HOLD 7 days before surgery    - Psychostimulants - Adderall: Hold the day of surgery  HOLD multivitamin 14 days before surgery

## 2024-03-15 NOTE — PROGRESS NOTES
Preoperative Evaluation  [address] [address]  Phone: [phone]  Primary Provider: Barrett Brooks  Pre-op Performing Provider: BARRETT BROOKS  Mar 15, 2024       William is a 44 year old, presenting for the following:  Pre-Op Exam        3/15/2024     8:07 AM   Additional Questions   Roomed by Felicita HUFF CMA     Surgical Information  Surgery/Procedure: Robotic assisted laparoscopic hiatal hernia repair with fundoplication   Surgery Location: [facility]  Surgeon: Dr. Andrew Franklin  Surgery Date: 4/1/2024  Time of Surgery: 12:25 PM  Where patient plans to recover: At home with family  Fax number for surgical facility: Note does not need to be faxed, will be available electronically in Epic.    Assessment & Plan     The proposed surgical procedure is considered INTERMEDIATE risk.    William was seen today for pre-op exam.    Diagnoses and all orders for this visit:    Preop general physical exam  Hiatal hernia  -     Basic metabolic panel  (Ca, Cl, CO2, Creat, Gluc, K, Na, BUN)  -     CBC with platelets      Risks and Recommendations  The patient has the following additional risks and recommendations for perioperative complications:  Diabetes:  - Patient is not on insulin therapy: regular NPO guidelines can be followed.   Obstructive Sleep Apnea:   Bring CPAP to surgery, monitor oxygen post-operatively.      Antiplatelet or Anticoagulation Medication Instructions   - Patient is on no antiplatelet or anticoagulation medications.    Additional Medication Instructions   - ACE/ARB: HOLD on day of surgery (minimum 11 hours for general anesthesia).   - metformin: HOLD day of surgery.   - GLP-1 Injectable (exenitide, liraglutide, semaglutide, dulaglutide, etc.): HOLD 7 days before surgery    - Psychostimulants: Hold the day of surgery    Recommendation  APPROVAL GIVEN to proceed with proposed procedure, without further diagnostic  evaluation.            Subjective       HPI related to upcoming procedure:    Patient with history of hiatal hernia and partially intrathoracic stomach.  +Chronic iron deficiency anemia.  Planning to proceed with hiatal hernia repair with fundoplication in order to stop chronic GI bleeding secondary to gastric irritation from the hernia.            3/8/2024     9:39 AM   Preop Questions   1. Have you ever had a heart attack or stroke? No   2. Have you ever had surgery on your heart or blood vessels, such as a stent placement, a coronary artery bypass, or surgery on an artery in your head, neck, heart, or legs? No   3. Do you have chest pain with activity? No   4. Do you have a history of  heart failure? No   5. Do you currently have a cold, bronchitis or symptoms of other infection? No   6. Do you have a cough, shortness of breath, or wheezing? No   7. Do you or anyone in your family have previous history of blood clots? No   8. Do you or does anyone in your family have a serious bleeding problem such as prolonged bleeding following surgeries or cuts? No   9. Have you ever had problems with anemia or been told to take iron pills? YES - chronic iron deficiency anemia   10. Have you had any abnormal blood loss such as black, tarry or bloody stools? No   11. Have you ever had a blood transfusion? No   12. Are you willing to have a blood transfusion if it is medically needed before, during, or after your surgery? Yes   13. Have you or any of your relatives ever had problems with anesthesia? No   14. Do you have sleep apnea, excessive snoring or daytime drowsiness? YES - NATHAN   14a. Do you have a CPAP machine? Yes   15. Do you have any artifical heart valves or other implanted medical devices like a pacemaker, defibrillator, or continuous glucose monitor? No   16. Do you have artificial joints? No   17. Are you allergic to latex? No       Health Care Directive  Patient does not have a Health Care Directive or Living  Will    Preoperative Review of    reviewed - controlled substances reflected in medication list.      Status of Chronic Conditions:  See problem list for active medical problems.  Problems all longstanding and stable, except as noted/documented.  See ROS for pertinent symptoms related to these conditions.    Patient Active Problem List    Diagnosis Date Noted    Low hemoglobin 12/08/2023     Priority: Medium    Diabetes mellitus, type 2 (H) 07/03/2023     Priority: Medium    Family history of early liver disease (PSC)  11/22/2022     Priority: Medium     Father      Hiatal hernia 11/22/2022     Priority: Medium    Fatty liver 11/22/2022     Priority: Medium    Elevated liver enzymes 11/22/2022     Priority: Medium    Essential hypertension with goal blood pressure less than 130/80 09/05/2019     Priority: Medium    Adenomatous polyp of descending colon 06/14/2019     Priority: Medium    Obesity (BMI 35.0-39.9) with comorbidity (H) 05/16/2019     Priority: Medium    NATHAN (obstructive sleep apnea) 02/09/2018     Priority: Medium     Wears CPAP machine      Nocturnal hypoxemia 02/09/2018     Priority: Medium    Abnormal bone xray - sclerosis of phalynx of right great toe 7/31/17 - suggest repeat imaging at annual visit. 07/31/2017     Priority: Medium    Non morbid obesity due to excess calories 06/05/2017     Priority: Medium    ADD (attention deficit disorder) without hyperactivity 07/14/2015     Priority: Medium    Mixed hyperlipidemia 05/29/2014     Priority: Medium      Past Medical History:   Diagnosis Date    Abnormal bone xray - sclerosis of phalynx of right great toe 7/31/17 - suggest repeat imaging at annual visit. 07/31/2017    Depressive disorder     Diabetes mellitus, type 2 (H) 07/03/2023    Hypercholesteremia     Hypertension 2019    Sleep apnea      Past Surgical History:   Procedure Laterality Date    COLONOSCOPY  06/13/2019    Dr. Navarro [facility]    COLONOSCOPY N/A 12/28/2023    Procedure:  Colonoscopy;  Surgeon: Luis Ge MD;  Location:  GI    COLONOSCOPY N/A 12/28/2023    Procedure: COLONOSCOPY, WITH POLYPECTOMY AND BIOPSY;  Surgeon: Luis Ge MD;  Location:  GI    ESOPHAGOSCOPY, GASTROSCOPY, DUODENOSCOPY (EGD), COMBINED N/A 05/23/2019    Procedure: ESOPHAGOGASTRODUODENOSCOPY, WITH BIOPSY;  Surgeon: Say Navarro MD;  Location:  GI    ESOPHAGOSCOPY, GASTROSCOPY, DUODENOSCOPY (EGD), COMBINED N/A 12/28/2023    Procedure: Esophagoscopy, gastroscopy, duodenoscopy (EGD), combined;  Surgeon: Luis Ge MD;  Location:  GI    HEAD & NECK SURGERY      wisdom teeth removed     Current Outpatient Medications   Medication Sig Dispense Refill    amphetamine-dextroamphetamine (ADDERALL XR) 25 MG 24 hr capsule Take 1 capsule (25 mg) by mouth daily for 30 days 30 capsule 0    amphetamine-dextroamphetamine (ADDERALL) 5 MG tablet Take 1 tablet (5 mg) by mouth daily for 30 days 30 tablet 0    Elemental iron 65 mg Vitamin C 125 mg (VITRON-C)  MG TABS tablet Take 1 tablet by mouth daily      lisinopril (ZESTRIL) 30 MG tablet Take 1 tablet (30 mg) by mouth daily 90 tablet 3    metFORMIN (GLUCOPHAGE) 500 MG tablet Take 1 tablet (500 mg) by mouth 2 times daily (with meals) 180 tablet 1    rosuvastatin (CRESTOR) 40 MG tablet Take 1 tablet (40 mg) by mouth daily 90 tablet 3    semaglutide (OZEMPIC) 2 MG/3ML pen Inject 0.5 mg Subcutaneous every 7 days 3 mL 6    testosterone cypionate (DEPOTESTOSTERONE) 200 MG/ML injection INJECT 0.8ML INTRAMUSCULARLY ONCE WEEKLY         No Known Allergies     Social History     Tobacco Use    Smoking status: Never     Passive exposure: Never    Smokeless tobacco: Never   Substance Use Topics    Alcohol use: Yes     Comment: 6 a week     Family History   Problem Relation Age of Onset    Hyperlipidemia Mother     Sleep Apnea Mother     Hypertension Father     Other - See Comments Father         primary sclerosing cholangitis    Hypertension Sister      Hyperlipidemia Sister     Coronary Artery Disease Paternal Grandfather     Colon Cancer Cousin      History   Drug Use No         Review of Systems    Review of Systems  CONSTITUTIONAL: NEGATIVE for fever, chills, change in weight  INTEGUMENTARY/SKIN: NEGATIVE for worrisome rashes, moles or lesions  EYES: NEGATIVE for vision changes or irritation  ENT/MOUTH: NEGATIVE for ear, mouth and throat problems  RESP: NEGATIVE for significant cough or SOB  BREAST: NEGATIVE for masses, tenderness or discharge  CV: NEGATIVE for chest pain, palpitations or peripheral edema  GI: NEGATIVE for nausea, abdominal pain, heartburn, or change in bowel habits  : NEGATIVE for frequency, dysuria, or hematuria  MUSCULOSKELETAL: NEGATIVE for significant arthralgias or myalgia  NEURO: NEGATIVE for weakness, dizziness or paresthesias  ENDOCRINE: NEGATIVE for temperature intolerance, skin/hair changes  HEME: NEGATIVE for bleeding problems  PSYCHIATRIC: NEGATIVE for changes in mood or affect    Objective    /66   Pulse 84   Temp 97.9  F (36.6  C) (Tympanic)   Resp 16   Ht 1.829 m (6')   Wt 112.5 kg (248 lb)   SpO2 99%   BMI 33.63 kg/m     Estimated body mass index is 33.63 kg/m  as calculated from the following:    Height as of this encounter: 1.829 m (6').    Weight as of this encounter: 112.5 kg (248 lb).  Physical Exam    GENERAL: alert and no distress  EYES: Eyes grossly normal to inspection  HENT: ear canals and TM's normal, nose and mouth without ulcers or lesions  NECK: no adenopathy, no asymmetry, masses, or scars  RESP: lungs clear to auscultation - no rales, rhonchi or wheezes  CV: regular rate and rhythm, normal S1 S2, no S3 or S4, no murmur, click or rub, no peripheral edema  ABDOMEN: soft, nontender, no hepatosplenomegaly, no masses and bowel sounds normal  MS: no gross musculoskeletal defects noted, no edema  SKIN: no suspicious lesions or rashes  NEURO: Normal strength and tone, mentation intact and speech  normal  PSYCH: mentation appears normal, affect normal/bright    Recent Labs   Lab Test 02/09/24  0814 12/22/23  0816 12/13/23  0808 12/01/23  1259 11/14/23  0747 07/03/23  0905 11/25/22  0954   HGB 12.4* 8.2* 7.2*   < >  --   --   --      --  470*   < >  --   --   --    INR  --   --   --   --   --   --  1.03   NA  --   --  140  --  137  --   --    POTASSIUM  --   --  3.8  --  4.8  --   --    CR  --   --  0.85  --  0.83  --   --    A1C 5.2  --   --   --  7.2*   < >  --     < > = values in this interval not displayed.        Diagnostics  Labs pending at this time.  Results will be reviewed when available.   No EKG required, no history of coronary heart disease, significant arrhythmia, peripheral arterial disease or other structural heart disease.    Revised Cardiac Risk Index (RCRI)  The patient has the following serious cardiovascular risks for perioperative complications:   - No serious cardiac risks = 0 points     RCRI Interpretation: 0 points: Class I (very low risk - 0.4% complication rate)         Signed Electronically by: ODALIS Martin CNP  Copy of this evaluation report is provided to requesting physician.

## 2024-03-16 LAB
ANION GAP SERPL CALCULATED.3IONS-SCNC: 12 MMOL/L (ref 7–15)
BUN SERPL-MCNC: 5.6 MG/DL (ref 6–20)
CALCIUM SERPL-MCNC: 9.5 MG/DL (ref 8.6–10)
CHLORIDE SERPL-SCNC: 104 MMOL/L (ref 98–107)
CREAT SERPL-MCNC: 0.89 MG/DL (ref 0.67–1.17)
DEPRECATED HCO3 PLAS-SCNC: 26 MMOL/L (ref 22–29)
EGFRCR SERPLBLD CKD-EPI 2021: >90 ML/MIN/1.73M2
GLUCOSE SERPL-MCNC: 84 MG/DL (ref 70–99)
POTASSIUM SERPL-SCNC: 4.4 MMOL/L (ref 3.4–5.3)
SODIUM SERPL-SCNC: 142 MMOL/L (ref 135–145)

## 2024-03-16 NOTE — RESULT ENCOUNTER NOTE
Josh Thomas,     -Normal red blood cell (hgb) levels, normal white blood cell count and normal platelet levels.      Thank you,     ODALIS Martin, CNP  [facility] [place]

## 2024-03-18 NOTE — RESULT ENCOUNTER NOTE
Josh Thomas,     -Kidney function is normal (Cr, GFR), Sodium is normal, Potassium is normal, Calcium is normal, Glucose is normal.         Thank you,     Kezia Brooks, APRN, CNP  [facility] [place]

## 2024-04-01 ENCOUNTER — APPOINTMENT (OUTPATIENT)
Dept: SURGERY | Facility: PHYSICIAN GROUP | Age: 44
End: 2024-04-01
Payer: COMMERCIAL

## 2024-04-01 ENCOUNTER — ANESTHESIA (OUTPATIENT)
Dept: SURGERY | Facility: CLINIC | Age: 44
DRG: 328 | End: 2024-04-01
Payer: COMMERCIAL

## 2024-04-01 ENCOUNTER — HOSPITAL ENCOUNTER (INPATIENT)
Facility: CLINIC | Age: 44
LOS: 1 days | Discharge: HOME | DRG: 328 | End: 2024-04-02
Attending: SURGERY | Admitting: SURGERY
Payer: COMMERCIAL

## 2024-04-01 ENCOUNTER — ANESTHESIA EVENT (OUTPATIENT)
Dept: SURGERY | Facility: CLINIC | Age: 44
DRG: 328 | End: 2024-04-01
Payer: COMMERCIAL

## 2024-04-01 DIAGNOSIS — K44.9 HIATAL HERNIA: Primary | ICD-10-CM

## 2024-04-01 LAB
CREAT SERPL-MCNC: 0.92 MG/DL (ref 0.67–1.17)
CREAT SERPL-MCNC: 1 MG/DL (ref 0.67–1.17)
EGFRCR SERPLBLD CKD-EPI 2021: >90 ML/MIN/1.73M2
EGFRCR SERPLBLD CKD-EPI 2021: >90 ML/MIN/1.73M2
GLUCOSE BLDC GLUCOMTR-MCNC: 71 MG/DL (ref 70–99)
GLUCOSE SERPL-MCNC: 99 MG/DL (ref 70–99)
PLATELET # BLD AUTO: 253 10E3/UL (ref 150–450)
POTASSIUM SERPL-SCNC: 4.2 MMOL/L (ref 3.4–5.3)

## 2024-04-01 PROCEDURE — 250N000011 HC RX IP 250 OP 636: Performed by: ANESTHESIOLOGY

## 2024-04-01 PROCEDURE — 36415 COLL VENOUS BLD VENIPUNCTURE: CPT | Performed by: SURGERY

## 2024-04-01 PROCEDURE — 250N000025 HC SEVOFLURANE, PER MIN: Performed by: SURGERY

## 2024-04-01 PROCEDURE — 0BUT4JZ SUPPLEMENT DIAPHRAGM WITH SYNTHETIC SUBSTITUTE, PERCUTANEOUS ENDOSCOPIC APPROACH: ICD-10-PCS | Performed by: SURGERY

## 2024-04-01 PROCEDURE — 43282 LAP PARAESOPH HER RPR W/MESH: CPT | Mod: AS | Performed by: PHYSICIAN ASSISTANT

## 2024-04-01 PROCEDURE — 82565 ASSAY OF CREATININE: CPT | Performed by: SURGERY

## 2024-04-01 PROCEDURE — 258N000003 HC RX IP 258 OP 636: Performed by: ANESTHESIOLOGY

## 2024-04-01 PROCEDURE — 250N000011 HC RX IP 250 OP 636

## 2024-04-01 PROCEDURE — 999N000141 HC STATISTIC PRE-PROCEDURE NURSING ASSESSMENT: Performed by: SURGERY

## 2024-04-01 PROCEDURE — 250N000009 HC RX 250

## 2024-04-01 PROCEDURE — 85049 AUTOMATED PLATELET COUNT: CPT | Performed by: SURGERY

## 2024-04-01 PROCEDURE — 258N000003 HC RX IP 258 OP 636: Performed by: PHYSICIAN ASSISTANT

## 2024-04-01 PROCEDURE — 82947 ASSAY GLUCOSE BLOOD QUANT: CPT | Performed by: ANESTHESIOLOGY

## 2024-04-01 PROCEDURE — 84132 ASSAY OF SERUM POTASSIUM: CPT | Performed by: ANESTHESIOLOGY

## 2024-04-01 PROCEDURE — 8E0W4CZ ROBOTIC ASSISTED PROCEDURE OF TRUNK REGION, PERCUTANEOUS ENDOSCOPIC APPROACH: ICD-10-PCS | Performed by: SURGERY

## 2024-04-01 PROCEDURE — 360N000077 HC SURGERY LEVEL 4, PER MIN: Performed by: SURGERY

## 2024-04-01 PROCEDURE — 250N000013 HC RX MED GY IP 250 OP 250 PS 637: Performed by: PHYSICIAN ASSISTANT

## 2024-04-01 PROCEDURE — 0DV44ZZ RESTRICTION OF ESOPHAGOGASTRIC JUNCTION, PERCUTANEOUS ENDOSCOPIC APPROACH: ICD-10-PCS | Performed by: SURGERY

## 2024-04-01 PROCEDURE — 272N000001 HC OR GENERAL SUPPLY STERILE: Performed by: SURGERY

## 2024-04-01 PROCEDURE — 43281 LAP PARAESOPHAG HERN REPAIR: CPT | Performed by: NURSE ANESTHETIST, CERTIFIED REGISTERED

## 2024-04-01 PROCEDURE — 36415 COLL VENOUS BLD VENIPUNCTURE: CPT | Performed by: ANESTHESIOLOGY

## 2024-04-01 PROCEDURE — P9045 ALBUMIN (HUMAN), 5%, 250 ML: HCPCS | Mod: JZ | Performed by: ANESTHESIOLOGY

## 2024-04-01 PROCEDURE — 250N000009 HC RX 250: Performed by: SURGERY

## 2024-04-01 PROCEDURE — 710N000009 HC RECOVERY PHASE 1, LEVEL 1, PER MIN: Performed by: SURGERY

## 2024-04-01 PROCEDURE — S2900 ROBOTIC SURGICAL SYSTEM: HCPCS | Performed by: SURGERY

## 2024-04-01 PROCEDURE — 82565 ASSAY OF CREATININE: CPT | Performed by: PHYSICIAN ASSISTANT

## 2024-04-01 PROCEDURE — 120N000001 HC R&B MED SURG/OB

## 2024-04-01 PROCEDURE — 250N000009 HC RX 250: Performed by: ANESTHESIOLOGY

## 2024-04-01 PROCEDURE — 250N000011 HC RX IP 250 OP 636: Performed by: SURGERY

## 2024-04-01 PROCEDURE — 43282 LAP PARAESOPH HER RPR W/MESH: CPT | Performed by: SURGERY

## 2024-04-01 PROCEDURE — 370N000017 HC ANESTHESIA TECHNICAL FEE, PER MIN: Performed by: SURGERY

## 2024-04-01 PROCEDURE — C1781 MESH (IMPLANTABLE): HCPCS | Performed by: SURGERY

## 2024-04-01 PROCEDURE — 43281 LAP PARAESOPHAG HERN REPAIR: CPT | Performed by: ANESTHESIOLOGY

## 2024-04-01 DEVICE — MESH BIO-A TISSUE REINFORCEMENT 7X10CM HH0710: Type: IMPLANTABLE DEVICE | Site: ABDOMEN | Status: FUNCTIONAL

## 2024-04-01 RX ORDER — DEXTROAMPHETAMINE SACCHARATE, AMPHETAMINE ASPARTATE, DEXTROAMPHETAMINE SULFATE AND AMPHETAMINE SULFATE 1.25; 1.25; 1.25; 1.25 MG/1; MG/1; MG/1; MG/1
5 TABLET ORAL DAILY
Status: DISCONTINUED | OUTPATIENT
Start: 2024-04-01 | End: 2024-04-01

## 2024-04-01 RX ORDER — ACETAMINOPHEN 325 MG/1
650 TABLET ORAL EVERY 4 HOURS PRN
Status: DISCONTINUED | OUTPATIENT
Start: 2024-04-04 | End: 2024-04-02 | Stop reason: HOSPADM

## 2024-04-01 RX ORDER — OXYCODONE HYDROCHLORIDE 5 MG/1
10 TABLET ORAL EVERY 4 HOURS PRN
Status: DISCONTINUED | OUTPATIENT
Start: 2024-04-01 | End: 2024-04-02 | Stop reason: HOSPADM

## 2024-04-01 RX ORDER — CEFAZOLIN SODIUM/WATER 3 G/30 ML
3 SYRINGE (ML) INTRAVENOUS SEE ADMIN INSTRUCTIONS
Status: DISCONTINUED | OUTPATIENT
Start: 2024-04-01 | End: 2024-04-01 | Stop reason: HOSPADM

## 2024-04-01 RX ORDER — ONDANSETRON 4 MG/1
4 TABLET, ORALLY DISINTEGRATING ORAL EVERY 6 HOURS PRN
Status: DISCONTINUED | OUTPATIENT
Start: 2024-04-01 | End: 2024-04-02 | Stop reason: HOSPADM

## 2024-04-01 RX ORDER — HYDROMORPHONE HCL IN WATER/PF 6 MG/30 ML
0.2 PATIENT CONTROLLED ANALGESIA SYRINGE INTRAVENOUS
Status: DISCONTINUED | OUTPATIENT
Start: 2024-04-01 | End: 2024-04-02 | Stop reason: HOSPADM

## 2024-04-01 RX ORDER — HYDROMORPHONE HCL IN WATER/PF 6 MG/30 ML
0.2 PATIENT CONTROLLED ANALGESIA SYRINGE INTRAVENOUS EVERY 5 MIN PRN
Status: DISCONTINUED | OUTPATIENT
Start: 2024-04-01 | End: 2024-04-01 | Stop reason: HOSPADM

## 2024-04-01 RX ORDER — NALOXONE HYDROCHLORIDE 0.4 MG/ML
0.2 INJECTION, SOLUTION INTRAMUSCULAR; INTRAVENOUS; SUBCUTANEOUS
Status: DISCONTINUED | OUTPATIENT
Start: 2024-04-01 | End: 2024-04-02 | Stop reason: HOSPADM

## 2024-04-01 RX ORDER — HYDROMORPHONE HCL IN WATER/PF 6 MG/30 ML
0.4 PATIENT CONTROLLED ANALGESIA SYRINGE INTRAVENOUS
Status: DISCONTINUED | OUTPATIENT
Start: 2024-04-01 | End: 2024-04-02 | Stop reason: HOSPADM

## 2024-04-01 RX ORDER — ONDANSETRON 2 MG/ML
INJECTION INTRAMUSCULAR; INTRAVENOUS PRN
Status: DISCONTINUED | OUTPATIENT
Start: 2024-04-01 | End: 2024-04-01

## 2024-04-01 RX ORDER — OXYCODONE HYDROCHLORIDE 5 MG/1
5 TABLET ORAL EVERY 4 HOURS PRN
Status: DISCONTINUED | OUTPATIENT
Start: 2024-04-01 | End: 2024-04-02 | Stop reason: HOSPADM

## 2024-04-01 RX ORDER — CEFAZOLIN SODIUM/WATER 3 G/30 ML
3 SYRINGE (ML) INTRAVENOUS
Status: COMPLETED | OUTPATIENT
Start: 2024-04-01 | End: 2024-04-01

## 2024-04-01 RX ORDER — AMOXICILLIN 250 MG
1 CAPSULE ORAL 2 TIMES DAILY
Status: DISCONTINUED | OUTPATIENT
Start: 2024-04-01 | End: 2024-04-02 | Stop reason: HOSPADM

## 2024-04-01 RX ORDER — ONDANSETRON 2 MG/ML
4 INJECTION INTRAMUSCULAR; INTRAVENOUS EVERY 30 MIN PRN
Status: DISCONTINUED | OUTPATIENT
Start: 2024-04-01 | End: 2024-04-01 | Stop reason: HOSPADM

## 2024-04-01 RX ORDER — VECURONIUM BROMIDE 1 MG/ML
INJECTION, POWDER, LYOPHILIZED, FOR SOLUTION INTRAVENOUS PRN
Status: DISCONTINUED | OUTPATIENT
Start: 2024-04-01 | End: 2024-04-01

## 2024-04-01 RX ORDER — BISACODYL 10 MG
10 SUPPOSITORY, RECTAL RECTAL DAILY PRN
Status: DISCONTINUED | OUTPATIENT
Start: 2024-04-04 | End: 2024-04-02 | Stop reason: HOSPADM

## 2024-04-01 RX ORDER — SODIUM CHLORIDE, SODIUM LACTATE, POTASSIUM CHLORIDE, CALCIUM CHLORIDE 600; 310; 30; 20 MG/100ML; MG/100ML; MG/100ML; MG/100ML
INJECTION, SOLUTION INTRAVENOUS CONTINUOUS
Status: DISCONTINUED | OUTPATIENT
Start: 2024-04-01 | End: 2024-04-01 | Stop reason: HOSPADM

## 2024-04-01 RX ORDER — ACETAMINOPHEN 325 MG/1
975 TABLET ORAL EVERY 8 HOURS
Status: DISCONTINUED | OUTPATIENT
Start: 2024-04-01 | End: 2024-04-02 | Stop reason: HOSPADM

## 2024-04-01 RX ORDER — BUPIVACAINE HYDROCHLORIDE AND EPINEPHRINE 2.5; 5 MG/ML; UG/ML
INJECTION, SOLUTION INFILTRATION; PERINEURAL PRN
Status: DISCONTINUED | OUTPATIENT
Start: 2024-04-01 | End: 2024-04-01 | Stop reason: HOSPADM

## 2024-04-01 RX ORDER — DEXTROSE MONOHYDRATE 25 G/50ML
25-50 INJECTION, SOLUTION INTRAVENOUS
Status: DISCONTINUED | OUTPATIENT
Start: 2024-04-01 | End: 2024-04-02 | Stop reason: HOSPADM

## 2024-04-01 RX ORDER — POLYETHYLENE GLYCOL 3350 17 G/17G
17 POWDER, FOR SOLUTION ORAL DAILY
Status: DISCONTINUED | OUTPATIENT
Start: 2024-04-02 | End: 2024-04-02 | Stop reason: HOSPADM

## 2024-04-01 RX ORDER — NICOTINE POLACRILEX 4 MG
15-30 LOZENGE BUCCAL
Status: DISCONTINUED | OUTPATIENT
Start: 2024-04-01 | End: 2024-04-02 | Stop reason: HOSPADM

## 2024-04-01 RX ORDER — ONDANSETRON 2 MG/ML
4 INJECTION INTRAMUSCULAR; INTRAVENOUS EVERY 6 HOURS PRN
Status: DISCONTINUED | OUTPATIENT
Start: 2024-04-01 | End: 2024-04-02 | Stop reason: HOSPADM

## 2024-04-01 RX ORDER — ONDANSETRON 4 MG/1
4 TABLET, ORALLY DISINTEGRATING ORAL EVERY 30 MIN PRN
Status: DISCONTINUED | OUTPATIENT
Start: 2024-04-01 | End: 2024-04-01 | Stop reason: HOSPADM

## 2024-04-01 RX ORDER — HYDROMORPHONE HCL IN WATER/PF 6 MG/30 ML
0.4 PATIENT CONTROLLED ANALGESIA SYRINGE INTRAVENOUS EVERY 5 MIN PRN
Status: DISCONTINUED | OUTPATIENT
Start: 2024-04-01 | End: 2024-04-01 | Stop reason: HOSPADM

## 2024-04-01 RX ORDER — METHOCARBAMOL 750 MG/1
750 TABLET, FILM COATED ORAL EVERY 6 HOURS PRN
Status: DISCONTINUED | OUTPATIENT
Start: 2024-04-01 | End: 2024-04-02 | Stop reason: HOSPADM

## 2024-04-01 RX ORDER — MAGNESIUM HYDROXIDE 1200 MG/15ML
LIQUID ORAL PRN
Status: DISCONTINUED | OUTPATIENT
Start: 2024-04-01 | End: 2024-04-01 | Stop reason: HOSPADM

## 2024-04-01 RX ORDER — FAMOTIDINE 20 MG/1
20 TABLET, FILM COATED ORAL 2 TIMES DAILY
Status: DISCONTINUED | OUTPATIENT
Start: 2024-04-01 | End: 2024-04-02 | Stop reason: HOSPADM

## 2024-04-01 RX ORDER — DEXTROAMPHETAMINE SACCHARATE, AMPHETAMINE ASPARTATE MONOHYDRATE, DEXTROAMPHETAMINE SULFATE AND AMPHETAMINE SULFATE 6.25; 6.25; 6.25; 6.25 MG/1; MG/1; MG/1; MG/1
25 CAPSULE, EXTENDED RELEASE ORAL DAILY
Status: DISCONTINUED | OUTPATIENT
Start: 2024-04-01 | End: 2024-04-01

## 2024-04-01 RX ORDER — PROPOFOL 10 MG/ML
INJECTION, EMULSION INTRAVENOUS CONTINUOUS PRN
Status: DISCONTINUED | OUTPATIENT
Start: 2024-04-01 | End: 2024-04-01

## 2024-04-01 RX ORDER — PROPOFOL 10 MG/ML
INJECTION, EMULSION INTRAVENOUS PRN
Status: DISCONTINUED | OUTPATIENT
Start: 2024-04-01 | End: 2024-04-01

## 2024-04-01 RX ORDER — NALOXONE HYDROCHLORIDE 0.4 MG/ML
0.1 INJECTION, SOLUTION INTRAMUSCULAR; INTRAVENOUS; SUBCUTANEOUS
Status: DISCONTINUED | OUTPATIENT
Start: 2024-04-01 | End: 2024-04-01 | Stop reason: HOSPADM

## 2024-04-01 RX ORDER — FENTANYL CITRATE 50 UG/ML
25 INJECTION, SOLUTION INTRAMUSCULAR; INTRAVENOUS EVERY 5 MIN PRN
Status: DISCONTINUED | OUTPATIENT
Start: 2024-04-01 | End: 2024-04-01 | Stop reason: HOSPADM

## 2024-04-01 RX ORDER — FENTANYL CITRATE 50 UG/ML
INJECTION, SOLUTION INTRAMUSCULAR; INTRAVENOUS PRN
Status: DISCONTINUED | OUTPATIENT
Start: 2024-04-01 | End: 2024-04-01

## 2024-04-01 RX ORDER — SODIUM CHLORIDE 9 MG/ML
INJECTION, SOLUTION INTRAVENOUS CONTINUOUS
Status: DISCONTINUED | OUTPATIENT
Start: 2024-04-01 | End: 2024-04-02 | Stop reason: HOSPADM

## 2024-04-01 RX ORDER — NALOXONE HYDROCHLORIDE 0.4 MG/ML
0.4 INJECTION, SOLUTION INTRAMUSCULAR; INTRAVENOUS; SUBCUTANEOUS
Status: DISCONTINUED | OUTPATIENT
Start: 2024-04-01 | End: 2024-04-02 | Stop reason: HOSPADM

## 2024-04-01 RX ORDER — FENTANYL CITRATE 50 UG/ML
50 INJECTION, SOLUTION INTRAMUSCULAR; INTRAVENOUS EVERY 5 MIN PRN
Status: DISCONTINUED | OUTPATIENT
Start: 2024-04-01 | End: 2024-04-01 | Stop reason: HOSPADM

## 2024-04-01 RX ORDER — DEXMEDETOMIDINE HYDROCHLORIDE 4 UG/ML
INJECTION, SOLUTION INTRAVENOUS PRN
Status: DISCONTINUED | OUTPATIENT
Start: 2024-04-01 | End: 2024-04-01

## 2024-04-01 RX ORDER — LIDOCAINE 40 MG/G
CREAM TOPICAL
Status: DISCONTINUED | OUTPATIENT
Start: 2024-04-01 | End: 2024-04-02 | Stop reason: HOSPADM

## 2024-04-01 RX ORDER — MEPERIDINE HYDROCHLORIDE 25 MG/ML
12.5 INJECTION INTRAMUSCULAR; INTRAVENOUS; SUBCUTANEOUS EVERY 5 MIN PRN
Status: DISCONTINUED | OUTPATIENT
Start: 2024-04-01 | End: 2024-04-01 | Stop reason: HOSPADM

## 2024-04-01 RX ORDER — KETOROLAC TROMETHAMINE 30 MG/ML
INJECTION, SOLUTION INTRAMUSCULAR; INTRAVENOUS PRN
Status: DISCONTINUED | OUTPATIENT
Start: 2024-04-01 | End: 2024-04-01

## 2024-04-01 RX ORDER — ENOXAPARIN SODIUM 100 MG/ML
40 INJECTION SUBCUTANEOUS EVERY 24 HOURS
Status: DISCONTINUED | OUTPATIENT
Start: 2024-04-02 | End: 2024-04-02 | Stop reason: HOSPADM

## 2024-04-01 RX ORDER — LIDOCAINE HYDROCHLORIDE 20 MG/ML
INJECTION, SOLUTION INFILTRATION; PERINEURAL PRN
Status: DISCONTINUED | OUTPATIENT
Start: 2024-04-01 | End: 2024-04-01

## 2024-04-01 RX ORDER — PROCHLORPERAZINE MALEATE 10 MG
10 TABLET ORAL EVERY 6 HOURS PRN
Status: DISCONTINUED | OUTPATIENT
Start: 2024-04-01 | End: 2024-04-02 | Stop reason: HOSPADM

## 2024-04-01 RX ADMIN — PHENYLEPHRINE HYDROCHLORIDE 100 MCG: 10 INJECTION INTRAVENOUS at 16:12

## 2024-04-01 RX ADMIN — PHENYLEPHRINE HYDROCHLORIDE 100 MCG: 10 INJECTION INTRAVENOUS at 14:07

## 2024-04-01 RX ADMIN — SODIUM CHLORIDE, POTASSIUM CHLORIDE, SODIUM LACTATE AND CALCIUM CHLORIDE: 600; 310; 30; 20 INJECTION, SOLUTION INTRAVENOUS at 12:51

## 2024-04-01 RX ADMIN — ACETAMINOPHEN 975 MG: 325 TABLET, FILM COATED ORAL at 19:55

## 2024-04-01 RX ADMIN — PHENYLEPHRINE HYDROCHLORIDE 100 MCG: 10 INJECTION INTRAVENOUS at 13:10

## 2024-04-01 RX ADMIN — PHENYLEPHRINE HYDROCHLORIDE 100 MCG: 10 INJECTION INTRAVENOUS at 14:09

## 2024-04-01 RX ADMIN — ONDANSETRON 4 MG: 2 INJECTION INTRAMUSCULAR; INTRAVENOUS at 16:18

## 2024-04-01 RX ADMIN — MIDAZOLAM 2 MG: 1 INJECTION INTRAMUSCULAR; INTRAVENOUS at 12:51

## 2024-04-01 RX ADMIN — PHENYLEPHRINE HYDROCHLORIDE 200 MCG: 10 INJECTION INTRAVENOUS at 14:55

## 2024-04-01 RX ADMIN — SODIUM CHLORIDE: 9 INJECTION, SOLUTION INTRAVENOUS at 18:58

## 2024-04-01 RX ADMIN — DEXMEDETOMIDINE HYDROCHLORIDE 8 MCG: 200 INJECTION INTRAVENOUS at 16:05

## 2024-04-01 RX ADMIN — ROCURONIUM BROMIDE 20 MG: 50 INJECTION, SOLUTION INTRAVENOUS at 13:30

## 2024-04-01 RX ADMIN — ROCURONIUM BROMIDE 50 MG: 50 INJECTION, SOLUTION INTRAVENOUS at 12:57

## 2024-04-01 RX ADMIN — PHENYLEPHRINE HYDROCHLORIDE 100 MCG: 10 INJECTION INTRAVENOUS at 14:52

## 2024-04-01 RX ADMIN — FENTANYL CITRATE 50 MCG: 50 INJECTION INTRAMUSCULAR; INTRAVENOUS at 12:57

## 2024-04-01 RX ADMIN — ROCURONIUM BROMIDE 20 MG: 50 INJECTION, SOLUTION INTRAVENOUS at 14:47

## 2024-04-01 RX ADMIN — PHENYLEPHRINE HYDROCHLORIDE 100 MCG: 10 INJECTION INTRAVENOUS at 13:31

## 2024-04-01 RX ADMIN — KETOROLAC TROMETHAMINE 15 MG: 30 INJECTION, SOLUTION INTRAMUSCULAR at 16:21

## 2024-04-01 RX ADMIN — VECURONIUM BROMIDE 2 MG: 1 INJECTION, POWDER, LYOPHILIZED, FOR SOLUTION INTRAVENOUS at 15:30

## 2024-04-01 RX ADMIN — FENTANYL CITRATE 50 MCG: 50 INJECTION INTRAMUSCULAR; INTRAVENOUS at 13:19

## 2024-04-01 RX ADMIN — ALBUMIN HUMAN: 0.05 INJECTION, SOLUTION INTRAVENOUS at 14:11

## 2024-04-01 RX ADMIN — VECURONIUM BROMIDE 2 MG: 1 INJECTION, POWDER, LYOPHILIZED, FOR SOLUTION INTRAVENOUS at 15:56

## 2024-04-01 RX ADMIN — FAMOTIDINE 20 MG: 20 TABLET ORAL at 21:53

## 2024-04-01 RX ADMIN — SODIUM CHLORIDE, POTASSIUM CHLORIDE, SODIUM LACTATE AND CALCIUM CHLORIDE: 600; 310; 30; 20 INJECTION, SOLUTION INTRAVENOUS at 15:57

## 2024-04-01 RX ADMIN — Medication 3 G: at 12:58

## 2024-04-01 RX ADMIN — ROCURONIUM BROMIDE 30 MG: 50 INJECTION, SOLUTION INTRAVENOUS at 14:19

## 2024-04-01 RX ADMIN — OXYCODONE HYDROCHLORIDE 10 MG: 5 TABLET ORAL at 23:33

## 2024-04-01 RX ADMIN — PHENYLEPHRINE HYDROCHLORIDE 0.3 MCG/KG/MIN: 10 INJECTION INTRAVENOUS at 13:34

## 2024-04-01 RX ADMIN — PROPOFOL 50 MG: 10 INJECTION, EMULSION INTRAVENOUS at 16:23

## 2024-04-01 RX ADMIN — ROCURONIUM BROMIDE 10 MG: 50 INJECTION, SOLUTION INTRAVENOUS at 15:17

## 2024-04-01 RX ADMIN — PROPOFOL 30 MCG/KG/MIN: 10 INJECTION, EMULSION INTRAVENOUS at 13:02

## 2024-04-01 RX ADMIN — OXYCODONE HYDROCHLORIDE 5 MG: 5 TABLET ORAL at 18:55

## 2024-04-01 RX ADMIN — HYDROMORPHONE HYDROCHLORIDE 0.5 MG: 1 INJECTION, SOLUTION INTRAMUSCULAR; INTRAVENOUS; SUBCUTANEOUS at 13:38

## 2024-04-01 RX ADMIN — DEXMEDETOMIDINE HYDROCHLORIDE 8 MCG: 200 INJECTION INTRAVENOUS at 13:02

## 2024-04-01 RX ADMIN — PROPOFOL 200 MG: 10 INJECTION, EMULSION INTRAVENOUS at 12:57

## 2024-04-01 RX ADMIN — DEXMEDETOMIDINE HYDROCHLORIDE 4 MCG: 200 INJECTION INTRAVENOUS at 14:25

## 2024-04-01 RX ADMIN — DOCUSATE SODIUM 50 MG AND SENNOSIDES 8.6 MG 1 TABLET: 8.6; 5 TABLET, FILM COATED ORAL at 21:53

## 2024-04-01 RX ADMIN — LIDOCAINE HYDROCHLORIDE 100 MG: 20 INJECTION, SOLUTION INFILTRATION; PERINEURAL at 12:57

## 2024-04-01 RX ADMIN — LISINOPRIL 30 MG: 20 TABLET ORAL at 19:55

## 2024-04-01 RX ADMIN — VECURONIUM BROMIDE 2 MG: 1 INJECTION, POWDER, LYOPHILIZED, FOR SOLUTION INTRAVENOUS at 15:17

## 2024-04-01 RX ADMIN — SUGAMMADEX 200 MG: 100 INJECTION, SOLUTION INTRAVENOUS at 16:28

## 2024-04-01 RX ADMIN — PHENYLEPHRINE HYDROCHLORIDE 200 MCG: 10 INJECTION INTRAVENOUS at 15:01

## 2024-04-01 RX ADMIN — PHENYLEPHRINE HYDROCHLORIDE 200 MCG: 10 INJECTION INTRAVENOUS at 14:58

## 2024-04-01 RX ADMIN — ROCURONIUM BROMIDE 20 MG: 50 INJECTION, SOLUTION INTRAVENOUS at 14:01

## 2024-04-01 RX ADMIN — FENTANYL CITRATE 50 MCG: 50 INJECTION, SOLUTION INTRAMUSCULAR; INTRAVENOUS at 17:43

## 2024-04-01 ASSESSMENT — ACTIVITIES OF DAILY LIVING (ADL)
ADLS_ACUITY_SCORE: 18
ADLS_ACUITY_SCORE: 35
ADLS_ACUITY_SCORE: 18

## 2024-04-01 NOTE — ANESTHESIA POSTPROCEDURE EVALUATION
Patient: William Bolton    Procedure: Procedure(s):  Robotic assisted laparoscopic hiatal hernia repair with NISSEN FUNDOPLICATION       Anesthesia Type:  General    Note:     Postop Pain Control: Uneventful            Sign Out: Well controlled pain   PONV: No   Neuro/Psych: Uneventful            Sign Out: Acceptable/Baseline neuro status   Airway/Respiratory: Uneventful            Sign Out: Acceptable/Baseline resp. status   CV/Hemodynamics: Uneventful            Sign Out: Acceptable CV status; No obvious hypovolemia; No obvious fluid overload   Other NRE:    DID A NON-ROUTINE EVENT OCCUR? No           Last vitals:  Vitals Value Taken Time   /98 04/01/24 1650   Temp     Pulse 87 04/01/24 1659   Resp 21 04/01/24 1659   SpO2 97 % 04/01/24 1659   Vitals shown include unfiled device data.    Electronically Signed By: Sonja Coley MD, MD  April 1, 2024  5:00 PM

## 2024-04-01 NOTE — ANESTHESIA CARE TRANSFER NOTE
Patient: William Bolton    Procedure: Procedure(s):  Robotic assisted laparoscopic hiatal hernia repair with NISSEN FUNDOPLICATION       Diagnosis: Hiatal hernia [K44.9]  Gastrointestinal hemorrhage, unspecified gastrointestinal hemorrhage type [K92.2]  Diagnosis Additional Information: No value filed.    Anesthesia Type:   General     Note:    Oropharynx: spontaneously breathing and oropharynx clear of all foreign objects  Level of Consciousness: awake  Oxygen Supplementation: face mask  Level of Supplemental Oxygen (L/min / FiO2): 6  Independent Airway: airway patency satisfactory and stable  Dentition: dentition unchanged  Vital Signs Stable: post-procedure vital signs reviewed and stable  Report to RN Given: handoff report given  Patient transferred to: PACU    Handoff Report: Identifed the Patient, Identified the Reponsible Provider, Reviewed the pertinent medical history, Discussed the surgical course, Reviewed Intra-OP anesthesia mangement and issues during anesthesia, Set expectations for post-procedure period and Allowed opportunity for questions and acknowledgement of understanding      Vitals:  Vitals Value Taken Time   /89    Temp 37.2    Pulse 89 04/01/24 1640   Resp 16 04/01/24 1640   SpO2 96 % 04/01/24 1640   Vitals shown include unfiled device data.    Electronically Signed By: ODALIS Sy CRNA  April 1, 2024  4:41 PM

## 2024-04-01 NOTE — ANESTHESIA PROCEDURE NOTES
Airway       Patient location during procedure: OR       Procedure Start/Stop Times: 4/1/2024 1:00 PM  Staff -        Anesthesiologist:  William Villegas MD       CRNA: Malgorzata Wilson APRN CRNA       Performed By: CRNA  Consent for Airway        Urgency: elective  Indications and Patient Condition       Indications for airway management: dae-procedural       Induction type:intravenous       Mask difficulty assessment: 2 - vent by mask + OA or adjuvant +/- NMBA    Final Airway Details       Final airway type: endotracheal airway       Successful airway: ETT - single and Oral  Endotracheal Airway Details        ETT size (mm): 8.0       Cuffed: yes       Successful intubation technique: video laryngoscopy       VL Blade Size: Carter 4       Grade View of Cords: 1       Adjucts: stylet       Position: Right       Measured from: gums/teeth       Secured at (cm): 23       Bite block used: None    Post intubation assessment        Placement verified by: capnometry, equal breath sounds and chest rise        Number of attempts at approach: 1       Secured with: tape       Ease of procedure: easy       Dentition: Intact and Unchanged    Medication(s) Administered   Medication Administration Time: 4/1/2024 1:00 PM

## 2024-04-01 NOTE — ANESTHESIA PREPROCEDURE EVALUATION
Anesthesia Pre-Procedure Evaluation    Patient: William Bolton   MRN: [id number] : 1980        Procedure : Procedure(s):  Robotic assisted laparoscopic hiatal hernia repair with fundoplication.          Past Medical History:   Diagnosis Date    Abnormal bone xray - sclerosis of phalynx of right great toe 17 - suggest repeat imaging at annual visit. 2017    Depressive disorder     Diabetes mellitus, type 2 (H) 2023    Hypercholesteremia     Hypertension 2019    Iron deficiency anemia     Sleep apnea       Past Surgical History:   Procedure Laterality Date    COLONOSCOPY  2019    Dr. Navarro [facility]    COLONOSCOPY N/A 2023    Procedure: Colonoscopy;  Surgeon: Luis Ge MD;  Location: [facility]    COLONOSCOPY N/A 2023    Procedure: COLONOSCOPY, WITH POLYPECTOMY AND BIOPSY;  Surgeon: Luis Ge MD;  Location: [facility]    ESOPHAGOSCOPY, GASTROSCOPY, DUODENOSCOPY (EGD), COMBINED N/A 2019    Procedure: ESOPHAGOGASTRODUODENOSCOPY, WITH BIOPSY;  Surgeon: Say Navarro MD;  Location: [facility]    ESOPHAGOSCOPY, GASTROSCOPY, DUODENOSCOPY (EGD), COMBINED N/A 2023    Procedure: Esophagoscopy, gastroscopy, duodenoscopy (EGD), combined;  Surgeon: Luis Ge MD;  Location: [facility]    HEAD & NECK SURGERY      wisdom teeth removed      No Known Allergies   Social History     Tobacco Use    Smoking status: Never     Passive exposure: Never    Smokeless tobacco: Never   Substance Use Topics    Alcohol use: Yes     Comment: 6 a week      Wt Readings from Last 1 Encounters:   24 111.9 kg (246 lb 11.2 oz)        Anesthesia Evaluation   Pt has had prior anesthetic.     No history of anesthetic complications       ROS/MED HX  ENT/Pulmonary:     (+) sleep apnea, uses CPAP,                                      Neurologic:  - neg neurologic ROS     Cardiovascular:     (+) Dyslipidemia hypertension- -   -  - -                                      METS/Exercise Tolerance:    "  Hematologic:       Musculoskeletal:       GI/Hepatic:     (+) GERD, Asymptomatic on medication,    hiatal hernia,       liver disease (fatty liver),       Renal/Genitourinary:       Endo:     (+)  type II DM,             Obesity (BMI 34),       Psychiatric/Substance Use:       Infectious Disease:       Malignancy:       Other:            Physical Exam    Airway        Mallampati: II   TM distance: < 3 FB   Neck ROM: full   Mouth opening: > 3 cm    Respiratory Devices and Support         Dental           Cardiovascular          Rhythm and rate: regular and normal     Pulmonary           breath sounds clear to auscultation           OUTSIDE LABS:  CBC:   Lab Results   Component Value Date    WBC 6.6 03/15/2024    WBC 5.3 02/09/2024    HGB 14.3 03/15/2024    HGB 12.4 (L) 02/09/2024    HCT 47.5 03/15/2024    HCT 42.6 02/09/2024     03/15/2024     02/09/2024     BMP:   Lab Results   Component Value Date     03/15/2024     12/13/2023    POTASSIUM 4.2 04/01/2024    POTASSIUM 4.4 03/15/2024    CHLORIDE 104 03/15/2024    CHLORIDE 103 12/13/2023    CO2 26 03/15/2024    CO2 25 12/13/2023    BUN 5.6 (L) 03/15/2024    BUN 7.6 12/13/2023    CR 0.89 03/15/2024    CR 0.85 12/13/2023    GLC 99 04/01/2024    GLC 84 03/15/2024     COAGS:   Lab Results   Component Value Date    INR 1.03 11/25/2022     POC: No results found for: \"BGM\", \"HCG\", \"HCGS\"  HEPATIC:   Lab Results   Component Value Date    ALBUMIN 4.7 12/13/2023    PROTTOTAL 7.2 12/13/2023    ALT 65 12/13/2023    AST 46 (H) 12/13/2023     (H) 11/09/2022    ALKPHOS 87 12/13/2023    BILITOTAL 0.4 12/13/2023     OTHER:   Lab Results   Component Value Date    A1C 5.2 02/09/2024    SAMARIA 9.5 03/15/2024    TSH 0.94 12/01/2023       Anesthesia Plan    ASA Status:  3    NPO Status:  NPO Appropriate    Anesthesia Type: General.     - Airway: ETT         Techniques and Equipment:     - Airway: Video-Laryngoscope       Consents    Anesthesia Plan(s) and " associated risks, benefits, and realistic alternatives discussed. Questions answered and patient/representative(s) expressed understanding.     - Discussed: Risks, Benefits and Alternatives for BOTH SEDATION and the PROCEDURE were discussed     - Discussed with:  Patient            Postoperative Care    Pain management: Multi-modal analgesia.        Comments:               Monae Serrano MD    I have reviewed the pertinent notes and labs in the chart from the past 30 days and (re)examined the patient.  Any updates or changes from those notes are reflected in this note.              # Obesity: Estimated body mass index is 33.46 kg/m  as calculated from the following:    Height as of this encounter: 1.829 m (6').    Weight as of this encounter: 111.9 kg (246 lb 11.2 oz).

## 2024-04-01 NOTE — BRIEF OP NOTE
[facility]    Brief Operative Note    Pre-operative diagnosis: Hiatal hernia [K44.9]  Gastrointestinal hemorrhage, unspecified gastrointestinal hemorrhage type [K92.2]  Post-operative diagnosis Same as pre-operative diagnosis    Procedure: Robotic assisted laparoscopic hiatal hernia repair with fundoplication., N/A - Abdomen    Surgeon: Surgeon(s) and Role:     * Andrew Franklin MD - Primary     * Ben Mueller PA-C - Assisting     * Konstantin Crowley PA-C - Assisting  Anesthesia: General   Estimated Blood Loss: 5cc    Drains: None  Specimens: * No specimens in log *  Findings:   Large hiatal hernia with intrathoracic stomach. bioA reinforcement of crura + bioA horseshoe mesh placement with silk suture fixation. Fundoplication performed  Complications: None.  Implants:   Implant Name Type Inv. Item Serial No.  Lot No. LRB No. Used Action   MESH BIO-A TISSUE REINFORCEMENT [id number] - [id number] Mesh MESH BIO-A TISSUE REINFORCEMENT 7X10CM [id number] [employer]  N/A 1 Implanted       Ben Mueller PA-C

## 2024-04-02 VITALS
SYSTOLIC BLOOD PRESSURE: 155 MMHG | HEIGHT: 72 IN | TEMPERATURE: 98.3 F | RESPIRATION RATE: 16 BRPM | DIASTOLIC BLOOD PRESSURE: 106 MMHG | BODY MASS INDEX: 33.41 KG/M2 | WEIGHT: 246.7 LBS | OXYGEN SATURATION: 94 % | HEART RATE: 73 BPM

## 2024-04-02 LAB
GLUCOSE BLDC GLUCOMTR-MCNC: 105 MG/DL (ref 70–99)
GLUCOSE BLDC GLUCOMTR-MCNC: 134 MG/DL (ref 70–99)
GLUCOSE BLDC GLUCOMTR-MCNC: 85 MG/DL (ref 70–99)
GLUCOSE BLDC GLUCOMTR-MCNC: 87 MG/DL (ref 70–99)
HGB BLD-MCNC: 13.8 G/DL (ref 13.3–17.7)

## 2024-04-02 PROCEDURE — 250N000013 HC RX MED GY IP 250 OP 250 PS 637: Performed by: SURGERY

## 2024-04-02 PROCEDURE — 250N000013 HC RX MED GY IP 250 OP 250 PS 637: Performed by: PHYSICIAN ASSISTANT

## 2024-04-02 PROCEDURE — 250N000011 HC RX IP 250 OP 636: Performed by: PHYSICIAN ASSISTANT

## 2024-04-02 PROCEDURE — 258N000003 HC RX IP 258 OP 636: Performed by: PHYSICIAN ASSISTANT

## 2024-04-02 PROCEDURE — 85018 HEMOGLOBIN: CPT | Performed by: PHYSICIAN ASSISTANT

## 2024-04-02 PROCEDURE — 36415 COLL VENOUS BLD VENIPUNCTURE: CPT | Performed by: PHYSICIAN ASSISTANT

## 2024-04-02 RX ORDER — OXYCODONE HYDROCHLORIDE 5 MG/1
5 TABLET ORAL EVERY 4 HOURS PRN
Qty: 10 TABLET | Refills: 0 | Status: SHIPPED | OUTPATIENT
Start: 2024-04-02 | End: 2024-04-30

## 2024-04-02 RX ORDER — PANTOPRAZOLE SODIUM 40 MG/1
40 TABLET, DELAYED RELEASE ORAL DAILY
Qty: 30 TABLET | Refills: 0 | Status: CANCELLED | OUTPATIENT
Start: 2024-04-02

## 2024-04-02 RX ORDER — METHOCARBAMOL 750 MG/1
750 TABLET, FILM COATED ORAL EVERY 6 HOURS PRN
Qty: 15 TABLET | Refills: 0 | Status: SHIPPED | OUTPATIENT
Start: 2024-04-02

## 2024-04-02 RX ORDER — AMOXICILLIN 250 MG
1 CAPSULE ORAL 2 TIMES DAILY PRN
Qty: 10 TABLET | Refills: 0 | Status: SHIPPED | OUTPATIENT
Start: 2024-04-02 | End: 2024-04-30

## 2024-04-02 RX ADMIN — OXYCODONE HYDROCHLORIDE 5 MG: 5 TABLET ORAL at 13:14

## 2024-04-02 RX ADMIN — DEXTROAMPHETAMINE SACCHARATE, AMPHETAMINE ASPARTATE MONOHYDRATE, DEXTROAMPHETAMINE SULFATE, AMPHETAMINE SULFATE 25 MG: 5; 5; 5; 5 CAPSULE, EXTENDED RELEASE ORAL at 08:54

## 2024-04-02 RX ADMIN — FAMOTIDINE 20 MG: 20 TABLET ORAL at 08:47

## 2024-04-02 RX ADMIN — SODIUM CHLORIDE: 9 INJECTION, SOLUTION INTRAVENOUS at 04:34

## 2024-04-02 RX ADMIN — OXYCODONE HYDROCHLORIDE 5 MG: 5 TABLET ORAL at 08:54

## 2024-04-02 RX ADMIN — DOCUSATE SODIUM 50 MG AND SENNOSIDES 8.6 MG 1 TABLET: 8.6; 5 TABLET, FILM COATED ORAL at 08:47

## 2024-04-02 RX ADMIN — OXYCODONE HYDROCHLORIDE 10 MG: 5 TABLET ORAL at 04:34

## 2024-04-02 RX ADMIN — POLYETHYLENE GLYCOL 3350 17 G: 17 POWDER, FOR SOLUTION ORAL at 08:50

## 2024-04-02 RX ADMIN — LISINOPRIL 30 MG: 20 TABLET ORAL at 08:47

## 2024-04-02 RX ADMIN — ENOXAPARIN SODIUM 40 MG: 40 INJECTION SUBCUTANEOUS at 09:43

## 2024-04-02 RX ADMIN — ACETAMINOPHEN 975 MG: 325 TABLET, FILM COATED ORAL at 11:23

## 2024-04-02 ASSESSMENT — ACTIVITIES OF DAILY LIVING (ADL)
ADLS_ACUITY_SCORE: 21

## 2024-04-02 NOTE — PLAN OF CARE
Date & Time: 1900-0730.  Surgery/POD#: POD 1 from a Robotic-assisted Lap hiatal hernia repair.  Behavior & Aggression: Green - no concern.  Fall Risk: Yes.  Orientation:A&Ox4.  ABNL VS/O2: VSS on RA,CPAP overnight. Denies N/V.  ABNL Labs: see chart.  Pain Management: Scheduled Tylenol. PRN Oxy, ice packs, and repositioning.  Bowel/Bladder: Continent of B/B.   IV/Drains: PIV infusing NS @ 100ml/hr.  Wounds/incisions: Abdominal incisions are C/D/I.  Diet:Clears.  Activity Level: SBA.  Tests/Procedures: N/A.  Anticipated  DC Date: Possibly today?  
Discharge instructions and medications reviewed with pt and his wife.  IV removed.  Pt left unit via wheelchair at 1430.  Recently received oxycodone for pain control, and well controlled.   
Detail Level: Detailed
Render Risk Assessment In Note?: no
Additional Notes: I informed patient that it is a possibility that the hive-like eruption was caused by his recent shingles or the valacyclovir medication. There was no other precipitating factors that could be ascertained by patient.

## 2024-04-02 NOTE — PROGRESS NOTES
[facility]    General Surgery  Daily Progress Note       Assessment and Plan:   William Bolton is a 44 year old male admitted following robotic assisted laparoscopic hiatal hernia repair with nissen fundoplication on 4/1/24    PLAN:  - Advance diet as tolerated to full liquids.   - Nutrition consult for education on full liquid diet x 2 weeks is pending.   - PPI for GI prophylaxis (PTA Prilosec).  - Oral analgesia for pain.   - Senokot BID and miralax daily prn.   - Ambulate QID  - Lovenox, PCDs, ambulation for DVT prophylaxis.   - Encourage deep breathing and IS use.   - Discharge today   - Instructed  - Follow up with Dr. Franklin at [facility] in about 2 weeks.  Call [phone] to schedule an appointment.         Interval History:   No specific complaints  Pain controlled  Awaiting bowel function   Tolerating liquid diet  Walking   Denies CP, dyspnea         Physical Exam:   Temp: 98.3  F (36.8  C) Temp src: Oral BP: (!) 155/106 Pulse: 73   Resp: 16 SpO2: 94 % O2 Device: None (Room air) Oxygen Delivery: 3 LPM    I/O last 3 completed shifts:  In: 2330 [P.O.:480; I.V.:1600]  Out: 620 [Urine:600; Blood:20]    General: VS reviewed, alert, oriented, no acute distress  Resp - Non-labored  Abdomen - Incisions healing appropriately without erythema or drainage. Steris/bandaids in place.  Extremities: no lower extremity edema  Neurological: Grossly non-focal, mood & affect appropriate      Ben Mueller PA-C  Office: [phone]  Pager: [phone]    Data   Recent Labs   Lab 04/02/24  0805 04/02/24  0735 04/02/24  0605 04/02/24  0210 04/01/24  2214 04/01/24  1912 04/01/24  1054   HGB  --  13.8  --   --   --   --   --    PLT  --   --   --   --   --  253  --    POTASSIUM  --   --   --   --   --   --  4.2   CR  --   --   --   --   --  1.00 0.92   *  --  105* 85   < >  --  99    < > = values in this interval not displayed.

## 2024-04-02 NOTE — CONSULTS
CLINICAL NUTRITION SERVICES    Reason for Assessment:  Nutrition education regarding diet s/p nissen fundoplication     Diet History:  Pt admits to not receiving nutrition education on diet advancement and stages of diet advancement s/p nissen fundoplication     Nutrition Diagnosis:  Food- and nutrition-related knowledge deficit r/t no previous knowledge of anticipated diet s/p nissen fundoplication AEB pt report of not receiving nutrition education    Interventions:  Nutrition Education  1.  Provided verbal instruction on diet s/p nissen fundoplication.Discussed foods/beverages at each stage of the diet (clear liquids, full liquids, semi-solid, and regular foods) and anticipated diet.  Also, reviewed helpful hints when starting solid food and ways of dealing with potential side effects post-op.    2.  Provided handout:  Diet Guidelines for a Nissen Fundoplication     Goals:   1.  Patient will verbalize at least two foods or beverages at each stage of the diet.    2.  Pt to verbalize anticipated duration of each diet stage.      Follow-up:    Patient to ask any further nutrition-related questions before discharge.  In addition, pt may request outpatient RD appointment.    Dolores Snyder RD, LD

## 2024-04-02 NOTE — PHARMACY-ADMISSION MEDICATION HISTORY
Pharmacist Admission Medication History    Admission medication history is complete. The information provided in this note is only as accurate as the sources available at the time of the update.    Information Source(s): Patient and CareEverywhere/SureScripts via in-person    Pertinent Information: none    Changes made to PTA medication list:  Added: multivitamin, fish oil  Deleted: None  Changed: Adderall 5 mg tab (daily --> daily PRN per patient), semaglutide (added day of week), testosterone (added day of week)    Allergies reviewed with patient and updates made in EHR: no, already reviewed by RN    Medication History Completed By: Kerrie Loyola RPH 4/1/2024 8:07 PM    PTA Med List   Medication Sig Last Dose    amphetamine-dextroamphetamine (ADDERALL XR) 25 MG 24 hr capsule Take 1 capsule (25 mg) by mouth daily for 30 days 3/31/2024 at AM    amphetamine-dextroamphetamine (ADDERALL) 5 MG tablet Take 1 tablet (5 mg) by mouth daily for 30 days (Patient taking differently: Take 5 mg by mouth daily as needed) 3/31/2024    Elemental iron 65 mg Vitamin C 125 mg (VITRON-C)  MG TABS tablet Take 1 tablet by mouth daily 4/1/2024    lisinopril (ZESTRIL) 30 MG tablet Take 1 tablet (30 mg) by mouth daily 3/31/2024    metFORMIN (GLUCOPHAGE) 500 MG tablet Take 1 tablet (500 mg) by mouth 2 times daily (with meals) 3/31/2024 at 1600    Multiple Vitamin (MULTIVITAMIN ADULT PO) Take 1 tablet by mouth daily Past Month    Omega-3 Fatty Acids (FISH OIL PO) Take 2 capsules by mouth daily Past Week    rosuvastatin (CRESTOR) 40 MG tablet Take 1 tablet (40 mg) by mouth daily 4/1/2024 at 0730    semaglutide (OZEMPIC) 2 MG/3ML pen Inject 0.5 mg Subcutaneous every 7 days (Patient taking differently: Inject 0.5 mg Subcutaneous every 7 days On Fridays, took on a Thursday most recently) 3/21/2024    testosterone cypionate (DEPOTESTOSTERONE) 200 MG/ML injection Inject 0.8 mLs into the muscle once a week On Wednesdays 3/27/2024

## 2024-04-02 NOTE — DISCHARGE SUMMARY
[facility] Discharge Summary    William Bolton MRN# [id number]   Age: 44 year old YOB: 1980     Date of Admission:  4/1/2024  Date of Discharge:  4/2/2024  Admitting Provider:  Andrew Franklin MD  Discharge Provider:  Ben Mueller PA-C  Discharging Service: General Surgery     Primary Provider: Kezia Brooks  Primary Care Physician Phone Number: [phone]          Admission Diagnoses:   Principle Diagnosis:  Iron deficiency anemia 2  to GI bleed  Anemia  Secondary Diagnoses:  DM 2  Sleep apnea  Hypercholesterolemia         Discharge Diagnosis:     Hiatal hernia [K44.9]  Gastrointestinal hemorrhage, unspecified gastrointestinal hemorrhage type [K92.2]          Procedures:     Procedure(s): robotic assisted laparoscopic hiatal hernia repair with nissen fundoplication             Discharge Medications:     Current Discharge Medication List        START taking these medications    Details   methocarbamol (ROBAXIN) 750 MG tablet Take 1 tablet (750 mg) by mouth every 6 hours as needed for muscle spasms  Qty: 15 tablet, Refills: 0    Associated Diagnoses: Hiatal hernia      oxyCODONE (ROXICODONE) 5 MG tablet Take 1 tablet (5 mg) by mouth every 4 hours as needed for moderate pain  Qty: 10 tablet, Refills: 0    Associated Diagnoses: Hiatal hernia      senna-docusate (SENOKOT-S/PERICOLACE) 8.6-50 MG tablet Take 1 tablet by mouth 2 times daily as needed for constipation  Qty: 10 tablet, Refills: 0    Associated Diagnoses: Hiatal hernia           CONTINUE these medications which have NOT CHANGED    Details   amphetamine-dextroamphetamine (ADDERALL XR) 25 MG 24 hr capsule Take 1 capsule (25 mg) by mouth daily for 30 days  Qty: 30 capsule, Refills: 0    Associated Diagnoses: ADD (attention deficit disorder) without hyperactivity      amphetamine-dextroamphetamine (ADDERALL) 5 MG tablet Take 1 tablet (5 mg) by mouth daily for 30 days  Qty: 30 tablet, Refills: 0    Associated Diagnoses: ADD (attention  deficit disorder) without hyperactivity      Elemental iron 65 mg Vitamin C 125 mg (VITRON-C)  MG TABS tablet Take 1 tablet by mouth daily      lisinopril (ZESTRIL) 30 MG tablet Take 1 tablet (30 mg) by mouth daily  Qty: 90 tablet, Refills: 3    Associated Diagnoses: Essential hypertension with goal blood pressure less than 130/80      metFORMIN (GLUCOPHAGE) 500 MG tablet Take 1 tablet (500 mg) by mouth 2 times daily (with meals)  Qty: 180 tablet, Refills: 1    Associated Diagnoses: Type 2 diabetes mellitus without complication, without long-term current use of insulin (H)      Multiple Vitamin (MULTIVITAMIN ADULT PO) Take 1 tablet by mouth daily      Omega-3 Fatty Acids (FISH OIL PO) Take 2 capsules by mouth daily      rosuvastatin (CRESTOR) 40 MG tablet Take 1 tablet (40 mg) by mouth daily  Qty: 90 tablet, Refills: 3    Associated Diagnoses: Hyperlipidemia, unspecified hyperlipidemia type      semaglutide (OZEMPIC) 2 MG/3ML pen Inject 0.5 mg Subcutaneous every 7 days  Qty: 3 mL, Refills: 6    Associated Diagnoses: Type 2 diabetes mellitus without complication, without long-term current use of insulin (H); Morbid obesity (H)      testosterone cypionate (DEPOTESTOSTERONE) 200 MG/ML injection Inject 0.8 mLs into the muscle once a week On Wednesdays                 Allergies:       No Known Allergies          Brief History of Illness:   William Bolton had a known history of hiatal hernia.  He was found to have a history of iron deficiency anemia and the source was deemed to be gastric irritation and bleeding from the intrathoracic stomach. Robotically assisted hiatal hernia repair was recommended.            Hospital Course:   William Bolton's hospital course was unremarkable.  He recovered as anticipated and experienced no post-operative complications. He advanced to a full liquid diet and will remain on a full liquid diet for 2 weeks until his post operative clinic visit.  He was seen in the hospital by a  dietician for diet education.     The patient was discharged to home in stable and afebrile condition.  He expressed understanding of all discharge instructions and felt comfortable with the discharge plan.  He was asked to call with any further questions or concerns. He will resume his PTA PPI daily.          Condition on Discharge:        Discharge condition: Stable   Discharge vitals: Blood pressure (!) 155/106, pulse 73, temperature 98.3  F (36.8  C), temperature source Oral, resp. rate 16, height 1.829 m (6'), weight 111.9 kg (246 lb 11.2 oz), SpO2 94%.           Discharge Disposition:     Discharged to home          Discharge Instructions and Follow-Up:      William Steelbaker was asked to follow up with surgical team in 2 weeks.       Discharge Instructions:   See attached instructions in AVS.            Ben Mueller PA-C   Office: [phone]

## 2024-04-02 NOTE — DISCHARGE INSTRUCTIONS
[facility]  Discharge Instructions: Post-Operative Hiatal Hernia Surgery    ACTIVITY  Expect to feel tired after your surgery.  This will gradually resolve.    Take frequent, short walks and increase your activity gradually.    Avoid strenuous physical activity or heavy lifting greater than 15 lbs. for 2-3 weeks.  You may climb stairs.    You may drive without restrictions when you are not using any prescription pain medication and feel comfortable in a car.  You may return to work/school when you are comfortable without any prescription pain medication.  You may wear an abdominal binder for comfort for 2-3 weeks from your surgery.  You can wash the abdominal binder and dry it on low heat in the dryer.    WOUND CARE  You may shower, but do not soak your incision(s) in a tub or pool for 2 weeks.  Pat your incisions dry after your shower and leave them open to air.  Re-apply dressing (Band-Aids or gauze/tape) as needed for comfort or drainage.  You may have steri-strips (looks like white tape) at your incisions.  You may peel off the steri-strips 2 weeks after your surgery.  If you have staples, they will be removed at your next office visit.  Do not apply any lotions, creams, or ointments to your incisions.  A ridge under your incisions is normal and will gradually resolve.    DIET  Stay on a full liquid diet until your follow up appointment.  Please go slow with your intake.      You may find your appetite to be diminished briefly after surgery.  You may take nutritional supplement shakes if you are able.   Drink plenty of fluids to stay hydrated.  Practice small sips using a medicine cup for the first week postop.  Sip, don't slurp when you drink.   Avoid straws, gum, hard candies, ice, cold drinks, alcohol, coffee, and soda pop or drinks with caffeine or carbonation.  These may cause excessive gas, spasms, and/or stomach pain.    PAIN  Expect some tenderness and discomfort at the  incision site(s).  Use the prescribed pain medication at your discretion.  Expect gradual resolution of your pain over several days.  You may take ibuprofen with food (unless you have been told not to) or acetaminophen/Tylenol instead of or in addition to your prescribed pain medication.  However, if you are taking Norco or Percocet, do not take any additional acetaminophen/Tylenol.  Do not drink alcohol or drive while you are taking pain medications or muscle relaxants.  You may apply ice to your incisions in 20 minute intervals as needed for the next 24-48 hours.  After that time, consider switching to heat if you prefer.    EXPECTATIONS  Pain medications can cause constipation.  Limit use when possible.  Take an over the counter or prescribed stool softener/stimulant, such as Colace or Senna, 1-2 times a day with plenty of water.  You may take a mild over the counter laxative, such as Miralax or a suppository, as needed.  You may discontinue these medications once you are having regular bowel movements and/or are no longer taking your narcotic pain medication.  If you had laparoscopic surgery, you may have shoulder or upper back discomfort due to the gas used in surgery.  This is temporary and should resolve in 48-72 hours after the surgery.  Short, frequent walks may help with this.    RETURN APPOINTMENT  Follow up with your surgeon in 2 weeks.  Please call our office at [phone] to schedule your appointment.  We are located at [address] [place] [address].    CALL OUR OFFICE [phone] IF YOU HAVE:   Chills or fever above 101  F.  Increased redness, warmth, or drainage at your incisions.  Significant bleeding.  Pain not relieved by your pain medication or rest.  Increasing pain after the first 48 hours.  Any other concerns or questions.             Take Gas-x as needed.    Follow full liquid diet x2 weeks/follow up appointment.  Canned fruits ok.  Smoothies or yogurt with nuts  and fruit also ok.  Avoid Pineapple.    Use Incentive Spirometer multiple times daily.  Increase fluid/water intake.  Take prilosec OTC daily for 6 weeks-3 months depending on MD recommendation.  Will discuss at follow up appointment.  Take iron tablet for at least 1 month, and discuss at follow up appointment.

## 2024-04-02 NOTE — PROGRESS NOTES
Pt up to floor from PACU 1830.  5 LAP sites CDI with steri strips and band-aids.  Rates pain 3/10, 5mg Oxycodone given.  CPAP at bedside for overnight.  Denies nausea, tolerating water/ice chips.

## 2024-04-02 NOTE — OP NOTE
Surgeon:  Andrew Franklin MD.     ASSISTANT:  Ben Mueller PA-C,The physicians assistant was medically necessary for their expertise in camera management, suctioning, suturing, and retraction.       PREOPERATIVE DIAGNOSIS:   1.  Hiatal Hernia  2.  Chronic gastrointestinal bleeding  3.  Intrathoracic stomach     POSTOPERATIVE DIAGNOSIS:   Same        PROCEDURE:   1.  Laparoscopic robotically assisted hiatal Hernia repair with Nissen fundoplication.  2.  Bio-A absorbable mesh placement (hiatal hernia patch)     ANESTHESIA:   General Endotracheal Anesthesia      COMPLICATIONS:   None      EBL:   Less than 25 ml          Events:       After induction of general endotracheal anesthesia William Bolton's abdomen was prepped and draped in the usual sterile fashion.  Using direct visualization trocar using the left upper quadrant, pneumoperitoneum established, a total of 4 trocars sites placed (8 mm), and one 5 mm port for the liver retractor.  We could easily identify the hiatal hernia containing the gastric fundus within it.  After further inspection of the abdominal cavity no additional pathology noted.  The robotic system was attached and the remaining of the operation performed from the surgeons console.  The left lobe of the liver was retracted to the right and the espohageal hiatus was exposed.  The content of the hernia was reduced into the abdomen and we exposed the esophagophrenic ligament, this allowed us to visualize the hernia sac.  The hernia sac was cauterized along the anterior edge of the hiatus.  We then entered the gastrohepatic ligament and this allowed us to complete the significant dissection of the hernia sac along the right leonila of the diaphragm. The gastrohepatic ligament was  up to the area of the esophageal hiatus. This exposed the right leonila of the diaphragm. This was followed anteriorly to expose the anterior arch of the leonila. The phrenoesophageal peritoneum was opened  exposing the  esophagus and the left leonila of the diaphragm. At this point we reduced the sac and dissected it free from it anterior and lateral atachments, once this was completed we were left only with the posterior attachments.  The anterior vagus was identified and protected.  The orogastric tube was then removed and accurate identification of the esophagus was facilitated by the introduction of a 36 [place] bougie into the esophagus.  The esophagus was then safely mobilized primarily by blunt dissection.  During this dissection, the esophageal hiatus was exposed, and the right leonila and posterior vagus were further identified and the hernia sac was further removed.  All this dissection was particularly tedious given the chronicity and amount of scar tissue developed between the herniated tissue and the posterior mediastinal structures.  The pleuras were identified and protected.     At this point we turned our attention towards identifying and ligating the short gastric vessels with LigaSure.  Further mobilization of the esophagus was carried out, and a window posterior to the esophagus was created.  At this point the hernia sac was circumeferentially dissected free and we had 360 degree visualization and mobilization of the esophagus. Using 2-0 permanent barbed sutures and columns of Bio-A material for buttressing purposes we closed the hernia defect posterior to the esophagus while the 36 [place] bougie remained within the esophagus.       Having successfully reapproximated the esophageal hiatus, a piece of Bio-A  mesh was cut to size.  The mesh was introduced and then secured around the esophagus by tacking it to the diaphragm 2-0 silk.     The fundus was plicated around the distal esophagus.  A 3 to 4-cm Nissen fundoplication was completed with interrupted 2-0 Surgidac sutures taking bites of the fundus to the diaphragmatic crura and to the esophagus in the uppermost and to the esophagus and stomach lowermost sutures sites.   Having successfully completed the fundoplication, additional sutures were placed from the stomach/fundus to the undersurface of the diaphragm and to the repaired crura. The abdomen was surveyed, the Penrose drain and remnant suture material were removed, all the dissection beds found to be hemostatic.  Trocars were removed without evidence of bleeding local anesthetic was placed in the bilateral intermuscular spaces subcostally.  Trocars were removed with out evidence of bleeding.     The wounds found to be hemostatic. The skin was closed using 4-0 absorbable suture. Steri strips were applied to the wounds; the patient was awakened from anesthesia, extubated and taken to recovery in stable condition.

## 2024-04-15 ENCOUNTER — OFFICE VISIT (OUTPATIENT)
Dept: SURGERY | Facility: CLINIC | Age: 44
End: 2024-04-15
Payer: COMMERCIAL

## 2024-04-15 DIAGNOSIS — Z09 SURGERY FOLLOW-UP EXAMINATION: Primary | ICD-10-CM

## 2024-04-15 PROCEDURE — 99024 POSTOP FOLLOW-UP VISIT: CPT | Performed by: SURGERY

## 2024-04-15 NOTE — LETTER
April 18, 2024          Milan Soria MD  [address] [place] [address]      RE:   William Bolton 1980      Dear Colleague,    Thank you for referring your patient, William Bolton, to [facility] at [facility]. Please see a copy of my visit note below.    First postoperative visit after hiatal hernia repair and fundoplication.  Since leaving the hospital he has being tolerating liquids well.  Has had to slow down a little bit when having thicker full liquids but otherwise no dysphagia nor odynophagia.  Good bowel function.     On exam his incisions have healed nicely.     Status post hiatal hernia repair and Nissen fundoplication     Will advance diet to moist, well cooked, easy to chew items (soft diet).  Continue to avoid green leafy salads and uncooked vegetables.  Avoid carbonation.  He will return to see me in 2 to 4 weeks.    Again, thank you for allowing me to participate in the care of your patient.      Sincerely,      Andrew Franklin MD

## 2024-04-16 ENCOUNTER — MYC MEDICAL ADVICE (OUTPATIENT)
Dept: FAMILY MEDICINE | Facility: CLINIC | Age: 44
End: 2024-04-16
Payer: COMMERCIAL

## 2024-04-16 DIAGNOSIS — E11.9 TYPE 2 DIABETES MELLITUS WITHOUT COMPLICATION, WITHOUT LONG-TERM CURRENT USE OF INSULIN (H): ICD-10-CM

## 2024-04-16 DIAGNOSIS — E66.01 MORBID OBESITY (H): ICD-10-CM

## 2024-04-17 NOTE — PROGRESS NOTES
General surgery clinic note    First postoperative visit after hiatal hernia repair and fundoplication.  Since leaving the hospital he has being tolerating liquids well.  Has had to slow down a little bit when having thicker full liquids but otherwise no dysphagia nor odynophagia.  Good bowel function.    On exam his incisions have healed nicely.    Status post hiatal hernia repair and Nissen fundoplication    Will advance diet to moist, well cooked, easy to chew items (soft diet).  Continue to avoid green leafy salads and uncooked vegetables.  Avoid carbonation.  He will return to see me in 2 to 4 weeks.

## 2024-05-09 ENCOUNTER — DOCUMENTATION ONLY (OUTPATIENT)
Dept: HOME HEALTH SERVICES | Facility: CLINIC | Age: 44
End: 2024-05-09
Payer: COMMERCIAL

## 2024-05-09 NOTE — PROGRESS NOTES
Patient came to [facility] for mask fitting appointment on May 9, 2024. Patient requested to switch masks because he prefers a nasal style mask. Discussed the following masks: Resmed Airfit N30i; Resmed Airift P30i; Resmed Airfit/touch N20; Resmed Airfit P10.     Patient selected a Resmed Mask name: Airfit [id number] mask size Small cushion, Standard frame

## 2024-05-14 ENCOUNTER — MYC MEDICAL ADVICE (OUTPATIENT)
Dept: FAMILY MEDICINE | Facility: CLINIC | Age: 44
End: 2024-05-14
Payer: COMMERCIAL

## 2024-05-15 ENCOUNTER — OFFICE VISIT (OUTPATIENT)
Dept: FAMILY MEDICINE | Facility: CLINIC | Age: 44
End: 2024-05-15
Payer: COMMERCIAL

## 2024-05-15 VITALS
DIASTOLIC BLOOD PRESSURE: 70 MMHG | HEART RATE: 112 BPM | WEIGHT: 212 LBS | BODY MASS INDEX: 28.71 KG/M2 | RESPIRATION RATE: 16 BRPM | TEMPERATURE: 97.9 F | HEIGHT: 72 IN | SYSTOLIC BLOOD PRESSURE: 108 MMHG | OXYGEN SATURATION: 99 %

## 2024-05-15 DIAGNOSIS — E11.9 TYPE 2 DIABETES MELLITUS WITHOUT COMPLICATION, WITHOUT LONG-TERM CURRENT USE OF INSULIN (H): ICD-10-CM

## 2024-05-15 DIAGNOSIS — E61.1 IRON DEFICIENCY: ICD-10-CM

## 2024-05-15 DIAGNOSIS — R11.0 NAUSEA: ICD-10-CM

## 2024-05-15 DIAGNOSIS — D50.9 MICROCYTIC ANEMIA: ICD-10-CM

## 2024-05-15 DIAGNOSIS — R19.7 DIARRHEA, UNSPECIFIED TYPE: Primary | ICD-10-CM

## 2024-05-15 LAB
ALBUMIN SERPL BCG-MCNC: 4.6 G/DL (ref 3.5–5.2)
ALP SERPL-CCNC: 120 U/L (ref 40–150)
ALT SERPL W P-5'-P-CCNC: 22 U/L (ref 0–70)
ANION GAP SERPL CALCULATED.3IONS-SCNC: 12 MMOL/L (ref 7–15)
AST SERPL W P-5'-P-CCNC: 18 U/L (ref 0–45)
BILIRUB SERPL-MCNC: 0.6 MG/DL
BUN SERPL-MCNC: 7.5 MG/DL (ref 6–20)
C DIFF TOX B STL QL: NEGATIVE
CALCIUM SERPL-MCNC: 9.4 MG/DL (ref 8.6–10)
CHLORIDE SERPL-SCNC: 99 MMOL/L (ref 98–107)
CREAT SERPL-MCNC: 0.94 MG/DL (ref 0.67–1.17)
CRP SERPL-MCNC: 4.3 MG/L
DEPRECATED HCO3 PLAS-SCNC: 23 MMOL/L (ref 22–29)
EGFRCR SERPLBLD CKD-EPI 2021: >90 ML/MIN/1.73M2
GLUCOSE SERPL-MCNC: 100 MG/DL (ref 70–99)
HBA1C MFR BLD: 5.8 % (ref 0–5.6)
POTASSIUM SERPL-SCNC: 4 MMOL/L (ref 3.4–5.3)
PROT SERPL-MCNC: 7.3 G/DL (ref 6.4–8.3)
SODIUM SERPL-SCNC: 134 MMOL/L (ref 135–145)
TSH SERPL DL<=0.005 MIU/L-ACNC: 2.01 UIU/ML (ref 0.3–4.2)

## 2024-05-15 PROCEDURE — 80053 COMPREHEN METABOLIC PANEL: CPT | Performed by: FAMILY MEDICINE

## 2024-05-15 PROCEDURE — 87507 IADNA-DNA/RNA PROBE TQ 12-25: CPT | Performed by: FAMILY MEDICINE

## 2024-05-15 PROCEDURE — 87493 C DIFF AMPLIFIED PROBE: CPT | Mod: 59 | Performed by: FAMILY MEDICINE

## 2024-05-15 PROCEDURE — 99214 OFFICE O/P EST MOD 30 MIN: CPT | Mod: 24 | Performed by: FAMILY MEDICINE

## 2024-05-15 PROCEDURE — 86140 C-REACTIVE PROTEIN: CPT | Performed by: FAMILY MEDICINE

## 2024-05-15 PROCEDURE — 36415 COLL VENOUS BLD VENIPUNCTURE: CPT | Performed by: FAMILY MEDICINE

## 2024-05-15 PROCEDURE — 84443 ASSAY THYROID STIM HORMONE: CPT | Performed by: FAMILY MEDICINE

## 2024-05-15 PROCEDURE — 83036 HEMOGLOBIN GLYCOSYLATED A1C: CPT | Performed by: FAMILY MEDICINE

## 2024-05-15 RX ORDER — ONDANSETRON 4 MG/1
4 TABLET, ORALLY DISINTEGRATING ORAL EVERY 8 HOURS PRN
Qty: 20 TABLET | Refills: 0 | Status: SHIPPED | OUTPATIENT
Start: 2024-05-15

## 2024-05-15 RX ORDER — AZITHROMYCIN 250 MG/1
1000 TABLET, FILM COATED ORAL DAILY
Qty: 4 TABLET | Refills: 0 | Status: SHIPPED | OUTPATIENT
Start: 2024-05-15

## 2024-05-15 RX ORDER — AZITHROMYCIN 250 MG/1
1000 TABLET, FILM COATED ORAL DAILY
Qty: 4 TABLET | Refills: 0 | Status: SHIPPED | OUTPATIENT
Start: 2024-05-15 | End: 2024-05-15

## 2024-05-15 ASSESSMENT — ENCOUNTER SYMPTOMS: DIARRHEA: 1

## 2024-05-15 NOTE — PROGRESS NOTES
Assessment & Plan     Diarrhea, unspecified type  Unknown cause at this point but given severity of symptoms, will empirically start azithromycin while stool studies are in process. He was hospitalized over night about 6 weeks ago so will check C diff. Will also check for viral/bacterial etiology. Push fluids, particularly sugar-free electrolyte enhanced beverages  - Comprehensive metabolic panel (BMP + Alb, Alk Phos, ALT, AST, Total. Bili, TP); Future  - CRP, inflammation; Future  - Enteric Bacteria and Virus Panel by [facility] Stool; Future  - C. difficile Toxin B PCR with reflex to C. difficile Antigen and Toxins A/B EIA; Future  - TSH with free T4 reflex; Future  - azithromycin (ZITHROMAX) 250 MG tablet; Take 4 tablets (1,000 mg) by mouth daily    Nausea  - ondansetron (ZOFRAN ODT) 4 MG ODT tab; Take 1 tablet (4 mg) by mouth every 8 hours as needed for nausea or vomiting    Type 2 diabetes mellitus without complication, without long-term current use of insulin (H)  Well controlled. Recheck A1c  - HEMOGLOBIN A1C; Future    Microcytic anemia    Iron deficiency  - **CBC with platelets FUTURE 2mo  - Ferritin    BMI  Estimated body mass index is 28.75 kg/m  as calculated from the following:    Height as of this encounter: 1.829 m (6').    Weight as of this encounter: 96.2 kg (212 lb).     Sandeep Thomas is a 44 year old, presenting for the following health issues:  Diarrhea        5/15/2024     8:09 AM   Additional Questions   Roomed by Felicita HUFF CMA     Diarrhea    History of Present Illness       Reason for visit:  Severe diarrhea and fatigue  Symptom onset:  3-7 days ago  Symptom intensity:  Severe  Symptom progression:  Staying the same  Had these symptoms before:  No    He eats 2-3 servings of fruits and vegetables daily.He consumes 1 sweetened beverage(s) daily.He exercises with enough effort to increase his heart rate 9 or less minutes per day.  He exercises with enough effort to increase his heart rate 3 or less  days per week.   He is taking medications regularly.     Diarrhea  Onset/Duration: Sunday evening  Description:       Consistency of stool: watery, runny, loose, and explosive       Blood in stool: No       Number of loose stools past 24 hours: 30-40 per day - liquid stool  Progression of Symptoms: same  Accompanying signs and symptoms:       Fever: No       Nausea/Vomiting: YES- nauseous-patient felt like he wanted to throw up but was unable to.        Abdominal pain: gas pains       Weight loss: 7-8 lbs       Episodes of constipation: No  History   Ill contacts: No  Recent use of antibiotics: No  Recent travels: No  Recent medication-new or changes(Rx or OTC): No  Precipitating or alleviating factors: imodium-not helping symptoms-fatigue is increasing   Therapies tried and outcome: Imodium, GasX, Pepto Bismol -- no improvement    Patent had stomach surgery (hiatal hernia surgery) about 6 weeks ago and was admitted overnight, has not had any complications since then, but now is have diarrhea. Has lost about 30 lbs since then.  He had been on a liquid diet for a couple weeks. No recent travel. Water is filtered. No other contacts with similar symptoms.       Review of Systems  Constitutional, HEENT, cardiovascular, pulmonary, gi and gu systems are negative, except as otherwise noted.      Objective    /70   Pulse 112   Temp 97.9  F (36.6  C) (Tympanic)   Resp 16   Ht 1.829 m (6')   Wt 96.2 kg (212 lb)   SpO2 99%   BMI 28.75 kg/m    Body mass index is 28.75 kg/m .  Physical Exam   GENERAL: alert and no distress  HENT: ear canals and TM's normal, nose and mouth without ulcers or lesions  NECK: no adenopathy, no asymmetry, masses, or scars  RESP: lungs clear to auscultation - no rales, rhonchi or wheezes  CV: regular rate and rhythm, normal S1 S2, no S3 or S4, no murmur, click or rub, no peripheral edema  ABDOMEN: soft, nontender, no hepatosplenomegaly, no masses and bowel sounds normal  PSYCH: mentation  appears normal, affect normal/bright            Signed Electronically by: Smith Lemons, DO

## 2024-05-16 LAB
ADV 40+41 DNA STL QL NAA+NON-PROBE: NEGATIVE
ASTRO TYP 1-8 RNA STL QL NAA+NON-PROBE: NEGATIVE
C CAYETANENSIS DNA STL QL NAA+NON-PROBE: NEGATIVE
CAMPYLOBACTER DNA SPEC NAA+PROBE: NEGATIVE
CRYPTOSP DNA STL QL NAA+NON-PROBE: NEGATIVE
E COLI O157 DNA STL QL NAA+NON-PROBE: NORMAL
E HISTOLYT DNA STL QL NAA+NON-PROBE: NEGATIVE
EAEC ASTA GENE ISLT QL NAA+PROBE: NEGATIVE
EC STX1+STX2 GENES STL QL NAA+NON-PROBE: NEGATIVE
EPEC EAE GENE STL QL NAA+NON-PROBE: NEGATIVE
ETEC LTA+ST1A+ST1B TOX ST NAA+NON-PROBE: NEGATIVE
G LAMBLIA DNA STL QL NAA+NON-PROBE: NEGATIVE
NOROVIRUS GI+II RNA STL QL NAA+NON-PROBE: NEGATIVE
P SHIGELLOIDES DNA STL QL NAA+NON-PROBE: NEGATIVE
RVA RNA STL QL NAA+NON-PROBE: NEGATIVE
SALMONELLA SP RPOD STL QL NAA+PROBE: NEGATIVE
SAPO I+II+IV+V RNA STL QL NAA+NON-PROBE: NEGATIVE
SHIGELLA SP+EIEC IPAH ST NAA+NON-PROBE: NEGATIVE
V CHOLERAE DNA SPEC QL NAA+PROBE: NEGATIVE
VIBRIO DNA SPEC NAA+PROBE: NEGATIVE
Y ENTEROCOL DNA STL QL NAA+PROBE: NEGATIVE

## 2024-05-20 ENCOUNTER — OFFICE VISIT (OUTPATIENT)
Dept: SURGERY | Facility: CLINIC | Age: 44
End: 2024-05-20
Payer: COMMERCIAL

## 2024-05-20 DIAGNOSIS — Z09 SURGERY FOLLOW-UP EXAMINATION: Primary | ICD-10-CM

## 2024-05-20 PROCEDURE — 99024 POSTOP FOLLOW-UP VISIT: CPT | Performed by: SURGERY

## 2024-05-20 NOTE — LETTER
"May 23, 2024          Milan Soria MD  [address] [place] [address]      RE:   William Bolton 1980      Dear Colleague,    Thank you for referring your patient, William Bolton, to [facility]. Please see a copy of my visit note below.    Follow-up with Mr. Bolton after laparoscopic hiatal hernia repair and Nissen fundoplication.  He will [place] with food\" as well.  Has been increasing his physical activity without problems.  There has been no dysphagia, no odynophagia, nausea nor vomiting.  He has not experienced any reflux nor heartburn.  He is still taking daily antiacid.  At this point we talked about liberalizing his diet as long as he continues to chew well and has small portions.  He understands and agrees with this.     He will finish the antiacid medications he has and then after that stopped him.  Should his symptoms recur with regards to reflux or heartburn he will resume medication and contact us.     He is pleased with the results.  Return to clinic as needed.    Again, thank you for allowing me to participate in the care of your patient.      Sincerely,      Andrew Franklin MD        "

## 2024-05-22 NOTE — PROGRESS NOTES
"General surgery clinic    Follow-up with Mr. Bolton after laparoscopic hiatal hernia repair and Nissen fundoplication.  He will [place] with food\" as well.  Has been increasing his physical activity without problems.  There has been no dysphagia, no odynophagia, nausea nor vomiting.  He has not experienced any reflux nor heartburn.  He is still taking daily antiacid.  At this point we talked about liberalizing his diet as long as he continues to chew well and has small portions.  He understands and agrees with this.    He will finish the antiacid medications he has and then after that stopped him.  Should his symptoms recur with regards to reflux or heartburn he will resume medication and contact us.    He is pleased with the results.  Return to clinic as needed.  "

## 2024-06-24 DIAGNOSIS — E11.9 TYPE 2 DIABETES MELLITUS WITHOUT COMPLICATION, WITHOUT LONG-TERM CURRENT USE OF INSULIN (H): ICD-10-CM

## 2024-08-01 ENCOUNTER — MYC REFILL (OUTPATIENT)
Dept: FAMILY MEDICINE | Facility: CLINIC | Age: 44
End: 2024-08-01
Payer: COMMERCIAL

## 2024-08-01 DIAGNOSIS — F98.8 ADD (ATTENTION DEFICIT DISORDER) WITHOUT HYPERACTIVITY: ICD-10-CM

## 2024-08-01 RX ORDER — DEXTROAMPHETAMINE SACCHARATE, AMPHETAMINE ASPARTATE, DEXTROAMPHETAMINE SULFATE AND AMPHETAMINE SULFATE 1.25; 1.25; 1.25; 1.25 MG/1; MG/1; MG/1; MG/1
5 TABLET ORAL DAILY
Qty: 30 TABLET | Refills: 0 | Status: CANCELLED | OUTPATIENT
Start: 2024-08-01

## 2024-08-01 RX ORDER — DEXTROAMPHETAMINE SACCHARATE, AMPHETAMINE ASPARTATE MONOHYDRATE, DEXTROAMPHETAMINE SULFATE AND AMPHETAMINE SULFATE 6.25; 6.25; 6.25; 6.25 MG/1; MG/1; MG/1; MG/1
25 CAPSULE, EXTENDED RELEASE ORAL DAILY
Qty: 30 CAPSULE | Refills: 0 | Status: CANCELLED | OUTPATIENT
Start: 2024-08-01

## 2024-08-02 RX ORDER — DEXTROAMPHETAMINE SACCHARATE, AMPHETAMINE ASPARTATE, DEXTROAMPHETAMINE SULFATE AND AMPHETAMINE SULFATE 1.25; 1.25; 1.25; 1.25 MG/1; MG/1; MG/1; MG/1
5 TABLET ORAL DAILY
Qty: 30 TABLET | Refills: 0 | Status: SHIPPED | OUTPATIENT
Start: 2024-09-01 | End: 2024-10-01

## 2024-08-02 RX ORDER — DEXTROAMPHETAMINE SACCHARATE, AMPHETAMINE ASPARTATE, DEXTROAMPHETAMINE SULFATE AND AMPHETAMINE SULFATE 1.25; 1.25; 1.25; 1.25 MG/1; MG/1; MG/1; MG/1
5 TABLET ORAL DAILY
Qty: 30 TABLET | Refills: 0 | Status: SHIPPED | OUTPATIENT
Start: 2024-08-02 | End: 2024-09-01

## 2024-08-02 RX ORDER — DEXTROAMPHETAMINE SACCHARATE, AMPHETAMINE ASPARTATE MONOHYDRATE, DEXTROAMPHETAMINE SULFATE AND AMPHETAMINE SULFATE 6.25; 6.25; 6.25; 6.25 MG/1; MG/1; MG/1; MG/1
25 CAPSULE, EXTENDED RELEASE ORAL DAILY
Qty: 30 CAPSULE | Refills: 0 | Status: SHIPPED | OUTPATIENT
Start: 2024-09-01 | End: 2024-10-01

## 2024-08-02 RX ORDER — DEXTROAMPHETAMINE SACCHARATE, AMPHETAMINE ASPARTATE, DEXTROAMPHETAMINE SULFATE AND AMPHETAMINE SULFATE 1.25; 1.25; 1.25; 1.25 MG/1; MG/1; MG/1; MG/1
5 TABLET ORAL DAILY
Qty: 30 TABLET | Refills: 0 | Status: SHIPPED | OUTPATIENT
Start: 2024-10-01 | End: 2024-10-31

## 2024-08-02 RX ORDER — DEXTROAMPHETAMINE SACCHARATE, AMPHETAMINE ASPARTATE MONOHYDRATE, DEXTROAMPHETAMINE SULFATE AND AMPHETAMINE SULFATE 6.25; 6.25; 6.25; 6.25 MG/1; MG/1; MG/1; MG/1
25 CAPSULE, EXTENDED RELEASE ORAL DAILY
Qty: 30 CAPSULE | Refills: 0 | Status: SHIPPED | OUTPATIENT
Start: 2024-10-01 | End: 2024-10-31

## 2024-08-02 RX ORDER — DEXTROAMPHETAMINE SACCHARATE, AMPHETAMINE ASPARTATE MONOHYDRATE, DEXTROAMPHETAMINE SULFATE AND AMPHETAMINE SULFATE 6.25; 6.25; 6.25; 6.25 MG/1; MG/1; MG/1; MG/1
25 CAPSULE, EXTENDED RELEASE ORAL DAILY
Qty: 30 CAPSULE | Refills: 0 | Status: SHIPPED | OUTPATIENT
Start: 2024-08-02 | End: 2024-09-01

## 2024-09-15 ENCOUNTER — HEALTH MAINTENANCE LETTER (OUTPATIENT)
Age: 44
End: 2024-09-15

## 2024-09-23 ENCOUNTER — TELEPHONE (OUTPATIENT)
Dept: GASTROENTEROLOGY | Facility: CLINIC | Age: 44
End: 2024-09-23
Payer: COMMERCIAL

## 2024-09-23 DIAGNOSIS — K76.0 FATTY LIVER: Primary | ICD-10-CM

## 2024-09-25 ENCOUNTER — VIRTUAL VISIT (OUTPATIENT)
Dept: SLEEP MEDICINE | Facility: CLINIC | Age: 44
End: 2024-09-25
Payer: COMMERCIAL

## 2024-09-25 VITALS — WEIGHT: 215 LBS | BODY MASS INDEX: 29.12 KG/M2 | HEIGHT: 72 IN

## 2024-09-25 DIAGNOSIS — G47.34 NOCTURNAL HYPOXEMIA: ICD-10-CM

## 2024-09-25 DIAGNOSIS — G47.33 OSA (OBSTRUCTIVE SLEEP APNEA): Primary | ICD-10-CM

## 2024-09-25 PROCEDURE — 99214 OFFICE O/P EST MOD 30 MIN: CPT | Mod: 95 | Performed by: INTERNAL MEDICINE

## 2024-09-25 ASSESSMENT — PAIN SCALES - GENERAL: PAINLEVEL: NO PAIN (0)

## 2024-09-25 NOTE — PATIENT INSTRUCTIONS
"          MY TREATMENT INFORMATION FOR SLEEP APNEA-  William Bolton    DOCTOR : Shruthi Neville MD      Frequently asked questions:  1. What is Obstructive Sleep Apnea (NATHAN)? NATHAN is the most common type of sleep apnea. Apnea means, \"without breath.\"  Apnea is most often caused by narrowing or collapse of the upper airway as muscles relax during sleep.   Almost everyone has occasional apneas. Most people with sleep apnea have had brief interruptions at night frequently for many years.  The severity of sleep apnea is related to how frequent and severe the events are.   2. What are the consequences of NATHAN? Symptoms include: feeling sleepy during the day, snoring loudly, gasping or stopping of breathing, trouble sleeping, and occasionally morning headaches or heartburn at night.  Sleepiness can be serious and even increase the risk of falling asleep while driving. Other health consequences may include development of high blood pressure and other cardiovascular disease in persons who are susceptible. Untreated NATHAN  can contribute to heart disease, stroke and diabetes.   3. What are the treatment options? In most situations, sleep apnea is a lifelong disease that must be managed with daily therapy. Medications are not effective for sleep apnea and surgery is generally not considered until other therapies have been tried. Your treatment is your choice . Continuous Positive Airway (CPAP) works right away and is the therapy that is effective in nearly everyone. An oral device to hold your jaw forward is usually the next most reliable option. Other options include postioning devices (to keep you off your back), weight loss, and surgery including a tongue pacing device. There is more detail about some of these options below.  4. Are my sleep studies covered by insurance? Although we will request verification of coverage, we advise you also check in advance of the study to ensure there is coverage.    Important tips for " those choosing CPAP and similar devices  REMEMBER-IF YOU RECEIVE A CALL FROM  [phone]-->IT IS TO SETUP A DEVICE  For new devices, sign up for device SABA to monitor your device for your followup visits  We encourage you to utilize the [employer] saba or website ( https://[employer].SynapSense/ ) to monitor your therapy progress and share the data with your healthcare team when you discuss your sleep apnea.                                                    Know your equipment:  CPAP is continuous positive airway pressure that prevents obstructive sleep apnea by keeping the throat from collapsing while you are sleeping. In most cases, the device is  smart  and can slowly self-adjusts if your throat collapses and keeps a record every day of how well you are treated-this information is available to you and your care team.  BPAP is bilevel positive airway pressure that keeps your throat open and also assists each breath with a pressure boost to maintain adequate breathing.  Special kinds of BPAP are used in patients who have inadequate breathing from lung or heart disease. In most cases, the device is  smart  and can slowly self-adjusts to assist breathing. Like CPAP, the device keeps a record of how well you are treated.  Your mask is your connection to the device. You get to choose what feels most comfortable and the staff will help to make sure if fits. Here: are some examples of the different masks that are available: Magnetic mask aids may assist with use but there are safety issues that should be addressed when considering with magnets* ( see end of discussion).       Key points to remember on your journey with sleep apnea:  Sleep study.  PAP devices often need to be adjusted during a sleep study to show that they are effective and adjusted right.  Good tips to remember: Try wearing just the mask during a quiet time during the day so your body adapts to wearing it. A humidifier is recommended for comfort in  most cases to prevent drying of your nose and throat. Allergy medication from your provider may help you if you are having nasal congestion.  Getting settled-in. It takes more than one night for most of us to get used to wearing a mask. Try wearing just the mask during a quiet time during the day so your body adapts to wearing it. A humidifier is recommended for comfort in most cases. Our team will work with you carefully on the first day and will be in contact within 4 days and again at 2 and 4 weeks for advice and remote device adjustments. Your therapy is evaluated by the device each day.   Use it every night. The more you are able to sleep naturally for 7-8 hours, the more likely you will have good sleep and to prevent health risks or symptoms from sleep apnea. Even if you use it 4 hours it helps. Occasionally all of us are unable to use a medical therapy, in sleep apnea, it is not dangerous to miss one night.   Communicate. Call our skilled team on the number provided on the first day if your visit for problems that make it difficult to wear the device. Over 2 out of 3 patients can learn to wear the device long-term with help from our team. Remember to call our team or your sleep providers if you are unable to wear the device as we may have other solutions for those who cannot adapt to mask CPAP therapy. It is recommended that you sleep your sleep provider within the first 3 months and yearly after that if you are not having problems.   Use it for your health. We encourage use of CPAP masks during daytime quiet periods to allow your face and brain to adapt to the sensation of CPAP so that it will be a more natural sensation to awaken to at night or during naps. This can be very useful during the first few weeks or months of adapting to CPAP though it does not help medically to wear CPAP during wakefulness and  should not be used as a strategy just to meet guidelines.  Take care of your equipment. Make sure you  clean your mask and tubing using directions every day and that your filter and mask are replaced as recommended or if they are not working.     *Masks with magnets:  Updated Contraindications  Masks with magnetic components are contraindicated for use by patients where they, or anyone in close physical contact while using the mask, have the following:   Active medical implants that interact with magnets (i.e., pacemakers, implantable cardioverter defibrillators (ICD), neurostimulators, cerebrospinal fluid (CSF) shunts, insulin/infusion pumps)   Metallic implants/objects containing ferromagnetic material (i.e., aneurysm clips/flow disruption devices, embolic coils, stents, valves, electrodes, implants to restore hearing or balance with implanted magnets, ocular implants, metallic splinters in the eye)  Updated Warning  Keep the mask magnets at a safe distance of at least 6 inches (150 mm) away from implants or medical devices that may be adversely affected by magnetic interference. This warning applies to you or anyone in close physical contact with your mask. The magnets are in the frame and lower headgear clips, with a magnetic field strength of up to 400mT. When worn, they connect to secure the mask but may inadvertently detach while asleep.  Implants/medical devices, including those listed within contraindications, may be adversely affected if they change function under external magnetic fields or contain ferromagnetic materials that attract/repel to magnetic fields (some metallic implants, e.g., contact lenses with metal, dental implants, metallic cranial plates, screws, thomas hole covers, and bone substitute devices). Consult your physician and  of your implant / other medical device for information on the potential adverse effects of magnetic fields.    BESIDES CPAP, WHAT OTHER THERAPIES ARE THERE?    Positioning Device  Positioning devices are generally used when sleep apnea is mild and only occurs  on your back.This example shows a pillow that straps around the waist. It may be appropriate for those whose sleep study shows milder sleep apnea that occurs primarily when lying flat on one's back. Preliminary studies have shown benefit but effectiveness at home may need to be verified by a home sleep test. These devices are generally not covered by medical insurance.  Examples of devices that maintain sleeping on the back to prevent snoring and mild sleep apnea.    Belt type body positioner  http://[employer]orem/    Electronic reminder  http://nightshifttherapy.com/            Oral Appliance  What is oral appliance therapy?  An oral appliance device fits on your teeth at night like a retainer used after having braces. The device is made by a specialized dentist and requires several visits over 1-2 months before a manufactured device is made to fit your teeth and is adjusted to prevent your sleep apnea. Once an oral device is working properly, snoring should be improved. A home sleep test may be recommended at that time if to determine whether the sleep apnea is adequately treated.       Some things to remember:  -Oral devices are often, but not always, covered by your medical insurance. Be sure to check with your insurance provider.   -If you are referred for oral therapy, you will be given a list of specialized dentists to consider or you may choose to visit the Web site of the American Academy of Dental Sleep Medicine  -Oral devices are less likely to work if you have severe sleep apnea or are extremely overweight.     More detailed information  An oral appliance is a small acrylic device that fits over the upper and lower teeth  (similar to a retainer or a mouth guard). This device slightly moves jaw forward, which moves the base of the tongue forward, opens the airway, improves breathing for effective treat snoring and obstructive sleep apnea in perhaps 7 out of 10 people .  The best working devices are  custom-made by a dental device  after a mold is made of the teeth 1, 2, 3.  When is an oral appliance indicated?  Oral appliance therapy is recommended as a first-line treatment for patients with primary snoring, mild sleep apnea, and for patients with moderate sleep apnea who prefer appliance therapy to use of CPAP4, 5. Severity of sleep apnea is determined by sleep testing and is based on the number of respiratory events per hour of sleep.   How successful is oral appliance therapy?  The success rate of oral appliance therapy in patients with mild sleep apnea is 75-80% while in patients with moderate sleep apnea it is 50-70%. The chance of success in patients with severe sleep apnea is 40-50%. The research also shows that oral appliances have a beneficial effect on the cardiovascular health of NATHAN patients at the same magnitude as CPAP therapy7.  Oral appliances should be a second-line treatment in cases of severe sleep apnea, but if not completely successful then a combination therapy utilizing CPAP plus oral appliance therapy may be effective. Oral appliances tend to be effective in a broad range of patients although studies show that the patients who have the highest success are females, younger patients, those with milder disease, and less severe obesity. 3, 6.   Finding a dentist that practices dental sleep medicine  Specific training is available through the American Academy of Dental Sleep Medicine for dentists interested in working in the field of sleep. To find a dentist who is educated in the field of sleep and the use of oral appliances, near you, visit the Web site of the American Academy of Dental Sleep Medicine.    References  1. Brooks et al. Objectively measured vs self-reported compliance during oral appliance therapy for sleep-disordered breathing. Chest 2013; 144(5): 7206-0112.  2. Francine et al. Objective measurement of compliance during oral appliance therapy for  sleep-disordered breathing. Thorax 2013; 68(1): 91-96.  3. Alee, et al. Mandibular advancement devices in 620 men and women with NATHAN and snoring: tolerability and predictors of treatment success. Chest 2004; 125: 5127-6784.  4. Lilliam et al. Oral appliances for snoring and NATHAN: a review. Sleep 2006; 29: 244-262.  5. Kristine et al. Oral appliance treatment for NATHAN: an update. J Clin Sleep Med 2014; 10(2): 215-227.  6. Mary et al. Predictors of OSAH treatment outcome. J Dent Res 2007; 86: 5576-8958.      Weight Loss:   Your Body mass index is 29.16 kg/m .    Being overweight does not necessarily mean you will have health consequences.  Those who have BMI over 35 or over 27 with existing medical conditions carries greater risk.   Weight loss decreases severity of sleep apnea in most people with obesity. For those with mild obesity who have developed snoring with weight gain, even 15-30 pound weight loss can improve and occasionally milder eliminate sleep apnea.  Structured and life-long dietary and health habits are necessary to lose weight and keep healthier weight levels.     The Comprehensive Weight loss program offers all aspects of weight loss strategies including two Non-Surgical Weight Loss Programs: Medical Weight Management and our 24 Week Healthy Lifestyle Program:    Medical Weight Management: You will meet with a Medical Weight Management Provider, as well as a Registered Dietician. The program may include medication therapy, dietary education, recommended exercise and physical therapy programs, monthly support group meetings, and possible psychological counseling. Follow up visits with the provider or dietician are scheduled based on your progress and needs.    24 Week Healthy Lifestyle Program: This unique program is designed to give you the support of weekly appointments and activities thru a 24-week period. It may include all of the components of the basic program (above), with the  addition of 11 individual Health  Visits, 24-week access to the [employer] website for over 700 online classes, and monthly support group meetings. This program has an out-of-pocket expense of $499 to cover the items that can not be billed to insurance (health coaches and [employer] access), and is non-refundable/non-transferable (you may be able to use a Health Savings Account; ask your HSA provider). There may be an optional meal replacement plan prescribed as well.   Surgical management achieves meaningful long-term weight loss and improvement in health risks in most patients with more severe obesity.      Sleep Apnea Surgery:    Surgery for obstructive sleep apnea is considered generally only when other therapies fail to work. Surgery may be discussed with you if you are having a difficult time tolerating CPAP and or when there is an abnormal structure that requires surgical correction.  Nose and throat surgeries often enlarge the airway to prevent collapse.  Most of these surgeries create pain for 1-2 weeks and up to half of the most common surgeries are not effective throughout life.  You should carefully discuss the benefits and drawbacks to surgery with your sleep provider and surgeon to determine if it is the best solution for you.   More information  Surgery for NATHAN is directed at areas that are responsible for narrowing or complete obstruction of the airway during sleep.  There are a wide range of procedures available to enlarge and/or stabilize the airway to prevent blockage of breathing in the three major areas where it can occur: the palate, tongue, and nasal regions.  Successful surgical treatment depends on the accurate identification of the factors responsible for obstructive sleep apnea in each person.  A personalized approach is required because there is no single treatment that works well for everyone.  Because of anatomic variation, consultation with an examination by a sleep surgeon is a  critical first step in determining what surgical options are best for each patient.  In some cases, examination during sedation may be recommended in order to guide the selection of procedures.  Patients will be counseled about risks and benefits as well as the typical recovery course after surgery. Surgery is typically not a cure for a person s NATHAN.  However, surgery will often significantly improve one s NATHAN severity (termed  success rate ).  Even in the absence of a cure, surgery will decrease the cardiovascular risk associated with OSA7; improve overall quality of life8 (sleepiness, functionality, sleep quality, etc).      Palate Procedures:  Patients with NATHAN often have narrowing of their airway in the region of their tonsils and uvula.  The goals of palate procedures are to widen the airway in this region as well as to help the tissues resist collapse.  Modern palate procedure techniques focus on tissue conservation and soft tissue rearrangement, rather than tissue removal.  Often the uvula is preserved in this procedure. Residual sleep apnea is common in patient after pharyngoplasty with an average reduction in sleep apnea events of 33%2.      Tongue Procedures:  ExamWhile patients are awake, the muscles that surround the throat are active and keep this region open for breathing. These muscles relax during sleep, allowing the tongue and other structures to collapse and block breathing.  There are several different tongue procedures available.  Selection of a tongue base procedure depends on characteristics seen on physical exam.  Generally, procedures are aimed at removing bulky tissues in this area or preventing the back of the tongue from falling back during sleep.  Success rates for tongue surgery range from 50-62%3.    Hypoglossal Nerve Stimulation:  Hypoglossal nerve stimulation has recently received approval from the [place] Food and Drug Administration for the treatment of obstructive sleep  apnea.  This is based on research showing that the system was safe and effective in treating sleep apnea6.  Results showed that the median AHI score decreased 68%, from 29.3 to 9.0. This therapy uses an implant system that senses breathing patterns and delivers mild stimulation to airway muscles, which keeps the airway open during sleep.  The system consists of three fully implanted components: a small generator (similar in size to a pacemaker), a breathing sensor, and a stimulation lead.  Using a small handheld remote, a patient turns the therapy on before bed and off upon awakening.    Candidates for this device must be greater than 18 years of age, have moderate to severe obstructive sleep apnea with less than 25% central events  (AHI between 15-65), BMI less than 35, have tried CPAP/oral appliance for at least 8 weeks without success, and have appropriate upper airway anatomy (determined by a sleep endoscopy performed by Dr. Luis Greenfield or Dr. Matthew Peters).    Nasal Procedures:  Nasal obstruction can interfere with nasal breathing during the day and night.  Studies have shown that relief of nasal obstruction can improve the ability of some patients to tolerate positive airway pressure therapy for obstructive sleep apnea1.  Treatment options include medications such as nasal saline, topical corticosteroid and antihistamine sprays, and oral medications such as antihistamines or decongestants. Non-surgical treatments can include external nasal dilators for selected patients. If these are not successful by themselves, surgery can improve the nasal airway either alone or in combination with these other options.        Combination Procedures:  Combination of surgical procedures and other treatments may be recommended, particularly if patients have more than one area of narrowing or persistent positional disease.  The success rate of combination surgery ranges from 66-80%2,3.    References  [place] POPE. The Role of  the Nose in Snoring and Obstructive Sleep Apnoea: An Update.  Eur Arch Otorhinolaryngol. 2011; 268: 1365-73.   Miroslava SM; Sol JA; Shannen JR; Pallanch JF; Neel MB; Mirtha SG; Taye WALTER. Surgical modifications of the upper airway for obstructive sleep apnea in adults: a systematic review and meta-analysis. SLEEP 2010;33(10):6642-8028. Too KELLY. Hypopharyngeal surgery in obstructive sleep apnea: an evidence-based medicine review.  Arch Otolaryngol Head Neck Surg. 2006 Feb;132(2):206-13.  Giovany YH1, Kayce Y, Denis HALIMA. The efficacy of anatomically based multilevel surgery for obstructive sleep apnea. Otolaryngol Head Neck Surg. 2003 Oct;129(4):327-35.  Kezirian E, Goldberg A. Hypopharyngeal Surgery in Obstructive Sleep Apnea: An Evidence-Based Medicine Review. Arch Otolaryngol Head Neck Surg. 2006 Feb;132(2):206-13.  Miguel MENDEZ et al. Upper-Airway Stimulation for Obstructive Sleep Apnea.  N Engl J Med. 2014 Jan 9;370(2):139-49.  Pedimitrios Y et al. Increased Incidence of Cardiovascular Disease in Middle-aged Men with Obstructive Sleep Apnea. Am J Respir Crit Care Med; 2002 166: 159-165  Velazquez EM et al. Studying Life Effects and Effectiveness of Palatopharyngoplasty (SLEEP) study: Subjective Outcomes of Isolated Uvulopalatopharyngoplasty. Otolaryngol Head Neck Surg. 2011; 144: 623-631.  WHAT IF I ONLY HAVE SNORING?    Mandibular advancement devices, lateral sleep positioning, long-term weight loss and treatment of nasal allergies have been shown to improve snoring.  Exercising tongue muscles with a game (https://apps.[employer]omics/us/saba/soundly-reduce-snoring/wm5050584257) or stimulating the tongue during the day with a device (https://doi.org/10.3390/zwo64374869) have improved snoring in some individuals.  https://www.[employer].com/  https://www.sleepfoundation.org/best-anti-snoring-mouthpieces-and-mouthguards    Remember to Drive Safe... Drive Alive     Sleep health profoundly affects your health, mood, and your  safety.  Thirty three percent of the population (one in three of us) is not getting enough sleep and many have a sleep disorder. Not getting enough sleep or having an untreated / undertreated sleep condition may make us sleepy without even knowing it. In fact, our driving could be dramatically impaired due to our sleep health. As your provider, here are some things I would like you to know about driving:     Here are some warning signs for impairment and dangerous drowsy driving:              -Having been awake more than 16 hours               -Looking tired               -Eyelid drooping              -Head nodding (it could be too late at this point)              -Driving for more than 30 minutes     Some things you could do to make the driving safer if you are experiencing some drowsiness:              -Stop driving and rest              -Call for transportation              -Make sure your sleep disorder is adequately treated     Some things that have been shown NOT to work when experiencing drowsiness while driving:              -Turning on the radio              -Opening windows              -Eating any  distracting  /  entertaining  foods (e.g., sunflower seeds, candy, or any other)              -Talking on the phone      Your decision may not only impact your life, but also the life of others. Please, remember to drive safe for yourself and all of us.

## 2024-09-25 NOTE — NURSING NOTE
Current patient location: [address] [place] [id number]    Is the patient currently in the state of [place]? YES    Visit mode:VIDEO    If the visit is dropped, the patient can be reconnected by: VIDEO VISIT: Text to cell phone:   Telephone Information:   Mobile [phone]       Will anyone else be joining the visit? NO  (If patient encounters technical issues they should call [phone]    How would you like to obtain your AVS? MyChart    Are changes needed to the allergy or medication list? No    Are refills needed on medications prescribed by this physician? NO    Rooming Documentation:  Questionnaire(s) completed    Reason for visit: TIFF PETEF

## 2024-09-25 NOTE — PROGRESS NOTES
Virtual Visit Details    Type of service:  Video Visit     Originating Location (pt. Location): Home    Distant Location (provider location):  Off-site  Platform used for Video Visit: Carol    Chief complaint: Follow-up obstructive sleep apnea    LOV 12/21/2022    History of Present Illness: 44-year-old gentleman with history of hypertension, obesity,  type 2 diabetes, ADD on stimulants, and overall mild obstructive sleep apnea.  He reports today ongoing problems with his CPAP machine.  It has been making more noise which has been disruptive to both his and his wife's sleep.  The machine is 6 or 7 years old and he would like to get a replacement.  He has also had some issues with rash on his face from a different mask and he is currently using a nasal mask which has been helpful.  Otherwise, he denies new sleep concerns.  Is not taking sleep aids.  He denies problems with restless legs, sleepwalking, sleep talking or dream enactment behavior.  He uses CPAP very regularly.  He will occasionally go a night without it when traveling and he is not with his wife.  He has been working on weight loss but remains on blood pressure medication as well as diabetes medications.    [place] Sleepiness Scale  Total score - [place]: 0 (9/20/2024  9:24 AM)   (Less than 10 normal)    Insomnia Severity Scale  TRELL Total Score: 9  (normal 0-7, mild 8-14, moderate 15-21, severe 22-28)    Past Medical History:   Diagnosis Date    Abnormal bone xray - sclerosis of phalynx of right great toe 7/31/17 - suggest repeat imaging at annual visit. 07/31/2017    Depressive disorder     Diabetes mellitus, type 2 (H) 07/03/2023    Hypercholesteremia     Hypertension 2019    Iron deficiency anemia     Sleep apnea        No Known Allergies    Current Outpatient Medications   Medication Sig Dispense Refill    amphetamine-dextroamphetamine (ADDERALL XR) 25 MG 24 hr capsule Take 1 capsule (25 mg) by mouth daily for 30 days 30 capsule 0    [START ON  10/1/2024] amphetamine-dextroamphetamine (ADDERALL XR) 25 MG 24 hr capsule Take 1 capsule (25 mg) by mouth daily for 30 days 30 capsule 0    amphetamine-dextroamphetamine (ADDERALL) 5 MG tablet Take 1 tablet (5 mg) by mouth daily for 30 days 30 tablet 0    [START ON 10/1/2024] amphetamine-dextroamphetamine (ADDERALL) 5 MG tablet Take 1 tablet (5 mg) by mouth daily for 30 days 30 tablet 0    azithromycin (ZITHROMAX) 250 MG tablet Take 4 tablets (1,000 mg) by mouth daily 4 tablet 0    Elemental iron 65 mg Vitamin C 125 mg (VITRON-C)  MG TABS tablet Take 1 tablet by mouth daily      lisinopril (ZESTRIL) 30 MG tablet Take 1 tablet (30 mg) by mouth daily 90 tablet 3    metFORMIN (GLUCOPHAGE) 500 MG tablet TAKE 1 TABLET(500 MG) BY MOUTH TWICE DAILY WITH MEALS 180 tablet 0    methocarbamol (ROBAXIN) 750 MG tablet Take 1 tablet (750 mg) by mouth every 6 hours as needed for muscle spasms 15 tablet 0    Multiple Vitamin (MULTIVITAMIN ADULT PO) Take 1 tablet by mouth daily      Omega-3 Fatty Acids (FISH OIL PO) Take 2 capsules by mouth daily      ondansetron (ZOFRAN ODT) 4 MG ODT tab Take 1 tablet (4 mg) by mouth every 8 hours as needed for nausea or vomiting 20 tablet 0    rosuvastatin (CRESTOR) 40 MG tablet Take 1 tablet (40 mg) by mouth daily 90 tablet 3    Semaglutide, 1 MG/DOSE, (OZEMPIC) 4 MG/3ML pen Inject 1 mg Subcutaneous every 7 days 3 mL 5    testosterone cypionate (DEPOTESTOSTERONE) 200 MG/ML injection Inject 0.8 mLs into the muscle once a week On Wednesdays       No current facility-administered medications for this visit.       Social History     Socioeconomic History    Marital status:      Spouse name: Not on file    Number of children: Not on file    Years of education: Not on file    Highest education level: Not on file   Occupational History     Employer: [employer]   Tobacco Use    Smoking status: Never     Passive exposure: Never    Smokeless tobacco: Never   Vaping Use    Vaping  status: Never Used   Substance and Sexual Activity    Alcohol use: Yes     Comment: 6 a week    Drug use: No    Sexual activity: Yes     Partners: Female     Birth control/protection: Pill, Male Surgical   Other Topics Concern    Parent/sibling w/ CABG, MI or angioplasty before 65F 55M? Yes   Social History Narrative    Not on file     Social Determinants of Health     Financial Resource Strain: Low Risk  (1/11/2024)    Financial Resource Strain     Within the past 12 months, have you or your family members you live with been unable to get utilities (heat, electricity) when it was really needed?: No   Food Insecurity: Low Risk  (1/11/2024)    Food Insecurity     Within the past 12 months, did you worry that your food would run out before you got money to buy more?: No     Within the past 12 months, did the food you bought just not last and you didn t have money to get more?: No   Transportation Needs: Low Risk  (1/11/2024)    Transportation Needs     Within the past 12 months, has lack of transportation kept you from medical appointments, getting your medicines, non-medical meetings or appointments, work, or from getting things that you need?: No   Physical Activity: Not on file   Stress: Not on file   Social Connections: Not on file   Interpersonal Safety: Low Risk  (1/12/2024)    Interpersonal Safety     Do you feel physically and emotionally safe where you currently live?: Yes     Within the past 12 months, have you been hit, slapped, kicked or otherwise physically hurt by someone?: No     Within the past 12 months, have you been humiliated or emotionally abused in other ways by your partner or ex-partner?: No   Housing Stability: Low Risk  (1/11/2024)    Housing Stability     Do you have housing? : Yes     Are you worried about losing your housing?: No       Family History   Problem Relation Age of Onset    Hyperlipidemia Mother     Sleep Apnea Mother     Hypertension Father     Other - See Comments Father          primary sclerosing cholangitis    Hypertension Sister     Hyperlipidemia Sister     Coronary Artery Disease Paternal Grandfather     Colon Cancer Cousin            EXAM:  Ht 1.829 m (6')   Wt 113.4 kg (250 lb)   BMI 33.91 kg/m    GENERAL: Alert and no distress  EYES: Eyes grossly normal to inspection.  No discharge or erythema, or obvious scleral/conjunctival abnormalities.  RESP: No audible wheeze, cough, or visible cyanosis.    SKIN: Visible skin clear. No significant rash, abnormal pigmentation or lesions.  NEURO: Cranial nerves grossly intact.  Mentation and speech appropriate for age.  PSYCH: Appropriate affect, tone, and pace of words       HSAT 12/11/2017  Weight 250 lbs, BMI 33.9  AHI 7.3, Lowest O2 sat 79%  Mild associated hypoxemia     CPAP set up 12/26/2017    [employer] air sense 10 auto PAP download from 8/25/2024 to 9/23/2024 reviewed:  Per cent of days used greater than 4 Hours 80% (minimum goal greater than 70%)  Average use on days used: 5 hours 52 min  Settings: Min EPAP 5 cmH2O    Max EPAP 15 cmH2O  Pressures delivered 90/95th percentile for pressure 7.4 cmH2O  Average AHI 0.4 events per hour (goal less than 5)  Leak acceptable    TSH   Date Value Ref Range Status   05/15/2024 2.01 0.30 - 4.20 uIU/mL Final   11/14/2019 0.95 0.40 - 4.00 mU/L Final       ASSESSMENT:  44-year-old gentleman with history of mild obstructive sleep apnea, type 2 diabetes, hypertension, ADD on stimulants.  He is meeting compliance goals and reporting benefit with the use of CPAP with normalization of AHI.  Ongoing treatment of obstructive sleep apnea is medically indicated.    PLAN:  Orders generated for updated CPAP machine and supplies.  No changes to his settings recommended.  In the meantime, continue efforts at weight management.  If he continues to have issues with rash with different masks could consider switching to an alternative treatment such as oral appliance therapy.  Continue efforts at weight management.   Contact me if new sleep issues arise.      31 minutes spent by me on the date of the encounter doing chart review, history and exam, documentation and further activities per the note    Shruthi Neville M.D.  Pulmonary/Critical Care/Sleep Medicine    [facility]   Fl[address], [address]   [address] [place] [address]   Appointments: [phone]    The above note was dictated using voice recognition software and may include typographical errors. Please contact the author for any clarifications.

## 2024-10-04 ENCOUNTER — MYC MEDICAL ADVICE (OUTPATIENT)
Dept: FAMILY MEDICINE | Facility: CLINIC | Age: 44
End: 2024-10-04

## 2024-10-04 ENCOUNTER — LAB (OUTPATIENT)
Dept: LAB | Facility: CLINIC | Age: 44
End: 2024-10-04
Payer: COMMERCIAL

## 2024-10-04 DIAGNOSIS — K76.0 FATTY LIVER: ICD-10-CM

## 2024-10-04 DIAGNOSIS — E11.9 TYPE 2 DIABETES MELLITUS WITHOUT COMPLICATION, WITHOUT LONG-TERM CURRENT USE OF INSULIN (H): ICD-10-CM

## 2024-10-04 DIAGNOSIS — D64.9 LOW HEMOGLOBIN: ICD-10-CM

## 2024-10-04 DIAGNOSIS — D50.8 OTHER IRON DEFICIENCY ANEMIA: ICD-10-CM

## 2024-10-04 DIAGNOSIS — E11.9 TYPE 2 DIABETES MELLITUS WITHOUT COMPLICATION, WITHOUT LONG-TERM CURRENT USE OF INSULIN (H): Primary | ICD-10-CM

## 2024-10-04 LAB
ALBUMIN SERPL BCG-MCNC: 4.5 G/DL (ref 3.5–5.2)
ALP SERPL-CCNC: 69 U/L (ref 40–150)
ALT SERPL W P-5'-P-CCNC: 20 U/L (ref 0–70)
ANION GAP SERPL CALCULATED.3IONS-SCNC: 11 MMOL/L (ref 7–15)
AST SERPL W P-5'-P-CCNC: 20 U/L (ref 0–45)
BILIRUB DIRECT SERPL-MCNC: 0.21 MG/DL (ref 0–0.3)
BILIRUB SERPL-MCNC: 0.7 MG/DL
BUN SERPL-MCNC: 10.3 MG/DL (ref 6–20)
CALCIUM SERPL-MCNC: 9.7 MG/DL (ref 8.8–10.4)
CHLORIDE SERPL-SCNC: 104 MMOL/L (ref 98–107)
CREAT SERPL-MCNC: 0.87 MG/DL (ref 0.67–1.17)
EGFRCR SERPLBLD CKD-EPI 2021: >90 ML/MIN/1.73M2
ERYTHROCYTE [DISTWIDTH] IN BLOOD BY AUTOMATED COUNT: 12.9 % (ref 10–15)
GLUCOSE SERPL-MCNC: 88 MG/DL (ref 70–99)
HCO3 SERPL-SCNC: 27 MMOL/L (ref 22–29)
HCT VFR BLD AUTO: 48.3 % (ref 40–53)
HGB BLD-MCNC: 16.4 G/DL (ref 13.3–17.7)
INR PPP: 1.04 (ref 0.85–1.15)
MCH RBC QN AUTO: 30.5 PG (ref 26.5–33)
MCHC RBC AUTO-ENTMCNC: 34 G/DL (ref 31.5–36.5)
MCV RBC AUTO: 90 FL (ref 78–100)
PLATELET # BLD AUTO: 236 10E3/UL (ref 150–450)
POTASSIUM SERPL-SCNC: 4.6 MMOL/L (ref 3.4–5.3)
PROT SERPL-MCNC: 7.1 G/DL (ref 6.4–8.3)
RBC # BLD AUTO: 5.37 10E6/UL (ref 4.4–5.9)
SODIUM SERPL-SCNC: 142 MMOL/L (ref 135–145)
WBC # BLD AUTO: 6.7 10E3/UL (ref 4–11)

## 2024-10-04 PROCEDURE — 83036 HEMOGLOBIN GLYCOSYLATED A1C: CPT

## 2024-10-04 PROCEDURE — 80053 COMPREHEN METABOLIC PANEL: CPT

## 2024-10-04 PROCEDURE — 80061 LIPID PANEL: CPT

## 2024-10-04 PROCEDURE — 36415 COLL VENOUS BLD VENIPUNCTURE: CPT

## 2024-10-04 PROCEDURE — 85027 COMPLETE CBC AUTOMATED: CPT

## 2024-10-04 PROCEDURE — 85610 PROTHROMBIN TIME: CPT

## 2024-10-04 PROCEDURE — 82248 BILIRUBIN DIRECT: CPT

## 2024-10-06 DIAGNOSIS — E11.9 TYPE 2 DIABETES MELLITUS WITHOUT COMPLICATION, WITHOUT LONG-TERM CURRENT USE OF INSULIN (H): ICD-10-CM

## 2024-10-06 DIAGNOSIS — E66.01 MORBID OBESITY (H): ICD-10-CM

## 2024-10-07 RX ORDER — SEMAGLUTIDE 1.34 MG/ML
INJECTION, SOLUTION SUBCUTANEOUS
Qty: 3 ML | Refills: 0 | Status: SHIPPED | OUTPATIENT
Start: 2024-10-07 | End: 2024-10-09

## 2024-10-08 ENCOUNTER — OFFICE VISIT (OUTPATIENT)
Dept: GASTROENTEROLOGY | Facility: CLINIC | Age: 44
End: 2024-10-08
Attending: PHYSICIAN ASSISTANT
Payer: COMMERCIAL

## 2024-10-08 VITALS
SYSTOLIC BLOOD PRESSURE: 117 MMHG | TEMPERATURE: 97.9 F | BODY MASS INDEX: 29.78 KG/M2 | HEIGHT: 72 IN | RESPIRATION RATE: 16 BRPM | DIASTOLIC BLOOD PRESSURE: 75 MMHG | HEART RATE: 67 BPM | WEIGHT: 219.9 LBS | OXYGEN SATURATION: 99 %

## 2024-10-08 DIAGNOSIS — K76.0 FATTY LIVER: ICD-10-CM

## 2024-10-08 DIAGNOSIS — K76.0 FATTY LIVER: Primary | ICD-10-CM

## 2024-10-08 DIAGNOSIS — E11.9 TYPE 2 DIABETES MELLITUS WITHOUT COMPLICATION, WITHOUT LONG-TERM CURRENT USE OF INSULIN (H): ICD-10-CM

## 2024-10-08 DIAGNOSIS — E78.2 MIXED HYPERLIPIDEMIA: ICD-10-CM

## 2024-10-08 DIAGNOSIS — G47.33 OSA (OBSTRUCTIVE SLEEP APNEA): ICD-10-CM

## 2024-10-08 LAB
CHOLEST SERPL-MCNC: 125 MG/DL
EST. AVERAGE GLUCOSE BLD GHB EST-MCNC: 108 MG/DL
HBA1C MFR BLD: 5.4 % (ref 0–5.6)
HDLC SERPL-MCNC: 52 MG/DL
LDLC SERPL CALC-MCNC: 47 MG/DL
NONHDLC SERPL-MCNC: 73 MG/DL
TRIGL SERPL-MCNC: 131 MG/DL

## 2024-10-08 PROCEDURE — 99214 OFFICE O/P EST MOD 30 MIN: CPT | Mod: 25 | Performed by: PHYSICIAN ASSISTANT

## 2024-10-08 PROCEDURE — 99213 OFFICE O/P EST LOW 20 MIN: CPT | Performed by: PHYSICIAN ASSISTANT

## 2024-10-08 PROCEDURE — 91200 LIVER ELASTOGRAPHY: CPT | Mod: 26 | Performed by: PHYSICIAN ASSISTANT

## 2024-10-08 ASSESSMENT — PAIN SCALES - GENERAL: PAINLEVEL: NO PAIN (0)

## 2024-10-08 NOTE — PROGRESS NOTES
Hepatology Follow-up Clinic note  William Bolton   Date of Birth 1980      Reason for follow-up: hepatic steatosis          Assessment/plan:   William Bolton is a 44 year old male with history of elevated ALT/AST and imaging findings of hepatic steatosis.Transaminases have been historically been elevated, now normalized since May 2024. He has made several lifestyle changes and started ozempic within the past year leading to 50 lb weight loss. Liver function also normal without stigmata of advanced liver disease. He was congratulated his efforts on health optimization.     #Metabolic associated fatty liver disease (MALFD):   - FibroScan done prior to appointment today showing Stage 0, 5.0 kilopascals and Grade 0 steatosis (which is likely an improvement with significant weight loss).   - Recommend FIB-4 yearly with PCP. FIB-4 Calculation: 0.8 at 10/4/2024    - Continue slow gradual weight loss or weight optimization   - Maintain good control of cholesterol (No contraindication to starting a statin with LFT elevations)   - Optimize blood glucose as needed. Agree with GLP-1 inhibitor for both insulin resistance and help with weight loss  - Improve nutrition: continue limiting carbohydrates, especially simple carbohydrates, and following Mediterranean eating patten  - Increase physical activity as able, ideally 150 minutes weekly  - Limit alcohol intake to not more than 3 drinks a week    - Can consider non-invasive elastography or FibroScan in 5 years if any concerns about fibrosis advancement. Overall, low risk at this time.     - Follow up with in future as needed    Precious Fernandez PA-C   [facility] Hepatology clinic    Total time for E/M services performed on the date of the encounter 30 minutes.  This included review of previous: clinic visits, hospital records, lab results, imaging studies, and procedural documentation. Time also includes patient visit, documentation and discussion with other  providers.  The findings from this review are summarized in the above note.   -----------------------------------------------------       HPI:   William Bolton is a 44 year old male  presenting for follow-up.     Dx: MAFLD  Risk factors: HTN, NATHAN, DM Type 2, obesity  FibroScan: Stage 0-1, 5.0 kilopascals and Grade 0 steatosis   Bx: none previously     Patient was last seen by me on 11/30/2023. After last visit, had a repeat EGD that showed large 9 cm hiatal hernia thought to likely be cause of recurrent anemia. He had colonoscopy which showed congested ileocecal valve which was biopsied. Large intestine showing focal active inflammation, negative chronic colitis.     Had four small poylps removed with snare, showing hyperplastic polyps. He has a 10 year recall.     He is now s/p hiatal hernia repair and nissen fundoplication. He has recovered well. Anemia remains normal.   New medications: He started on Ozempic.     Appetite is okay. Weight is down about 50-60 lbs. Reports that occasionally he will have foods that don't' sit well with him/can't keep them down. He has not identified any specific foods or times of day.     Patient denies jaundice, lower extremity edema, abdominal distension or confusion.      Patient also denies melena, hematochezia or hematemesis.    Patient denies fevers, sweats or chills.  Dad had primary sclerosing cholangitis.     Previous work-up:   Lab Results   Component Value Date    HEPBANG Nonreactive 11/09/2022    HCVAB Nonreactive 11/09/2022    COLLIN 21 (L) 02/09/2024    IRONSAT 15 02/09/2024    TTG 0.7 11/14/2023    TTGG <0.6 11/14/2023     11/14/2023    KIMBERLY Negative 11/14/2023    SMOOTHMUS 2 11/14/2023    V6CKZKH Whole Blood 11/14/2023    A1A 164 11/14/2023    G1OOBQC Negative 11/14/2023    Y5KMPJI Negative 11/14/2023    Z7DMQAPB Not Applicable 11/14/2023    Q1DZOKNZR See Note 11/14/2023    TSH 2.01 05/15/2024    CHOL 127 11/14/2023    HDL 34 (L) 11/14/2023    LDL 78 11/14/2023     TRIG 77 11/14/2023    A1C 5.8 (H) 05/15/2024      Recent Labs   Lab Test 10/04/24  0827 05/15/24  0856 12/13/23  0808 11/14/23  0747 11/25/22  0951 11/09/22  0812 04/29/22  0959 11/12/21  0742 05/16/19  1506 07/17/18  0908   ALKPHOS 69 120 87 71 82 75 79 69 67 88   ALT 20 22 65 53 51* 136* 157* 125* 58 129*   AST 20 18 46* 42 34 60* 83* 58* 29 73*   BILITOTAL 0.7 0.6 0.4 0.4 0.5 0.5 0.6 0.5 0.4 0.5      Medical hx Surgical hx   Past Medical History:   Diagnosis Date    Abnormal bone xray - sclerosis of phalynx of right great toe 7/31/17 - suggest repeat imaging at annual visit. 07/31/2017    Depressive disorder     Diabetes mellitus, type 2 (H) 07/03/2023    Hypercholesteremia     Hypertension 2019    Iron deficiency anemia     Sleep apnea     Past Surgical History:   Procedure Laterality Date    COLONOSCOPY  06/13/2019    Dr. Navarro [facility]    COLONOSCOPY N/A 12/28/2023    Procedure: Colonoscopy;  Surgeon: Luis Ge MD;  Location:  GI    COLONOSCOPY N/A 12/28/2023    Procedure: COLONOSCOPY, WITH POLYPECTOMY AND BIOPSY;  Surgeon: Luis Ge MD;  Location:  GI    ESOPHAGOSCOPY, GASTROSCOPY, DUODENOSCOPY (EGD), COMBINED N/A 05/23/2019    Procedure: ESOPHAGOGASTRODUODENOSCOPY, WITH BIOPSY;  Surgeon: Say Navarro MD;  Location:  GI    ESOPHAGOSCOPY, GASTROSCOPY, DUODENOSCOPY (EGD), COMBINED N/A 12/28/2023    Procedure: Esophagoscopy, gastroscopy, duodenoscopy (EGD), combined;  Surgeon: Luis Ge MD;  Location:  GI    HEAD & NECK SURGERY      wisdom teeth removed    LAPAROSCOPIC HERNIORRHAPHY HIATAL N/A 4/1/2024    Procedure: Robotic assisted laparoscopic hiatal hernia repair with NISSEN FUNDOPLICATION;  Surgeon: Andrew Franklin MD;  Location:  OR                 Medications:     Current Outpatient Medications   Medication Sig Dispense Refill    amphetamine-dextroamphetamine (ADDERALL XR) 25 MG 24 hr capsule Take 1 capsule (25 mg) by mouth daily for 30 days 30 capsule 0     "amphetamine-dextroamphetamine (ADDERALL) 5 MG tablet Take 1 tablet (5 mg) by mouth daily for 30 days 30 tablet 0    Elemental iron 65 mg Vitamin C 125 mg (VITRON-C)  MG TABS tablet Take 1 tablet by mouth daily      lisinopril (ZESTRIL) 30 MG tablet Take 1 tablet (30 mg) by mouth daily 90 tablet 3    metFORMIN (GLUCOPHAGE) 500 MG tablet TAKE 1 TABLET(500 MG) BY MOUTH TWICE DAILY WITH MEALS 180 tablet 0    Multiple Vitamin (MULTIVITAMIN ADULT PO) Take 1 tablet by mouth daily      Omega-3 Fatty Acids (FISH OIL PO) Take 2 capsules by mouth daily      rosuvastatin (CRESTOR) 40 MG tablet Take 1 tablet (40 mg) by mouth daily 90 tablet 3    Semaglutide, 1 MG/DOSE, (OZEMPIC, 1 MG/DOSE,) 4 MG/3ML pen INJECT 1MG SUBCUTANEOUS EVERY 7 DAYS 3 mL 0    testosterone cypionate (DEPOTESTOSTERONE) 200 MG/ML injection Inject 0.8 mLs into the muscle once a week On Wednesdays       No current facility-administered medications for this visit.            Allergies:   No Known Allergies         Review of Systems:   10 points ROS was obtained and highlighted in the HPI, otherwise negative.          Physical Exam:   VS:  /75 (BP Location: Right arm, Patient Position: Sitting, Cuff Size: Adult Large)   Pulse 67   Temp 97.9  F (36.6  C) (Oral)   Resp 16   Ht 1.829 m (6' 0.01\")   Wt 99.7 kg (219 lb 14.4 oz)   SpO2 99%   BMI 29.82 kg/m        Gen- well, NAD, A+Ox3, normal color  Lym- no palpable LAD  Abd- soft, nontender.   Extr- hands normal, no CHRISTIAN  Skin- no rash or jaundice  Neuro- no asterixis  Psych- normal mood         Data:   Reviewed in person and significant for:    Lab Results   Component Value Date     10/04/2024     09/15/2020      Lab Results   Component Value Date    POTASSIUM 4.6 10/04/2024    POTASSIUM 4.1 11/09/2022    POTASSIUM 3.9 09/15/2020     Lab Results   Component Value Date    CHLORIDE 104 10/04/2024    CHLORIDE 107 11/09/2022    CHLORIDE 102 09/15/2020     Lab Results   Component Value Date " "   CO2 27 10/04/2024    CO2 27 11/09/2022    CO2 25 09/15/2020     Lab Results   Component Value Date    BUN 10.3 10/04/2024    BUN 10 11/09/2022    BUN 10 09/15/2020     Lab Results   Component Value Date    CR 0.87 10/04/2024    CR 0.96 09/15/2020       Lab Results   Component Value Date    WBC 6.7 10/04/2024    WBC 6.8 07/19/2019    WBC 7.0 07/19/2019     Lab Results   Component Value Date    HGB 16.4 10/04/2024    HGB 13.2 07/19/2019    HGB 13.5 07/19/2019     Lab Results   Component Value Date    HCT 48.3 10/04/2024    HCT 42.2 07/19/2019    HCT 44.5 07/19/2019     Lab Results   Component Value Date    MCV 90 10/04/2024    MCV 76 07/19/2019    MCV 79 07/19/2019     Lab Results   Component Value Date     10/04/2024     07/19/2019     07/19/2019       Lab Results   Component Value Date    AST 20 10/04/2024    AST 29 05/16/2019     Lab Results   Component Value Date    ALT 20 10/04/2024    ALT 58 05/16/2019     No results found for: \"BILICONJ\"   Lab Results   Component Value Date    BILITOTAL 0.7 10/04/2024    BILITOTAL 0.4 05/16/2019       Lab Results   Component Value Date    ALBUMIN 4.5 10/04/2024    ALBUMIN 3.8 11/09/2022    ALBUMIN 3.9 05/16/2019     Lab Results   Component Value Date    PROTTOTAL 7.1 10/04/2024    PROTTOTAL 7.5 05/16/2019      Lab Results   Component Value Date    ALKPHOS 69 10/04/2024    ALKPHOS 67 05/16/2019       Lab Results   Component Value Date    INR 1.04 10/04/2024       EXAM: CT ABDOMEN PELVIS W CONTRAST  LOCATION: [facility]  DATE: 12/7/2023     INDICATION: Repeat iron deficiency anemia; 15.6 to 6.5 in the past year. Last occured in 2019.  COMPARISON: None.  TECHNIQUE: CT scan of the abdomen and pelvis was performed following injection of IV contrast. Multiplanar reformats were obtained. Dose reduction techniques were used.  CONTRAST: 135 mL Isovue-370.     FINDINGS:   LOWER CHEST: Large hiatal hernia. Lung bases are clear.   "   HEPATOBILIARY: Diffuse hepatic steatosis. No significant mass. No bile duct dilatation. No calcified gallstones.     PANCREAS: No significant mass, duct dilatation, or inflammatory change.     SPLEEN: Normal.     ADRENAL GLANDS: Normal.     KIDNEYS/BLADDER: Kidneys enhance symmetrically without evidence for hydronephrosis or pyelonephritis. No renal calculi seen. No renal masses noted. The bilateral ureters and urinary bladder are unremarkable.      BOWEL: Nonspecific nonobstructive bowel gas pattern. Appendix appears within normal limits.     LYMPH NODES: No lymphadenopathy.     VASCULATURE: No abdominal aortic aneurysm.     PELVIC ORGANS: No pelvic masses.     MUSCULOSKELETAL: No concerning osseous abnormalities.                                                                      IMPRESSION:   1.  Large hiatal hernia.     2.  Diffuse fatty infiltration of the liver.     3.  Nonspecific nonobstructive bowel gas pattern. Appendix appears within normal limits.     4.  No acute findings in the abdomen or pelvis.

## 2024-10-08 NOTE — NURSING NOTE
"Chief Complaint   Patient presents with    RECHECK     Fatty liver     Vital signs:  Temp: 97.9  F (36.6  C) Temp src: Oral BP: 117/75 Pulse: 67   Resp: 16 SpO2: 99 %     Height: 182.9 cm (6' 0.01\") Weight: 99.7 kg (219 lb 14.4 oz)  Estimated body mass index is 29.82 kg/m  as calculated from the following:    Height as of this encounter: 1.829 m (6' 0.01\").    Weight as of this encounter: 99.7 kg (219 lb 14.4 oz).      Stefanie Correia, [facility]  10/8/2024 8:12 AM    "

## 2024-10-08 NOTE — LETTER
10/8/2024      William Bolton  [address] [facility] [address]      Dear Colleague,    Thank you for referring your patient, William Bolton, to the [facility] [place]. Please see a copy of my visit note below.    Hepatology Follow-up Clinic note  William Bolton   Date of Birth 1980      Reason for follow-up: hepatic steatosis          Assessment/plan:   William Bolton is a 44 year old male with history of elevated ALT/AST and imaging findings of hepatic steatosis.Transaminases have been historically been elevated, now normalized since May 2024. He has made several lifestyle changes and started ozempic within the past year leading to 50 lb weight loss. Liver function also normal without stigmata of advanced liver disease. He was congratulated his efforts on health optimization.     #Metabolic associated fatty liver disease (MALFD):   - FibroScan done prior to appointment today showing Stage 0, 5.0 kilopascals and Grade 0 steatosis (which is likely an improvement with significant weight loss).   - Recommend FIB-4 yearly with PCP. FIB-4 Calculation: 0.8 at 10/4/2024    - Continue slow gradual weight loss or weight optimization   - Maintain good control of cholesterol (No contraindication to starting a statin with LFT elevations)   - Optimize blood glucose as needed. Agree with GLP-1 inhibitor for both insulin resistance and help with weight loss  - Improve nutrition: continue limiting carbohydrates, especially simple carbohydrates, and following Mediterranean eating patten  - Increase physical activity as able, ideally 150 minutes weekly  - Limit alcohol intake to not more than 3 drinks a week    - Can consider non-invasive elastography or FibroScan in 5 years if any concerns about fibrosis advancement. Overall, low risk at this time.     - Follow up with in future as needed    Precious Fernandez PA-C   [facility] Hepatology clinic    Total time for E/M services performed on the  date of the encounter 30 minutes.  This included review of previous: clinic visits, hospital records, lab results, imaging studies, and procedural documentation. Time also includes patient visit, documentation and discussion with other providers.  The findings from this review are summarized in the above note.   -----------------------------------------------------       HPI:   William Bolton is a 44 year old male  presenting for follow-up.     Dx: MAFLD  Risk factors: HTN, NATHAN, DM Type 2, obesity  FibroScan: Stage 0-1, 5.0 kilopascals and Grade 0 steatosis   Bx: none previously     Patient was last seen by me on 11/30/2023. After last visit, had a repeat EGD that showed large 9 cm hiatal hernia thought to likely be cause of recurrent anemia. He had colonoscopy which showed congested ileocecal valve which was biopsied. Large intestine showing focal active inflammation, negative chronic colitis.     Had four small poylps removed with snare, showing hyperplastic polyps. He has a 10 year recall.     He is now s/p hiatal hernia repair and nissen fundoplication. He has recovered well. Anemia remains normal.   New medications: He started on Ozempic.     Appetite is okay. Weight is down about 50-60 lbs. Reports that occasionally he will have foods that don't' sit well with him/can't keep them down. He has not identified any specific foods or times of day.     Patient denies jaundice, lower extremity edema, abdominal distension or confusion.      Patient also denies melena, hematochezia or hematemesis.    Patient denies fevers, sweats or chills.  Dad had primary sclerosing cholangitis.     Previous work-up:   Lab Results   Component Value Date    HEPBANG Nonreactive 11/09/2022    HCVAB Nonreactive 11/09/2022    COLLIN 21 (L) 02/09/2024    IRONSAT 15 02/09/2024    TTG 0.7 11/14/2023    TTGG <0.6 11/14/2023     11/14/2023    KIMBERLY Negative 11/14/2023    SMOOTHMUS 2 11/14/2023    U7MAALI Whole Blood 11/14/2023    A1A 164  11/14/2023    P7EBYHT Negative 11/14/2023    T0RWGJR Negative 11/14/2023    E0QATZEL Not Applicable 11/14/2023    S2CEAOLMR See Note 11/14/2023    TSH 2.01 05/15/2024    CHOL 127 11/14/2023    HDL 34 (L) 11/14/2023    LDL 78 11/14/2023    TRIG 77 11/14/2023    A1C 5.8 (H) 05/15/2024      Recent Labs   Lab Test 10/04/24  0827 05/15/24  0856 12/13/23  0808 11/14/23  0747 11/25/22  0951 11/09/22  0812 04/29/22  0959 11/12/21  0742 05/16/19  1506 07/17/18  0908   ALKPHOS 69 120 87 71 82 75 79 69 67 88   ALT 20 22 65 53 51* 136* 157* 125* 58 129*   AST 20 18 46* 42 34 60* 83* 58* 29 73*   BILITOTAL 0.7 0.6 0.4 0.4 0.5 0.5 0.6 0.5 0.4 0.5      Medical hx Surgical hx   Past Medical History:   Diagnosis Date     Abnormal bone xray - sclerosis of phalynx of right great toe 7/31/17 - suggest repeat imaging at annual visit. 07/31/2017     Depressive disorder      Diabetes mellitus, type 2 (H) 07/03/2023     Hypercholesteremia      Hypertension 2019     Iron deficiency anemia      Sleep apnea     Past Surgical History:   Procedure Laterality Date     COLONOSCOPY  06/13/2019    Dr. Navarro [facility]     COLONOSCOPY N/A 12/28/2023    Procedure: Colonoscopy;  Surgeon: Luis Ge MD;  Location: [facility]     COLONOSCOPY N/A 12/28/2023    Procedure: COLONOSCOPY, WITH POLYPECTOMY AND BIOPSY;  Surgeon: Luis Ge MD;  Location: [facility]     ESOPHAGOSCOPY, GASTROSCOPY, DUODENOSCOPY (EGD), COMBINED N/A 05/23/2019    Procedure: ESOPHAGOGASTRODUODENOSCOPY, WITH BIOPSY;  Surgeon: Say Navarro MD;  Location: [facility]     ESOPHAGOSCOPY, GASTROSCOPY, DUODENOSCOPY (EGD), COMBINED N/A 12/28/2023    Procedure: Esophagoscopy, gastroscopy, duodenoscopy (EGD), combined;  Surgeon: Luis Ge MD;  Location:  GI     HEAD & NECK SURGERY      wisdom teeth removed     LAPAROSCOPIC HERNIORRHAPHY HIATAL N/A 4/1/2024    Procedure: Robotic assisted laparoscopic hiatal hernia repair with NISSEN FUNDOPLICATION;  Surgeon: Andrew Franklin MD;  Location:  [facility] OR                 Medications:     Current Outpatient Medications   Medication Sig Dispense Refill     amphetamine-dextroamphetamine (ADDERALL XR) 25 MG 24 hr capsule Take 1 capsule (25 mg) by mouth daily for 30 days 30 capsule 0     amphetamine-dextroamphetamine (ADDERALL) 5 MG tablet Take 1 tablet (5 mg) by mouth daily for 30 days 30 tablet 0     Elemental iron 65 mg Vitamin C 125 mg (VITRON-C)  MG TABS tablet Take 1 tablet by mouth daily       lisinopril (ZESTRIL) 30 MG tablet Take 1 tablet (30 mg) by mouth daily 90 tablet 3     metFORMIN (GLUCOPHAGE) 500 MG tablet TAKE 1 TABLET(500 MG) BY MOUTH TWICE DAILY WITH MEALS 180 tablet 0     Multiple Vitamin (MULTIVITAMIN ADULT PO) Take 1 tablet by mouth daily       Omega-3 Fatty Acids (FISH OIL PO) Take 2 capsules by mouth daily       rosuvastatin (CRESTOR) 40 MG tablet Take 1 tablet (40 mg) by mouth daily 90 tablet 3     Semaglutide, 1 MG/DOSE, (OZEMPIC, 1 MG/DOSE,) 4 MG/3ML pen INJECT 1MG SUBCUTANEOUS EVERY 7 DAYS 3 mL 0     testosterone cypionate (DEPOTESTOSTERONE) 200 MG/ML injection Inject 0.8 mLs into the muscle once a week On Wednesdays       No current facility-administered medications for this visit.            Allergies:   No Known Allergies         Review of Systems:   10 points ROS was obtained and highlighted in the HPI, otherwise negative.          Physical Exam:   VS:  /75 (BP Location: Right arm, Patient Position: Sitting, Cuff Size: Adult Large)   Pulse 67   Temp 97.9  F (36.6  C) (Oral)   Resp 16   Ht 1.829 m (6' 0.01\")   Wt 99.7 kg (219 lb 14.4 oz)   SpO2 99%   BMI 29.82 kg/m        Gen- well, NAD, A+Ox3, normal color  Lym- no palpable LAD  Abd- soft, nontender.   Extr- hands normal, no CHRISTIAN  Skin- no rash or jaundice  Neuro- no asterixis  Psych- normal mood         Data:   Reviewed in person and significant for:    Lab Results   Component Value Date     10/04/2024     09/15/2020      Lab Results   Component Value " "Date    POTASSIUM 4.6 10/04/2024    POTASSIUM 4.1 11/09/2022    POTASSIUM 3.9 09/15/2020     Lab Results   Component Value Date    CHLORIDE 104 10/04/2024    CHLORIDE 107 11/09/2022    CHLORIDE 102 09/15/2020     Lab Results   Component Value Date    CO2 27 10/04/2024    CO2 27 11/09/2022    CO2 25 09/15/2020     Lab Results   Component Value Date    BUN 10.3 10/04/2024    BUN 10 11/09/2022    BUN 10 09/15/2020     Lab Results   Component Value Date    CR 0.87 10/04/2024    CR 0.96 09/15/2020       Lab Results   Component Value Date    WBC 6.7 10/04/2024    WBC 6.8 07/19/2019    WBC 7.0 07/19/2019     Lab Results   Component Value Date    HGB 16.4 10/04/2024    HGB 13.2 07/19/2019    HGB 13.5 07/19/2019     Lab Results   Component Value Date    HCT 48.3 10/04/2024    HCT 42.2 07/19/2019    HCT 44.5 07/19/2019     Lab Results   Component Value Date    MCV 90 10/04/2024    MCV 76 07/19/2019    MCV 79 07/19/2019     Lab Results   Component Value Date     10/04/2024     07/19/2019     07/19/2019       Lab Results   Component Value Date    AST 20 10/04/2024    AST 29 05/16/2019     Lab Results   Component Value Date    ALT 20 10/04/2024    ALT 58 05/16/2019     No results found for: \"BILICONJ\"   Lab Results   Component Value Date    BILITOTAL 0.7 10/04/2024    BILITOTAL 0.4 05/16/2019       Lab Results   Component Value Date    ALBUMIN 4.5 10/04/2024    ALBUMIN 3.8 11/09/2022    ALBUMIN 3.9 05/16/2019     Lab Results   Component Value Date    PROTTOTAL 7.1 10/04/2024    PROTTOTAL 7.5 05/16/2019      Lab Results   Component Value Date    ALKPHOS 69 10/04/2024    ALKPHOS 67 05/16/2019       Lab Results   Component Value Date    INR 1.04 10/04/2024       EXAM: CT ABDOMEN PELVIS W CONTRAST  LOCATION: [facility]  DATE: 12/7/2023     INDICATION: Repeat iron deficiency anemia; 15.6 to 6.5 in the past year. Last occured in 2019.  COMPARISON: None.  TECHNIQUE: CT scan of the abdomen " and pelvis was performed following injection of IV contrast. Multiplanar reformats were obtained. Dose reduction techniques were used.  CONTRAST: 135 mL Isovue-370.     FINDINGS:   LOWER CHEST: Large hiatal hernia. Lung bases are clear.     HEPATOBILIARY: Diffuse hepatic steatosis. No significant mass. No bile duct dilatation. No calcified gallstones.     PANCREAS: No significant mass, duct dilatation, or inflammatory change.     SPLEEN: Normal.     ADRENAL GLANDS: Normal.     KIDNEYS/BLADDER: Kidneys enhance symmetrically without evidence for hydronephrosis or pyelonephritis. No renal calculi seen. No renal masses noted. The bilateral ureters and urinary bladder are unremarkable.      BOWEL: Nonspecific nonobstructive bowel gas pattern. Appendix appears within normal limits.     LYMPH NODES: No lymphadenopathy.     VASCULATURE: No abdominal aortic aneurysm.     PELVIC ORGANS: No pelvic masses.     MUSCULOSKELETAL: No concerning osseous abnormalities.                                                                      IMPRESSION:   1.  Large hiatal hernia.     2.  Diffuse fatty infiltration of the liver.     3.  Nonspecific nonobstructive bowel gas pattern. Appendix appears within normal limits.     4.  No acute findings in the abdomen or pelvis.      Again, thank you for allowing me to participate in the care of your patient.        Sincerely,        Precious Fernandez PA-C

## 2024-10-09 ENCOUNTER — OFFICE VISIT (OUTPATIENT)
Dept: FAMILY MEDICINE | Facility: CLINIC | Age: 44
End: 2024-10-09
Payer: COMMERCIAL

## 2024-10-09 VITALS
SYSTOLIC BLOOD PRESSURE: 110 MMHG | HEIGHT: 72 IN | WEIGHT: 219 LBS | TEMPERATURE: 97 F | OXYGEN SATURATION: 98 % | DIASTOLIC BLOOD PRESSURE: 70 MMHG | HEART RATE: 72 BPM | BODY MASS INDEX: 29.66 KG/M2

## 2024-10-09 DIAGNOSIS — F98.8 ADD (ATTENTION DEFICIT DISORDER) WITHOUT HYPERACTIVITY: ICD-10-CM

## 2024-10-09 DIAGNOSIS — Z01.84 IMMUNITY STATUS TESTING: ICD-10-CM

## 2024-10-09 DIAGNOSIS — E11.9 TYPE 2 DIABETES MELLITUS WITHOUT COMPLICATION, WITHOUT LONG-TERM CURRENT USE OF INSULIN (H): ICD-10-CM

## 2024-10-09 DIAGNOSIS — E66.01 MORBID OBESITY (H): ICD-10-CM

## 2024-10-09 DIAGNOSIS — I10 ESSENTIAL HYPERTENSION WITH GOAL BLOOD PRESSURE LESS THAN 130/80: ICD-10-CM

## 2024-10-09 DIAGNOSIS — E78.5 HYPERLIPIDEMIA, UNSPECIFIED HYPERLIPIDEMIA TYPE: Primary | ICD-10-CM

## 2024-10-09 LAB
AMPHETAMINES UR QL: NOT DETECTED
BARBITURATES UR QL SCN: NOT DETECTED
BENZODIAZ UR QL SCN: NOT DETECTED
BUPRENORPHINE UR QL: NOT DETECTED
CANNABINOIDS UR QL: NOT DETECTED
COCAINE UR QL SCN: NOT DETECTED
D-METHAMPHET UR QL: NOT DETECTED
METHADONE UR QL SCN: NOT DETECTED
OPIATES UR QL SCN: NOT DETECTED
OXYCODONE UR QL SCN: NOT DETECTED
PCP UR QL SCN: NOT DETECTED
TRICYCLICS UR QL SCN: NOT DETECTED

## 2024-10-09 PROCEDURE — 90656 IIV3 VACC NO PRSV 0.5 ML IM: CPT | Performed by: NURSE PRACTITIONER

## 2024-10-09 PROCEDURE — G2211 COMPLEX E/M VISIT ADD ON: HCPCS | Performed by: NURSE PRACTITIONER

## 2024-10-09 PROCEDURE — 90471 IMMUNIZATION ADMIN: CPT | Performed by: NURSE PRACTITIONER

## 2024-10-09 PROCEDURE — 80306 DRUG TEST PRSMV INSTRMNT: CPT | Performed by: NURSE PRACTITIONER

## 2024-10-09 PROCEDURE — 99214 OFFICE O/P EST MOD 30 MIN: CPT | Mod: 25 | Performed by: NURSE PRACTITIONER

## 2024-10-09 RX ORDER — LISINOPRIL 20 MG/1
20 TABLET ORAL DAILY
Qty: 90 TABLET | Refills: 3 | Status: SHIPPED | OUTPATIENT
Start: 2024-10-09

## 2024-10-09 RX ORDER — DEXTROAMPHETAMINE SACCHARATE, AMPHETAMINE ASPARTATE MONOHYDRATE, DEXTROAMPHETAMINE SULFATE AND AMPHETAMINE SULFATE 6.25; 6.25; 6.25; 6.25 MG/1; MG/1; MG/1; MG/1
25 CAPSULE, EXTENDED RELEASE ORAL DAILY
Qty: 30 CAPSULE | Refills: 0 | Status: SHIPPED | OUTPATIENT
Start: 2024-10-09 | End: 2024-11-08

## 2024-10-09 RX ORDER — DEXTROAMPHETAMINE SACCHARATE, AMPHETAMINE ASPARTATE MONOHYDRATE, DEXTROAMPHETAMINE SULFATE AND AMPHETAMINE SULFATE 6.25; 6.25; 6.25; 6.25 MG/1; MG/1; MG/1; MG/1
25 CAPSULE, EXTENDED RELEASE ORAL DAILY
Qty: 30 CAPSULE | Refills: 0 | Status: SHIPPED | OUTPATIENT
Start: 2024-12-08 | End: 2025-01-07

## 2024-10-09 RX ORDER — DEXTROAMPHETAMINE SACCHARATE, AMPHETAMINE ASPARTATE MONOHYDRATE, DEXTROAMPHETAMINE SULFATE AND AMPHETAMINE SULFATE 6.25; 6.25; 6.25; 6.25 MG/1; MG/1; MG/1; MG/1
25 CAPSULE, EXTENDED RELEASE ORAL DAILY
Qty: 30 CAPSULE | Refills: 0 | Status: SHIPPED | OUTPATIENT
Start: 2024-11-08 | End: 2024-12-08

## 2024-10-09 RX ORDER — DEXTROAMPHETAMINE SACCHARATE, AMPHETAMINE ASPARTATE, DEXTROAMPHETAMINE SULFATE AND AMPHETAMINE SULFATE 1.25; 1.25; 1.25; 1.25 MG/1; MG/1; MG/1; MG/1
5 TABLET ORAL DAILY
Qty: 30 TABLET | Refills: 0 | Status: SHIPPED | OUTPATIENT
Start: 2024-10-09 | End: 2024-11-08

## 2024-10-09 RX ORDER — ROSUVASTATIN CALCIUM 20 MG/1
20 TABLET, COATED ORAL DAILY
Qty: 90 TABLET | Refills: 3 | Status: SHIPPED | OUTPATIENT
Start: 2024-10-09

## 2024-10-09 RX ORDER — SEMAGLUTIDE 1.34 MG/ML
1 INJECTION, SOLUTION SUBCUTANEOUS
Qty: 3 ML | Refills: 5 | Status: SHIPPED | OUTPATIENT
Start: 2024-10-09

## 2024-10-09 RX ORDER — DEXTROAMPHETAMINE SACCHARATE, AMPHETAMINE ASPARTATE, DEXTROAMPHETAMINE SULFATE AND AMPHETAMINE SULFATE 1.25; 1.25; 1.25; 1.25 MG/1; MG/1; MG/1; MG/1
5 TABLET ORAL DAILY
Qty: 30 TABLET | Refills: 0 | Status: SHIPPED | OUTPATIENT
Start: 2024-11-08 | End: 2024-12-08

## 2024-10-09 RX ORDER — DEXTROAMPHETAMINE SACCHARATE, AMPHETAMINE ASPARTATE, DEXTROAMPHETAMINE SULFATE AND AMPHETAMINE SULFATE 1.25; 1.25; 1.25; 1.25 MG/1; MG/1; MG/1; MG/1
5 TABLET ORAL DAILY
Qty: 30 TABLET | Refills: 0 | Status: SHIPPED | OUTPATIENT
Start: 2024-12-08 | End: 2025-01-07

## 2024-10-09 NOTE — RESULT ENCOUNTER NOTE
Dearussel Thomas,     -Drug screen showed expected results.        Thank you,     Kezia Brooks, ODALIS, CNP  [facility]    If you have further questions about the interpretation of your labs, [email or web address] is a good website to check out for further information.

## 2024-10-09 NOTE — PROGRESS NOTES
Assessment & Plan     Hyperlipidemia, unspecified hyperlipidemia type  Recent Labs   Lab Test 10/04/24  0827 11/14/23  0747   CHOL 125 127   HDL 52 34*   LDL 47 78   TRIG 131 77     Stable lipid levels with statin therapy, significant decrease in LDL level.    Decreased rosuvastatin from 40 mg to 20 mg daily. Recheck lipid panel in 6 months, future order placed.   - rosuvastatin (CRESTOR) 20 MG tablet  Dispense: 90 tablet; Refill: 3  - Lipid panel reflex to direct LDL Fasting    Essential hypertension with goal blood pressure less than 130/80  Blood pressure at goal, less than 130/80 mm Hg. Decreased lisinopril from 30 mg to 20 mg daily.   - lisinopril (ZESTRIL) 20 MG tablet  Dispense: 90 tablet; Refill: 3    Type 2 diabetes mellitus without complication, without long-term current use of insulin (H)  Stable Diabetes control. Hemoglobin A1c decreased from 5.8% to 5.4%, discontinued metformin. Continue Semaglutide and recheck Hbg A1c in 6 months, future lab order placed. Recommend that patient schedule his diabetic eye exam.   - Hemoglobin A1c  - Semaglutide, 1 MG/DOSE, (OZEMPIC, 1 MG/DOSE,) 4 MG/3ML pen  Dispense: 3 mL; Refill: 5    Morbid obesity (H)  Continue Semaglutide and monitor weight loss. Provided education regarding dietary and lifestyle changes.   - Semaglutide, 1 MG/DOSE, (OZEMPIC, 1 MG/DOSE,) 4 MG/3ML pen  Dispense: 3 mL; Refill: 5    ADD (attention deficit disorder) without hyperactivity  Continue current amphetamine-dextroamphetamine dosing, ADD is overall well-controlled. Annual CSA reviewed and updated and annual urine drug screen completed in clinic.   - Urine Drug Screen Clinic    Immunity status testing  Unclear documentation of hepatitis B immunity, future order for hepatitis B surface antibody placed for 6-month lab draw. Will reevaulate if starting the Hepatitis B vaccination series based on patient's future blood work.   - Hepatitis B Surface Antibody      BMI  Estimated body mass index is  29.7 kg/m  as calculated from the following:    Height as of this encounter: 1.829 m (6').    Weight as of this encounter: 99.3 kg (219 lb).     The longitudinal plan of care for the diagnosis(es)/condition(s) as documented were addressed during this visit. Due to the added complexity in care, I will continue to support William in the subsequent management and with ongoing continuity of care.      Sandeep Thomas is a 44 year old, presenting for the following health issues:  Recheck Medication      10/9/2024     7:20 AM   Additional Questions   Roomed by Angela FAITH CMA   Accompanied by self     History of Present Illness       Diabetes:   He presents for follow up of diabetes.    He is not checking blood glucose.         He has no concerns regarding his diabetes at this time.   He is not experiencing numbness or burning in feet, excessive thirst, blurry vision, weight changes or redness, sores or blisters on feet. The patient has not had a diabetic eye exam in the last 12 months.          Hyperlipidemia:  He presents for follow up of hyperlipidemia.   He is taking medication to lower cholesterol. He is not having myalgia or other side effects to statin medications.    Hypertension: He presents for follow up of hypertension.  He does not check blood pressure  regularly outside of the clinic. Outpatient blood pressures have not been over 140/90. He follows a low salt diet.     He eats 2-3 servings of fruits and vegetables daily.He consumes 0 sweetened beverage(s) daily.He exercises with enough effort to increase his heart rate 9 or less minutes per day.  He exercises with enough effort to increase his heart rate 3 or less days per week.   He is taking medications regularly.     Patient would like to discuss possibly getting off some medications.  Adderall follow up.    HPI: Overall feels adderall XR dosage is well controlled. Had hiatal surgery in April, states he wasn't able to resume physical activity or dietary changes  until July/August 2024. Wants to discuss decreasing his rosuvastatin and metformin dosages.     Diabetes Follow-up    How often are you checking your blood sugar? Not at all  What concerns do you have today about your diabetes? None   Do you have any of these symptoms? (Select all that apply)  No numbness or tingling in feet.  No redness, sores or blisters on feet.  No complaints of excessive thirst.  No reports of blurry vision.  No significant changes to weight.  Have you had a diabetic eye exam in the last 12 months? No    Recent Labs   Lab Test 10/04/24  0827 11/14/23  0747   CHOL 125 127   HDL 52 34*   LDL 47 78   TRIG 131 77         BP Readings from Last 2 Encounters:   10/09/24 110/70   10/08/24 117/75     Hemoglobin A1C (%)   Date Value   10/04/2024 5.4   05/15/2024 5.8 (H)     LDL Cholesterol Calculated (mg/dL)   Date Value   10/04/2024 47   11/14/2023 78   03/17/2021 78   09/15/2020 120 (H)       Hyperlipidemia Follow-Up  Recent Labs   Lab Test 10/04/24  0827 11/14/23  0747   CHOL 125 127   HDL 52 34*   LDL 47 78   TRIG 131 77       Are you regularly taking any medication or supplement to lower your cholesterol?   Yes- rosuvastatin  Are you having muscle aches or other side effects that you think could be caused by your cholesterol lowering medication?  No    Hypertension Follow-up    Do you check your blood pressure regularly outside of the clinic? No   Are you following a low salt diet? Yes  Are your blood pressures ever more than 140 on the top number (systolic) OR more   than 90 on the bottom number (diastolic), for example 140/90? No    Review of Systems  Constitutional, HEENT, cardiovascular, pulmonary, gi and gu systems are negative, except as otherwise noted.      Objective    /70   Pulse 72   Temp 97  F (36.1  C) (Tympanic)   Ht 1.829 m (6')   Wt 99.3 kg (219 lb)   SpO2 98%   BMI 29.70 kg/m    Body mass index is 29.7 kg/m .    Physical Exam     GENERAL: alert and no distress  EYES:  Eyes grossly normal to inspection  RESP: lungs clear to auscultation - no rales, rhonchi or wheezes  CV: regular rate and rhythm, normal S1 S2, no S3 or S4, no murmur, click or rub  NEURO: Normal strength and tone, mentation intact and speech normal  PSYCH: mentation appears normal, affect normal/bright        Written by Apolonia Marrufo DNP-FNP Student       I was present with the student who participated in the service and in the documentation of the note, which I have reviewed and verified. The history, reviews of systems, objective data, and assessment/plan were completed by myself.      Signed Electronically by: ODALIS Martin CNP

## 2024-10-09 NOTE — LETTER
[facility] [place]  10/09/24  Patient: William Bolton  YOB: 1980  Medical Record Number: [id number]                                                                                  Non-Opioid Controlled Substance Agreement    This is an agreement between you and your provider regarding safe and appropriate use of controlled substances prescribed by your care team. Controlled substances are?medicines that can cause physical and mental dependence (abuse).     There are strict laws about having and using these medicines. We here at [facility] are  committed to working with you in your efforts to get better. To support you in this work, we'll help you schedule regular office appointments for medicine refills. If we must cancel or change your appointment for any reason, we'll make sure you have enough medicine to last until your next appointment.     As a Provider, I will:   Listen carefully to your concerns while treating you with respect.   Recommend a treatment plan that I believe is in your best interest and may involve therapies other than medicine.    Talk with you often about the possible benefits and the risk of harm of any medicine that we prescribe for you.  Assess the safety of this medicine and check how well it works.    Provide a plan on how to taper (discontinue or go off) using this medicine if the decision is made to stop its use.      ::  As a Patient, I understand controlled substances:     Are prescribed by my care provider to help me function or work and manage my condition(s).?  Are strong medicines and can cause serious side effects.     Need to be taken exactly as prescribed.?Combining controlled substances with certain medicines or chemicals (such as illegal drugs, alcohol, sedatives, sleeping pills, and benzodiazepines) can be dangerous or even fatal.? If I stop taking my medicines suddenly, I may have severe withdrawal symptoms.     The risks, benefits, and  side effects of these medicine(s) were explained to me. I agree that:    I will take part in other treatments as advised by my care team. This may be psychiatry or counseling, physical therapy, behavioral therapy, group treatment or a referral to specialist.    I will keep all my appointments and understand this is part of the monitoring of controlled substances.?My care team may require an office visit for EVERY controlled substance refill. If I miss appointments or don t follow instructions, my care team may stop my medicine    I will take my medicines as prescribed. I will not change the dose or schedule unless my care team tells me to. There will be no refills if I run out early.      I may be asked to come to the clinic and complete a urine drug test or complete a pill count. If I don t give a urine sample or participate in a pill count, the care team may stop my medicine.    I will only receive controlled substance prescriptions from this clinic. If I am treated by another provider, I will tell them that I am taking controlled substances and that I have a treatment agreement with this provider. I will inform my [facility] care team within one business day if I am given a prescription for any controlled substance by another healthcare provider. My [facility] care team can contact other providers and pharmacists about my use of any medicines.    It is up to me to make sure that I don't run out of my medicines on weekends or holidays.?If my care team is willing to refill my prescription without a visit, I must request refills only during office hours. Refills may take up to 3 business days to process. I will use one pharmacy to fill all my controlled substance prescriptions. I will notify the clinic about any changes to my insurance or medicine availability.    I am responsible for my prescriptions. If the medicine/prescription is lost, stolen or destroyed, it will not be replaced.?I also agree not  to share controlled substance medicines with anyone.     I am aware I should not use any illegal or recreational drugs. I agree not to drink alcohol unless my care team says I can.     If I enroll in the [place] Medical Cannabis program, I will tell my care team before my next refill.    I will tell my care team right away if I become pregnant, have a new medical problem treated outside of my regular clinic, or have a change in my medicines.     I understand that this medicine can affect my thinking, judgment and reaction time.? Alcohol and drugs affect the brain and body, which can affect the safety of my driving. Being under the influence of alcohol or drugs can affect my decision-making, behaviors, personal safety and the safety of others. Driving while impaired (DWI) can occur if a person is driving, operating or in physical control of a car, motorcycle, boat, snowmobile, ATV, motorbike, off-road vehicle or any other motor vehicle ([place] Statute 169A.20). I understand the risk if I choose to drive or operate any vehicle or machinery.    I understand that if I do not follow any of the conditions above, my prescriptions or treatment may be stopped or changed.   I agree that my provider, clinic care team and pharmacy may work with any city, state or federal law enforcement agency that investigates the misuse, sale or other diversion of my controlled medicine. I will allow my provider to discuss my care with, or share a copy of, this agreement with any other treating provider, pharmacy or emergency room where I receive care.     I have read this agreement and have asked questions about anything I did not understand.    ________________________________________________________  Patient Signature - William Bolton     ___________________                   Date     ________________________________________________________  Provider Signature - ODALIS Martin CNP       ___________________                   Date      ________________________________________________________  Witness Signature (required if provider not present while patient signing)          ___________________                   Date

## 2024-10-15 ENCOUNTER — DOCUMENTATION ONLY (OUTPATIENT)
Dept: SLEEP MEDICINE | Facility: CLINIC | Age: 44
End: 2024-10-15
Payer: COMMERCIAL

## 2025-01-11 ENCOUNTER — HEALTH MAINTENANCE LETTER (OUTPATIENT)
Age: 45
End: 2025-01-11

## 2025-01-28 ENCOUNTER — PATIENT OUTREACH (OUTPATIENT)
Dept: CARE COORDINATION | Facility: CLINIC | Age: 45
End: 2025-01-28
Payer: COMMERCIAL

## 2025-01-28 ENCOUNTER — MYC MEDICAL ADVICE (OUTPATIENT)
Dept: FAMILY MEDICINE | Facility: CLINIC | Age: 45
End: 2025-01-28
Payer: COMMERCIAL

## 2025-01-28 DIAGNOSIS — F98.8 ADD (ATTENTION DEFICIT DISORDER) WITHOUT HYPERACTIVITY: ICD-10-CM

## 2025-01-28 RX ORDER — DEXTROAMPHETAMINE SACCHARATE, AMPHETAMINE ASPARTATE MONOHYDRATE, DEXTROAMPHETAMINE SULFATE AND AMPHETAMINE SULFATE 6.25; 6.25; 6.25; 6.25 MG/1; MG/1; MG/1; MG/1
25 CAPSULE, EXTENDED RELEASE ORAL DAILY
Qty: 30 CAPSULE | Refills: 0 | Status: SHIPPED | OUTPATIENT
Start: 2025-01-28

## 2025-01-28 RX ORDER — DEXTROAMPHETAMINE SACCHARATE, AMPHETAMINE ASPARTATE, DEXTROAMPHETAMINE SULFATE AND AMPHETAMINE SULFATE 1.25; 1.25; 1.25; 1.25 MG/1; MG/1; MG/1; MG/1
5 TABLET ORAL DAILY
Qty: 30 TABLET | Refills: 0 | Status: SHIPPED | OUTPATIENT
Start: 2025-01-28

## 2025-01-28 NOTE — TELEPHONE ENCOUNTER
LOV 10/9/24    Future Appointments 1/28/2025 - 7/27/2025        Date Visit Type Length Department Provider     2/19/2025  1:30 PM [facility] PREVENTATIVE ADULT VISIT 30 min [facility] Kezia Brooks, ODALIS CNP    Location Instructions:     [facility] [place] is located at [address], [address] Free parking is available; access the lot by turning [address] from [address] onto [address], then west onto [address].                   Are you able to send over one month to bridge until 2/19/25?    Routing to provider to review and advise.     Citllai Barrera, RN   [facility] Triage

## 2025-01-29 ENCOUNTER — TELEPHONE (OUTPATIENT)
Dept: FAMILY MEDICINE | Facility: CLINIC | Age: 45
End: 2025-01-29
Payer: COMMERCIAL

## 2025-01-29 NOTE — TELEPHONE ENCOUNTER
[facility] pharmacist calling in regards to Adderall series order Rx sent in on Oct 9th. When going back in history they noticed they incorrectly filled December Rx.     They dispensed Focalin XR 25 mg was instead of Adderall.    They talked to patient, they said he did not notice any difference in effect or side effects. They wanted it noted that a mistake was made.     Chary Peralta R.N.

## 2025-02-04 SDOH — HEALTH STABILITY: PHYSICAL HEALTH: ON AVERAGE, HOW MANY DAYS PER WEEK DO YOU ENGAGE IN MODERATE TO STRENUOUS EXERCISE (LIKE A BRISK WALK)?: 0 DAYS

## 2025-02-04 SDOH — HEALTH STABILITY: PHYSICAL HEALTH: ON AVERAGE, HOW MANY MINUTES DO YOU ENGAGE IN EXERCISE AT THIS LEVEL?: 0 MIN

## 2025-02-04 ASSESSMENT — SOCIAL DETERMINANTS OF HEALTH (SDOH): HOW OFTEN DO YOU GET TOGETHER WITH FRIENDS OR RELATIVES?: ONCE A WEEK

## 2025-02-06 ENCOUNTER — OFFICE VISIT (OUTPATIENT)
Dept: FAMILY MEDICINE | Facility: CLINIC | Age: 45
End: 2025-02-06
Attending: NURSE PRACTITIONER
Payer: COMMERCIAL

## 2025-02-06 VITALS
WEIGHT: 226 LBS | HEIGHT: 72 IN | TEMPERATURE: 97.2 F | BODY MASS INDEX: 30.61 KG/M2 | OXYGEN SATURATION: 98 % | SYSTOLIC BLOOD PRESSURE: 112 MMHG | RESPIRATION RATE: 18 BRPM | DIASTOLIC BLOOD PRESSURE: 74 MMHG | HEART RATE: 74 BPM

## 2025-02-06 DIAGNOSIS — I10 ESSENTIAL HYPERTENSION WITH GOAL BLOOD PRESSURE LESS THAN 130/80: ICD-10-CM

## 2025-02-06 DIAGNOSIS — E11.9 TYPE 2 DIABETES MELLITUS WITHOUT COMPLICATION, WITHOUT LONG-TERM CURRENT USE OF INSULIN (H): ICD-10-CM

## 2025-02-06 DIAGNOSIS — H61.21 IMPACTED CERUMEN OF RIGHT EAR: ICD-10-CM

## 2025-02-06 DIAGNOSIS — L71.9 ROSACEA: ICD-10-CM

## 2025-02-06 DIAGNOSIS — E78.5 HYPERLIPIDEMIA, UNSPECIFIED HYPERLIPIDEMIA TYPE: ICD-10-CM

## 2025-02-06 DIAGNOSIS — F98.8 ADD (ATTENTION DEFICIT DISORDER) WITHOUT HYPERACTIVITY: ICD-10-CM

## 2025-02-06 DIAGNOSIS — Z00.00 ROUTINE GENERAL MEDICAL EXAMINATION AT A HEALTH CARE FACILITY: Primary | ICD-10-CM

## 2025-02-06 PROBLEM — E66.01 MORBID OBESITY (H): Status: RESOLVED | Noted: 2019-05-16 | Resolved: 2025-02-06

## 2025-02-06 LAB
ALBUMIN SERPL BCG-MCNC: 4.7 G/DL (ref 3.5–5.2)
ALP SERPL-CCNC: 59 U/L (ref 40–150)
ALT SERPL W P-5'-P-CCNC: 32 U/L (ref 0–70)
ANION GAP SERPL CALCULATED.3IONS-SCNC: 11 MMOL/L (ref 7–15)
AST SERPL W P-5'-P-CCNC: 30 U/L (ref 0–45)
BILIRUB SERPL-MCNC: 0.9 MG/DL
BUN SERPL-MCNC: 10.1 MG/DL (ref 6–20)
CALCIUM SERPL-MCNC: 9.6 MG/DL (ref 8.8–10.4)
CHLORIDE SERPL-SCNC: 104 MMOL/L (ref 98–107)
CHOLEST SERPL-MCNC: 138 MG/DL
CREAT SERPL-MCNC: 0.91 MG/DL (ref 0.67–1.17)
CREAT UR-MCNC: 238 MG/DL
EGFRCR SERPLBLD CKD-EPI 2021: >90 ML/MIN/1.73M2
ERYTHROCYTE [DISTWIDTH] IN BLOOD BY AUTOMATED COUNT: 12.6 % (ref 10–15)
EST. AVERAGE GLUCOSE BLD GHB EST-MCNC: 97 MG/DL
FASTING STATUS PATIENT QL REPORTED: YES
FASTING STATUS PATIENT QL REPORTED: YES
GLUCOSE SERPL-MCNC: 86 MG/DL (ref 70–99)
HBA1C MFR BLD: 5 % (ref 0–5.6)
HCO3 SERPL-SCNC: 26 MMOL/L (ref 22–29)
HCT VFR BLD AUTO: 52.3 % (ref 40–53)
HDLC SERPL-MCNC: 58 MG/DL
HGB BLD-MCNC: 18.1 G/DL (ref 13.3–17.7)
LDLC SERPL CALC-MCNC: 58 MG/DL
MCH RBC QN AUTO: 31.5 PG (ref 26.5–33)
MCHC RBC AUTO-ENTMCNC: 34.6 G/DL (ref 31.5–36.5)
MCV RBC AUTO: 91 FL (ref 78–100)
MICROALBUMIN UR-MCNC: <12 MG/L
MICROALBUMIN/CREAT UR: NORMAL MG/G{CREAT}
NONHDLC SERPL-MCNC: 80 MG/DL
PLATELET # BLD AUTO: 224 10E3/UL (ref 150–450)
POTASSIUM SERPL-SCNC: 4.4 MMOL/L (ref 3.4–5.3)
PROT SERPL-MCNC: 7.3 G/DL (ref 6.4–8.3)
RBC # BLD AUTO: 5.74 10E6/UL (ref 4.4–5.9)
SODIUM SERPL-SCNC: 141 MMOL/L (ref 135–145)
TRIGL SERPL-MCNC: 110 MG/DL
WBC # BLD AUTO: 6.7 10E3/UL (ref 4–11)

## 2025-02-06 RX ORDER — LISINOPRIL 20 MG/1
20 TABLET ORAL DAILY
Qty: 90 TABLET | Refills: 3 | Status: SHIPPED | OUTPATIENT
Start: 2025-02-06

## 2025-02-06 RX ORDER — DEXTROAMPHETAMINE SACCHARATE, AMPHETAMINE ASPARTATE MONOHYDRATE, DEXTROAMPHETAMINE SULFATE AND AMPHETAMINE SULFATE 6.25; 6.25; 6.25; 6.25 MG/1; MG/1; MG/1; MG/1
25 CAPSULE, EXTENDED RELEASE ORAL DAILY
Qty: 30 CAPSULE | Refills: 0 | Status: SHIPPED | OUTPATIENT
Start: 2025-04-07 | End: 2025-05-07

## 2025-02-06 RX ORDER — DEXTROAMPHETAMINE SACCHARATE, AMPHETAMINE ASPARTATE MONOHYDRATE, DEXTROAMPHETAMINE SULFATE AND AMPHETAMINE SULFATE 6.25; 6.25; 6.25; 6.25 MG/1; MG/1; MG/1; MG/1
25 CAPSULE, EXTENDED RELEASE ORAL DAILY
Qty: 30 CAPSULE | Refills: 0 | Status: SHIPPED | OUTPATIENT
Start: 2025-02-06 | End: 2025-03-08

## 2025-02-06 RX ORDER — DEXTROAMPHETAMINE SACCHARATE, AMPHETAMINE ASPARTATE, DEXTROAMPHETAMINE SULFATE AND AMPHETAMINE SULFATE 1.25; 1.25; 1.25; 1.25 MG/1; MG/1; MG/1; MG/1
5 TABLET ORAL DAILY
Qty: 30 TABLET | Refills: 0 | Status: SHIPPED | OUTPATIENT
Start: 2025-03-08 | End: 2025-04-07

## 2025-02-06 RX ORDER — SEMAGLUTIDE 1.34 MG/ML
1 INJECTION, SOLUTION SUBCUTANEOUS
Qty: 3 ML | Refills: 5 | Status: SHIPPED | OUTPATIENT
Start: 2025-02-06

## 2025-02-06 RX ORDER — ACETAMINOPHEN 160 MG
1 TABLET,DISINTEGRATING ORAL SEE ADMIN INSTRUCTIONS
OUTPATIENT
Start: 2025-02-13

## 2025-02-06 RX ORDER — ROSUVASTATIN CALCIUM 20 MG/1
20 TABLET, COATED ORAL DAILY
Qty: 90 TABLET | Refills: 3 | Status: SHIPPED | OUTPATIENT
Start: 2025-02-06

## 2025-02-06 RX ORDER — DEXTROAMPHETAMINE SACCHARATE, AMPHETAMINE ASPARTATE MONOHYDRATE, DEXTROAMPHETAMINE SULFATE AND AMPHETAMINE SULFATE 6.25; 6.25; 6.25; 6.25 MG/1; MG/1; MG/1; MG/1
25 CAPSULE, EXTENDED RELEASE ORAL DAILY
Qty: 30 CAPSULE | Refills: 0 | Status: SHIPPED | OUTPATIENT
Start: 2025-03-08 | End: 2025-04-07

## 2025-02-06 RX ORDER — DEXTROAMPHETAMINE SACCHARATE, AMPHETAMINE ASPARTATE, DEXTROAMPHETAMINE SULFATE AND AMPHETAMINE SULFATE 1.25; 1.25; 1.25; 1.25 MG/1; MG/1; MG/1; MG/1
5 TABLET ORAL DAILY
Qty: 30 TABLET | Refills: 0 | Status: SHIPPED | OUTPATIENT
Start: 2025-04-07 | End: 2025-05-07

## 2025-02-06 RX ORDER — DEXTROAMPHETAMINE SACCHARATE, AMPHETAMINE ASPARTATE, DEXTROAMPHETAMINE SULFATE AND AMPHETAMINE SULFATE 1.25; 1.25; 1.25; 1.25 MG/1; MG/1; MG/1; MG/1
5 TABLET ORAL DAILY
Qty: 30 TABLET | Refills: 0 | Status: SHIPPED | OUTPATIENT
Start: 2025-02-06 | End: 2025-03-08

## 2025-02-06 NOTE — PROGRESS NOTES
Preventive Care Visit  [facility] [place]  Kezia ODALIS Worrell CNP, Family Medicine  Feb 6, 2025      Assessment & Plan     William was seen today for physical.    Diagnoses and all orders for this visit:    Routine general medical examination at a health care facility  -     PRIMARY CARE FOLLOW-UP SCHEDULING    Essential hypertension with goal blood pressure less than 130/80  Stable on Lisinopril 20 mg daily.    -     lisinopril (ZESTRIL) 20 MG tablet; Take 1 tablet (20 mg) by mouth daily.  -     Comprehensive metabolic panel (BMP + Alb, Alk Phos, ALT, AST, Total. Bili, TP)  -     CBC with platelets    Hyperlipidemia, unspecified hyperlipidemia type  Recent Labs   Lab Test 10/04/24  0827 11/14/23  0747   CHOL 125 127   HDL 52 34*   LDL 47 78   TRIG 131 77   Stable on Rosuvastatin 20 mg daily.    -     rosuvastatin (CRESTOR) 20 MG tablet; Take 1 tablet (20 mg) by mouth daily.  -     Lipid panel reflex to direct LDL Fasting    Type 2 diabetes mellitus without complication, without long-term current use of insulin (H)  Hemoglobin A1C   Date Value Ref Range Status   02/06/2025 5.0 0.0 - 5.6 % Final     Comment:     Normal <5.7%   Prediabetes 5.7-6.4%    Diabetes 6.5% or higher     Note: Adopted from ADA consensus guidelines.   10/04/2024 5.4 0.0 - 5.6 % Final     Comment:     Normal <5.7%   Prediabetes 5.7-6.4%    Diabetes 6.5% or higher     Note: Adopted from ADA consensus guidelines.   05/15/2024 5.8 (H) 0.0 - 5.6 % Final     Comment:     Normal <5.7%   Prediabetes 5.7-6.4%    Diabetes 6.5% or higher     Note: Adopted from ADA consensus guidelines.   Well controlled Diabetes. Continue with current plan.    -     Semaglutide, 1 MG/DOSE, (OZEMPIC, 1 MG/DOSE,) 4 MG/3ML pen; Inject 1 mg subcutaneously every 7 days.  -     Albumin Random Urine Quantitative with Creat Ratio  -     Hemoglobin A1c    Impacted cerumen of right ear  -     MI REMOVAL IMPACTED CERUMEN IRRIGATION/LVG UNILAT (RN/MA)  -      hydrogen peroxide 3 % solution 1 mL    Rosacea  Plan trial of Metronidazole cream.   With no improvement or worsening will plan further consultation with dermatology.    -     metroNIDAZOLE (METROCREAM) 0.75 % external cream; Apply topically 2 times daily.    ADD (attention deficit disorder) without hyperactivity  Stable on Adderall XR 25 mg daily and Adderall IR 5 mg once daily in the afternoon as needed.    -     amphetamine-dextroamphetamine (ADDERALL XR) 25 MG 24 hr capsule; Take 1 capsule (25 mg) by mouth daily.  -     amphetamine-dextroamphetamine (ADDERALL XR) 25 MG 24 hr capsule; Take 1 capsule (25 mg) by mouth daily.  -     amphetamine-dextroamphetamine (ADDERALL XR) 25 MG 24 hr capsule; Take 1 capsule (25 mg) by mouth daily.  -     amphetamine-dextroamphetamine (ADDERALL) 5 MG tablet; Take 1 tablet (5 mg) by mouth daily.  -     amphetamine-dextroamphetamine (ADDERALL) 5 MG tablet; Take 1 tablet (5 mg) by mouth daily.  -     amphetamine-dextroamphetamine (ADDERALL) 5 MG tablet; Take 1 tablet (5 mg) by mouth daily.          BMI  Estimated body mass index is 30.65 kg/m  as calculated from the following:    Height as of this encounter: 1.829 m (6').    Weight as of this encounter: 102.5 kg (226 lb).     Counseling  Appropriate preventive services were addressed with this patient.  Checklist reviewing preventive services available has been given to the patient.  Reviewed patient's diet, addressing concerns and/or questions.     Return in about 8 months (around 10/6/2025) for ADHD Med check, In Clinic.  The longitudinal plan of care for the diagnosis(es)/condition(s) as documented were addressed during this visit. Due to the added complexity in care, I will continue to support William in the subsequent management and with ongoing continuity of care.    Sandeep Thomas is a 44 year old, presenting for the following:  Physical        2/6/2025     7:09 AM   Additional Questions   Roomed by Saranya MORENO         2/6/2025    Declines Weight   Did patient decline having their weight taken? Yes          HPI    Social:  . Children are ages 14,10, 8, 6 and 20 months  Traveling with 10 year old son for [employer] competitions.      Hypertension Follow-up    Do you check your blood pressure regularly outside of the clinic? No   Are you following a low salt diet? No  Are your blood pressures ever more than 140 on the top number (systolic) OR more   than 90 on the bottom number (diastolic), for example 140/90? N/A      Diabetes Follow-up  Lab Results   Component Value Date    A1C 5.4 10/04/2024    A1C 5.8 05/15/2024    A1C 5.2 02/09/2024    A1C 7.2 11/14/2023    A1C 7.6 07/03/2023       How often are you checking your blood sugar? Not at all  What concerns do you have today about your diabetes? None   Do you have any of these symptoms? (Select all that apply)  No numbness or tingling in feet.  No redness, sores or blisters on feet.  No complaints of excessive thirst.  No reports of blurry vision.  No significant changes to weight.  Have you had a diabetic eye exam in the last 12 months? No        BP Readings from Last 2 Encounters:   02/06/25 112/74   10/09/24 110/70     Hemoglobin A1C (%)   Date Value   02/06/2025 5.0   10/04/2024 5.4     LDL Cholesterol Calculated (mg/dL)   Date Value   10/04/2024 47   11/14/2023 78   03/17/2021 78   09/15/2020 120 (H)         Hyperlipidemia Follow-Up  Recent Labs   Lab Test 10/04/24  0827 11/14/23  0747   CHOL 125 127   HDL 52 34*   LDL 47 78   TRIG 131 77       Are you regularly taking any medication or supplement to lower your cholesterol?   Yes- rosuvastatin  Are you having muscle aches or other side effects that you think could be caused by your cholesterol lowering medication?  No    Medication Followup of Adderall  Taking Medication as prescribed: yes  Side Effects:  None  Medication Helping Symptoms:  yes    Health Care Directive    Patient does not have a Health Care Directive.           2/4/2025   General Health   How would you rate your overall physical health? Good   Feel stress (tense, anxious, or unable to sleep) Not at all         2/4/2025   Nutrition   Three or more servings of calcium each day? Yes   Diet: Regular (no restrictions)   How many servings of fruit and vegetables per day? (!) 2-3   How many sweetened beverages each day? 0-1         2/4/2025   Exercise   Days per week of moderate/strenous exercise 0 days   Average minutes spent exercising at this level 0 min   (!) EXERCISE CONCERN      2/4/2025   Social Factors   Frequency of gathering with friends or relatives Once a week   Worry food won't last until get money to buy more No   Food not last or not have enough money for food? No   Do you have housing? (Housing is defined as stable permanent housing and does not include staying ouside in a car, in a tent, in an abandoned building, in an overnight shelter, or couch-surfing.) Yes   Are you worried about losing your housing? No   Lack of transportation? No   Unable to get utilities (heat,electricity)? No         2/4/2025   Dental   Dentist two times every year? Yes         2/4/2025   TB Screening   Were you born outside of the [place]? No         Today's PHQ-2 Score:       2/6/2025     7:07 AM   PHQ-2 ( 1999 [employer])   Q1: Little interest or pleasure in doing things 0    Q2: Feeling down, depressed or hopeless 0    PHQ-2 Score 0    Q1: Little interest or pleasure in doing things Not at all   Q2: Feeling down, depressed or hopeless Not at all   PHQ-2 Score 0       Proxy-reported           2/4/2025   Substance Use   Alcohol more than 3/day or more than 7/wk No   Do you use any other substances recreationally? No     Social History     Tobacco Use    Smoking status: Never     Passive exposure: Never    Smokeless tobacco: Never   Vaping Use    Vaping status: Never Used   Substance Use Topics    Alcohol use: Yes     Comment: 2-4 a week    Drug use: No           2/4/2025   One time HIV Screening    Previous HIV test? Yes         2/4/2025   STI Screening   New sexual partner(s) since last STI/HIV test? No   ASCVD Risk   The ASCVD Risk score (Mynor DK, et al., 2019) failed to calculate for the following reasons:    The valid total cholesterol range is 130 to 320 mg/dL    Reviewed and updated as needed this visit by Provider       Med Hx              BP Readings from Last 3 Encounters:   02/06/25 112/74   10/09/24 110/70   10/08/24 117/75    Wt Readings from Last 3 Encounters:   02/06/25 102.5 kg (226 lb)   10/09/24 99.3 kg (219 lb)   10/08/24 99.7 kg (219 lb 14.4 oz)                  Patient Active Problem List   Diagnosis    Mixed hyperlipidemia    ADD (attention deficit disorder) without hyperactivity    Non morbid obesity due to excess calories    Abnormal bone xray - sclerosis of phalynx of right great toe 7/31/17 - suggest repeat imaging at annual visit.    NATHAN (obstructive sleep apnea)    Nocturnal hypoxemia    Adenomatous polyp of descending colon    Essential hypertension with goal blood pressure less than 130/80    Family history of early liver disease (PSC)     Hiatal hernia    Fatty liver    Elevated liver enzymes    Diabetes mellitus, type 2 (H)    Low hemoglobin    Surgery follow-up examination     Past Surgical History:   Procedure Laterality Date    COLONOSCOPY  06/13/2019    Dr. Navarro [facility]    COLONOSCOPY N/A 12/28/2023    Procedure: Colonoscopy;  Surgeon: Luis Ge MD;  Location: [facility]    COLONOSCOPY N/A 12/28/2023    Procedure: COLONOSCOPY, WITH POLYPECTOMY AND BIOPSY;  Surgeon: Luis Ge MD;  Location: [facility]    ESOPHAGOSCOPY, GASTROSCOPY, DUODENOSCOPY (EGD), COMBINED N/A 05/23/2019    Procedure: ESOPHAGOGASTRODUODENOSCOPY, WITH BIOPSY;  Surgeon: Say Navarro MD;  Location: [facility]    ESOPHAGOSCOPY, GASTROSCOPY, DUODENOSCOPY (EGD), COMBINED N/A 12/28/2023    Procedure: Esophagoscopy, gastroscopy, duodenoscopy (EGD), combined;  Surgeon: Luis Ge MD;   Location:  GI    HEAD & NECK SURGERY      wisdom teeth removed    LAPAROSCOPIC HERNIORRHAPHY HIATAL N/A 4/1/2024    Procedure: Robotic assisted laparoscopic hiatal hernia repair with NISSEN FUNDOPLICATION;  Surgeon: Andrew Franklin MD;  Location:  OR       Social History     Tobacco Use    Smoking status: Never     Passive exposure: Never    Smokeless tobacco: Never   Substance Use Topics    Alcohol use: Yes     Comment: 2-4 a week     Family History   Problem Relation Age of Onset    Hyperlipidemia Mother     Sleep Apnea Mother     Hypertension Father     Other - See Comments Father         primary sclerosing cholangitis    Hypertension Sister     Hyperlipidemia Sister     Coronary Artery Disease Paternal Grandfather     Colon Cancer Cousin          Current Outpatient Medications   Medication Sig Dispense Refill    amphetamine-dextroamphetamine (ADDERALL XR) 25 MG 24 hr capsule Take 1 capsule (25 mg) by mouth daily. 30 capsule 0    [START ON 3/8/2025] amphetamine-dextroamphetamine (ADDERALL XR) 25 MG 24 hr capsule Take 1 capsule (25 mg) by mouth daily. 30 capsule 0    [START ON 4/7/2025] amphetamine-dextroamphetamine (ADDERALL XR) 25 MG 24 hr capsule Take 1 capsule (25 mg) by mouth daily. 30 capsule 0    amphetamine-dextroamphetamine (ADDERALL) 5 MG tablet Take 1 tablet (5 mg) by mouth daily. 30 tablet 0    [START ON 3/8/2025] amphetamine-dextroamphetamine (ADDERALL) 5 MG tablet Take 1 tablet (5 mg) by mouth daily. 30 tablet 0    [START ON 4/7/2025] amphetamine-dextroamphetamine (ADDERALL) 5 MG tablet Take 1 tablet (5 mg) by mouth daily. 30 tablet 0    Elemental iron 65 mg Vitamin C 125 mg (VITRON-C)  MG TABS tablet Take 1 tablet by mouth daily      lisinopril (ZESTRIL) 20 MG tablet Take 1 tablet (20 mg) by mouth daily. 90 tablet 3    metroNIDAZOLE (METROCREAM) 0.75 % external cream Apply topically 2 times daily. 45 g 5    Multiple Vitamin (MULTIVITAMIN ADULT PO) Take 1 tablet by mouth daily      Omega-3  Fatty Acids (FISH OIL PO) Take 2 capsules by mouth daily      rosuvastatin (CRESTOR) 20 MG tablet Take 1 tablet (20 mg) by mouth daily. 90 tablet 3    Semaglutide, 1 MG/DOSE, (OZEMPIC, 1 MG/DOSE,) 4 MG/3ML pen Inject 1 mg subcutaneously every 7 days. 3 mL 5    testosterone cypionate (DEPOTESTOSTERONE) 200 MG/ML injection Inject 0.8 mLs into the muscle once a week On Wednesdays           Review of Systems  Constitutional, HEENT, cardiovascular, pulmonary, gi and gu systems are negative, except as otherwise noted.     Objective    Exam  /74   Pulse 74   Temp 97.2  F (36.2  C)   Resp 18   Ht 1.829 m (6')   Wt 102.5 kg (226 lb)   SpO2 98%   BMI 30.65 kg/m     Estimated body mass index is 30.65 kg/m  as calculated from the following:    Height as of this encounter: 1.829 m (6').    Weight as of this encounter: 102.5 kg (226 lb).    Physical Exam    GENERAL: alert and no distress  EYES: Eyes grossly normal to inspection  HENT: right ear canal occluded with cerumen, s/p ear irrigation canal and TM is normal, left ear canal and TM normal, nose and mouth without ulcers or lesions  NECK: no adenopathy, no asymmetry, masses, or scars  RESP: lungs clear to auscultation - no rales, rhonchi or wheezes  CV: regular rate and rhythm, normal S1 S2, no S3 or S4, no murmur, click or rub, no peripheral edema  ABDOMEN: soft, nontender, bowel sounds normal  MS: no gross musculoskeletal defects noted, no edema  SKIN: no suspicious lesions or rashes, face with erythema, mild scaling/peeling skin  NEURO: Normal strength and tone, mentation intact and speech normal  PSYCH: mentation appears normal, affect normal/bright        Signed Electronically by: ODALIS Martin CNP

## 2025-02-06 NOTE — PATIENT INSTRUCTIONS
Patient Education   Preventive Care Advice   This is general advice given by our system to help you stay healthy. However, your care team may have specific advice just for you. Please talk to your care team about your preventive care needs.  Nutrition  Eat 5 or more servings of fruits and vegetables each day.  Try wheat bread, brown rice and whole grain pasta (instead of white bread, rice, and pasta).  Get enough calcium and vitamin D. Check the label on foods and aim for 100% of the RDA (recommended daily allowance).  Lifestyle  Exercise at least 150 minutes each week  (30 minutes a day, 5 days a week).  Do muscle strengthening activities 2 days a week. These help control your weight and prevent disease.  No smoking.  Wear sunscreen to prevent skin cancer.  Have a dental exam and cleaning every 6 months.  Yearly exams  See your health care team every year to talk about:  Any changes in your health.  Any medicines your care team has prescribed.  Preventive care, family planning, and ways to prevent chronic diseases.  Shots (vaccines)   HPV shots (up to age 26), if you've never had them before.  Hepatitis B shots (up to age 59), if you've never had them before.  COVID-19 shot: Get this shot when it's due.  Flu shot: Get a flu shot every year.  Tetanus shot: Get a tetanus shot every 10 years.  Pneumococcal, hepatitis A, and RSV shots: Ask your care team if you need these based on your risk.  Shingles shot (for age 50 and up)  General health tests  Diabetes screening:  Starting at age 35, Get screened for diabetes at least every 3 years.  If you are younger than age 35, ask your care team if you should be screened for diabetes.  Cholesterol test: At age 39, start having a cholesterol test every 5 years, or more often if advised.  Bone density scan (DEXA): At age 50, ask your care team if you should have this scan for osteoporosis (brittle bones).  Hepatitis C: Get tested at least once in your life.  STIs (sexually  transmitted infections)  Before age 24: Ask your care team if you should be screened for STIs.  After age 24: Get screened for STIs if you're at risk. You are at risk for STIs (including HIV) if:  You are sexually active with more than one person.  You don't use condoms every time.  You or a partner was diagnosed with a sexually transmitted infection.  If you are at risk for HIV, ask about PrEP medicine to prevent HIV.  Get tested for HIV at least once in your life, whether you are at risk for HIV or not.  Cancer screening tests  Cervical cancer screening: If you have a cervix, begin getting regular cervical cancer screening tests starting at age 21.  Breast cancer scan (mammogram): If you've ever had breasts, begin having regular mammograms starting at age 40. This is a scan to check for breast cancer.  Colon cancer screening: It is important to start screening for colon cancer at age 45.  Have a colonoscopy test every 10 years (or more often if you're at risk) Or, ask your provider about stool tests like a FIT test every year or Cologuard test every 3 years.  To learn more about your testing options, visit:   .  For help making a decision, visit:   https://bit.ly/kz29126.  Prostate cancer screening test: If you have a prostate, ask your care team if a prostate cancer screening test (PSA) at age 55 is right for you.  Lung cancer screening: If you are a current or former smoker ages 50 to 80, ask your care team if ongoing lung cancer screenings are right for you.  For informational purposes only. Not to replace the advice of your health care provider. Copyright   2023 [employer]. All rights reserved. Clinically reviewed by the [facility] Transitions Program. [employer] 228836 - REV 01/24.

## 2025-03-03 ENCOUNTER — TELEPHONE (OUTPATIENT)
Dept: FAMILY MEDICINE | Facility: CLINIC | Age: 45
End: 2025-03-03
Payer: COMMERCIAL

## 2025-03-03 NOTE — TELEPHONE ENCOUNTER
Prior Authorization Retail Medication Request    Medication/Dose: D AMPHETAMINE SALT COMBO 5MG TABS  Diagnosis and ICD code (if different than what is on RX):    New/renewal/insurance change PA/secondary ins. PA:  Previously Tried and Failed:    Rationale:      Insurance   Primary: [employer]  Insurance ID:  [id number]     Secondary (if applicable):  Insurance ID:      Pharmacy Information (if different than what is on RX)  Name:  [facility]  Phone:  [phone]  [phone]    Clinic Information  Preferred routing pool for dept communication:

## 2025-03-03 NOTE — TELEPHONE ENCOUNTER
Prior Authorization Retail Medication Request    Medication/Dose: D AMPHETAMINE ER 25MG SALT COMBO CP  Diagnosis and ICD code (if different than what is on RX):    New/renewal/insurance change PA/secondary ins. PA:  Previously Tried and Failed:    Rationale:      Insurance   Primary: [employer]  Insurance ID:  [id number]     Secondary (if applicable):  Insurance ID:      Pharmacy Information (if different than what is on RX)  [facility]:  [facility]  Phone:  [phone]  [phone]    Clinic Information  Preferred routing pool for dept communication:

## 2025-03-05 ENCOUNTER — MYC MEDICAL ADVICE (OUTPATIENT)
Dept: FAMILY MEDICINE | Facility: CLINIC | Age: 45
End: 2025-03-05
Payer: COMMERCIAL

## 2025-03-05 NOTE — TELEPHONE ENCOUNTER
Prior Authorization Not Needed per Insurance    Medication: AMPHETAMINE-DEXTROAMPHET ER 25 MG PO CP24  Insurance Company: [employer] - Phone [phone] Fax [phone]  Expected CoPay: $    Pharmacy Filling the Rx: [id number] [place] [address] [facility] [address]  Pharmacy Notified: YES  Patient Notified: YES

## 2025-03-05 NOTE — TELEPHONE ENCOUNTER
Prior Authorization Approval    Medication: AMPHETAMINE-DEXTROAMPHETAMINE 5 MG PO TABS  Authorization Effective Date: 3/5/2025  Authorization Expiration Date: 3/5/2028  Approved Dose/Quantity:   Reference #:     Insurance Company: [employer] Non-Specialty PA's - Phone [phone] Fax [phone]  Expected CoPay: $    CoPay Card Available:      Financial Assistance Needed:   Which Pharmacy is filling the prescription: [id number] - [place] [address] AT [address] [facility]  Pharmacy Notified: YES  Patient Notified: **Instructed pharmacy to notify patient when script is ready to /ship.**

## 2025-03-05 NOTE — TELEPHONE ENCOUNTER
PA Initiation    Medication: AMPHETAMINE-DEXTROAMPHETAMINE 5 MG PO TABS  Insurance Company: [employer] Non-Specialty PA's - Phone [phone] Fax [phone]  Pharmacy Filling the Rx: [facility] #[id number] - [place], [place] - [address] [facility] [address]  Filling Pharmacy Phone: [phone]  Filling Pharmacy Fax: [phone]  Start Date: 3/5/2025

## 2025-03-06 ENCOUNTER — OFFICE VISIT (OUTPATIENT)
Dept: FAMILY MEDICINE | Facility: CLINIC | Age: 45
End: 2025-03-06
Payer: COMMERCIAL

## 2025-03-06 ENCOUNTER — ANCILLARY PROCEDURE (OUTPATIENT)
Dept: GENERAL RADIOLOGY | Facility: CLINIC | Age: 45
End: 2025-03-06
Payer: COMMERCIAL

## 2025-03-06 VITALS
BODY MASS INDEX: 32.37 KG/M2 | WEIGHT: 238.7 LBS | HEART RATE: 97 BPM | OXYGEN SATURATION: 97 % | SYSTOLIC BLOOD PRESSURE: 138 MMHG | TEMPERATURE: 98.5 F | DIASTOLIC BLOOD PRESSURE: 86 MMHG | RESPIRATION RATE: 18 BRPM

## 2025-03-06 DIAGNOSIS — E11.9 TYPE 2 DIABETES MELLITUS WITHOUT COMPLICATION, WITHOUT LONG-TERM CURRENT USE OF INSULIN (H): ICD-10-CM

## 2025-03-06 DIAGNOSIS — M79.675 PAIN OF TOE OF LEFT FOOT: ICD-10-CM

## 2025-03-06 DIAGNOSIS — S90.112A CONTUSION OF LEFT GREAT TOE WITHOUT DAMAGE TO NAIL, INITIAL ENCOUNTER: Primary | ICD-10-CM

## 2025-03-06 NOTE — TELEPHONE ENCOUNTER
"      Patient called back regarding mychart/broken toe about one month ago    He states \"the pain is not too bad\"     Explained ortho walk in clinic option versus clinic apt.   Patient  prefer to come to clinic today- we have xray today.     Scheduled- explained location, arrival and apt time   "

## 2025-03-06 NOTE — TELEPHONE ENCOUNTER
Attempt # 1    Called # [phone]     Left a non detailed VM to call back at [phone] and ask for any available Triage Nurse.    [facility] sent advising patient to call to be triaged.     Citlali Barrera RN on 3/6/2025 at 8:20 AM   [facility]

## 2025-03-06 NOTE — PROGRESS NOTES
Assessment & Plan     Contusion of left great toe without damage to nail, initial encounter   Pain of toe of left foot  Xray as interpreted by radiology was negative for signs of acute fracture. Given mechanism of injury, patient likely bruised his toe and this is leading to current pain. Encouraged pt to continue resting the foot, elevating while sitting, and alternate applying heat and ice to the area. He should take tylenol or ibuprofen for pain control.   - XR Foot Left G/E 3 Views    Type 2 diabetes mellitus without complication, without long-term current use of insulin (H)  Due to T2DM risk factor, may consider podiatry referral if no improvement with above conservative cares.     Patient should follow up in 1-2 weeks if symptoms do not improve or sooner for new or worsening symptoms. All questions answered to patient's satisfaction.     aMine Srivastava PA-C    31 minutes spent by me on the date of the encounter doing chart review, history and exam, documentation and further activities per the note    BMI  Estimated body mass index is 32.37 kg/m  as calculated from the following:    Height as of 2/6/25: 1.829 m (6').    Weight as of this encounter: 108.3 kg (238 lb 11.2 oz).   Weight management plan: Patient was referred to their PCP to discuss a diet and exercise plan.    Subjective   William is a 45 year old, presenting for the following health issues:  toe issue        3/6/2025     1:46 PM   Additional Questions   Roomed by pineda foster   Accompanied by self         History of Present Illness       Reason for visit:  Broken toe  Symptom onset:  3-4 weeks ago  Symptoms include:  Pain in toe  Symptom intensity:  Moderate  Symptom progression:  Improving  Had these symptoms before:  No   He is taking medications regularly.      Patient brocora toe 2/9/25 toe is swollen and purple. Slip down stairs and my toe got under.    William is a 45 year old male who presents today with concerns for a broken left big toe.     He is  wearing a walking boot in clinic. States the pain is overall tolerable. No difficulties walking. No fevers or chills.     Patient has a history of well-controlled diabetes. He is currently taking semaglutide.     Nuvosun message 3/5/25:   I am 100% certain I broke my left big toe on 2-9-25. I ve broken my right toe before where my toe is in a similar position of swelling/being purple for a while/not being able to bed it 3+ weeks out and fortunately I have a walking boot from my prior incident (both times from my mortal enemy stairs). I had a total of 3 trips in the following 4 weeks and thought I would just wear my boot and should be fine. Full disclosure one trip was to [place] where I had 5 people convince me to hike 3 miles [place] to the [facility] where I was not wearing my boot for safety reasons (rain forest with stone paths makes for slippery walking conditions in a boot). So safe to say I regretted that and last week I agitated my toe enough to wear it went numb several times but is not currently. I have been wearing my boot since and all the previous info was to say if I make an appointment with you in [place] does your office have an X-Ray or will I need a referral? I leave Friday at noon for another trip and am back Monday for at least 3 weeks so early next week is ok too. Thanks and I m sure I will be seeing you soon!     Review of Systems  Constitutional, neuro, ENT, endocrine, pulmonary, cardiac, gastrointestinal, genitourinary, musculoskeletal, integument and psychiatric systems are negative, except as otherwise noted.      Objective    /86   Pulse 97   Temp 98.5  F (36.9  C) (Tympanic)   Resp 18   Wt 108.3 kg (238 lb 11.2 oz)   SpO2 97%   BMI 32.37 kg/m    Body mass index is 32.37 kg/m .  Physical Exam  Constitutional:       General: He is not in acute distress.     Appearance: Normal appearance. He is not ill-appearing or toxic-appearing.   HENT:      Head:  Normocephalic.      Nose: Nose normal.      Mouth/Throat:      Mouth: Mucous membranes are moist.      Pharynx: Oropharynx is clear.   Eyes:      General: No scleral icterus.     Extraocular Movements: Extraocular movements intact.      Conjunctiva/sclera: Conjunctivae normal.   Cardiovascular:      Rate and Rhythm: Normal rate.      Pulses:           Dorsalis pedis pulses are 2+ on the left side.   Pulmonary:      Effort: Pulmonary effort is normal.      Breath sounds: Normal breath sounds.   Musculoskeletal:      Cervical back: Normal range of motion and neck supple.      Right foot: Normal range of motion. No deformity.      Left foot: Normal range of motion. No deformity.   Feet:      Right foot:      Skin integrity: Skin integrity normal.      Left foot:      Skin integrity: Skin integrity normal.      Comments: Normal sensation bilaterally. Mild tenderness to base of lateral big toe.   Skin:     General: Skin is dry.   Neurological:      Mental Status: He is alert.   Psychiatric:         Mood and Affect: Mood normal.         Behavior: Behavior normal.         Thought Content: Thought content normal.         Judgment: Judgment normal.           Signed Electronically by: [place] Srivastava PA-C

## 2025-05-01 ENCOUNTER — E-VISIT (OUTPATIENT)
Dept: URGENT CARE | Facility: CLINIC | Age: 45
End: 2025-05-01
Payer: COMMERCIAL

## 2025-05-01 DIAGNOSIS — H10.33 ACUTE BACTERIAL CONJUNCTIVITIS OF BOTH EYES: Primary | ICD-10-CM

## 2025-05-01 RX ORDER — POLYMYXIN B SULFATE AND TRIMETHOPRIM 1; 10000 MG/ML; [USP'U]/ML
SOLUTION OPHTHALMIC
Qty: 10 ML | Refills: 0 | Status: SHIPPED | OUTPATIENT
Start: 2025-05-01 | End: 2025-05-08

## 2025-05-01 NOTE — PATIENT INSTRUCTIONS
Thank you for choosing us for your care. I have placed an order for a prescription so that you can start treatment. View your full visit summary for details by clicking on the link below. Your pharmacist will able to address any questions you may have about the medication.     If you re not feeling better within 2-3 days, please schedule an appointment.  You can schedule an appointment right here in [facility], or call [phone]  If the visit is for the same symptoms as your eVisit, we ll refund the cost of your eVisit if seen within seven days.    Pinkeye: Care Instructions  Overview     Pinkeye is redness and swelling of the eye surface and the conjunctiva (the lining of the eyelid and the covering of the white part of the eye). Pinkeye is also called conjunctivitis. Pinkeye is often caused by infection with bacteria or a virus. Dry air, allergies, smoke, and chemicals are other common causes.  Pinkeye often gets better on its own in 7 to 10 days. Antibiotics only help if the pinkeye is caused by bacteria. Pinkeye caused by infection spreads easily. If an allergy or chemical is causing pinkeye, it will not go away unless you can avoid whatever is causing it.  Follow-up care is a key part of your treatment and safety. Be sure to make and go to all appointments, and call your doctor if you are having problems. It's also a good idea to know your test results and keep a list of the medicines you take.  How can you care for yourself at home?  Wash your hands often. Always wash them before and after you treat pinkeye or touch your eyes or face.  Use moist cotton or a clean, wet cloth to remove crust. Wipe from the inside corner of the eye to the outside. Use a clean part of the cloth for each wipe.  Put cold or warm wet cloths on your eye a few times a day if the eye hurts.  Do not wear contact lenses or eye makeup until the pinkeye is gone. Throw away any eye makeup you were using when you got pinkeye. Clean your  "contacts and storage case. If you wear disposable contacts, use a new pair when your eye has cleared and it is safe to wear contacts again.  If the doctor gave you antibiotic ointment or eyedrops, use them as directed. Use the medicine for as long as instructed, even if your eye starts looking better soon. Keep the bottle tip clean, and do not let it touch the eye area.  To put in eyedrops or ointment:  Tilt your head back, and pull your lower eyelid down with one finger.  Drop or squirt the medicine inside the lower lid.  Close your eye for 30 to 60 seconds to let the drops or ointment move around.  Do not touch the ointment or dropper tip to your eyelashes or any other surface.  Do not share towels, pillows, or washcloths while you have pinkeye.  When should you call for help?   Call your doctor now or seek immediate medical care if:    You have pain in your eye, not just irritation on the surface.     You have a change in vision or loss of vision.     You have an increase in discharge from the eye.     Your eye has not started to improve or begins to get worse within 48 hours after you start using antibiotics.     Pinkeye lasts longer than 7 days.   Watch closely for changes in your health, and be sure to contact your doctor if you have any problems.  Where can you learn more?  Go to https://www.[employer].net/patiented  Enter Y392 in the search box to learn more about \"Pinkeye: Care Instructions.\"  Current as of: July 31, 2024  Content Version: 14.4    6226-3825 [employer].   Care instructions adapted under license by your healthcare professional. If you have questions about a medical condition or this instruction, always ask your healthcare professional. [employer] disclaims any warranty or liability for your use of this information.    "

## 2025-05-17 ENCOUNTER — HEALTH MAINTENANCE LETTER (OUTPATIENT)
Age: 45
End: 2025-05-17

## 2025-06-17 ENCOUNTER — MYC MEDICAL ADVICE (OUTPATIENT)
Dept: FAMILY MEDICINE | Facility: CLINIC | Age: 45
End: 2025-06-17
Payer: COMMERCIAL

## 2025-06-17 DIAGNOSIS — F98.8 ADD (ATTENTION DEFICIT DISORDER) WITHOUT HYPERACTIVITY: ICD-10-CM

## 2025-06-17 RX ORDER — DEXTROAMPHETAMINE SACCHARATE, AMPHETAMINE ASPARTATE MONOHYDRATE, DEXTROAMPHETAMINE SULFATE AND AMPHETAMINE SULFATE 6.25; 6.25; 6.25; 6.25 MG/1; MG/1; MG/1; MG/1
25 CAPSULE, EXTENDED RELEASE ORAL DAILY
Qty: 30 CAPSULE | Refills: 0 | Status: SHIPPED | OUTPATIENT
Start: 2025-06-17

## 2025-06-17 RX ORDER — DEXTROAMPHETAMINE SACCHARATE, AMPHETAMINE ASPARTATE, DEXTROAMPHETAMINE SULFATE AND AMPHETAMINE SULFATE 1.25; 1.25; 1.25; 1.25 MG/1; MG/1; MG/1; MG/1
5 TABLET ORAL DAILY
Qty: 30 TABLET | Refills: 0 | Status: SHIPPED | OUTPATIENT
Start: 2025-06-17

## 2025-07-01 NOTE — TELEPHONE ENCOUNTER
I will fill this on Wednesday morning when I'm next in [place] unless he'd like to come to [place] to  the paper Rx's.  I can do whichever Lucora prefers.  Please call patient.    Juan Antonio Villa MD     unk

## 2025-08-24 ENCOUNTER — HEALTH MAINTENANCE LETTER (OUTPATIENT)
Age: 45
End: 2025-08-24

## 2025-09-01 DIAGNOSIS — E11.9 TYPE 2 DIABETES MELLITUS WITHOUT COMPLICATION, WITHOUT LONG-TERM CURRENT USE OF INSULIN (H): ICD-10-CM

## 2025-09-02 RX ORDER — SEMAGLUTIDE 1.34 MG/ML
INJECTION, SOLUTION SUBCUTANEOUS
Qty: 3 ML | Refills: 5 | Status: SHIPPED | OUTPATIENT
Start: 2025-09-02

## (undated) DEVICE — ENDO TROCAR FIRST ENTRY KII FIOS Z-THRD 05X100MM CTF03

## (undated) DEVICE — SU MONOCRYL 4-0 PS-2 18" UND Y496G

## (undated) DEVICE — EVAC SYSTEM CLEAR FLOW SC082500

## (undated) DEVICE — ESU GROUND PAD UNIVERSAL W/O CORD

## (undated) DEVICE — SU STRATAFIX PDS PLUS 0 CT-1 30CM SXPP1B450

## (undated) DEVICE — DAVINCI XI SEAL UNIVERSAL 5-8MM 470361

## (undated) DEVICE — GLOVE BIOGEL PI MICRO INDICATOR UNDERGLOVE SZ 7.5 48975

## (undated) DEVICE — DAVINCI XI HANDPIECE ESU VESSEL SEALER 8MM EXT 480422

## (undated) DEVICE — DAVINCI XI OBTURATOR BLADELESS 8MM 470359

## (undated) DEVICE — KIT ENDO TURNOVER/PROCEDURE W/CLEAN A SCOPE LINERS 103888

## (undated) DEVICE — GLOVE BIOGEL PI MICRO SZ 7.5 48575

## (undated) DEVICE — ENDO BITE BLOCK ADULT OLYMPUS LATEX FREE MAJ-1632

## (undated) DEVICE — LINEN TOWEL PACK X5 5464

## (undated) DEVICE — ENDO TRAP POLYP QUICK CATCH 710201

## (undated) DEVICE — SU ETHIBOND 2-0 SHDA 30" X563H

## (undated) DEVICE — SU SILK 2-0 SH 30" K833H

## (undated) DEVICE — PREP CHLORAPREP 26ML TINTED HI-LITE ORANGE 930815

## (undated) DEVICE — SUTURE STRATAFIX 2-0 SH-1 SPIRAL L30 CM NONABSOR SXPL1B400

## (undated) DEVICE — DAVINCI XI DRAPE ARM 470015

## (undated) DEVICE — ESU HOLDER LAP INST DISP PURPLE LONG 330MM H-PRO-330

## (undated) DEVICE — Device

## (undated) DEVICE — DRAPE BREAST/CHEST 29420

## (undated) DEVICE — PACK LAP CHOLE SLC15LCFSD

## (undated) DEVICE — DAVINCI XI DRAPE COLUMN 470341

## (undated) DEVICE — ENDO SNARE EXACTO COLD 9MM LOOP 2.4MMX230CM 00711115

## (undated) DEVICE — DAVINCI XI NDL DRIVER MEGA SUTURE CUT 8MM 470309

## (undated) RX ORDER — FENTANYL CITRATE 0.05 MG/ML
INJECTION, SOLUTION INTRAMUSCULAR; INTRAVENOUS
Status: DISPENSED
Start: 2024-04-01

## (undated) RX ORDER — VECURONIUM BROMIDE 1 MG/ML
INJECTION, POWDER, LYOPHILIZED, FOR SOLUTION INTRAVENOUS
Status: DISPENSED
Start: 2024-04-01

## (undated) RX ORDER — FENTANYL CITRATE 50 UG/ML
INJECTION, SOLUTION INTRAMUSCULAR; INTRAVENOUS
Status: DISPENSED
Start: 2019-06-13

## (undated) RX ORDER — FENTANYL CITRATE 50 UG/ML
INJECTION, SOLUTION INTRAMUSCULAR; INTRAVENOUS
Status: DISPENSED
Start: 2023-12-28

## (undated) RX ORDER — HYDROMORPHONE HYDROCHLORIDE 1 MG/ML
INJECTION, SOLUTION INTRAMUSCULAR; INTRAVENOUS; SUBCUTANEOUS
Status: DISPENSED
Start: 2024-04-01

## (undated) RX ORDER — PROPOFOL 10 MG/ML
INJECTION, EMULSION INTRAVENOUS
Status: DISPENSED
Start: 2024-04-01

## (undated) RX ORDER — FENTANYL CITRATE 50 UG/ML
INJECTION, SOLUTION INTRAMUSCULAR; INTRAVENOUS
Status: DISPENSED
Start: 2019-05-23

## (undated) RX ORDER — FENTANYL CITRATE 50 UG/ML
INJECTION, SOLUTION INTRAMUSCULAR; INTRAVENOUS
Status: DISPENSED
Start: 2024-04-01

## (undated) RX ORDER — KETOROLAC TROMETHAMINE 30 MG/ML
INJECTION, SOLUTION INTRAMUSCULAR; INTRAVENOUS
Status: DISPENSED
Start: 2024-04-01

## (undated) RX ORDER — BUPIVACAINE HYDROCHLORIDE AND EPINEPHRINE 2.5; 5 MG/ML; UG/ML
INJECTION, SOLUTION EPIDURAL; INFILTRATION; INTRACAUDAL; PERINEURAL
Status: DISPENSED
Start: 2024-04-01